# Patient Record
Sex: MALE | Race: WHITE | NOT HISPANIC OR LATINO | Employment: FULL TIME | URBAN - METROPOLITAN AREA
[De-identification: names, ages, dates, MRNs, and addresses within clinical notes are randomized per-mention and may not be internally consistent; named-entity substitution may affect disease eponyms.]

---

## 2017-10-13 ENCOUNTER — TELEPHONE (OUTPATIENT)
Dept: FAMILY MEDICINE | Facility: CLINIC | Age: 52
End: 2017-10-13

## 2017-10-13 NOTE — TELEPHONE ENCOUNTER
----- Message from Jaelyn Alcantar sent at 10/13/2017  1:32 PM CDT -----  Wife (Yessica Cuello)is returning nurse's (Daly) call/please call back at [phone] or [phone] (cellphone)

## 2017-10-13 NOTE — TELEPHONE ENCOUNTER
Pt has an appointment on 10/23 at 3:30 pm.  Per Dr. Lugo pt needs to come at 4:45 pm. Dr. Lugo will not be available 10/23/17 from 330-4:30 pm.     LMOR for pt to call clinic back.

## 2018-03-19 ENCOUNTER — DOCUMENTATION ONLY (OUTPATIENT)
Dept: FAMILY MEDICINE | Facility: CLINIC | Age: 53
End: 2018-03-19

## 2018-03-19 NOTE — PROGRESS NOTES
Pre-Visit Chart Review  For Appointment Scheduled on 3-20-18    Health Maintenance Due   Topic Date Due    Foot Exam  03/16/1973    Eye Exam  03/16/1973    TETANUS VACCINE  03/16/1981    Hemoglobin A1c  09/21/2009    Lipid Panel  03/21/2010    Colonoscopy  03/16/2013    Influenza Vaccine  08/01/2017

## 2018-03-20 ENCOUNTER — OFFICE VISIT (OUTPATIENT)
Dept: FAMILY MEDICINE | Facility: CLINIC | Age: 53
End: 2018-03-20
Payer: COMMERCIAL

## 2018-03-20 ENCOUNTER — LAB VISIT (OUTPATIENT)
Dept: LAB | Facility: HOSPITAL | Age: 53
End: 2018-03-20
Attending: FAMILY MEDICINE
Payer: COMMERCIAL

## 2018-03-20 VITALS
TEMPERATURE: 98 F | HEIGHT: 72 IN | BODY MASS INDEX: 38.22 KG/M2 | DIASTOLIC BLOOD PRESSURE: 78 MMHG | WEIGHT: 282.19 LBS | SYSTOLIC BLOOD PRESSURE: 136 MMHG | HEART RATE: 66 BPM

## 2018-03-20 DIAGNOSIS — E78.5 HYPERLIPIDEMIA ASSOCIATED WITH TYPE 2 DIABETES MELLITUS: ICD-10-CM

## 2018-03-20 DIAGNOSIS — E11.9 DIABETIC EYE EXAM: ICD-10-CM

## 2018-03-20 DIAGNOSIS — E11.40 TYPE 2 DIABETES MELLITUS WITH DIABETIC NEUROPATHY, WITHOUT LONG-TERM CURRENT USE OF INSULIN: ICD-10-CM

## 2018-03-20 DIAGNOSIS — D50.9 IRON DEFICIENCY ANEMIA, UNSPECIFIED IRON DEFICIENCY ANEMIA TYPE: ICD-10-CM

## 2018-03-20 DIAGNOSIS — K21.9 GASTROESOPHAGEAL REFLUX DISEASE, ESOPHAGITIS PRESENCE NOT SPECIFIED: ICD-10-CM

## 2018-03-20 DIAGNOSIS — Z01.00 DIABETIC EYE EXAM: ICD-10-CM

## 2018-03-20 DIAGNOSIS — E11.69 HYPERLIPIDEMIA ASSOCIATED WITH TYPE 2 DIABETES MELLITUS: ICD-10-CM

## 2018-03-20 DIAGNOSIS — Z87.19 H/O PYLORIC STENOSIS: ICD-10-CM

## 2018-03-20 DIAGNOSIS — R06.81 WITNESSED EPISODE OF APNEA: ICD-10-CM

## 2018-03-20 DIAGNOSIS — R06.83 SNORING: ICD-10-CM

## 2018-03-20 DIAGNOSIS — Z12.5 PROSTATE CANCER SCREENING: ICD-10-CM

## 2018-03-20 DIAGNOSIS — I10 ESSENTIAL HYPERTENSION: ICD-10-CM

## 2018-03-20 DIAGNOSIS — J30.1 CHRONIC SEASONAL ALLERGIC RHINITIS DUE TO POLLEN: ICD-10-CM

## 2018-03-20 DIAGNOSIS — H10.10 ALLERGIC CONJUNCTIVITIS, UNSPECIFIED LATERALITY: Primary | ICD-10-CM

## 2018-03-20 PROBLEM — J30.9 ALLERGIC RHINITIS: Status: ACTIVE | Noted: 2018-03-20

## 2018-03-20 LAB
ALBUMIN SERPL BCP-MCNC: 4 G/DL
ALP SERPL-CCNC: 73 U/L
ALT SERPL W/O P-5'-P-CCNC: 29 U/L
ANION GAP SERPL CALC-SCNC: 12 MMOL/L
AST SERPL-CCNC: 23 U/L
BASOPHILS # BLD AUTO: 0.07 K/UL
BASOPHILS NFR BLD: 0.9 %
BILIRUB SERPL-MCNC: 0.5 MG/DL
BUN SERPL-MCNC: 25 MG/DL
CALCIUM SERPL-MCNC: 9.7 MG/DL
CHLORIDE SERPL-SCNC: 94 MMOL/L
CHOLEST SERPL-MCNC: 266 MG/DL
CHOLEST/HDLC SERPL: 6.5 {RATIO}
CO2 SERPL-SCNC: 32 MMOL/L
COMPLEXED PSA SERPL-MCNC: 0.76 NG/ML
CREAT SERPL-MCNC: 1.4 MG/DL
DIFFERENTIAL METHOD: ABNORMAL
EOSINOPHIL # BLD AUTO: 0.4 K/UL
EOSINOPHIL NFR BLD: 4.6 %
ERYTHROCYTE [DISTWIDTH] IN BLOOD BY AUTOMATED COUNT: 14 %
EST. GFR  (AFRICAN AMERICAN): >60 ML/MIN/1.73 M^2
EST. GFR  (NON AFRICAN AMERICAN): 56.2 ML/MIN/1.73 M^2
ESTIMATED AVG GLUCOSE: 200 MG/DL
FERRITIN SERPL-MCNC: 49 NG/ML
GLUCOSE SERPL-MCNC: 249 MG/DL
HBA1C MFR BLD HPLC: 8.6 %
HCT VFR BLD AUTO: 39 %
HDLC SERPL-MCNC: 41 MG/DL
HDLC SERPL: 15.4 %
HGB BLD-MCNC: 13.1 G/DL
IMM GRANULOCYTES # BLD AUTO: 0.03 K/UL
IMM GRANULOCYTES NFR BLD AUTO: 0.4 %
IRON SERPL-MCNC: 74 UG/DL
LDLC SERPL CALC-MCNC: ABNORMAL MG/DL
LYMPHOCYTES # BLD AUTO: 2.2 K/UL
LYMPHOCYTES NFR BLD: 28 %
MCH RBC QN AUTO: 29.6 PG
MCHC RBC AUTO-ENTMCNC: 33.6 G/DL
MCV RBC AUTO: 88 FL
MONOCYTES # BLD AUTO: 0.5 K/UL
MONOCYTES NFR BLD: 6.5 %
NEUTROPHILS # BLD AUTO: 4.8 K/UL
NEUTROPHILS NFR BLD: 59.6 %
NONHDLC SERPL-MCNC: 225 MG/DL
NRBC BLD-RTO: 0 /100 WBC
PLATELET # BLD AUTO: 236 K/UL
PMV BLD AUTO: 11.4 FL
POTASSIUM SERPL-SCNC: 3.6 MMOL/L
PROT SERPL-MCNC: 7.6 G/DL
RBC # BLD AUTO: 4.43 M/UL
SATURATED IRON: 14 %
SODIUM SERPL-SCNC: 138 MMOL/L
TOTAL IRON BINDING CAPACITY: 522 UG/DL
TRANSFERRIN SERPL-MCNC: 353 MG/DL
TRIGL SERPL-MCNC: 487 MG/DL
WBC # BLD AUTO: 8.01 K/UL

## 2018-03-20 PROCEDURE — 36415 COLL VENOUS BLD VENIPUNCTURE: CPT | Mod: PO

## 2018-03-20 PROCEDURE — 82728 ASSAY OF FERRITIN: CPT

## 2018-03-20 PROCEDURE — 83036 HEMOGLOBIN GLYCOSYLATED A1C: CPT

## 2018-03-20 PROCEDURE — 83540 ASSAY OF IRON: CPT

## 2018-03-20 PROCEDURE — 3075F SYST BP GE 130 - 139MM HG: CPT | Mod: CPTII,S$GLB,, | Performed by: FAMILY MEDICINE

## 2018-03-20 PROCEDURE — 80053 COMPREHEN METABOLIC PANEL: CPT

## 2018-03-20 PROCEDURE — 85025 COMPLETE CBC W/AUTO DIFF WBC: CPT

## 2018-03-20 PROCEDURE — 3045F PR MOST RECENT HEMOGLOBIN A1C LEVEL 7.0-9.0%: CPT | Mod: CPTII,S$GLB,, | Performed by: FAMILY MEDICINE

## 2018-03-20 PROCEDURE — 84153 ASSAY OF PSA TOTAL: CPT

## 2018-03-20 PROCEDURE — 99999 PR PBB SHADOW E&M-EST. PATIENT-LVL III: CPT | Mod: PBBFAC,,, | Performed by: FAMILY MEDICINE

## 2018-03-20 PROCEDURE — 99214 OFFICE O/P EST MOD 30 MIN: CPT | Mod: S$GLB,,, | Performed by: FAMILY MEDICINE

## 2018-03-20 PROCEDURE — 3078F DIAST BP <80 MM HG: CPT | Mod: CPTII,S$GLB,, | Performed by: FAMILY MEDICINE

## 2018-03-20 PROCEDURE — 80061 LIPID PANEL: CPT

## 2018-03-20 RX ORDER — GLIMEPIRIDE 4 MG/1
TABLET ORAL
COMMUNITY
Start: 2018-02-16 | End: 2018-03-20

## 2018-03-20 RX ORDER — LISINOPRIL AND HYDROCHLOROTHIAZIDE 20; 25 MG/1; MG/1
1 TABLET ORAL DAILY
Qty: 90 TABLET | Refills: 3 | Status: SHIPPED | OUTPATIENT
Start: 2018-03-20 | End: 2018-12-18 | Stop reason: SDUPTHER

## 2018-03-20 RX ORDER — AZELASTINE HYDROCHLORIDE 0.5 MG/ML
1 SOLUTION/ DROPS OPHTHALMIC 2 TIMES DAILY
Qty: 4 ML | Refills: 11 | Status: SHIPPED | OUTPATIENT
Start: 2018-03-20 | End: 2018-10-08

## 2018-03-20 RX ORDER — METFORMIN HYDROCHLORIDE 1000 MG/1
1000 TABLET ORAL 2 TIMES DAILY WITH MEALS
COMMUNITY
End: 2018-03-20 | Stop reason: SDUPTHER

## 2018-03-20 RX ORDER — FERROUS SULFATE 325(65) MG
325 TABLET ORAL
COMMUNITY

## 2018-03-20 RX ORDER — LORATADINE 10 MG/1
10 TABLET ORAL DAILY
COMMUNITY

## 2018-03-20 RX ORDER — GABAPENTIN 300 MG/1
CAPSULE ORAL
Qty: 90 CAPSULE | Refills: 2 | Status: SHIPPED | OUTPATIENT
Start: 2018-03-20 | End: 2018-04-17

## 2018-03-20 RX ORDER — LANCETS
EACH MISCELLANEOUS
Qty: 100 EACH | Refills: 3 | Status: SHIPPED | OUTPATIENT
Start: 2018-03-20 | End: 2019-09-04

## 2018-03-20 RX ORDER — MULTIVITAMIN
1 TABLET ORAL DAILY
COMMUNITY

## 2018-03-20 RX ORDER — OMEPRAZOLE 20 MG/1
20 CAPSULE, DELAYED RELEASE ORAL DAILY
COMMUNITY
End: 2018-08-29 | Stop reason: SDUPTHER

## 2018-03-20 RX ORDER — IBUPROFEN 100 MG/5ML
1000 SUSPENSION, ORAL (FINAL DOSE FORM) ORAL DAILY
COMMUNITY
End: 2021-12-08

## 2018-03-20 RX ORDER — METFORMIN HYDROCHLORIDE 1000 MG/1
1000 TABLET ORAL 2 TIMES DAILY WITH MEALS
Qty: 180 TABLET | Refills: 3 | Status: SHIPPED | OUTPATIENT
Start: 2018-03-20 | End: 2018-09-05

## 2018-03-20 RX ORDER — ATENOLOL AND CHLORTHALIDONE TABLET 100; 25 MG/1; MG/1
1 TABLET ORAL DAILY
COMMUNITY
Start: 2018-02-16 | End: 2018-03-20

## 2018-03-20 RX ORDER — ASPIRIN 81 MG/1
81 TABLET ORAL DAILY
COMMUNITY
End: 2018-10-08

## 2018-03-20 RX ORDER — TRIAMCINOLONE ACETONIDE 55 UG/1
2 SPRAY, METERED NASAL DAILY
Qty: 16.5 G | Status: SHIPPED | OUTPATIENT
Start: 2018-03-20 | End: 2018-04-17 | Stop reason: ALTCHOICE

## 2018-03-20 RX ORDER — DEXTROSE 4 G
TABLET,CHEWABLE ORAL
Qty: 1 EACH | Refills: 0 | Status: SHIPPED | OUTPATIENT
Start: 2018-03-20 | End: 2018-09-05

## 2018-03-20 NOTE — PROGRESS NOTES
"Subjective:       Patient ID: Alfred Cuello is a 53 y.o. male.    Chief Complaint: Establish Care and Insomnia    Patient Active Problem List   Diagnosis    DM type 2 (diabetes mellitus, type 2)    Iron deficiency anemia    Essential hypertension    Allergic rhinitis    GERD (gastroesophageal reflux disease)    H/O pyloric stenosis    Hyperlipidemia associated with type 2 diabetes mellitus   Has not seen PCP in 2 years.  Has not been checking BS.  Has h/o neuropathy in feet with worsening sx of burning, crawling, pain crys after prolonged walking.  Tried Metanx but too expensive.      C/o chronic allergies with filmy eyes, congestion, stuffy nose, nose bleeds, PND. Not responding to claritin.  Tried flonase but had bad taste and made him feel "weird"    HPI  Review of Systems   Constitutional: Negative for fatigue and unexpected weight change.   Respiratory: Negative for chest tightness and shortness of breath.    Cardiovascular: Negative for chest pain, palpitations and leg swelling.   Gastrointestinal: Negative for abdominal pain, blood in stool, constipation and diarrhea.   Musculoskeletal: Negative for arthralgias.   Neurological: Negative for dizziness, syncope, light-headedness and headaches.       Objective:      Physical Exam   Constitutional: He is oriented to person, place, and time. He appears well-developed and well-nourished.   Cardiovascular: Normal rate, regular rhythm and normal heart sounds.    Pulmonary/Chest: Effort normal and breath sounds normal.   Musculoskeletal: He exhibits no edema.   Neurological: He is alert and oriented to person, place, and time.   Skin: Skin is warm and dry.   Psychiatric: He has a normal mood and affect.   Nursing note and vitals reviewed.      Assessment:       1. Allergic conjunctivitis, unspecified laterality    2. Type 2 diabetes mellitus with diabetic neuropathy, without long-term current use of insulin    3. Iron deficiency anemia, unspecified iron " deficiency anemia type    4. Essential hypertension    5. Chronic seasonal allergic rhinitis due to pollen    6. Gastroesophageal reflux disease, esophagitis presence not specified    7. H/O pyloric stenosis    8. Prostate cancer screening    9. Hyperlipidemia associated with type 2 diabetes mellitus    10. Diabetic eye exam    11. Witnessed episode of apnea    12. Snoring        Plan:       1. Type 2 diabetes mellitus with diabetic neuropathy, without long-term current use of insulin  Stable condition.  Continue current medications.  Will adjust based on lab findings or if condition changes.    - CBC auto differential; Future  - Comprehensive metabolic panel; Future  - Microalbumin/creatinine urine ratio; Future  - Hemoglobin A1c; Future  - metFORMIN (GLUCOPHAGE) 1000 MG tablet; Take 1 tablet (1,000 mg total) by mouth 2 (two) times daily with meals.  Dispense: 180 tablet; Refill: 3  - blood-glucose meter (CONTOUR METER) Misc; 1 meter  Dispense: 1 each; Refill: 0  - blood sugar diagnostic Strp; Take 1 x day testing  Dispense: 100 strip; Refill: 3  - lancets Misc; Take 1 x day  Dispense: 100 each; Refill: 3  Add  - gabapentin (NEURONTIN) 300 MG capsule; 1 po qhs x 1week, then 1 po bid x 1 week, then 1 po tid maintenance  Dispense: 90 capsule; Refill: 2    2. Iron deficiency anemia, unspecified iron deficiency anemia type  Screen and treat as indicated:    - Iron and TIBC; Future  - Ferritin; Future    3. Essential hypertension  Controlled on current medications.  Continue current medications.  D/c tenoretic for : (due to preferred AcEi)  - lisinopril-hydrochlorothiazide (PRINZIDE,ZESTORETIC) 20-25 mg Tab; Take 1 tablet by mouth once daily.  Dispense: 90 tablet; Refill: 3    4. Chronic seasonal allergic rhinitis due to pollen  Recommend otc non-sedating antihistamine such as Loratadine and/or steroid nasal spray such as Flonase as directed and as needed.  Please return to clinic if symptoms persist after these  interventions.    - triamcinolone (NASACORT) 55 mcg nasal inhaler; 2 sprays by Nasal route once daily.  Dispense: 16.5 g; Refill: prn    5. Gastroesophageal reflux disease, esophagitis presence not specified  Counseled patient on prevention of reflux with changes in diet and behavior.  I recommended avoidance of greasy and spicy foods, caffeine and eating within 3 hours of bedtime.  I counseled the patient to avoid eating large meals and instead eating more frequent small meals.  I also recommended weight loss and elevation of the head of the bed by 6 inches.  If symptoms persist after these changes medication may be needed to control GERD.        6. H/O pyloric stenosis  Noted    7. Allergic conjunctivitis, unspecified laterality  Add  - azelastine (OPTIVAR) 0.05 % ophthalmic solution; Place 1 drop into both eyes 2 (two) times daily.  Dispense: 4 mL; Refill: 11    8. Prostate cancer screening  Discussed benefits, risks and limitations of PSA testing and current USPSTF guidelines.  Patient expressed verbal understanding and wished to pursue screening.    - PSA, Screening; Future    9. Hyperlipidemia associated with type 2 diabetes mellitus  Stable condition.  Continue current medications.  Will adjust based on lab findings or if condition changes.    - Lipid panel; Future    10. Diabetic eye exam  Refer for eval and treat  - Ambulatory referral to Optometry    11. Witnessed episode of apnea  Refer for eval and treat  - Polysomnogram (CPAP will be added if patient meets diagnostic criteria.); Future    12. Snoring  Refer for eval and treat  - Polysomnogram (CPAP will be added if patient meets diagnostic criteria.); Future    Reeval 4 months with me and 4 weeks PA Carlos

## 2018-03-21 ENCOUNTER — TELEPHONE (OUTPATIENT)
Dept: SLEEP MEDICINE | Facility: HOSPITAL | Age: 53
End: 2018-03-21

## 2018-03-22 DIAGNOSIS — Z12.11 COLON CANCER SCREENING: ICD-10-CM

## 2018-03-23 DIAGNOSIS — E11.40 TYPE 2 DIABETES MELLITUS WITH DIABETIC NEUROPATHY, WITHOUT LONG-TERM CURRENT USE OF INSULIN: Primary | ICD-10-CM

## 2018-03-23 DIAGNOSIS — E11.69 HYPERLIPIDEMIA ASSOCIATED WITH TYPE 2 DIABETES MELLITUS: ICD-10-CM

## 2018-03-23 DIAGNOSIS — E78.5 HYPERLIPIDEMIA ASSOCIATED WITH TYPE 2 DIABETES MELLITUS: ICD-10-CM

## 2018-03-23 RX ORDER — GLIPIZIDE 5 MG/1
5 TABLET, FILM COATED, EXTENDED RELEASE ORAL
Qty: 90 TABLET | Refills: 3 | Status: SHIPPED | OUTPATIENT
Start: 2018-03-23 | End: 2018-04-17

## 2018-03-23 RX ORDER — ATORVASTATIN CALCIUM 40 MG/1
40 TABLET, FILM COATED ORAL DAILY
Qty: 90 TABLET | Refills: 3 | Status: SHIPPED | OUTPATIENT
Start: 2018-03-23 | End: 2019-03-23 | Stop reason: SDUPTHER

## 2018-04-10 ENCOUNTER — OFFICE VISIT (OUTPATIENT)
Dept: OPTOMETRY | Facility: CLINIC | Age: 53
End: 2018-04-10
Payer: COMMERCIAL

## 2018-04-10 DIAGNOSIS — H25.13 NUCLEAR SCLEROSIS, BILATERAL: ICD-10-CM

## 2018-04-10 DIAGNOSIS — E11.9 DIABETES MELLITUS WITHOUT OPHTHALMIC MANIFESTATIONS: Primary | ICD-10-CM

## 2018-04-10 DIAGNOSIS — H52.7 REFRACTIVE ERROR: ICD-10-CM

## 2018-04-10 DIAGNOSIS — H04.123 DRY EYE SYNDROME, BILATERAL: ICD-10-CM

## 2018-04-10 PROCEDURE — 99999 PR PBB SHADOW E&M-EST. PATIENT-LVL II: CPT | Mod: PBBFAC,,, | Performed by: OPTOMETRIST

## 2018-04-10 PROCEDURE — 92004 COMPRE OPH EXAM NEW PT 1/>: CPT | Mod: S$GLB,,, | Performed by: OPTOMETRIST

## 2018-04-10 RX ORDER — ZOSTER VACCINE RECOMBINANT, ADJUVANTED 50 MCG/0.5
KIT INTRAMUSCULAR
Refills: 0 | COMMUNITY
Start: 2018-03-20 | End: 2019-09-04

## 2018-04-10 RX ORDER — TETANUS TOXOID, REDUCED DIPHTHERIA TOXOID AND ACELLULAR PERTUSSIS VACCINE, ADSORBED 5; 2.5; 8; 8; 2.5 [IU]/.5ML; [IU]/.5ML; UG/.5ML; UG/.5ML; UG/.5ML
SUSPENSION INTRAMUSCULAR
Refills: 0 | COMMUNITY
Start: 2018-03-20 | End: 2019-09-04

## 2018-04-10 NOTE — LETTER
April 10, 2018      Nuria Man MD  [address]  [place] [address]           [facility] - Opt[facility]  [address] [place] [address] Drive [address]  [facility] [address]  Phone: [phone]          Patient: Alfred Cuello   MR Number: [id number]   YOB: 1965   Date of Visit: 4/10/2018       Dear Dr. Nuria Man:    Thank you for referring Alfred Cuello to me for evaluation. Attached you will find relevant portions of my assessment and plan of care.    If you have questions, please do not hesitate to call me. I look forward to following Alfred Cuello along with you.    Sincerely,    Carl Yost, OD    Enclosure  CC:  No Recipients    If you would like to receive this communication electronically, please contact [email or web address] or [phone] to request more information on [facility] access.    For providers and/or their staff who would like to refer a patient to [facility], please contact us through our one-stop-shop provider referral line, Tod Jacob, at [phone].    If you feel you have received this communication in error or would no longer like to receive these types of communications, please e-mail [email or web address]

## 2018-04-10 NOTE — PROGRESS NOTES
HPI     Presenting Complaint: Pt her today for yearly diabetic eye exam and   refraction.     Hemoglobin A1C       Date                     Value               Ref Range             Status                03/20/2018               8.6 (H)             4.0 - 5.6 %           Final                 03/21/2009               8.6 (H)             4.0 - 6.2 %           Final                 10/06/2008               12.5 (H)            4.0 - 6.2 %           Final            ----------     Pt states distance vision has been blurry, Uses OTC readers for small   print.     Ophthalmic medication / drops: Optivar 1 drop both eyes 2 x day    (-) headaches  (-) diplopia   (-) flashes / (-) floaters      Last edited by Carl Yost, OD on 4/10/2018  4:04 PM. (History)            Assessment /Plan     For exam results, see Encounter Report.    Diabetes mellitus without ophthalmic manifestations    Nuclear sclerosis, bilateral    Refractive error    Dry eye syndrome, bilateral      DM type 2 w/o ocular retinopathy OU. Discussed possible ocular affects of uncontrolled blood sugar with patient. Recommended continued strong blood sugar control and continued care with PCP. Monitor yearly.     Mild NS OU. Discussed possible ocular affects of cataracts. Acceptable BCVA OU. Discussed treatment options. Surgery not recommended at this time. Monitor yearly.     Discussed spec options, pt prefers to try OTC readers first, recommend OTC +1.75 for distance (20/25 OU) and OTC +4.00 for near. Return prn for spec Rx.    JOSE OU. D/c optivar and visine. Start OTC Systane Balance bid-qid OU. Discussed ocular affects of dry eyes. Discussed chronicity of JOSE. RTC if symptoms not alleviated by continued use of artificial tears.       RTC in 1 year for comprehensive eye exam, or sooner prn.

## 2018-04-17 ENCOUNTER — DOCUMENTATION ONLY (OUTPATIENT)
Dept: FAMILY MEDICINE | Facility: CLINIC | Age: 53
End: 2018-04-17

## 2018-04-17 ENCOUNTER — OFFICE VISIT (OUTPATIENT)
Dept: FAMILY MEDICINE | Facility: CLINIC | Age: 53
End: 2018-04-17
Payer: COMMERCIAL

## 2018-04-17 VITALS
HEART RATE: 88 BPM | HEIGHT: 72 IN | BODY MASS INDEX: 38.91 KG/M2 | TEMPERATURE: 99 F | SYSTOLIC BLOOD PRESSURE: 126 MMHG | WEIGHT: 287.25 LBS | DIASTOLIC BLOOD PRESSURE: 77 MMHG

## 2018-04-17 DIAGNOSIS — E11.42 DIABETIC POLYNEUROPATHY ASSOCIATED WITH TYPE 2 DIABETES MELLITUS: ICD-10-CM

## 2018-04-17 DIAGNOSIS — E11.69 HYPERLIPIDEMIA ASSOCIATED WITH TYPE 2 DIABETES MELLITUS: ICD-10-CM

## 2018-04-17 DIAGNOSIS — I15.2 HYPERTENSION ASSOCIATED WITH DIABETES: ICD-10-CM

## 2018-04-17 DIAGNOSIS — E78.5 HYPERLIPIDEMIA ASSOCIATED WITH TYPE 2 DIABETES MELLITUS: ICD-10-CM

## 2018-04-17 DIAGNOSIS — E11.59 HYPERTENSION ASSOCIATED WITH DIABETES: ICD-10-CM

## 2018-04-17 DIAGNOSIS — Z12.11 ENCOUNTER FOR SCREENING COLONOSCOPY: ICD-10-CM

## 2018-04-17 DIAGNOSIS — N18.30 CKD (CHRONIC KIDNEY DISEASE) STAGE 3, GFR 30-59 ML/MIN: ICD-10-CM

## 2018-04-17 DIAGNOSIS — G47.33 OSA (OBSTRUCTIVE SLEEP APNEA): Primary | ICD-10-CM

## 2018-04-17 DIAGNOSIS — E11.65 UNCONTROLLED TYPE 2 DIABETES MELLITUS WITH HYPERGLYCEMIA, WITHOUT LONG-TERM CURRENT USE OF INSULIN: Primary | ICD-10-CM

## 2018-04-17 PROCEDURE — 3045F PR MOST RECENT HEMOGLOBIN A1C LEVEL 7.0-9.0%: CPT | Mod: CPTII,S$GLB,, | Performed by: PHYSICIAN ASSISTANT

## 2018-04-17 PROCEDURE — 99999 PR PBB SHADOW E&M-EST. PATIENT-LVL V: CPT | Mod: PBBFAC,,, | Performed by: PHYSICIAN ASSISTANT

## 2018-04-17 PROCEDURE — 3074F SYST BP LT 130 MM HG: CPT | Mod: CPTII,S$GLB,, | Performed by: PHYSICIAN ASSISTANT

## 2018-04-17 PROCEDURE — 99214 OFFICE O/P EST MOD 30 MIN: CPT | Mod: S$GLB,,, | Performed by: PHYSICIAN ASSISTANT

## 2018-04-17 PROCEDURE — 3078F DIAST BP <80 MM HG: CPT | Mod: CPTII,S$GLB,, | Performed by: PHYSICIAN ASSISTANT

## 2018-04-17 RX ORDER — GABAPENTIN 300 MG/1
900 CAPSULE ORAL 3 TIMES DAILY
Qty: 270 CAPSULE | Refills: 2 | Status: SHIPPED | OUTPATIENT
Start: 2018-04-17 | End: 2018-07-18 | Stop reason: SDUPTHER

## 2018-04-17 RX ORDER — GLIPIZIDE 10 MG/1
10 TABLET, FILM COATED, EXTENDED RELEASE ORAL
Qty: 30 TABLET | Refills: 2 | Status: SHIPPED | OUTPATIENT
Start: 2018-04-17 | End: 2018-08-07 | Stop reason: SDUPTHER

## 2018-04-17 NOTE — PROGRESS NOTES
Pre-Visit Chart Review  For Appointment Scheduled on 4/17/2018    Health Maintenance Due   Topic Date Due    Foot Exam  03/16/1973    TETANUS VACCINE  03/16/1981    Pneumococcal PPSV23 (Medium Risk) (1) 03/16/1981    Colonoscopy  03/16/2013    Influenza Vaccine  08/01/2017

## 2018-04-17 NOTE — PROGRESS NOTES
Home sleep study did show significant RAFAEL and a trial of CPAP was recommended.  I have placed the order.  (please schedule for patient)

## 2018-04-17 NOTE — PROGRESS NOTES
Subjective:       Patient ID: Alfred Cuello is a 53 y.o. male.    Chief Complaint: Follow-up    Mr. Cuello is a 53 year old male who presents to clinic for follow up on hypertension and type 2 diabetes. Since his last visit he states gabapentin has helped only some with pins and needle sensation and burning pain in the legs. He has tolerated increasing dose to 900 mg BID and would like to increase to TID. Overall, he is tolerating the medication. He states morning fasting BG has remained elevated 180-220 despite addition of glipizide. He is open to seeing the diabetic educator. He reports occasional lower extremity edema. We reviewed his labs in detail including uncontrolled diabetes with HgbA1c 8 %, uncontrolled hyperlipidemia with trig >400, and mild renal insufficiency. He is due for foot exam today. He is due for colonoscopy.       Review of Systems   Constitutional: Negative for chills, fatigue and fever.   Eyes: Negative for visual disturbance.   Respiratory: Negative for cough, shortness of breath and wheezing.    Cardiovascular: Positive for leg swelling. Negative for chest pain and palpitations.   Gastrointestinal: Negative for abdominal pain, constipation, diarrhea, nausea and vomiting.   Musculoskeletal: Negative for arthralgias and myalgias.   Neurological: Positive for numbness. Negative for dizziness, syncope and headaches.        Burning sensation feet   Hematological: Negative for adenopathy.       Objective:      Vitals:    04/17/18 1706   BP: 126/77   Pulse: 88   Temp:      Physical Exam   Constitutional: He is oriented to person, place, and time. He appears well-developed.   Obese body habitus.   HENT:   Head: Normocephalic and atraumatic.   Eyes: Conjunctivae and EOM are normal. Pupils are equal, round, and reactive to light.   Cardiovascular: Normal rate, regular rhythm, normal heart sounds and intact distal pulses.    Pulses:       Dorsalis pedis pulses are 2+ on the right side, and 2+ on the  left side.        Posterior tibial pulses are 2+ on the right side, and 2+ on the left side.   Pulmonary/Chest: Effort normal and breath sounds normal.   Musculoskeletal:        Right foot: There is normal range of motion and no deformity.        Left foot: There is normal range of motion and no deformity.   Feet:   Right Foot:   Protective Sensation: 5 sites tested. 1 site sensed.  Skin Integrity: Negative for ulcer, blister or skin breakdown.   Left Foot:   Protective Sensation: 5 sites tested. 0 sites sensed.   Skin Integrity: Negative for ulcer, blister or skin breakdown.   Neurological: He is alert and oriented to person, place, and time.   Skin: Skin is warm and dry.   Psychiatric: He has a normal mood and affect.       Assessment:       1. Uncontrolled type 2 diabetes mellitus with hyperglycemia, without long-term current use of insulin    2. Diabetic polyneuropathy associated with type 2 diabetes mellitus    3. Hypertension associated with diabetes    4. Hyperlipidemia associated with type 2 diabetes mellitus    5. CKD (chronic kidney disease) stage 3, GFR 30-59 ml/min    6. Obesity, Class II, BMI 35.0-39.9, with comorbidity (see actual BMI)    7. Encounter for screening colonoscopy        Plan:       Uncontrolled type 2 diabetes mellitus with hyperglycemia, without long-term current use of insulin  -     glipiZIDE (GLUCOTROL) 10 MG TR24; Take 1 tablet (10 mg total) by mouth daily with breakfast.  Dispense: 30 tablet; Refill: 2  - Continue metformin as prescribed  -     Ambulatory Referral to Diabetes Education  - Discussed foot care today    Diabetic polyneuropathy associated with type 2 diabetes mellitus  -     Increase gabapentin (NEURONTIN) 300 MG capsule; Take 3 capsules (900 mg total) by mouth 3 (three) times daily.  Dispense: 270 capsule; Refill: 2. Monitor for side effects including dizziness/drowsiness/instability    Hypertension associated with diabetes        - Controlled, continue  lisinopril/hctz    Hyperlipidemia associated with type 2 diabetes mellitus        - Uncontrolled, continue lipitor 40 mg daily. Repeat lipids in 3 months and labs to be ordered at f/u visit.    CKD (chronic kidney disease) stage 3, GFR 30-59 ml/min        - Stable, avoid NSAIDs, continue to follow     Obesity, Class II, BMI 35.0-39.9, with comorbidity (see actual BMI)         - Encouraged patient to increase routine exercise     Encounter for screening colonoscopy  -     Ambulatory referral to Gastroenterology    Patient readiness: acceptance and barriers:none    During the course of the visit the patient was educated and counseled about the following:     Diabetes:  Discussed foot care.  Hypertension:   Medication: no change.  Obesity:   Regular aerobic exercise program discussed.    Goals: Diabetes: Maintain Hemoglobin A1C below 7, Hypertension: Reduce Blood Pressure and Obesity: Reduce calorie intake and BMI    Did patient meet goals/outcomes: No    The following self management tools provided: declined    Patient Instructions (the written plan) was given to the patient/family.     Time spent with patient: 30 minutes    Follow up in 4 weeks with me and 3 months with Dr. Man

## 2018-04-17 NOTE — Clinical Note
Please provide patient with information to schedule his colonoscopy via phone or [facility] message. Referral placed.

## 2018-04-20 PROBLEM — I12.9 HYPERTENSION ASSOCIATED WITH CHRONIC KIDNEY DISEASE DUE TO TYPE 2 DIABETES MELLITUS: Status: ACTIVE | Noted: 2018-03-20

## 2018-04-20 PROBLEM — I15.2 HYPERTENSION ASSOCIATED WITH DIABETES: Status: ACTIVE | Noted: 2018-03-20

## 2018-04-20 PROBLEM — E11.22 HYPERTENSION ASSOCIATED WITH CHRONIC KIDNEY DISEASE DUE TO TYPE 2 DIABETES MELLITUS: Status: ACTIVE | Noted: 2018-03-20

## 2018-04-20 PROBLEM — E11.59 HYPERTENSION ASSOCIATED WITH DIABETES: Status: ACTIVE | Noted: 2018-03-20

## 2018-05-01 ENCOUNTER — TELEPHONE (OUTPATIENT)
Dept: FAMILY MEDICINE | Facility: CLINIC | Age: 53
End: 2018-05-01

## 2018-05-01 ENCOUNTER — PATIENT MESSAGE (OUTPATIENT)
Dept: FAMILY MEDICINE | Facility: CLINIC | Age: 53
End: 2018-05-01

## 2018-05-01 NOTE — TELEPHONE ENCOUNTER
No he should not stop the baby aspirin.  His kidney impairment is not severe and this small dose of aspirin is not likely to significantly affect kidney function.  However aspirin is very effective at preventing heart attack and stroke

## 2018-05-01 NOTE — TELEPHONE ENCOUNTER
Spoke with patients wife. She knew her husbands test results and wanted to know if he should stop his baby asa, due to his impaired kidney function. I told her I would have to ask but I could not call her back, since there is not IOC on his chart. She understood and said she would get him to take care of that.

## 2018-05-01 NOTE — TELEPHONE ENCOUNTER
----- Message from Yadiel Amador sent at 5/1/2018  9:09 AM CDT -----  Contact: pt's wife Yessica Juan is calling to see if she can speak with a nurse about pt's medications   Call Back#[phone]  Thanks

## 2018-05-10 ENCOUNTER — PATIENT MESSAGE (OUTPATIENT)
Dept: FAMILY MEDICINE | Facility: CLINIC | Age: 53
End: 2018-05-10

## 2018-05-11 ENCOUNTER — TELEPHONE (OUTPATIENT)
Dept: FAMILY MEDICINE | Facility: CLINIC | Age: 53
End: 2018-05-11

## 2018-05-11 DIAGNOSIS — G47.33 OSA ON CPAP: Primary | ICD-10-CM

## 2018-05-11 NOTE — TELEPHONE ENCOUNTER
Notify patient:  Sleep study showed RAFAEL and needs CPAP. Order placed for home machine. Please process

## 2018-05-14 ENCOUNTER — DOCUMENTATION ONLY (OUTPATIENT)
Dept: FAMILY MEDICINE | Facility: CLINIC | Age: 53
End: 2018-05-14

## 2018-05-14 ENCOUNTER — OFFICE VISIT (OUTPATIENT)
Dept: FAMILY MEDICINE | Facility: CLINIC | Age: 53
End: 2018-05-14
Payer: COMMERCIAL

## 2018-05-14 VITALS
TEMPERATURE: 99 F | BODY MASS INDEX: 37.93 KG/M2 | SYSTOLIC BLOOD PRESSURE: 133 MMHG | HEART RATE: 100 BPM | DIASTOLIC BLOOD PRESSURE: 78 MMHG | HEIGHT: 72 IN | WEIGHT: 280 LBS

## 2018-05-14 DIAGNOSIS — I15.2 HYPERTENSION ASSOCIATED WITH DIABETES: ICD-10-CM

## 2018-05-14 DIAGNOSIS — E11.40 TYPE 2 DIABETES MELLITUS WITH DIABETIC NEUROPATHY, WITHOUT LONG-TERM CURRENT USE OF INSULIN: Primary | ICD-10-CM

## 2018-05-14 DIAGNOSIS — R21 RASH OF HANDS: ICD-10-CM

## 2018-05-14 DIAGNOSIS — E11.59 HYPERTENSION ASSOCIATED WITH DIABETES: ICD-10-CM

## 2018-05-14 PROCEDURE — 99214 OFFICE O/P EST MOD 30 MIN: CPT | Mod: S$GLB,,, | Performed by: PHYSICIAN ASSISTANT

## 2018-05-14 PROCEDURE — 3008F BODY MASS INDEX DOCD: CPT | Mod: CPTII,S$GLB,, | Performed by: PHYSICIAN ASSISTANT

## 2018-05-14 PROCEDURE — 99999 PR PBB SHADOW E&M-EST. PATIENT-LVL V: CPT | Mod: PBBFAC,,, | Performed by: PHYSICIAN ASSISTANT

## 2018-05-14 PROCEDURE — 3045F PR MOST RECENT HEMOGLOBIN A1C LEVEL 7.0-9.0%: CPT | Mod: CPTII,S$GLB,, | Performed by: PHYSICIAN ASSISTANT

## 2018-05-14 PROCEDURE — 3078F DIAST BP <80 MM HG: CPT | Mod: CPTII,S$GLB,, | Performed by: PHYSICIAN ASSISTANT

## 2018-05-14 PROCEDURE — 3075F SYST BP GE 130 - 139MM HG: CPT | Mod: CPTII,S$GLB,, | Performed by: PHYSICIAN ASSISTANT

## 2018-05-14 RX ORDER — AMITRIPTYLINE HYDROCHLORIDE 25 MG/1
25 TABLET, FILM COATED ORAL NIGHTLY
Qty: 30 TABLET | Refills: 1 | Status: SHIPPED | OUTPATIENT
Start: 2018-05-14 | End: 2018-09-05

## 2018-05-14 NOTE — PROGRESS NOTES
"Subjective:       Patient ID: Alfred Cuello is a 53 y.o. male.    Chief Complaint: Follow-up    Mr. Cuello is a 53 year old male who presents to clinic for follow up. Since his last visit he reports only minimal improvement with increasing gabapentin to 900 mg TID. He denies side effects, but states he has difficulty remembering to take the medication during the day. He wants neuropathic pain "to be completely gone." He states he has not been motivated to follow diabetic diet and his blood sugar has reflected that. He is open to seeing the diabetic educator. He is requesting referral to podiatry. He complains of rash on the palms of his hands, which he had several years ago that required multiple specialist evaluations and biopsies with no official diagnosis. He is quite distressed this rash is returning. He complains of poor mood given his health problems, but denies SI/HI. Sleep study confirmed sleep apnea.       Review of Systems   Constitutional: Positive for fatigue. Negative for chills and fever.   Eyes: Negative for visual disturbance.   Respiratory: Negative for cough, shortness of breath and wheezing.    Cardiovascular: Negative for chest pain, palpitations and leg swelling.   Gastrointestinal: Negative for abdominal pain, constipation, diarrhea, nausea and vomiting.   Endocrine: Positive for polydipsia and polyphagia. Negative for polyuria.   Musculoskeletal: Negative for arthralgias and myalgias.   Skin: Negative for pallor.   Neurological: Positive for dizziness. Negative for tremors, seizures, syncope, speech difficulty, weakness and headaches.        Burning pain in the feet   Hematological: Negative for adenopathy.   Psychiatric/Behavioral: Positive for dysphoric mood and sleep disturbance. Negative for confusion and suicidal ideas. The patient is nervous/anxious.        Objective:      Vitals:    05/14/18 1808   BP: 133/78   Pulse: 100   Temp: 98.9 °F (37.2 °C)     Physical Exam   Constitutional: He " is oriented to person, place, and time. He appears well-developed.   Obese body habitus.   HENT:   Head: Normocephalic and atraumatic.   Eyes: Conjunctivae and EOM are normal. Pupils are equal, round, and reactive to light.   Cardiovascular: Normal rate, regular rhythm, normal heart sounds and intact distal pulses.    Pulmonary/Chest: Effort normal and breath sounds normal.   Neurological: He is alert and oriented to person, place, and time.   Skin: Skin is warm and dry. Rash noted. Rash is papular (pinpoint skin colored lesions on the palms).   Psychiatric: He has a normal mood and affect.       Assessment:       1. Type 2 diabetes mellitus with diabetic neuropathy, without long-term current use of insulin    2. Hypertension associated with diabetes    3. Rash of hands    4. Obesity, Class II, BMI 35.0-39.9, with comorbidity (see actual BMI)        Plan:       Type 2 diabetes mellitus with diabetic neuropathy, without long-term current use of insulin         Uncontrolled, continue current medications for now (recently adjusted)  -     Add to gabapentin for treatment of neuropathic pain.  amitriptyline (ELAVIL) 25 MG tablet; Take 1 tablet (25 mg total) by mouth every evening.  Dispense: 30 tablet; Refill: 1         -     Ambulatory Referral to Diabetes Education ASAP        -     Ambulatory referral to Podiatry    Hypertension associated with diabetes         - Stable, continue current medications    Rash of hands  -     Ambulatory referral to Dermatology    Obesity, Class II, BMI 35.0-39.9, with comorbidity (see actual BMI)        - See below    Patient readiness: acceptance and barriers:none    During the course of the visit the patient was educated and counseled about the following:     Diabetes:  Discussed general issues about diabetes pathophysiology and management.  Hypertension:   Medication: no change.  Obesity:   Informal exercise measures discussed, e.g. taking stairs instead of elevator.    Goals:  Diabetes: Maintain Hemoglobin A1C below 7, Hypertension: Reduce Blood Pressure and Obesity: Reduce calorie intake and BMI    Did patient meet goals/outcomes: No    The following self management tools provided: declined    Patient Instructions (the written plan) was given to the patient/family.     Time spent with patient: 30 minutes     Follow up with Dr. Man in 8 weeks

## 2018-05-14 NOTE — PROGRESS NOTES
Pre-Visit Chart Review  For Appointment Scheduled on 5/14/2018    Health Maintenance Due   Topic Date Due    Pneumococcal PPSV23 (Medium Risk) (1) 03/16/1981    TETANUS VACCINE  04/03/2011    Colonoscopy  03/16/2013

## 2018-05-21 ENCOUNTER — TELEPHONE (OUTPATIENT)
Dept: FAMILY MEDICINE | Facility: CLINIC | Age: 53
End: 2018-05-21

## 2018-05-21 DIAGNOSIS — Z12.11 COLON CANCER SCREENING: Primary | ICD-10-CM

## 2018-05-21 NOTE — TELEPHONE ENCOUNTER
----- Message from Jacinto Gleason sent at 5/17/2018 11:19 AM CDT -----  Contact: pt wife (Fabby)            Name of Who is Calling: pt wife (Fabby)      What is the request in detail: is needing to schedule patient for the colonoscopy      Can the clinic reply by [facility]: no      What Number to [facility] Back [facility]: [phone]

## 2018-06-04 ENCOUNTER — TELEPHONE (OUTPATIENT)
Dept: FAMILY MEDICINE | Facility: CLINIC | Age: 53
End: 2018-06-04

## 2018-06-04 NOTE — TELEPHONE ENCOUNTER
----- Message from Phoebe Espinoza sent at 6/4/2018  2:46 PM CDT -----  Contact: Yessica Cuello (Spouse)  Yessica Cuello (Spouse) calling on the status of pt CPAP machine...[phone]

## 2018-06-04 NOTE — TELEPHONE ENCOUNTER
Spoke with [employer]. They are awaiting approval from insurance company and then they can proceed from that point. Gave this information to wife. Verbal understanding given.

## 2018-06-07 ENCOUNTER — TELEPHONE (OUTPATIENT)
Dept: GASTROENTEROLOGY | Facility: CLINIC | Age: 53
End: 2018-06-07

## 2018-06-07 ENCOUNTER — TELEPHONE (OUTPATIENT)
Dept: PODIATRY | Facility: CLINIC | Age: 53
End: 2018-06-07

## 2018-06-07 NOTE — TELEPHONE ENCOUNTER
----- Message from Kayleigh Brewster LPN sent at 6/7/2018  2:50 PM CDT -----      ----- Message -----  From: Jaelyn Alcantar  Sent: 6/7/2018   2:10 PM  To: [facility] Staff    Type: Needs Medical Advice    Who Called:  Wife (Yessica)  Best Call Back Number: [phone]  Additional Information: Wife (Yessica)requesting to speak with nurse concerning nurse visit appointment on June 11th/has question/please call back to advise.

## 2018-06-07 NOTE — TELEPHONE ENCOUNTER
Called pt to reschedule appt on 6/15/18 r/t change in clinic hours. No answer. Left message for pt to return call to office for proper scheduling.

## 2018-06-13 DIAGNOSIS — Z12.11 SCREENING FOR COLON CANCER: Primary | ICD-10-CM

## 2018-07-17 ENCOUNTER — DOCUMENTATION ONLY (OUTPATIENT)
Dept: FAMILY MEDICINE | Facility: CLINIC | Age: 53
End: 2018-07-17

## 2018-07-17 NOTE — PROGRESS NOTES
Pre-Visit Chart Review  For Appointment Scheduled on 7-18-18    Health Maintenance Due   Topic Date Due    Pneumococcal PPSV23 (Medium Risk) (1) 03/16/1981    TETANUS VACCINE  04/03/2011    Colonoscopy  03/16/2013    Hemoglobin A1c  06/20/2018

## 2018-07-18 ENCOUNTER — OFFICE VISIT (OUTPATIENT)
Dept: FAMILY MEDICINE | Facility: CLINIC | Age: 53
End: 2018-07-18
Payer: COMMERCIAL

## 2018-07-18 VITALS
WEIGHT: 285.5 LBS | TEMPERATURE: 99 F | SYSTOLIC BLOOD PRESSURE: 140 MMHG | HEIGHT: 72 IN | BODY MASS INDEX: 38.67 KG/M2 | HEART RATE: 107 BPM | DIASTOLIC BLOOD PRESSURE: 79 MMHG

## 2018-07-18 DIAGNOSIS — E11.42 DIABETIC POLYNEUROPATHY ASSOCIATED WITH TYPE 2 DIABETES MELLITUS: ICD-10-CM

## 2018-07-18 DIAGNOSIS — E11.69 HYPERLIPIDEMIA ASSOCIATED WITH TYPE 2 DIABETES MELLITUS: ICD-10-CM

## 2018-07-18 DIAGNOSIS — G47.33 OSA ON CPAP: ICD-10-CM

## 2018-07-18 DIAGNOSIS — D50.9 IRON DEFICIENCY ANEMIA, UNSPECIFIED IRON DEFICIENCY ANEMIA TYPE: ICD-10-CM

## 2018-07-18 DIAGNOSIS — K21.9 GASTROESOPHAGEAL REFLUX DISEASE, ESOPHAGITIS PRESENCE NOT SPECIFIED: ICD-10-CM

## 2018-07-18 DIAGNOSIS — I15.2 HYPERTENSION ASSOCIATED WITH DIABETES: Primary | ICD-10-CM

## 2018-07-18 DIAGNOSIS — E78.5 HYPERLIPIDEMIA ASSOCIATED WITH TYPE 2 DIABETES MELLITUS: ICD-10-CM

## 2018-07-18 DIAGNOSIS — N18.30 CKD (CHRONIC KIDNEY DISEASE) STAGE 3, GFR 30-59 ML/MIN: ICD-10-CM

## 2018-07-18 DIAGNOSIS — E11.59 HYPERTENSION ASSOCIATED WITH DIABETES: Primary | ICD-10-CM

## 2018-07-18 DIAGNOSIS — E11.40 TYPE 2 DIABETES MELLITUS WITH DIABETIC NEUROPATHY, WITHOUT LONG-TERM CURRENT USE OF INSULIN: ICD-10-CM

## 2018-07-18 PROCEDURE — 99999 PR PBB SHADOW E&M-EST. PATIENT-LVL III: CPT | Mod: PBBFAC,,, | Performed by: FAMILY MEDICINE

## 2018-07-18 PROCEDURE — 3077F SYST BP >= 140 MM HG: CPT | Mod: CPTII,S$GLB,, | Performed by: FAMILY MEDICINE

## 2018-07-18 PROCEDURE — 3008F BODY MASS INDEX DOCD: CPT | Mod: CPTII,S$GLB,, | Performed by: FAMILY MEDICINE

## 2018-07-18 PROCEDURE — 99214 OFFICE O/P EST MOD 30 MIN: CPT | Mod: S$GLB,,, | Performed by: FAMILY MEDICINE

## 2018-07-18 PROCEDURE — 3078F DIAST BP <80 MM HG: CPT | Mod: CPTII,S$GLB,, | Performed by: FAMILY MEDICINE

## 2018-07-18 PROCEDURE — 3045F PR MOST RECENT HEMOGLOBIN A1C LEVEL 7.0-9.0%: CPT | Mod: CPTII,S$GLB,, | Performed by: FAMILY MEDICINE

## 2018-07-18 RX ORDER — GABAPENTIN 300 MG/1
900 CAPSULE ORAL 4 TIMES DAILY
Qty: 1080 CAPSULE | Refills: 3 | Status: SHIPPED | OUTPATIENT
Start: 2018-07-18 | End: 2019-02-07 | Stop reason: SDUPTHER

## 2018-07-18 NOTE — PROGRESS NOTES
Subjective:       Patient ID: Alfred Cuello is a 53 y.o. male.    Chief Complaint: Follow-up    HPI  Review of Systems   Constitutional: Negative for fatigue and unexpected weight change.   Respiratory: Negative for chest tightness and shortness of breath.    Cardiovascular: Negative for chest pain, palpitations and leg swelling.   Gastrointestinal: Negative for abdominal pain.   Musculoskeletal: Negative for arthralgias.   Neurological: Negative for dizziness, syncope, light-headedness and headaches.       Patient Active Problem List   Diagnosis    DM type 2 (diabetes mellitus, type 2)    Iron deficiency anemia    Hypertension associated with diabetes    Allergic rhinitis    GERD (gastroesophageal reflux disease)    H/O pyloric stenosis    Hyperlipidemia associated with type 2 diabetes mellitus    CKD (chronic kidney disease) stage 3, GFR 30-59 ml/min    Obesity, Class II, BMI 35.0-39.9, with comorbidity (see actual BMI)    RAFAEL on CPAP    Screen for colon cancer     Patient is here for a chronic conditions follow up.    No visits with results within 1 Month(s) from this visit.   Latest known visit with results is:   Lab Visit on 03/20/2018   Component Date Value Ref Range Status    Microalbum.,U,Random 03/20/2018 101.0  ug/mL Final    Creatinine, Random Ur 03/20/2018 143.0  23.0 - 375.0 mg/dL Final    Microalb Creat Ratio 03/20/2018 70.6* 0.0 - 30.0 ug/mg Final     Starting using CPAP 2 days ago-still trying to get used to it  Objective:      Physical Exam   Constitutional: He is oriented to person, place, and time. He appears well-developed and well-nourished.   Cardiovascular: Normal rate, regular rhythm and normal heart sounds.    Pulmonary/Chest: Effort normal and breath sounds normal.   Musculoskeletal: He exhibits no edema.   Neurological: He is alert and oriented to person, place, and time.   Skin: Skin is warm and dry.   Psychiatric: He has a normal mood and affect.   Nursing note and  vitals reviewed.      Assessment:       1. Hypertension associated with diabetes    2. Hyperlipidemia associated with type 2 diabetes mellitus    3. CKD (chronic kidney disease) stage 3, GFR 30-59 ml/min    4. Iron deficiency anemia, unspecified iron deficiency anemia type    5. Type 2 diabetes mellitus with diabetic neuropathy, without long-term current use of insulin    6. Obesity, Class II, BMI 35.0-39.9, with comorbidity (see actual BMI)    7. RAFAEL on CPAP    8. Gastroesophageal reflux disease, esophagitis presence not specified    9. Diabetic polyneuropathy associated with type 2 diabetes mellitus        Plan:       1. Hypertension associated with diabetes  Controlled on current medications.  Continue current medications.    - Lipid panel; Future    2. Hyperlipidemia associated with type 2 diabetes mellitus  Stable condition.  Continue current medications.  Will adjust based on lab findings or if condition changes.      3. CKD (chronic kidney disease) stage 3, GFR 30-59 ml/min  Stable and chronic.  Will continue to monitor q3-6 months and control chronic conditions as optimally as possible to preserve function.      4. Iron deficiency anemia, unspecified iron deficiency anemia type  Stable condition.  Continue current medications.  Will adjust based on lab findings or if condition changes.    - CBC auto differential; Future  - Ferritin; Future  - Iron and TIBC; Future    5. Type 2 diabetes mellitus with diabetic neuropathy, without long-term current use of insulin  Stable condition.  Continue current medications.  Will adjust based on lab findings or if condition changes.    - Comprehensive metabolic panel; Future  - Hemoglobin A1c; Future  - Microalbumin/creatinine urine ratio; Future    6. Obesity, Class II, BMI 35.0-39.9, with comorbidity (see actual BMI)  Counseled patient on his ideal body weight, health consequences of being obese and current recommendations including weekly exercise and a heart healthy  diet.  Current BMI is:Estimated body mass index is 38.72 kg/m² as calculated from the following:    Height as of this encounter: 6' (1.829 m).    Weight as of this encounter: 129.5 kg (285 lb 7.9 oz)..  Patient is aware that ideal BMI < 25 or Weight in (lb) to have BMI = 25: 183.9.        7. RAFAEL on CPAP  Cont cpap    8. Gastroesophageal reflux disease, esophagitis presence not specified  Counseled patient on prevention of reflux with changes in diet and behavior.  I recommended avoidance of greasy and spicy foods, caffeine and eating within 3 hours of bedtime.  I counseled the patient to avoid eating large meals and instead eating more frequent small meals.  I also recommended weight loss and elevation of the head of the bed by 6 inches.  If symptoms persist after these changes medication may be needed to control GERD.        9. Diabetic polyneuropathy associated with type 2 diabetes mellitus  Continue  - gabapentin (NEURONTIN) 300 MG capsule; Take 3 capsules (900 mg total) by mouth 4 (four) times daily.  Dispense: 1080 capsule; Refill: 3      Reeval 3 months JEAN-PAUL Gonzalez and 6 months with me

## 2018-07-20 ENCOUNTER — HOSPITAL ENCOUNTER (OUTPATIENT)
Facility: HOSPITAL | Age: 53
Discharge: HOME | End: 2018-07-20
Attending: INTERNAL MEDICINE | Admitting: INTERNAL MEDICINE
Payer: COMMERCIAL

## 2018-07-20 ENCOUNTER — ANESTHESIA (OUTPATIENT)
Dept: ENDOSCOPY | Facility: HOSPITAL | Age: 53
End: 2018-07-20
Payer: COMMERCIAL

## 2018-07-20 ENCOUNTER — SURGERY (OUTPATIENT)
Age: 53
End: 2018-07-20

## 2018-07-20 ENCOUNTER — ANESTHESIA EVENT (OUTPATIENT)
Dept: ENDOSCOPY | Facility: HOSPITAL | Age: 53
End: 2018-07-20
Payer: COMMERCIAL

## 2018-07-20 VITALS
RESPIRATION RATE: 18 BRPM | HEART RATE: 76 BPM | DIASTOLIC BLOOD PRESSURE: 88 MMHG | TEMPERATURE: 98 F | BODY MASS INDEX: 35.94 KG/M2 | SYSTOLIC BLOOD PRESSURE: 137 MMHG | HEIGHT: 74 IN | WEIGHT: 280 LBS | OXYGEN SATURATION: 100 %

## 2018-07-20 DIAGNOSIS — K57.30 DIVERTICULOSIS OF LARGE INTESTINE WITHOUT HEMORRHAGE: ICD-10-CM

## 2018-07-20 DIAGNOSIS — K64.8 INTERNAL HEMORRHOIDS: ICD-10-CM

## 2018-07-20 DIAGNOSIS — Z12.11 SCREEN FOR COLON CANCER: ICD-10-CM

## 2018-07-20 DIAGNOSIS — K63.5 POLYP OF COLON, UNSPECIFIED PART OF COLON, UNSPECIFIED TYPE: Primary | ICD-10-CM

## 2018-07-20 PROCEDURE — 45380 COLONOSCOPY AND BIOPSY: CPT | Performed by: INTERNAL MEDICINE

## 2018-07-20 PROCEDURE — 45380 COLONOSCOPY AND BIOPSY: CPT | Mod: 22,59,, | Performed by: INTERNAL MEDICINE

## 2018-07-20 PROCEDURE — 27201089 HC SNARE, DISP (ANY): Performed by: INTERNAL MEDICINE

## 2018-07-20 PROCEDURE — 25000003 PHARM REV CODE 250: Performed by: INTERNAL MEDICINE

## 2018-07-20 PROCEDURE — D9220A PRA ANESTHESIA: Mod: 33,ANES,, | Performed by: ANESTHESIOLOGY

## 2018-07-20 PROCEDURE — 88305 TISSUE EXAM BY PATHOLOGIST: CPT | Performed by: PATHOLOGY

## 2018-07-20 PROCEDURE — 37000009 HC ANESTHESIA EA ADD 15 MINS: Performed by: INTERNAL MEDICINE

## 2018-07-20 PROCEDURE — 45385 COLONOSCOPY W/LESION REMOVAL: CPT | Performed by: INTERNAL MEDICINE

## 2018-07-20 PROCEDURE — 88305 TISSUE EXAM BY PATHOLOGIST: CPT | Mod: 26,,, | Performed by: PATHOLOGY

## 2018-07-20 PROCEDURE — 25000003 PHARM REV CODE 250: Performed by: NURSE ANESTHETIST, CERTIFIED REGISTERED

## 2018-07-20 PROCEDURE — 63600175 PHARM REV CODE 636 W HCPCS: Performed by: NURSE ANESTHETIST, CERTIFIED REGISTERED

## 2018-07-20 PROCEDURE — 37000008 HC ANESTHESIA 1ST 15 MINUTES: Performed by: INTERNAL MEDICINE

## 2018-07-20 PROCEDURE — 27201012 HC FORCEPS, HOT/COLD, DISP: Performed by: INTERNAL MEDICINE

## 2018-07-20 PROCEDURE — 45385 COLONOSCOPY W/LESION REMOVAL: CPT | Mod: 22,33,, | Performed by: INTERNAL MEDICINE

## 2018-07-20 PROCEDURE — D9220A PRA ANESTHESIA: Mod: 33,CRNA,, | Performed by: NURSE ANESTHETIST, CERTIFIED REGISTERED

## 2018-07-20 RX ORDER — GLYCOPYRROLATE 0.2 MG/ML
INJECTION INTRAMUSCULAR; INTRAVENOUS
Status: COMPLETED
Start: 2018-07-20 | End: 2018-07-20

## 2018-07-20 RX ORDER — PROPOFOL 10 MG/ML
INJECTION, EMULSION INTRAVENOUS
Status: COMPLETED
Start: 2018-07-20 | End: 2018-07-20

## 2018-07-20 RX ORDER — LIDOCAINE HYDROCHLORIDE 20 MG/ML
INJECTION, SOLUTION EPIDURAL; INFILTRATION; INTRACAUDAL; PERINEURAL
Status: DISCONTINUED
Start: 2018-07-20 | End: 2018-07-20 | Stop reason: HOSPADM

## 2018-07-20 RX ORDER — PROPOFOL 10 MG/ML
VIAL (ML) INTRAVENOUS
Status: DISCONTINUED | OUTPATIENT
Start: 2018-07-20 | End: 2018-07-20

## 2018-07-20 RX ORDER — SODIUM CHLORIDE 9 MG/ML
INJECTION, SOLUTION INTRAVENOUS CONTINUOUS
Status: DISCONTINUED | OUTPATIENT
Start: 2018-07-20 | End: 2018-07-20 | Stop reason: HOSPADM

## 2018-07-20 RX ORDER — DEXTROMETHORPHAN/PSEUDOEPHED 2.5-7.5/.8
DROPS ORAL
Status: COMPLETED | OUTPATIENT
Start: 2018-07-20 | End: 2018-07-20

## 2018-07-20 RX ORDER — GLYCOPYRROLATE 0.2 MG/ML
INJECTION INTRAMUSCULAR; INTRAVENOUS
Status: DISCONTINUED | OUTPATIENT
Start: 2018-07-20 | End: 2018-07-20

## 2018-07-20 RX ORDER — DEXTROMETHORPHAN/PSEUDOEPHED 2.5-7.5/.8
DROPS ORAL
Status: DISCONTINUED
Start: 2018-07-20 | End: 2018-07-20 | Stop reason: HOSPADM

## 2018-07-20 RX ORDER — LIDOCAINE HCL/PF 100 MG/5ML
SYRINGE (ML) INTRAVENOUS
Status: DISCONTINUED | OUTPATIENT
Start: 2018-07-20 | End: 2018-07-20

## 2018-07-20 RX ADMIN — PROPOFOL 50 MG: 10 INJECTION, EMULSION INTRAVENOUS at 01:07

## 2018-07-20 RX ADMIN — SIMETHICONE 40 MG: 20 SUSPENSION/ DROPS ORAL at 01:07

## 2018-07-20 RX ADMIN — PROPOFOL 100 MG: 10 INJECTION, EMULSION INTRAVENOUS at 01:07

## 2018-07-20 RX ADMIN — SODIUM CHLORIDE: 0.9 INJECTION, SOLUTION INTRAVENOUS at 12:07

## 2018-07-20 RX ADMIN — LIDOCAINE HYDROCHLORIDE 75 MG: 20 INJECTION, SOLUTION INTRAVENOUS at 01:07

## 2018-07-20 RX ADMIN — GLYCOPYRROLATE 0.2 MG: 0.2 INJECTION, SOLUTION INTRAMUSCULAR; INTRAVENOUS at 01:07

## 2018-07-20 NOTE — PLAN OF CARE
Patient awake, alert, and oriented.  No complaints of pain or discomfort at present time. Patient tolerated po fluids; Dr. Bob spoke with patient and spouse prior to discharge;    Abdomen soft and nontender;  Ambulates without difficulty;  All instructions given and reviewed with patient and family;  Stable for discharge to home accompanied by spouse.

## 2018-07-20 NOTE — ANESTHESIA POSTPROCEDURE EVALUATION
"Anesthesia Post Evaluation    Patient: Alfred Cuello    Procedure(s) Performed: Procedure(s) (LRB):  COLONOSCOPY (N/A)    Final Anesthesia Type: general  Patient location during evaluation: PACU  Patient participation: Yes- Able to Participate  Level of consciousness: awake and alert  Post-procedure vital signs: reviewed and stable  Pain management: adequate  Airway patency: patent  PONV status at discharge: No PONV  Anesthetic complications: no      Cardiovascular status: blood pressure returned to baseline  Respiratory status: unassisted  Hydration status: euvolemic  Follow-up not needed.        Visit Vitals  /88   Pulse 76   Temp 36.8 °C (98.3 °F) (Temporal)   Resp 18   Ht 6' 1.75" (1.873 m)   Wt 127 kg (280 lb)   SpO2 100%   BMI 36.19 kg/m²       Pain/Bean Score: Pain Assessment Performed: Yes (7/20/2018  1:56 PM)  Presence of Pain: denies (7/20/2018  2:20 PM)  Bean Score: 10 (7/20/2018  2:20 PM)      "

## 2018-07-20 NOTE — ANESTHESIA PREPROCEDURE EVALUATION
07/20/2018  Alfred Cuello is a 53 y.o., male.    Anesthesia Evaluation    I have reviewed the Patient Summary Reports.    I have reviewed the Nursing Notes.   I have reviewed the Medications.     Review of Systems  Anesthesia Hx:  Denies Family Hx of Anesthesia complications.   Denies Personal Hx of Anesthesia complications.   Cardiovascular:   Hypertension, poorly controlled    Pulmonary:   Sleep Apnea Just started CPAP, poor fit and tolerance   Renal/:   Chronic Renal Disease, CRI    Hepatic/GI:   Bowel Prep. GERD    Endocrine:   Diabetes, type 2        Physical Exam  General:  Obesity    Airway/Jaw/Neck:  Airway Findings: Mouth Opening: Normal Tongue: Normal  General Airway Assessment: Adult  Mallampati: III  TM Distance: Normal, at least 6 cm  Jaw/Neck Findings:  Neck ROM: Normal ROM  Neck Findings:  Girth Increased      Dental:  DENTAL FINDINGS: Normal   Chest/Lungs:  Chest/Lungs Clear    Heart/Vascular:  Heart Findings: Normal            Anesthesia Plan  Type of Anesthesia, risks & benefits discussed:  Anesthesia Type:  general  Patient's Preference:   Intra-op Monitoring Plan: standard ASA monitors  Intra-op Monitoring Plan Comments:   Post Op Pain Control Plan:   Post Op Pain Control Plan Comments:   Induction:   IV  Beta Blocker:  Patient is not currently on a Beta-Blocker (No further documentation required).       Informed Consent: Patient understands risks and agrees with Anesthesia plan.  Questions answered. Anesthesia consent signed with patient.  ASA Score: 3     Day of Surgery Review of History & Physical:    H&P update referred to the provider.         Ready For Surgery From Anesthesia Perspective.

## 2018-07-20 NOTE — [employer] PATIENT INSTRUCTIONS
Discharge Summary/Instructions after an Endoscopic Procedure  Patient Name: Alfred Cuello  Patient MRN: [id number]  Patient YOB: 1965     Friday, July 20, 2018  Marko Bob MD  RESTRICTIONS:  During your procedure today, you received medications for sedation.  These   medications may affect your judgment, balance and coordination.  Therefore,   for 24 hours, you have the following restrictions:   - DO NOT drive a car, operate machinery, make legal/financial decisions,   sign important papers or drink alcohol.    ACTIVITY:  Today: no heavy lifting, straining or running due to procedural   sedation/anesthesia.  The following day: return to full activity including work.  DIET:  Eat and drink normally unless instructed otherwise.     TREATMENT FOR COMMON SIDE EFFECTS:  - Mild abdominal pain, nausea, belching, bloating or excessive gas:  rest,   eat lightly and use a heating pad.  - Sore Throat: treat with throat lozenges and/or gargle with warm salt   water.  - Because air was used during the procedure, expelling large amounts of air   from your rectum or belching is normal.  - If a bowel prep was taken, you may not have a bowel movement for 1-3 days.    This is normal.  SYMPTOMS TO WATCH FOR AND REPORT TO YOUR PHYSICIAN:  1. Abdominal pain or bloating, other than gas cramps.  2. Chest pain.  3. Back pain.  4. Signs of infection such as: chills or fever occurring within 24 hours   after the procedure.  5. Rectal bleeding, which would show as bright red, maroon, or black stools.   (A tablespoon of blood from the rectum is not serious, especially if   hemorrhoids are present.)  6. Vomiting.  7. Weakness or dizziness.  GO DIRECTLY TO THE NEAREST EMERGENCY ROOM IF YOU HAVE ANY OF THE FOLLOWING:      Difficulty breathing              Chills and/or fever over 101 F   Persistent vomiting and/or vomiting blood   Severe abdominal pain   Severe chest pain   Black, tarry stools   Bleeding- more than one tablespoon   Any  other symptom or condition that you feel may need urgent attention  Your doctor recommends these additional instructions:  If any biopsies were taken, your doctors clinic will contact you in 1 to 2   weeks with any results.  - Patient has a contact number available for emergencies.  The signs and   symptoms of potential delayed complications were discussed with the   patient.  Return to normal activities tomorrow.  Written discharge   instructions were provided to the patient.   - High fiber diet.   - Continue present medications.   - Await pathology results.   - Repeat colonoscopy in 3 years for surveillance. PATIENT WILL NEED ONE HOUR   TIME SLOT FOR ALL FUTURE COLONOSCOPIES.  - Discharge patient to home (ambulatory).   - Return to my office PRN.  For questions, problems or results please call your physician - Marko Bob MD at Work:  [phone].  [facility], EMERGENCY ROOM PHONE NUMBER: [phone]  IF A COMPLICATION OR EMERGENCY SITUATION ARISES AND YOU ARE UNABLE TO REACH   YOUR PHYSICIAN - GO DIRECTLY TO THE EMERGENCY ROOM.  Marko Bob MD  7/20/2018 1:58:37 PM  This report has been verified and signed electronically.  [employer]

## 2018-07-20 NOTE — OR NURSING
Have you had a colonoscopy LESS THAN 3 years ago?   * If YES, answer these questions*:    NO    1. Did patient have a prior colonic polyp in a previous surveillance/diagnostic colonoscopy and is 18 years or older on date of encounter?    2. Documentation of < 3 year interval since the patients last colonoscopy due to medical reasons (eg., last colonoscopy incomplete, last colonoscopy had inadequate prep, piecemeal removal of adenomas, or last colonoscopy found > 10 adenomas) ?    1ST COLONOSCOPY

## 2018-07-20 NOTE — TRANSFER OF CARE
"Anesthesia Transfer of Care Note    Patient: Alfred Cuello    Procedure(s) Performed: Procedure(s) (LRB):  COLONOSCOPY (N/A)    Patient location: PACU    Anesthesia Type: general    Transport from OR: Transported from OR on room air with adequate spontaneous ventilation    Post pain: adequate analgesia    Post assessment: no apparent anesthetic complications and tolerated procedure well    Post vital signs: stable    Level of consciousness: awake, alert and oriented    Nausea/Vomiting: no nausea/vomiting    Complications: none    Transfer of care protocol was followed      Last vitals:   Visit Vitals  BP (!) 130/93 (BP Location: Left arm, Patient Position: Lying)   Pulse 88   Temp 37 °C (98.6 °F) (Temporal)   Resp 13   Ht 6' 1.75" (1.873 m)   Wt 127 kg (280 lb)   SpO2 96%   BMI 36.19 kg/m²     "

## 2018-07-20 NOTE — H&P
CC: Screening for colorectal cancer - first occurrence    53 year old male with above. States that symptoms are absent, no alleviating/exacerbating factors. No family history of CA. No personal history of polyps. No bleeding or weight loss.     ROS:  No headache, no fever/chills, no chest pain/SOB, no nausea/vomiting/diarrhea/constipation/GI bleeding/abdominal pain, no dysuria/hematuria.    VSSAF   Exam:   Alert and oriented x 3; no apparent distress   PERRLA, sclera anicteric  CV: Regular rate/rhythm, normal PMI   Lungs: Clear bilaterally with no wheeze/rales   Abdomen: Soft, NT/ND, normal bowel sounds   Ext: No cyanosis, clubbing     Impression:   As above    Plan:   Proceed with endoscopy. Further recs to follow.

## 2018-07-20 NOTE — DISCHARGE INSTRUCTIONS
Diverticulosis    Diverticulosis means that small pouches have formed in the wall of your large intestine (colon). Most often, this problem causes no symptoms and is common as people age. But the pouches in the colon are at risk of becoming infected. When this happens, the condition is called diverticulitis. Although most people with diverticulosis never develop diverticulitis, it is still not uncommon. Rectal bleeding can also occur and in less common situations, a type of colon inflammation called colitis.  While most people do not have symptoms, some people with diverticulosis may have:  · Abdominal cramps and pain  · Bloating  · Constipation  · Change in bowel habits  Causes  The exact cause of diverticulosis (and diverticulitis) has not been proved, but a few things are associated with the condition:  · Low-fiber diet  · Constipation  · Lack of exercise  Your healthcare provider will talk with you about how to manage your condition. Diet changes may be all that are needed to help control diverticulosis and prevent progression to diverticulitis. If you develop diverticulitis, you will likely need other treatments.  Home care  You may be told to take fiber supplements daily. Fiber adds bulk to the stool so that it passes through the colon more easily. Stool softeners may be recommended. You may also be given medications for pain relief. Be sure to take all medications as directed.  In the past, people were told to avoid corn, nuts, and seeds. This is no longer necessary.  Follow these guidelines when caring for yourself at home:  · Eat unprocessed foods that are high in fiber. Whole grains, fruits, and vegetables are good choices.  · Drink 6 to 8 glasses of water every day unless your healthcare provider has you limit how much fluid you should have.  · Watch for changes in your bowel movements. Tell your provider if you notice any changes.  · Begin an exercise program. Ask your provider how to get started.  Generally, walking is the best.  · Get plenty of rest and sleep.  Follow-up care  Follow up with your healthcare provider, or as advised. Regular visits may be needed to check on your health. Sometimes special procedures such as colonoscopy, are needed after an episode of diverticulitis or blooding. Be sure to keep all your appointments.  If a stool sample was taken, or cultures were done, you should be told if they are positive, or if your treatment needs to be changed. You can call as directed for the results.  If X-rays were done, a radiologist will look at them. You will be told if there is a change in your treatment.  If antibiotics were prescribed, be sure to finish them all.  When to seek medical advice  Call your healthcare provider right away if any of these occur:  · Fever of 100.4°F (38°C) or higher, or as directed by your healthcare provider  · Severe cramps in the lower left side of the abdomen or pain that is getting worse  · Tenderness in the lower left side of the abdomen or worsening pain throughout the abdomen  · Diarrhea or constipation that doesn't get better within 24 hours  · Nausea and vomiting  · Bleeding from the rectum  Call 911  Call emergency services if any of the following occur:  · Trouble breathing  · Confusion  · Very drowsy or trouble awakening  · Fainting or loss of consciousness  · Rapid heart rate  · Chest pain  Date Last Reviewed: 12/30/2015 © 2000-2017 [employer]. [address] [place] [address]. All rights reserved. This information is not intended as a substitute for professional medical care. Always follow your healthcare professional's instructions.        Hemorrhoids    Hemorrhoids are swollen and inflamed veins inside the rectum and near the anus. The rectum is the last several inches of the colon. The anus is the passage between the rectum and the outside of the body.  Causes  The veins can become swollen due to increased pressure in them. This  is most often caused by:  · Chronic constipation or diarrhea  · Straining when having a bowel movement  · Sitting too long on the toilet  · A low-fiber diet  · Pregnancy  Symptoms  · Bleeding from the rectum (this may be noticeable after bowel movements)  · Lump near the anus  · Itching around the anus  · Pain around the anus  There are different types of hemorrhoids. Depending on the type you have and the severity, you may be able to treat yourself at home. In some cases, a procedure may be the best treatment option. Your healthcare provider can tell you more about this, if needed.  Home care  General care  · To get relief from pain or itching, try:  ¨ Topical products. Your healthcare provider may prescribe or recommend creams, ointments, or pads that can be applied to the hemorrhoid. Use these exactly as directed.  ¨ Medicines. Your healthcare provider may recommend stool softeners, suppositories, or laxatives to help manage constipation. Use these exactly as directed.  ¨ Sitz baths. A sitz bath involves sitting in a few inches of warm bath water. Be careful not to make the water so hot that you burn yourself--test it before sitting in it. Soak for about 10 to 15 minutes a few times a day. This may help relieve pain.  Tips to help prevent hemorrhoids  · Eat more fiber. Fiber adds bulk to stool and absorbs water as it moves through your colon. This makes stool softer and easier to pass.  ¨ Increase the fiber in your diet with more fiber-rich foods. These include fresh fruit, vegetables, and whole grains.  ¨ Take a fiber supplement or bulking agent, if advised to by your provider. These include products such as psyllium or methylcellulose.  · Drink plenty of water, if directed to by your provider. This can help keep stool soft.  · Be more active. Frequent exercise aids digestion and helps prevent constipation. It may also help make bowel movements more regular.  · Dont strain during bowel movements. This can make  hemorrhoids more likely. Also, dont sit on the toilet for long periods of time.  Follow-up care  Follow up with your healthcare provider, or as advised. If a culture or imaging tests were done, you will be notified of the results when they are ready. This may take a few days or longer.  When to seek medical advice  Call your healthcare provider right away if any of these occur:  · Increased bleeding from the rectum  · Increased pain around the rectum or anus  · Weakness or dizziness  Call 911  Call 911 or return to the emergency department right away if any of these occur:  · Trouble breathing or swallowing  · Fainting or loss of consciousness  · Unusually fast heart rate  · Vomiting blood  · Large amounts of blood in stool  Date Last Reviewed: 6/22/2015 © 2000-2017 [employer]. [address] [place] [address], [place] [address]. All rights reserved. This information is not intended as a substitute for professional medical care. Always follow your healthcare professional's instructions.        Understanding Colon and Rectal Polyps    The colon (also called the large intestine) is a muscular tube that forms the last part of the digestive tract. It absorbs water and stores food waste. The colon is about 4 to 6 feet long. The rectum is the last 6 inches of the colon. The colon and rectum have a smooth lining composed of millions of cells. Changes in these cells can lead to growths in the colon that can become cancerous and should be removed. Multiple tests are available to screen for colon cancer, but the colonoscopy is the most recommended test. During colonoscopy, these polyps can be removed. How often you need this test depends on many things including your condition, your family history, symptoms, and what the findings were at the previous colonoscopy.   When the colon lining changes  Changes that happen in the cells that line the colon or rectum can lead to growths called polyps. Over a period of years,  polyps can turn cancerous. Removing polyps early may prevent cancer from ever forming.  Polyps  Polyps are fleshy clumps of tissue that form on the lining of the colon or rectum. Small polyps are usually benign (not cancerous). However, over time, cells in a polyp can change and become cancerous. Certain types of polyps known as adenomatous polyps are premalignant. The risk for invasive cancer increases with the size of the polyp and certain cell and gene features. This means that they can become cancerous if they're not removed. Hyperplastic polyps are benign. They can grow quite large and not turn cancerous.   Cancer  Almost all colorectal cancers start when polyp cells begin growing abnormally. As a cancerous tumor grows, it may involve more and more of the colon or rectum. In time, cancer can also grow beyond the colon or rectum and spread to nearby organs or to glands called lymph nodes. The cells can also travel to other parts of the body. This is known as metastasis. The earlier a cancerous tumor is removed, the better the chance of preventing its spread.    Date Last Reviewed: 8/1/2016  © 2987-7090 [employer]. [address] [place] [address]. All rights reserved. This information is not intended as a substitute for professional medical care. Always follow your healthcare professional's instructions.      Discharge Instructions: After Your Surgery/Procedure  Youve just had surgery. During surgery you were given medicine called anesthesia to keep you relaxed and free of pain. After surgery you may have some pain or nausea. This is common. Here are some tips for feeling better and getting well after surgery.     Stay on schedule with your medication.   Going home  Your doctor or nurse will show you how to take care of yourself when you go home. He or she will also answer your questions. Have an adult family member or friend drive you home.      For your safety we recommend these  "precaution for the first 24 hours after your procedure:  · Do not drive or use heavy equipment.  · Do not make important decisions or sign legal papers.  · Do not drink alcohol.  · Have someone stay with you, if needed. He or she can watch for problems and help keep you safe.  · Your concentration, balance, coordination, and judgement may be impaired for many hours after anesthesia.  Use caution when ambulating or standing up.     · You may feel weak and "washed out" after anesthesia and surgery.      Subtle residual effects of general anesthesia or sedation with regional / local anesthesia can last more than 24 hours.  Rest for the remainder of the day or longer if your Doctor/Surgeon has advised you to do so.  Although you may feel normal within the first 24 hours, your reflexes and mental ability may be impaired without you realizing it.  You may feel dizzy, lightheaded or sleepy for 24 hours or longer.      Be sure to go to all follow-up visits with your doctor. And rest after your surgery for as long as your doctor tells you to.  Coping with pain  If you have pain after surgery, pain medicine will help you feel better. Take it as told, before pain becomes severe. Also, ask your doctor or pharmacist about other ways to control pain. This might be with heat, ice, or relaxation. And follow any other instructions your surgeon or nurse gives you.  Tips for taking pain medicine  To get the best relief possible, remember these points:  · Pain medicines can upset your stomach. Taking them with a little food may help.  · Most pain relievers taken by mouth need at least 20 to 30 minutes to start to work.  · Taking medicine on a schedule can help you remember to take it. Try to time your medicine so that you can take it before starting an activity. This might be before you get dressed, go for a walk, or sit down for dinner.  · Constipation is a common side effect of pain medicines. Call your doctor before taking any " medicines such as laxatives or stool softeners to help ease constipation. Also ask if you should skip any foods. Drinking lots of fluids and eating foods such as fruits and vegetables that are high in fiber can also help. Remember, do not take laxatives unless your surgeon has prescribed them.  · Drinking alcohol and taking pain medicine can cause dizziness and slow your breathing. It can even be deadly. Do not drink alcohol while taking pain medicine.  · Pain medicine can make you react more slowly to things. Do not drive or run machinery while taking pain medicine.  Your health care provider may tell you to take acetaminophen to help ease your pain. Ask him or her how much you are supposed to take each day. Acetaminophen or other pain relievers may interact with your prescription medicines or other over-the-counter (OTC) drugs. Some prescription medicines have acetaminophen and other ingredients. Using both prescription and OTC acetaminophen for pain can cause you to overdose. Read the labels on your OTC medicines with care. This will help you to clearly know the list of ingredients, how much to take, and any warnings. It may also help you not take too much acetaminophen. If you have questions or do not understand the information, ask your pharmacist or health care provider to explain it to you before you take the OTC medicine.  Managing nausea  Some people have an upset stomach after surgery. This is often because of anesthesia, pain, or pain medicine, or the stress of surgery. These tips will help you handle nausea and eat healthy foods as you get better. If you were on a special food plan before surgery, ask your doctor if you should follow it while you get better. These tips may help:  · Do not push yourself to eat. Your body will tell you when to eat and how much.  · Start off with clear liquids and soup. They are easier to digest.  · Next try semi-solid foods, such as mashed potatoes, applesauce, and  gelatin, as you feel ready.  · Slowly move to solid foods. Dont eat fatty, rich, or spicy foods at first.  · Do not force yourself to have 3 large meals a day. Instead eat smaller amounts more often.  · Take pain medicines with a small amount of solid food, such as crackers or toast, to avoid nausea.     Call your surgeon if  · You still have pain an hour after taking medicine. The medicine may not be strong enough.  · You feel too sleepy, dizzy, or groggy. The medicine may be too strong.  · You have side effects like nausea, vomiting, or skin changes, such as rash, itching, or hives.       If you have obstructive sleep apnea  You were given anesthesia medicine during surgery to keep you comfortable and free of pain. After surgery, you may have more apnea spells because of this medicine and other medicines you were given. The spells may last longer than usual.   At home:  · Keep using the continuous positive airway pressure (CPAP) device when you sleep. Unless your health care provider tells you not to, use it when you sleep, day or night. CPAP is a common device used to treat obstructive sleep apnea.  · Talk with your provider before taking any pain medicine, muscle relaxants, or sedatives. Your provider will tell you about the possible dangers of taking these medicines.  © 5300-2659 [employer]. [address] [place] [address], [place] [address]. All rights reserved. This information is not intended as a substitute for professional medical care. Always follow your healthcare professional's instructions.  Discharge Instructions for Bronchoscopy    Chest X-ray completed and MD notified.    ACTIVITY LEVEL:  If you received sedation or an anesthetic, you may feel sleepy for several hours. Do not drive, operate machinery, make critical decisions, or perform activities that require coordination or balance until tomorrow morning. Please have a responsible person stay with you for at least two (2) hours after you  leave the hospital.     DIET:  Do not eat or drink anything until ***. Once you can drink clear liquids without coughing, you can resume your regular diet.     WHAT you may expect over the next 24 hours:  · You may experience a low grade fever.  · You may cough up streaks of blood.  · Take Tylenol as directed for comfort/fever.    Additional Instructions:  · Do not take Aspirin, ibuprofen, naproxen, or any medications containing these items for *** days after the bronchoscopy.  · If you normally take Coumadin or aspirin, you may restart on ***.     COME TO THE EMERGENCY DEPARTMENT IF:  · You cough up more than one (1) tablespoon of blood.  · You have fever over 101F (38.4C) for more than one evening.  · You experience shortness of breath that is of new onset, or that is increased from your usual baseline.  · You experience chest pain.  · You have chills.    RETURN APPOINTMENT:  Follow up as directed.   Comments: ***.    FOR EMERGENCIES:    If any unusual problems or difficulties occur, contact ***  or the resident at *** or at the Clinic office, ***.    Colonoscopy     A camera attached to a flexible tube with a viewing lens is used to take video pictures.     Colonoscopy is a test to view the inside of your lower digestive tract (colon and rectum). Sometimes it can show the last part of the small intestine (ileum). During the test, small pieces of tissue may be removed for testing. This is called a biopsy. Small growths, such as polyps, may also be removed.   Why is colonoscopy done?  The test is done to help look for colon cancer. And it can help find the source of abdominal pain, bleeding, and changes in bowel habits. It may be needed once a year, depending on factors such as your:  · Age  · Health history  · Family health history  · Symptoms  · Results from any prior colonoscopy  Risks and possible complications  These include:  · Bleeding               · A puncture or tear in the colon   · Risks of  anesthesia  · A cancer lesion not being seen  Getting ready   To prepare for the test:  · Talk with your healthcare provider about the risks of the test (see below). Also ask your healthcare provider about alternatives to the test.  · Tell your healthcare provider about any medicines you take. Also tell him or her about any health conditions you may have.  · Make sure your rectum and colon are empty for the test. Follow the diet and bowel prep instructions exactly. If you dont, the test may need to be rescheduled.  · Plan for a friend or family member to drive you home after the test.     Colonoscopy provides an inside view of the entire colon.     You may discuss the results with your doctor right away or at a future visit.  During the test   The test is usually done in the hospital on an outpatient basis. This means you go home the same day. The procedure takes about 30 minutes. During that time:  · You are given relaxing (sedating) medicine through an IV line. You may be drowsy, or fully asleep.  · The healthcare provider will first give you a physical exam to check for anal and rectal problems.  · Then the anus is lubricated and the scope inserted.  · If you are awake, you may have a feeling similar to needing to have a bowel movement. You may also feel pressure as air is pumped into the colon. Its OK to pass gas during the procedure.  · Biopsy, polyp removal, or other treatments may be done during the test.  After the test   You may have gas right after the test. It can help to try to pass it to help prevent later bloating. Your healthcare provider may discuss the results with you right away. Or you may need to schedule a follow-up visit to talk about the results. After the test, you can go back to your normal eating and other activities. You may be tired from the sedation and need to rest for a few hours.  Date Last Reviewed: 11/1/2016  © 6265-2714 [employer]. [address] [place] [address], [facility]  [place] [address]. All rights reserved. This information is not intended as a substitute for professional medical care. Always follow your healthcare professional's instructions.

## 2018-07-26 ENCOUNTER — TELEPHONE (OUTPATIENT)
Dept: FAMILY MEDICINE | Facility: CLINIC | Age: 53
End: 2018-07-26

## 2018-07-26 NOTE — TELEPHONE ENCOUNTER
----- Message from Rin Harris sent at 7/26/2018  2:54 PM CDT -----  Contact: Wife Fabby  Patient went to the company that provided his C [employer] machine and they would not adjust it without orders from his doctor.  Patient is having troubles exhaling so needs it to be adjusted.   Call back at [phone].  Thank you!

## 2018-07-27 NOTE — TELEPHONE ENCOUNTER
Spoke with patients wife to explain the CPAP process. She said he is having trouble exhaling with the mask on. I did advise that this was a normal thing. That he would have to exhale against the pressure in the machine set to keep his airway open. Kind of like breathing with your head out of a car window. I educated her that it is a process he may only be able to wear his mask for a few hours initially but each couple of nights he should be able to increase the amount of time its on. The longer he can keep it on the better he will feel the next day. She said ok I'll let him know. Advised to call back if problem persists past a few weeks. She verbalized understanding.

## 2018-08-07 DIAGNOSIS — E11.65 UNCONTROLLED TYPE 2 DIABETES MELLITUS WITH HYPERGLYCEMIA, WITHOUT LONG-TERM CURRENT USE OF INSULIN: ICD-10-CM

## 2018-08-07 RX ORDER — GLIPIZIDE 10 MG/1
10 TABLET, FILM COATED, EXTENDED RELEASE ORAL
Qty: 30 TABLET | Refills: 2 | Status: SHIPPED | OUTPATIENT
Start: 2018-08-07 | End: 2018-10-31

## 2018-08-28 ENCOUNTER — PATIENT MESSAGE (OUTPATIENT)
Dept: FAMILY MEDICINE | Facility: CLINIC | Age: 53
End: 2018-08-28

## 2018-08-28 ENCOUNTER — PATIENT MESSAGE (OUTPATIENT)
Dept: GASTROENTEROLOGY | Facility: CLINIC | Age: 53
End: 2018-08-28

## 2018-08-28 RX ORDER — OMEPRAZOLE 20 MG/1
20 CAPSULE, DELAYED RELEASE ORAL DAILY
Qty: 90 CAPSULE | Status: CANCELLED | OUTPATIENT
Start: 2018-08-28

## 2018-08-31 RX ORDER — OMEPRAZOLE 20 MG/1
20 CAPSULE, DELAYED RELEASE ORAL DAILY
Qty: 90 CAPSULE | Refills: 3 | Status: SHIPPED | OUTPATIENT
Start: 2018-08-31 | End: 2019-06-13 | Stop reason: SDUPTHER

## 2018-09-05 ENCOUNTER — OFFICE VISIT (OUTPATIENT)
Dept: ENDOCRINOLOGY | Facility: CLINIC | Age: 53
End: 2018-09-05
Payer: COMMERCIAL

## 2018-09-05 VITALS
DIASTOLIC BLOOD PRESSURE: 70 MMHG | SYSTOLIC BLOOD PRESSURE: 113 MMHG | WEIGHT: 284.38 LBS | BODY MASS INDEX: 36.5 KG/M2 | RESPIRATION RATE: 18 BRPM | HEIGHT: 74 IN | TEMPERATURE: 99 F | HEART RATE: 81 BPM

## 2018-09-05 DIAGNOSIS — I15.2 HYPERTENSION ASSOCIATED WITH DIABETES: ICD-10-CM

## 2018-09-05 DIAGNOSIS — E11.40 TYPE 2 DIABETES MELLITUS WITH DIABETIC NEUROPATHY, WITHOUT LONG-TERM CURRENT USE OF INSULIN: Primary | ICD-10-CM

## 2018-09-05 DIAGNOSIS — E78.5 HYPERLIPIDEMIA ASSOCIATED WITH TYPE 2 DIABETES MELLITUS: ICD-10-CM

## 2018-09-05 DIAGNOSIS — E11.59 HYPERTENSION ASSOCIATED WITH DIABETES: ICD-10-CM

## 2018-09-05 DIAGNOSIS — G47.33 OSA ON CPAP: ICD-10-CM

## 2018-09-05 DIAGNOSIS — G62.9 NEUROPATHY: ICD-10-CM

## 2018-09-05 DIAGNOSIS — E11.69 HYPERLIPIDEMIA ASSOCIATED WITH TYPE 2 DIABETES MELLITUS: ICD-10-CM

## 2018-09-05 DIAGNOSIS — D50.9 IRON DEFICIENCY ANEMIA, UNSPECIFIED IRON DEFICIENCY ANEMIA TYPE: ICD-10-CM

## 2018-09-05 PROCEDURE — 99999 PR PBB SHADOW E&M-EST. PATIENT-LVL IV: CPT | Mod: PBBFAC,,, | Performed by: PHYSICIAN ASSISTANT

## 2018-09-05 PROCEDURE — 99204 OFFICE O/P NEW MOD 45 MIN: CPT | Mod: S$GLB,,, | Performed by: PHYSICIAN ASSISTANT

## 2018-09-05 PROCEDURE — 3074F SYST BP LT 130 MM HG: CPT | Mod: CPTII,S$GLB,, | Performed by: PHYSICIAN ASSISTANT

## 2018-09-05 PROCEDURE — 3046F HEMOGLOBIN A1C LEVEL >9.0%: CPT | Mod: CPTII,S$GLB,, | Performed by: PHYSICIAN ASSISTANT

## 2018-09-05 PROCEDURE — 3078F DIAST BP <80 MM HG: CPT | Mod: CPTII,S$GLB,, | Performed by: PHYSICIAN ASSISTANT

## 2018-09-05 PROCEDURE — 3008F BODY MASS INDEX DOCD: CPT | Mod: CPTII,S$GLB,, | Performed by: PHYSICIAN ASSISTANT

## 2018-09-05 RX ORDER — DEXTROSE 4 G
1 TABLET,CHEWABLE ORAL DAILY PRN
Qty: 1 EACH | Refills: 0 | Status: SHIPPED | OUTPATIENT
Start: 2018-09-05 | End: 2019-09-04

## 2018-09-05 RX ORDER — DULOXETIN HYDROCHLORIDE 60 MG/1
60 CAPSULE, DELAYED RELEASE ORAL DAILY
Qty: 30 CAPSULE | Refills: 11 | Status: SHIPPED | OUTPATIENT
Start: 2018-09-05 | End: 2018-09-20

## 2018-09-05 RX ORDER — METFORMIN HYDROCHLORIDE 850 MG/1
TABLET ORAL
Qty: 270 TABLET | Refills: 3 | Status: SHIPPED | OUTPATIENT
Start: 2018-09-05 | End: 2019-11-18 | Stop reason: SDUPTHER

## 2018-09-05 NOTE — PROGRESS NOTES
"CC: DM    HPI: Alfred Cuello was diagnosed with Type 2 DM ~ 6 years ago after having polyuria, polydipsia and polyphagia. No hospitalizations for DM. His mom has Type 2 diabetes and is on a pump.     New to me and endocrine today.    CURRENT DIABETIC MEDS: metformin 2 g daily, glipizide xr 10 mg daily  Uses Vials or Pens: n/a  Skipped/missed glycemic medications:no    Past medications:  Glimepiride    BG readings are checked 1x every few days.  Fastin-200    Hypoglycemia: No  Type of Glucose Meter:  Orgoo contour    Physical Activity: He does 10,000+ steps at work every day.    Dietary Habits:   BF-banana with breakfast carnation  LH-sandwich, carrots, applesauce, hot dog, egg salad  DN-salad, wife cooks, grilled chicken, pork chops, rice or potatoes  Snacks on carrots and cheese. Drinks water.     Last Eye Exam: --cataracts  Last Podiatry Exam:     Last DM Education Attended:  in [facility] Hx: Drinks ETOH (2-3 cocktails) on holidays. He smoked 0.5 ppd for 33 years and quit in .     REVIEW OF SYSTEMS  General: no fatigue, wt stable.   Eyes: decreased vision while reading no blurry vision.   Cardiac: no chest pain or palpitations.   Respiratory: no cough or dyspnea.   GI: no abdominal pain or nausea.   Skin: no rashes or itching.   Neuro: + numbness or tingling in feet.   Endocrine: no polyuria, polydipsia, polyphagia.   Remainder ROS negative    Vital Signs  /70 (BP Location: Right arm, Patient Position: Sitting, BP Method: Large (Automatic))   Pulse 81   Temp 98.8 °F (37.1 °C) (Oral)   Resp 18   Ht 6' 1.75" (1.873 m)   Wt 129 kg (284 lb 6.3 oz)   BMI 36.76 kg/m²     Personally reviewed labs below:   Hemoglobin A1C   Date Value Ref Range Status   2018 9.7 (H) 4.0 - 5.6 % Final     Comment:     ADA Screening Guidelines:  5.7-6.4%  Consistent with prediabetes  >or=6.5%  Consistent with diabetes  High levels of fetal hemoglobin interfere with the HbA1C  assay. " Heterozygous hemoglobin variants (HbS, HgC, etc)do  not significantly interfere with this assay.   However, presence of multiple variants may affect accuracy.     03/20/2018 8.6 (H) 4.0 - 5.6 % Final     Comment:     According to ADA guidelines, hemoglobin A1c <7.0% represents  optimal control in non-pregnant diabetic patients. Different  metrics may apply to specific patient populations.   Standards of Medical Care in Diabetes-2016.  For the purpose of screening for the presence of diabetes:  <5.7%     Consistent with the absence of diabetes  5.7-6.4%  Consistent with increasing risk for diabetes   (prediabetes)  >or=6.5%  Consistent with diabetes  Currently, no consensus exists for use of hemoglobin A1c  for diagnosis of diabetes for children.  This Hemoglobin A1c assay has significant interference with fetal   hemoglobin   (HbF). The results are invalid for patients with abnormal amounts of   HbF,   including those with known Hereditary Persistence   of Fetal Hemoglobin. Heterozygous hemoglobin variants (HbAS, HbAC,   HbAD, HbAE, HbA2) do not significantly interfere with this assay;   however, presence of multiple variants in a sample may impact the %   interference.     03/21/2009 8.6 (H) 4.0 - 6.2 % Final       Chemistry        Component Value Date/Time     07/20/2018 1045    K 3.7 07/20/2018 1045    CL 97 07/20/2018 1045    CO2 31 (H) 07/20/2018 1045    BUN 14 07/20/2018 1045    CREATININE 1.3 07/20/2018 1045     (H) 07/20/2018 1045        Component Value Date/Time    CALCIUM 9.5 07/20/2018 1045    ALKPHOS 95 07/20/2018 1045    AST 31 07/20/2018 1045    ALT 35 07/20/2018 1045    BILITOT 0.7 07/20/2018 1045    ESTGFRAFRICA >60 07/20/2018 1045    EGFRNONAA >60 07/20/2018 1045        Lab Results   Component Value Date    CHOL 199 07/20/2018    CHOL 266 (H) 03/20/2018    CHOL 273 (H) 03/21/2009     Lab Results   Component Value Date    HDL 34 (L) 07/20/2018    HDL 41 03/20/2018    HDL 43 03/21/2009      Lab Results   Component Value Date    LDLCALC Invalid, Trig>400.0 07/20/2018    LDLCALC Invalid, Trig>400.0 03/20/2018    LDLCALC 174.6 (H) 03/21/2009     Lab Results   Component Value Date    TRIG 440 (H) 07/20/2018    TRIG 487 (H) 03/20/2018    TRIG 277 (H) 03/21/2009     Lab Results   Component Value Date    CHOLHDL 17.1 (L) 07/20/2018    CHOLHDL 15.4 (L) 03/20/2018    CHOLHDL 15.8 (L) 03/21/2009     Lab Results   Component Value Date    TSH 1.22 10/06/2008     Lab Results   Component Value Date    MICALBCREAT 23.7 07/20/2018     No results found for: [id number]    PHYSICAL EXAMINATION  Constitutional: elderly male, appears well, no distress  Neck: Supple, trachea midline.   Respiratory: even and unlabored, CTA without wheezes.  Cardiovascular: RRR; no carotid bruits or murmurs.   Lymph: DP pulses  2+ bilaterally; no edema.   Skin: warm and dry; no injection site reactions, no acanthosis nigracans observed.  Neuro: patient alert and cooperative; CN 2-12 grossly intact  Foot Exam: no sores or macerations noted. Healing Abrasion on dorsal side of mid left foot. Small pinpoint hole on 5th digit of right foot,no exudate or signs of infection.    Protective Sensation (w/ 10 gram monofilament):  Right: Intact  Left: Intact    Visual Inspection:  Normal -  Bilateral, Nails Intact - without Evidence of Foot Deformity- Bilateral, Callus -  Bilateral and Dry Skin -  Bilateral    Pedal Pulses:   Right: Present  Left: Present    Posterior tibialis:   Right:Present  Left: Present     Vibratory Sensation  Right:Absent  Left:Absent     Assessment/Plan    1. Type 2 diabetes mellitus with diabetic neuropathy, without long-term current use of insulin  Fructosamine    Hemoglobin A1c    GlycoMark (TM)    T3    T4, free    TSH    Ambulatory consult to Diabetic Education    Ambulatory Referral to Podiatry    DULoxetine (CYMBALTA) 60 MG capsule    semaglutide (OZEMPIC) 0.25 mg or 0.5 mg(2 mg/1.5 mL) PnIj    metFORMIN (GLUCOPHAGE)  850 MG tablet    blood-glucose meter Misc    blood sugar diagnostic Strp    Amylase    Lipase   2. Neuropathy     3. Hypertension associated with diabetes     4. Hyperlipidemia associated with type 2 diabetes mellitus     5. Iron deficiency anemia, unspecified iron deficiency anemia type     6. RAFAEL on CPAP       Type 2 DM-A1c is above goal. Start ozempic 0.25 mg weekly for one month. Increase dose to 0.5 mg after one month. Discussed side effects and MOA. BG checks 2x daily. Logs to all visits. Referral to DE and podiatry.   Neuropathy-start Cymbalta 60 mg daily. Continue gabapentin.  ILW-crcyuq-wjglwpxy ACEi  HLD-elevated-continue statin and ASA. Optimize BG control.  Anemia-may falsely lower a1c. Continue Iron supplements.  RAFAEL-continue CPAP    FOLLOW UP  6 weeks

## 2018-09-13 ENCOUNTER — TELEPHONE (OUTPATIENT)
Dept: FAMILY MEDICINE | Facility: CLINIC | Age: 53
End: 2018-09-13

## 2018-09-13 DIAGNOSIS — R09.89 CHOKING EPISODE OCCURRING AT NIGHT: Primary | ICD-10-CM

## 2018-09-13 NOTE — TELEPHONE ENCOUNTER
----- Message from Paras Lawson sent at 9/13/2018  9:51 AM CDT -----  Type: Needs Medical Advice    Who Called:  Wife- Yessica Cuello  Symptoms (please be specific):  NA  How long has patient had these symptoms:  HUSSEIN  Pharmacy name and phone #:  HUSSEIN  Best Call Back Number: [phone]  Additional Information: Patient need a rx to lower C-pap pressure with [facility] respiratory- [phone]. The wife asking for the order to be faxed today, patient want to have test done tomorrow.

## 2018-09-14 NOTE — TELEPHONE ENCOUNTER
"Spoke to patient's wife via involvement of care states patient can not tolerate the cpap states patient wakes up out of his sleep describes as "choking patient" or "takes his breath away" states patient has had a bipap in the past and has done fine with it.    Spoke to [facility] respiratory staff patient will need a new order for bipap and will need a repeat sleep titration only  " Problem: Patient Care Overview (Adult)  Goal: Plan of Care Review  Outcome: Ongoing (interventions implemented as appropriate)    04/07/17 1550   Outcome Evaluation   Outcome Summary/Follow up Plan Pt w/ decreased LOC this pm still. Slightly more alert and was able to squeeze hand when asked, but not appopriate for OOB activity. Tolerated ROM well.    Coping/Psychosocial Response Interventions   Plan Of Care Reviewed With patient

## 2018-09-18 NOTE — TELEPHONE ENCOUNTER
Spoke to patient's wife notified patient will need sleep titration. Provided patient wife with phone number to schedule

## 2018-09-19 ENCOUNTER — PATIENT MESSAGE (OUTPATIENT)
Dept: ENDOCRINOLOGY | Facility: CLINIC | Age: 53
End: 2018-09-19

## 2018-09-19 ENCOUNTER — PATIENT MESSAGE (OUTPATIENT)
Dept: FAMILY MEDICINE | Facility: CLINIC | Age: 53
End: 2018-09-19

## 2018-09-20 DIAGNOSIS — E11.40 TYPE 2 DIABETES MELLITUS WITH DIABETIC NEUROPATHY, WITHOUT LONG-TERM CURRENT USE OF INSULIN: Primary | ICD-10-CM

## 2018-09-20 DIAGNOSIS — G62.9 NEUROPATHY: ICD-10-CM

## 2018-09-20 RX ORDER — DULOXETIN HYDROCHLORIDE 30 MG/1
30 CAPSULE, DELAYED RELEASE ORAL DAILY
Qty: 30 CAPSULE | Refills: 11 | Status: SHIPPED | OUTPATIENT
Start: 2018-09-20 | End: 2018-10-08

## 2018-10-01 ENCOUNTER — LAB VISIT (OUTPATIENT)
Dept: LAB | Facility: HOSPITAL | Age: 53
End: 2018-10-01
Attending: PHYSICIAN ASSISTANT
Payer: COMMERCIAL

## 2018-10-01 DIAGNOSIS — E11.40 TYPE 2 DIABETES MELLITUS WITH DIABETIC NEUROPATHY, WITHOUT LONG-TERM CURRENT USE OF INSULIN: ICD-10-CM

## 2018-10-01 LAB
ESTIMATED AVG GLUCOSE: 206 MG/DL
HBA1C MFR BLD HPLC: 8.8 %

## 2018-10-01 PROCEDURE — 84443 ASSAY THYROID STIM HORMONE: CPT

## 2018-10-01 PROCEDURE — 83690 ASSAY OF LIPASE: CPT

## 2018-10-01 PROCEDURE — 82150 ASSAY OF AMYLASE: CPT

## 2018-10-01 PROCEDURE — 83036 HEMOGLOBIN GLYCOSYLATED A1C: CPT

## 2018-10-01 PROCEDURE — 82985 ASSAY OF GLYCATED PROTEIN: CPT

## 2018-10-01 PROCEDURE — 36415 COLL VENOUS BLD VENIPUNCTURE: CPT | Mod: PO

## 2018-10-01 PROCEDURE — 84439 ASSAY OF FREE THYROXINE: CPT

## 2018-10-01 PROCEDURE — 84480 ASSAY TRIIODOTHYRONINE (T3): CPT

## 2018-10-01 PROCEDURE — 84378 SUGARS SINGLE QUANT: CPT

## 2018-10-02 LAB
AMYLASE SERPL-CCNC: 54 U/L
LIPASE SERPL-CCNC: 53 U/L
T3 SERPL-MCNC: 105 NG/DL
T4 FREE SERPL-MCNC: 0.99 NG/DL
TSH SERPL DL<=0.005 MIU/L-ACNC: 0.82 UIU/ML

## 2018-10-03 LAB — FRUCTOSAMINE SERPL-SCNC: 383 UMOL /L (ref 151–300)

## 2018-10-05 LAB — GLYCOMARK (TM): 6.9 UG/ML

## 2018-10-08 ENCOUNTER — LAB VISIT (OUTPATIENT)
Dept: LAB | Facility: HOSPITAL | Age: 53
End: 2018-10-08
Attending: INTERNAL MEDICINE
Payer: COMMERCIAL

## 2018-10-08 ENCOUNTER — OFFICE VISIT (OUTPATIENT)
Dept: ENDOCRINOLOGY | Facility: CLINIC | Age: 53
End: 2018-10-08
Payer: COMMERCIAL

## 2018-10-08 VITALS
RESPIRATION RATE: 18 BRPM | HEART RATE: 78 BPM | SYSTOLIC BLOOD PRESSURE: 132 MMHG | HEIGHT: 74 IN | BODY MASS INDEX: 36.27 KG/M2 | DIASTOLIC BLOOD PRESSURE: 70 MMHG | WEIGHT: 282.63 LBS

## 2018-10-08 DIAGNOSIS — I15.2 HYPERTENSION ASSOCIATED WITH DIABETES: ICD-10-CM

## 2018-10-08 DIAGNOSIS — E78.5 HYPERLIPIDEMIA ASSOCIATED WITH TYPE 2 DIABETES MELLITUS: ICD-10-CM

## 2018-10-08 DIAGNOSIS — K21.9 GASTROESOPHAGEAL REFLUX DISEASE, ESOPHAGITIS PRESENCE NOT SPECIFIED: ICD-10-CM

## 2018-10-08 DIAGNOSIS — E11.40 TYPE 2 DIABETES MELLITUS WITH DIABETIC NEUROPATHY, WITHOUT LONG-TERM CURRENT USE OF INSULIN: ICD-10-CM

## 2018-10-08 DIAGNOSIS — E11.69 HYPERLIPIDEMIA ASSOCIATED WITH TYPE 2 DIABETES MELLITUS: ICD-10-CM

## 2018-10-08 DIAGNOSIS — E11.65 TYPE 2 DIABETES MELLITUS WITH HYPERGLYCEMIA, WITHOUT LONG-TERM CURRENT USE OF INSULIN: ICD-10-CM

## 2018-10-08 DIAGNOSIS — G47.33 OSA ON CPAP: ICD-10-CM

## 2018-10-08 DIAGNOSIS — E11.59 HYPERTENSION ASSOCIATED WITH DIABETES: ICD-10-CM

## 2018-10-08 DIAGNOSIS — G62.9 NEUROPATHY: ICD-10-CM

## 2018-10-08 DIAGNOSIS — E78.00 HYPERCHOLESTEROLEMIA: ICD-10-CM

## 2018-10-08 DIAGNOSIS — E11.65 TYPE 2 DIABETES MELLITUS WITH HYPERGLYCEMIA, WITHOUT LONG-TERM CURRENT USE OF INSULIN: Primary | ICD-10-CM

## 2018-10-08 DIAGNOSIS — N18.30 CKD (CHRONIC KIDNEY DISEASE) STAGE 3, GFR 30-59 ML/MIN: ICD-10-CM

## 2018-10-08 DIAGNOSIS — E66.9 OBESITY (BMI 30-39.9): ICD-10-CM

## 2018-10-08 LAB
CHOLEST SERPL-MCNC: 171 MG/DL
CHOLEST/HDLC SERPL: 4.3 {RATIO}
HDLC SERPL-MCNC: 40 MG/DL
HDLC SERPL: 23.4 %
LDLC SERPL CALC-MCNC: 80 MG/DL
NONHDLC SERPL-MCNC: 131 MG/DL
TRIGL SERPL-MCNC: 255 MG/DL

## 2018-10-08 PROCEDURE — 36415 COLL VENOUS BLD VENIPUNCTURE: CPT | Mod: PO

## 2018-10-08 PROCEDURE — 3045F PR MOST RECENT HEMOGLOBIN A1C LEVEL 7.0-9.0%: CPT | Mod: CPTII,S$GLB,, | Performed by: INTERNAL MEDICINE

## 2018-10-08 PROCEDURE — 3078F DIAST BP <80 MM HG: CPT | Mod: CPTII,S$GLB,, | Performed by: INTERNAL MEDICINE

## 2018-10-08 PROCEDURE — 3008F BODY MASS INDEX DOCD: CPT | Mod: CPTII,S$GLB,, | Performed by: INTERNAL MEDICINE

## 2018-10-08 PROCEDURE — 99214 OFFICE O/P EST MOD 30 MIN: CPT | Mod: S$GLB,,, | Performed by: INTERNAL MEDICINE

## 2018-10-08 PROCEDURE — 3075F SYST BP GE 130 - 139MM HG: CPT | Mod: CPTII,S$GLB,, | Performed by: INTERNAL MEDICINE

## 2018-10-08 PROCEDURE — 83701 LIPOPROTEIN BLD HR FRACTION: CPT

## 2018-10-08 PROCEDURE — 99999 PR PBB SHADOW E&M-EST. PATIENT-LVL IV: CPT | Mod: PBBFAC,,, | Performed by: INTERNAL MEDICINE

## 2018-10-08 PROCEDURE — 80061 LIPID PANEL: CPT

## 2018-10-08 RX ORDER — SEMAGLUTIDE 1.34 MG/ML
0.5 INJECTION, SOLUTION SUBCUTANEOUS
Qty: 3 SYRINGE | Refills: 3 | Status: SHIPPED | OUTPATIENT
Start: 2018-10-08 | End: 2019-01-06

## 2018-10-08 RX ORDER — DULOXETIN HYDROCHLORIDE 60 MG/1
60 CAPSULE, DELAYED RELEASE ORAL DAILY
Qty: 90 CAPSULE | Refills: 3 | Status: SHIPPED | OUTPATIENT
Start: 2018-10-08 | End: 2019-02-07 | Stop reason: SDUPTHER

## 2018-10-08 NOTE — PROGRESS NOTES
Subjective:      Patient ID: Alfred Cuello is a 53 y.o. male.    Chief Complaint:      53 yr old gentleman with type 2 diabetes seen for [facility] visit today.      History of Present Illness    Patient is a 53 yr old gentleman seen in [facility] today. He had previously been seen for initial care visit by Eulalia Sheridan PA-C.  Patient was first diagnosed with diabetes ~ 6 yrs ago.  This was found on routine screening labs.  Patients mother has diabetes as well.   Patients most recent HBA1c from 10/18 was 8.8 reflecting an improvement from 9.7 from 07/18.  His current treatment regimen consists of metformin 850mg TID, glipizide 10mg Qd ( has been on this since diagnosis) and ozempic 0.25mg weekly.  Hi associated comorbidities are detailed below;  Patient Active Problem List   Diagnosis    DM type 2 (diabetes mellitus, type 2)    Iron deficiency anemia    Hypertension associated with diabetes    Allergic rhinitis    GERD (gastroesophageal reflux disease)    H/O pyloric stenosis    Hyperlipidemia associated with type 2 diabetes mellitus    CKD (chronic kidney disease) stage 3, GFR 30-59 ml/min    Obesity (BMI 30-39.9)    RAFAEL on CPAP    Screen for colon cancer    Neuropathy    Type 2 diabetes mellitus with hyperglycemia    Hypercholesterolemia     His most recent ophthalmic screening was from 04/18.   ?? Flu vaccination; does not take this by choice  ?/ Pneumovax.; does not take this by choice.  Patients [place] scores is 18.  Patient stopped smoking when he was diagnosed with diabetes and also stopped drinking ETOH at the time.          Review of Systems   Constitutional: Negative for fatigue and fever.   HENT: Negative for facial swelling, hearing loss, trouble swallowing and voice change.    Respiratory: Negative for cough and shortness of breath.    Cardiovascular: Negative for chest pain, palpitations and leg swelling.   Gastrointestinal: Negative for abdominal pain, nausea and vomiting.   Endocrine: Negative  "for polyuria.   Genitourinary: Negative for dysuria.   Musculoskeletal: Negative for gait problem.   Skin: Positive for rash (intermittent rashes which become more apparent with higher ambient blood glucose levels). Negative for color change and pallor.   Neurological: Positive for numbness (painful lancinating pains in both feet; chronic) and headaches (intermittent).   Hematological: Does not bruise/bleed easily.   Psychiatric/Behavioral: The patient is not nervous/anxious.        Objective: Resp 18   Ht 6' 1.75" (1.873 m)   Wt 128.2 kg (282 lb 10.1 oz)   BMI 36.53 kg/m²    /70 (BP Location: Left arm, Patient Position: Sitting, BP Method: X-Large (Automatic))   Pulse 78   Resp 18   Ht 6' 1.75" (1.873 m)   Wt 128.2 kg (282 lb 10.1 oz)   BMI 36.53 kg/m²  Body surface area is 2.58 meters squared.           Physical Exam   Constitutional: He is oriented to person, place, and time. He appears well-developed and well-nourished. No distress.   Pleasant gentleman. Not pale, anicteric and afebrile. Well hydrated. Not in any acute distress.   HENT:   Head: Normocephalic and atraumatic.   Eyes: Conjunctivae and EOM are normal. Pupils are equal, round, and reactive to light. No scleral icterus.   Neck: Normal range of motion. Neck supple. No thyromegaly present.   Cardiovascular: Normal rate, regular rhythm and normal heart sounds.   No murmur heard.  Pulmonary/Chest: Effort normal and breath sounds normal. No stridor. No respiratory distress. He has no wheezes.   Abdominal: He exhibits distension (chronic).   Obese anterior abdominal wall.   Musculoskeletal: Normal range of motion. He exhibits no edema.   Neurological: He is alert and oriented to person, place, and time. No cranial nerve deficit.   Skin: Skin is warm and dry. No rash noted. He is not diaphoretic. No erythema. No pallor.   Psychiatric: He has a normal mood and affect. His behavior is normal. Judgment and thought content normal.   Vitals " reviewed.      Lab Review:     Results for KATHIE PICKERING (MRN [id number]) as of 10/8/2018 13:17   Ref. Range 7/20/2018 10:34 7/20/2018 10:45 7/20/2018 14:01 10/1/2018 16:00   WBC Latest Ref Range: 3.90 - 12.70 K/uL  7.20     RBC Latest Ref Range: 4.60 - 6.20 M/uL  4.51 (L)     Hemoglobin Latest Ref Range: 14.0 - 18.0 g/dL  12.7 (L)     Hematocrit Latest Ref Range: 40.0 - 54.0 %  38.3 (L)     MCV Latest Ref Range: 82 - 98 fL  85     MCH Latest Ref Range: 27.0 - 31.0 pg  28.2     MCHC Latest Ref Range: 32.0 - 36.0 g/dL  33.2     RDW Latest Ref Range: 11.5 - 14.5 %  14.8 (H)     Platelets Latest Ref Range: 150 - 350 K/uL  237     MPV Latest Ref Range: 9.2 - 12.9 fL  9.0 (L)     Gran% Latest Ref Range: 38.0 - 73.0 %  65.4     Gran # (ANC) Latest Ref Range: 1.8 - 7.7 K/uL  4.7     Lymph% Latest Ref Range: 18.0 - 48.0 %  25.5     Lymph # Latest Ref Range: 1.0 - 4.8 K/uL  1.8     Mono% Latest Ref Range: 4.0 - 15.0 %  4.3     Mono # Latest Ref Range: 0.3 - 1.0 K/uL  0.3     Eosinophil% Latest Ref Range: 0.0 - 8.0 %  4.5     Eos # Latest Ref Range: 0.0 - 0.5 K/uL  0.3     Basophil% Latest Ref Range: 0.0 - 1.9 %  0.3     Baso # Latest Ref Range: 0.00 - 0.20 K/uL  0.00     Iron Latest Ref Range: 45 - 160 ug/dL  76     TIBC Latest Ref Range: 250 - 450 ug/dL  451 (H)     Saturated Iron Latest Ref Range: 20 - 50 %  17 (L)     Transferrin Latest Ref Range: 200 - 375 mg/dL  305     Ferritin Latest Ref Range: 20.0 - 300.0 ng/mL  74     Sodium Latest Ref Range: 136 - 145 mmol/L  139     Potassium Latest Ref Range: 3.5 - 5.1 mmol/L  3.7     Chloride Latest Ref Range: 95 - 110 mmol/L  97     CO2 Latest Ref Range: 23 - 29 mmol/L  31 (H)     Anion Gap Latest Ref Range: 8 - 16 mmol/L  11     BUN, Bld Latest Ref Range: 6 - 20 mg/dL  14     Creatinine Latest Ref Range: 0.5 - 1.4 mg/dL  1.3     eGFR if non African American Latest Ref Range: >60 mL/min/1.73 m^2  >60     eGFR if African American Latest Ref Range: >60 mL/min/1.73 m^2  >60      Glucose Latest Ref Range: 70 - 110 mg/dL  222 (H)     Calcium Latest Ref Range: 8.7 - 10.5 mg/dL  9.5     Alkaline Phosphatase Latest Ref Range: 55 - 135 U/L  95     Total Protein Latest Ref Range: 6.0 - 8.4 g/dL  7.1     Albumin Latest Ref Range: 3.5 - 5.2 g/dL  3.8     Total Bilirubin Latest Ref Range: 0.1 - 1.0 mg/dL  0.7     AST Latest Ref Range: 10 - 40 U/L  31     ALT Latest Ref Range: 10 - 44 U/L  35     Amylase Latest Ref Range: 20 - 110 U/L    54   Lipase Latest Ref Range: 4 - 60 U/L    53   Triglycerides Latest Ref Range: 30 - 150 mg/dL  440 (H)     Cholesterol Latest Ref Range: 120 - 199 mg/dL  199     HDL Latest Ref Range: 40 - 75 mg/dL  34 (L)     LDL Cholesterol Latest Ref Range: 63.0 - 159.0 mg/dL  Invalid, Trig>400.0     Total Cholesterol/HDL Ratio Latest Ref Range: 2.0 - 5.0   5.9 (H)     Hemoglobin A1C Latest Ref Range: 4.0 - 5.6 %  9.7 (H)  8.8 (H)   Estimated Avg Glucose Latest Ref Range: 68 - 131 mg/dL  232 (H)  206 (H)   GlycoMark (TM) Latest Units: ug/mL    6.9 (L)   TSH Latest Ref Range: 0.400 - 4.000 uIU/mL    0.821   T3, Total Latest Ref Range: 60 - 180 ng/dL    105   Free T4 Latest Ref Range: 0.71 - 1.51 ng/dL    0.99   Microalbum.,U,Random Latest Units: ug/mL 41.0      Microalb Creat Ratio Latest Ref Range: 0.0 - 30.0 ug/mg 23.7      Fructosamine Latest Ref Range: 151 - 300 umol /L    383 (H)   TISSUE SPECIMEN TO PATHOLOGY - SURGERY Unknown   Rpt    Creatinine, Random Ur Latest Ref Range: 23.0 - 375.0 mg/dL 173.0      Differential Method Unknown  Automated     HDL/Chol Ratio Latest Ref Range: 20.0 - 50.0 %  17.1 (L)     Non-HDL Cholesterol Latest Units: mg/dL  165         Assessment:     1. Type 2 diabetes mellitus with hyperglycemia, without long-term current use of insulin  Lipid panel    LDL Cholesterol, Direct Measurement    Ambulatory consult to Podiatry   2. Hyperlipidemia associated with type 2 diabetes mellitus  Lipid panel    LDL Cholesterol, Direct Measurement   3.  Hypertension associated with diabetes     4. Hypercholesterolemia     5. CKD (chronic kidney disease) stage 3, GFR 30-59 ml/min     6. Neuropathy  DULoxetine (CYMBALTA) 60 MG capsule   7. Obesity (BMI 30-39.9)     8. Gastroesophageal reflux disease, esophagitis presence not specified     9. RAFAEL on CPAP        Type 2 [id number] . Increase  ozempic to 0.5mg weekly  To stop glipizide and instead commence jardiance 10mg QD.   Regarding painful diabetic Neuropathy-increase Cymbalta to  60 mg daily and Continue gabapentin but see if he can reduce dose based on that.  Also refer for Nevro study @ main [facility].  Regarding PDM-[id number]-kpiznfwf ACEi  HLD-elevated-continue statin and ASA. Optimize BG control.  Anemia-may falsely lower a1c. Continue Iron supplements.  RAFAEL-continue CPAP; refer to ffup with the sleep medicine group for adjustment of CPAP settings and possible nasal septum correction.            Plan:     FFup in ~ 3mths

## 2018-10-09 ENCOUNTER — OFFICE VISIT (OUTPATIENT)
Dept: PODIATRY | Facility: CLINIC | Age: 53
End: 2018-10-09
Payer: COMMERCIAL

## 2018-10-09 VITALS
WEIGHT: 284.81 LBS | BODY MASS INDEX: 37.75 KG/M2 | HEART RATE: 104 BPM | DIASTOLIC BLOOD PRESSURE: 72 MMHG | SYSTOLIC BLOOD PRESSURE: 142 MMHG | HEIGHT: 73 IN

## 2018-10-09 DIAGNOSIS — B35.1 ONYCHOMYCOSIS DUE TO DERMATOPHYTE: Primary | ICD-10-CM

## 2018-10-09 DIAGNOSIS — E11.49 TYPE II DIABETES MELLITUS WITH NEUROLOGICAL MANIFESTATIONS: ICD-10-CM

## 2018-10-09 PROCEDURE — 99203 OFFICE O/P NEW LOW 30 MIN: CPT | Mod: S$GLB,,, | Performed by: PODIATRIST

## 2018-10-09 PROCEDURE — 3045F PR MOST RECENT HEMOGLOBIN A1C LEVEL 7.0-9.0%: CPT | Mod: CPTII,S$GLB,, | Performed by: PODIATRIST

## 2018-10-09 PROCEDURE — 99999 PR PBB SHADOW E&M-EST. PATIENT-LVL III: CPT | Mod: PBBFAC,,, | Performed by: PODIATRIST

## 2018-10-09 PROCEDURE — 3008F BODY MASS INDEX DOCD: CPT | Mod: CPTII,S$GLB,, | Performed by: PODIATRIST

## 2018-10-09 PROCEDURE — 3077F SYST BP >= 140 MM HG: CPT | Mod: CPTII,S$GLB,, | Performed by: PODIATRIST

## 2018-10-09 PROCEDURE — 3078F DIAST BP <80 MM HG: CPT | Mod: CPTII,S$GLB,, | Performed by: PODIATRIST

## 2018-10-09 RX ORDER — CICLOPIROX 80 MG/ML
SOLUTION TOPICAL NIGHTLY
Qty: 6.6 ML | Refills: 11 | Status: SHIPPED | OUTPATIENT
Start: 2018-10-09 | End: 2019-09-04

## 2018-10-09 NOTE — PROGRESS NOTES
Subjective:      Patient ID: Alfred Cuello is a 53 y.o. male.    Chief Complaint: Diabetic Foot Exam (Pain 3rd toe right foot just stated today )    Alfred is a 53 y.o. male who presents to the clinic upon referral from Dr. Beach  for evaluation and treatment of diabetic feet. Alfred has a past medical history of Diabetes mellitus, type 2, GERD (gastroesophageal reflux disease), Hyperlipidemia, Hypertension, and Neuropathy. Patient relates no major problem with feet. Only complaints today consist of Diabetes, increased risk amputation needing evaluation/management/optomization of foot care.  .    PCP: Nuria Man MD    Date Last Seen by PCP:   Chief Complaint   Patient presents with    Diabetic Foot Exam     Pain 3rd toe right foot just stated today          Current shoe gear: Casual shoes    Hemoglobin A1C   Date Value Ref Range Status   10/01/2018 8.8 (H) 4.0 - 5.6 % Final     Comment:     ADA Screening Guidelines:  5.7-6.4%  Consistent with prediabetes  >or=6.5%  Consistent with diabetes  High levels of fetal hemoglobin interfere with the HbA1C  assay. Heterozygous hemoglobin variants (HbS, HgC, etc)do  not significantly interfere with this assay.   However, presence of multiple variants may affect accuracy.     07/20/2018 9.7 (H) 4.0 - 5.6 % Final     Comment:     ADA Screening Guidelines:  5.7-6.4%  Consistent with prediabetes  >or=6.5%  Consistent with diabetes  High levels of fetal hemoglobin interfere with the HbA1C  assay. Heterozygous hemoglobin variants (HbS, HgC, etc)do  not significantly interfere with this assay.   However, presence of multiple variants may affect accuracy.     03/20/2018 8.6 (H) 4.0 - 5.6 % Final     Comment:     According to ADA guidelines, hemoglobin A1c <7.0% represents  optimal control in non-pregnant diabetic patients. Different  metrics may apply to specific patient populations.   Standards of Medical Care in Diabetes-2016.  For the purpose of screening for the presence of  diabetes:  <5.7%     Consistent with the absence of diabetes  5.7-6.4%  Consistent with increasing risk for diabetes   (prediabetes)  >or=6.5%  Consistent with diabetes  Currently, no consensus exists for use of hemoglobin A1c  for diagnosis of diabetes for children.  This Hemoglobin A1c assay has significant interference with fetal   hemoglobin   (HbF). The results are invalid for patients with abnormal amounts of   HbF,   including those with known Hereditary Persistence   of Fetal Hemoglobin. Heterozygous hemoglobin variants (HbAS, HbAC,   HbAD, HbAE, HbA2) do not significantly interfere with this assay;   however, presence of multiple variants in a sample may impact the %   interference.             Review of Systems   Constitution: Negative for chills, diaphoresis, fever, malaise/fatigue and night sweats.   Cardiovascular: Negative for claudication, cyanosis, leg swelling and syncope.   Skin: Positive for nail changes. Negative for color change, dry skin, rash, suspicious lesions and unusual hair distribution.   Musculoskeletal: Negative for falls, joint pain, joint swelling, muscle cramps, muscle weakness and stiffness.   Gastrointestinal: Negative for constipation, diarrhea, nausea and vomiting.   Neurological: Negative for brief paralysis, disturbances in coordination, focal weakness, numbness, paresthesias, sensory change and tremors.           Objective:      Physical Exam   Constitutional: He is oriented to person, place, and time. He appears well-developed and well-nourished. He is cooperative. No distress.   Cardiovascular:   Pulses:       Popliteal pulses are 2+ on the right side, and 2+ on the left side.        Dorsalis pedis pulses are 2+ on the right side, and 2+ on the left side.        Posterior tibial pulses are 2+ on the right side, and 2+ on the left side.   Capillary refill 3 seconds all toes/distal feet, all toes/both feet warm to touch.      Negative lymphadenopathy bilateral popliteal fossa  and tarsal tunnel.      Negavie lower extremity edema bilateral.     Musculoskeletal:        Right ankle: He exhibits normal range of motion, no swelling, no ecchymosis, no deformity, no laceration and normal pulse. Achilles tendon normal. Achilles tendon exhibits no pain, no defect and normal Templeton's test results.   Patient has hammertoes of digits   2-5 bilateral                partially reducible without symptom today.    Otherwise, Normal angle, base, station of gait. All ten toes without clubbing, cyanosis, or signs of ischemia.  No pain to palpation bilateral lower extremities.  Range of motion, stability, muscle strength, and muscle tone normal bilateral feet and legs.     Lymphadenopathy: No inguinal adenopathy noted on the right or left side.   Negative lymphadenopathy bilateral popliteal fossa and tarsal tunnel.    Negative lymphangitic streaking bilateral feet/ankles/legs.   Neurological: He is alert and oriented to person, place, and time. He has normal strength. He displays no atrophy and no tremor. A sensory deficit is present. He exhibits normal muscle tone. Gait normal.   Reflex Scores:       Patellar reflexes are 2+ on the right side and 2+ on the left side.       Achilles reflexes are 2+ on the right side and 2+ on the left side.  Diminished/loss of protective sensation all toes bilateral to 10 gram monofilament.    Decreased/absent vibratory sensation bilateral feet to 128Hz tuning fork.    Burning tingling paresthesias both feet minimal with medication.   Skin: Skin is warm, dry and intact. Capillary refill takes 2 to 3 seconds. No abrasion, no bruising, no burn, no ecchymosis, no laceration, no lesion and no rash noted. He is not diaphoretic. No cyanosis or erythema. No pallor. Nails show no clubbing.     Skin is normal age and health appropriate color, turgor, texture, and temperature bilateral lower extremities without ulceration, hyperpigmentation, discoloration, masses nodules or cords  palpated.  No ecchymosis, erythema, edema, or cardinal signs of infection bilateral lower extremities.    Toenails 1st, 2nd, 3rd, 4th, 5th  bilateral are hypertrophic thickened 2-3 mm, dystrophic, discolored tanish brown with tan, gray crumbly subungual debris.  Neatly trimmed and not tender to distal nail plate pressure, without periungual skin abnormality of each.     Psychiatric: He has a normal mood and affect.             Assessment:       Encounter Diagnoses   Name Primary?    Onychomycosis due to dermatophyte Yes    Type II diabetes mellitus with neurological manifestations          Plan:       Alfred was seen today for diabetic foot exam.    Diagnoses and all orders for this visit:    Onychomycosis due to dermatophyte    Type II diabetes mellitus with neurological manifestations    Other orders  -     ciclopirox (PENLAC) 8 % Soln; Apply topically nightly.      I counseled the patient on his conditions, their implications and medical management.        - Shoe inspection. Diabetic Foot Education. Patient reminded of the importance of good nutrition and blood sugar control to help prevent podiatric complications of diabetes. Patient instructed on proper foot hygeine. We discussed wearing proper shoe gear, daily foot inspections, never walking without protective shoe gear, never putting sharp instruments to feet, routine podiatric visits at least annually.      Rx penlac.    Discussed conservative treatment with shoes of adequate dimensions, material, and style to alleviate symptoms and delay or prevent surgical intervention.         Follow-up in about 1 year (around 10/9/2019).

## 2018-10-09 NOTE — LETTER
October 9, 2018      Trace Beach MD  [address] [place] [address]           [facility]  [address] [place] [address]  Phone: [phone]          Patient: Alfred Cuello   MR Number: [id number]   YOB: 1965   Date of Visit: 10/9/2018       Dear Dr. Trace Beach:    Thank you for referring Alfred Cuello to me for evaluation. Attached you will find relevant portions of my assessment and plan of care.    If you have questions, please do not hesitate to call me. I look forward to following Alfred Cuello along with you.    Sincerely,    Leonid Dueñas, DPHERMILA    Enclosure  CC:  No Recipients    If you would like to receive this communication electronically, please contact [email or web address] or [phone] to request more information on [email or web address] Link access.    For providers and/or their staff who would like to refer a patient to [facility], please contact us through our one-stop-shop provider referral line, [facility] , at [phone].    If you feel you have received this communication in error or would no longer like to receive these types of communications, please e-mail [email or web address]

## 2018-10-11 ENCOUNTER — PATIENT MESSAGE (OUTPATIENT)
Dept: ENDOCRINOLOGY | Facility: CLINIC | Age: 53
End: 2018-10-11

## 2018-10-11 LAB — LDLC SERPL-MCNC: 101 MG/DL

## 2018-10-17 ENCOUNTER — TELEPHONE (OUTPATIENT)
Dept: RESEARCH | Facility: OTHER | Age: 53
End: 2018-10-17

## 2018-10-17 ENCOUNTER — RESEARCH ENCOUNTER (OUTPATIENT)
Dept: RESEARCH | Facility: OTHER | Age: 53
End: 2018-10-17

## 2018-10-17 NOTE — TELEPHONE ENCOUNTER
"Left message with patient's wife to return call regarding PDN study at [phone] or [phone] ([place]). Wife expresses that she will try to convince patient to call back due to "He really dismissed the study by the time he got to the car."  "

## 2018-10-18 NOTE — ADDENDUM NOTE
Addended by: ADILSON BAINS on: 10/18/2018 02:44 PM     Modules accepted: Level of Service, SmartSet

## 2018-10-19 ENCOUNTER — RESEARCH ENCOUNTER (OUTPATIENT)
Dept: RESEARCH | Facility: OTHER | Age: 53
End: 2018-10-19
Payer: COMMERCIAL

## 2018-10-19 DIAGNOSIS — E11.40 PAINFUL DIABETIC NEUROPATHY: Primary | ICD-10-CM

## 2018-10-19 DIAGNOSIS — G89.4 CHRONIC PAIN SYNDROME: ICD-10-CM

## 2018-10-22 PROBLEM — G89.4 CHRONIC PAIN SYNDROME: Status: ACTIVE | Noted: 2018-10-22

## 2018-10-22 PROBLEM — E11.40 PAINFUL DIABETIC NEUROPATHY: Status: ACTIVE | Noted: 2018-10-22

## 2018-10-26 ENCOUNTER — PATIENT MESSAGE (OUTPATIENT)
Dept: ENDOCRINOLOGY | Facility: CLINIC | Age: 53
End: 2018-10-26

## 2018-10-30 ENCOUNTER — TELEPHONE (OUTPATIENT)
Dept: ENDOCRINOLOGY | Facility: CLINIC | Age: 53
End: 2018-10-30

## 2018-10-30 ENCOUNTER — HOSPITAL ENCOUNTER (OUTPATIENT)
Dept: RADIOLOGY | Facility: HOSPITAL | Age: 53
Discharge: HOME | End: 2018-10-30
Attending: ANESTHESIOLOGY

## 2018-10-30 ENCOUNTER — PATIENT MESSAGE (OUTPATIENT)
Dept: FAMILY MEDICINE | Facility: CLINIC | Age: 53
End: 2018-10-30

## 2018-10-30 ENCOUNTER — TELEPHONE (OUTPATIENT)
Dept: FAMILY MEDICINE | Facility: CLINIC | Age: 53
End: 2018-10-30

## 2018-10-30 DIAGNOSIS — E11.40 PAINFUL DIABETIC NEUROPATHY: ICD-10-CM

## 2018-10-30 DIAGNOSIS — G89.4 CHRONIC PAIN SYNDROME: ICD-10-CM

## 2018-10-30 PROCEDURE — 72148 MRI LUMBAR SPINE W/O DYE: CPT | Mod: TC

## 2018-10-30 PROCEDURE — 72148 MRI LUMBAR SPINE W/O DYE: CPT | Mod: 26,,, | Performed by: RADIOLOGY

## 2018-10-30 NOTE — TELEPHONE ENCOUNTER
Called pt regarding below message. Recording on phone states the number I dialed will not accept your call and goes to a busy signal

## 2018-10-30 NOTE — TELEPHONE ENCOUNTER
----- Message from Anna Bourne sent at 10/30/2018  3:25 PM CDT -----  Contact: Self/ [phone]  Pt calling in regards to appt tomorrow. Wants to know if he was supposed to have blood work done first. Thank you.

## 2018-10-30 NOTE — TELEPHONE ENCOUNTER
----- Message from Abby Keys sent at 10/30/2018 12:44 PM CDT -----  Contact: Patient's wife, Yessica  Patient's wife is calling to speak with someone regarding patient's MRI that is scheduled for 4:30 today.  She stated that they want to charge him $266 for this and it is supposed to be done as a study.  Please call to discuss, because he is not supposed to be charged for this.  Call Back#[phone]  Thanks

## 2018-10-31 ENCOUNTER — OFFICE VISIT (OUTPATIENT)
Dept: FAMILY MEDICINE | Facility: CLINIC | Age: 53
End: 2018-10-31
Payer: COMMERCIAL

## 2018-10-31 VITALS
SYSTOLIC BLOOD PRESSURE: 120 MMHG | BODY MASS INDEX: 35.97 KG/M2 | HEIGHT: 73 IN | TEMPERATURE: 99 F | WEIGHT: 271.38 LBS | OXYGEN SATURATION: 96 % | HEART RATE: 97 BPM | RESPIRATION RATE: 14 BRPM | DIASTOLIC BLOOD PRESSURE: 72 MMHG

## 2018-10-31 DIAGNOSIS — Z23 NEED FOR 23-POLYVALENT PNEUMOCOCCAL POLYSACCHARIDE VACCINE: ICD-10-CM

## 2018-10-31 DIAGNOSIS — E78.5 HYPERLIPIDEMIA ASSOCIATED WITH TYPE 2 DIABETES MELLITUS: ICD-10-CM

## 2018-10-31 DIAGNOSIS — E11.59 HYPERTENSION ASSOCIATED WITH DIABETES: ICD-10-CM

## 2018-10-31 DIAGNOSIS — N18.30 CKD (CHRONIC KIDNEY DISEASE) STAGE 3, GFR 30-59 ML/MIN: ICD-10-CM

## 2018-10-31 DIAGNOSIS — E11.69 HYPERLIPIDEMIA ASSOCIATED WITH TYPE 2 DIABETES MELLITUS: ICD-10-CM

## 2018-10-31 DIAGNOSIS — E11.40 PAINFUL DIABETIC NEUROPATHY: ICD-10-CM

## 2018-10-31 DIAGNOSIS — I15.2 HYPERTENSION ASSOCIATED WITH DIABETES: ICD-10-CM

## 2018-10-31 DIAGNOSIS — E11.40 TYPE 2 DIABETES MELLITUS WITH DIABETIC NEUROPATHY, WITHOUT LONG-TERM CURRENT USE OF INSULIN: ICD-10-CM

## 2018-10-31 DIAGNOSIS — L08.9 TOE INFECTION: Primary | ICD-10-CM

## 2018-10-31 DIAGNOSIS — E66.9 OBESITY (BMI 30-39.9): ICD-10-CM

## 2018-10-31 PROCEDURE — 3078F DIAST BP <80 MM HG: CPT | Mod: CPTII,S$GLB,, | Performed by: FAMILY MEDICINE

## 2018-10-31 PROCEDURE — 90471 IMMUNIZATION ADMIN: CPT | Mod: S$GLB,,, | Performed by: FAMILY MEDICINE

## 2018-10-31 PROCEDURE — 3045F PR MOST RECENT HEMOGLOBIN A1C LEVEL 7.0-9.0%: CPT | Mod: CPTII,S$GLB,, | Performed by: FAMILY MEDICINE

## 2018-10-31 PROCEDURE — 99999 PR PBB SHADOW E&M-EST. PATIENT-LVL IV: CPT | Mod: PBBFAC,,, | Performed by: FAMILY MEDICINE

## 2018-10-31 PROCEDURE — 3008F BODY MASS INDEX DOCD: CPT | Mod: CPTII,S$GLB,, | Performed by: FAMILY MEDICINE

## 2018-10-31 PROCEDURE — 3074F SYST BP LT 130 MM HG: CPT | Mod: CPTII,S$GLB,, | Performed by: FAMILY MEDICINE

## 2018-10-31 PROCEDURE — 90732 PPSV23 VACC 2 YRS+ SUBQ/IM: CPT | Mod: S$GLB,,, | Performed by: FAMILY MEDICINE

## 2018-10-31 PROCEDURE — 99214 OFFICE O/P EST MOD 30 MIN: CPT | Mod: 25,S$GLB,, | Performed by: FAMILY MEDICINE

## 2018-10-31 RX ORDER — SULFAMETHOXAZOLE AND TRIMETHOPRIM 800; 160 MG/1; MG/1
1 TABLET ORAL 2 TIMES DAILY
Qty: 20 TABLET | Refills: 0 | Status: SHIPPED | OUTPATIENT
Start: 2018-10-31 | End: 2018-11-13

## 2018-10-31 NOTE — PATIENT INSTRUCTIONS
Established High Blood Pressure    High blood pressure (hypertension) is a chronic disease. Often, healthcare providers dont know what causes it. But it can be caused by certain health conditions and medicines.  If you have high blood pressure, you may not have any symptoms. If you do have symptoms, they may include headache, dizziness, changes in your vision, chest pain, and shortness of breath. But even without symptoms, high blood pressure thats not treated raises your risk for heart attack and stroke. High blood pressure is a serious health risk and shouldnt be ignored.  A blood pressure reading is made up of two numbers: a higher number over a lower number. The top number is the systolic pressure. The bottom number is the diastolic pressure. A normal blood pressure is a systolic pressure of  less than 120 over a diastolic pressure of less than 80. You will see your blood pressure readings written together. For example, a person with a systolic pressure of 188 and a diastolic pressure of 78 will have 118/78 written in the medical record.  High blood pressure is when either the top number is 140 or higher, or the bottom number is 90 or higher. This must be the result when taking your blood pressure a number of times. The blood pressures between normal and high are called prehypertension.  Home care  If you have high blood pressure, you should do what is listed below to lower your blood pressure. If you are taking medicines for high blood pressure, these methods may reduce or end your need for medicines in the future.  · Begin a weight-loss program if you are overweight.  · Cut back on how much salt you get in your diet. Heres how to do this:  ¨ Dont eat foods that have a lot of salt. These include olives, pickles, smoked meats, and salted potato chips.  ¨ Dont add salt to your food at the table.  ¨ Use only small amounts of salt when cooking.  · Start an exercise program. Talk with your healthcare  provider about the type of exercise program that would be best for you. It doesn't have to be hard. Even brisk walking for 20 minutes 3 times a week is a good form of exercise.  · Dont take medicines that stimulate the heart. This includes many over-the-counter cold and sinus decongestant pills and sprays, as well as diet pills. Check the warnings about hypertension on the label. Before buying any over-the-counter medicines or supplements, always ask the pharmacist about the product's potential interaction with your high blood pressure and your high blood pressure medicines.  · Stimulants such as amphetamine or cocaine could be deadly for someone with high blood pressure. Never take these.  · Limit how much caffeine you get in your diet. Switch to caffeine-free products.  · Stop smoking. If you are a long-time smoker, this can be hard. Talk to your healthcare provider about medicines and nicotine replacement options to help you. Also, enroll in a stop-smoking program to make it more likely that you will quit for good.  · Learn how to handle stress. This is an important part of any program to lower blood pressure. Learn about relaxation methods like meditation, yoga, or biofeedback.  · If your provider prescribed medicines, take them exactly as directed. Missing doses may cause your blood pressure get out of control.  · If you miss a dose or doses, check with your healthcare provider or pharmacist about what to do.  · Consider buying an automatic blood pressure machine. Ask your provider for a recommendation. You can get one of these at most pharmacies.     The American Heart Association recommends the following guidelines for home blood pressure monitoring:  · Don't smoke or drink coffee for 30 minutes before taking your blood pressure.  · Go to the bathroom before the test.  · Relax for 5 minutes before taking the measurement.  · Sit with your back supported (don't sit on a couch or soft chair); keep your feet on  the floor uncrossed. Place your arm on a solid flat surface (like a table) with the upper part of the arm at heart level. Place the middle of the cuff directly above the eye of the elbow. Check the monitor's instruction manual for an illustration.  · Take multiple readings. When you measure, take 2 to 3 readings one minute apart and record all of the results.  · Take your blood pressure at the same time every day, or as your healthcare provider recommends.  · Record the date, time, and blood pressure reading.  · Take the record with you to your next medical appointment. If your blood pressure monitor has a built-in memory, simply take the monitor with you to your next appointment.  · Call your provider if you have several high readings. Don't be frightened by a single high blood pressure reading, but if you get several high readings, check in with your healthcare provider.  · Note: When blood pressure reaches a systolic (top number) of 180 or higher OR diastolic (bottom number) of 110 or higher, seek emergency medical treatment.  Follow-up care  You will need to see your healthcare provider regularly. This is to check your blood pressure and to make changes to your medicines. Make a follow-up appointment as directed. Bring the record of your home blood pressure readings to the appointment.  When to seek medical advice  Call your healthcare provider right away if any of these occur:  · Blood pressure reaches a systolic (upper number) of 180 or higher OR a diastolic (bottom number) of 110 or higher  · Chest pain or shortness of breath  · Severe headache  · Throbbing or rushing sound in the ears  · Nosebleed  · Sudden severe pain in your belly (abdomen)  · Extreme drowsiness, confusion, or fainting  · Dizziness or spinning sensation (vertigo)  · Weakness of an arm or leg or one side of the face  · You have problems speaking or seeing   Date Last Reviewed: 12/1/2016  © 4353-6485 [employer]. [address] [place] [address]  [place] [address]. All rights reserved. This information is not intended as a substitute for professional medical care. Always follow your healthcare professional's instructions.            Walking for Fitness  Fitness walking has something for everyone, even people who are already fit. Walking is one of the safest ways to condition your body aerobically. It can boost energy, help you lose weight, and reduce stress.    Physical benefits  · Walking strengthens your heart and lungs, and tones your muscles.  · When walking, your feet land with less impact than in other sports. This reduces chances of muscle, bone, and joint injury.  · Regular walking improves your cholesterol levels and lowers your risk of heart disease. And it helps you control your blood sugar if you have diabetes.  · Walking is a weight-bearing activity, which helps maintain bone density. This can help prevent osteoporosis.  Personal rewards  · Taking walks can help you relax and manage stress. And fitness walking may make you feel better about yourself.  · Walking can help you sleep better at night and make you less likely to be depressed.  · Regular walking may help maintain your memory as you get older.  · Walking is a great way to spend extra time with friends and family members. Be sure to invite your dog along!  Q&A about fitness walking  Q: Will walking keep me fit?  A: Yes. Regular walking at the right pace gives you all the benefits of other aerobic activities, such as jogging and swimming.  Q: Will walking help me lose weight and keep it off?  A: Yes. Per mile, walking can burn as many calories as jogging. Your health care provider can help work walking into your weight-loss plan.  Q: Is walking safe for my health?  A: Yes. Walking is safe if you have high blood pressure, diabetes, heart disease, or other conditions. Talk to your healthcare provider before you start.  Date Last Reviewed: 4/1/2017  © 9275-6105 [employer]. [address] [place] [address], [place] [address]. All rights reserved. This information is not intended as a substitute for professional medical care. Always follow your healthcare professional's instructions.            Diabetes (General Information)  Diabetes is a long-term health problem. It means your body does not make enough insulin. Or it may mean that your body cannot use the insulin it makes. Insulin is a hormone in your body. It lets blood sugar (glucose) reach the cells in your body. All of your cells need glucose for fuel.  When you have diabetes, the glucose in your blood builds up because it cannot get into the cells. This buildup is called high blood sugar (hyperglycemia).  Your blood sugar level depends on several things. It depends on what kind of food you eat and how much of it you eat. It also depends on how much exercise you get, and how much insulin you have in your body. Eating too much of the wrong kinds of food or not taking diabetes medicine on time can cause high blood sugar. Infections can cause high blood sugar even if you are taking medicines correctly.  These things can also cause low blood sugar:  · Missing meals  · Not eating enough food  · Taking too much diabetes medicine  Diabetes can cause serious problems over time if you do not get treated. These problems include heart disease, stroke, kidney failure, and blindness. They also include nerve pain or loss of feeling in your legs and feet, and gangrene of the feet. By keeping your blood sugar under control you can prevent or delay these problems.  Normal blood sugar levels are 80 to 100 before a meal and less than 180 in the 1 to 2 hours after a meal.  Home care  Follow these guidelines when caring for yourself at home:  · Follow the diet your healthcare provider gives you. Take insulin or other diabetes medicine exactly as told to.  · Watch your blood sugar as you are told to. Keep a log of your results. This will help your  provider change your medicines to keep your blood sugar under control.  · Try to reach your ideal weight. You may be able to cut back on or not have to take diabetes medicine if you eat the right foods and get exercise.  · Do not smoke. Smoking worsens the effects of diabetes on your circulation. You are much more likely to have a heart attack if you have diabetes and you smoke.  · Take good care of your feet. If you have lost feeling in your feet, you may not see an injury or infection. Check your feet and between your toes at least once a week.  · Wear a medical alert bracelet or necklace, or carry a card in your wallet that says you have diabetes. This will help healthcare providers give you the right care if you get very ill and cannot tell them that you have diabetes.  Sick day plan  If you get a cold, the flu, or a bacterial or viral infection, take these steps:  · Look at your diabetes sick plan and call your healthcare provider as you were told to. You may need to call your provider right away if:  ¨ Your blood sugar is above 240 while taking your diabetes medicine  ¨ Your urine ketone levels are above normal or high  ¨ You have been vomiting more than 6 hours  ¨ You have trouble breathing or your breath ha s a fruity smell  ¨ You have a high fever  ¨ You have a fever for several days and you are not getting better  ¨ You get light-headed and are sleepier than usual  · Keep taking your diabetes pills (oral medicine) even if you have been vomiting and are feeling sick. Call your provider right away because you may need insulin to lower your blood sugar until you recover from your illness.  · Keep taking your insulin even if you have been vomiting and are feeling sick. Call your provider right away to ask if you need to change your insulin dose. This will depend on your blood sugar results.  · Check your blood sugar every 2 to 4 hours, or at least 4 times a day.  · Check your ketones often. If you are vomiting  and having diarrhea, watch them more often.  · Do not skip meals. Try to eat small meals on a regular schedule. Do this even if you do not feel like eating.  · Drink water or other liquids that do not have caffeine or calories. This will keep you from getting dehydrated. If you are nauseated or vomiting, takes small sips every 5 minutes. To prevent dehydration try to drink a cup (8 ounces) of fluids every hour while you are awake.  General care  Always bring a source of fast-acting sugar with you in case you have symptoms of low blood sugar (below 70). At the first sign of low blood sugar, eat or drink 15 to 20 grams of fast-acting sugar to raise your blood sugar. Examples are:  · 3 to 4 glucose tablets. You can buy these at most drugstores.  · 4 ounces (1/2 cup) of regular (not diet) soft drinks  · 4 ounces (1/2 cup) of any fruit juice  · 8 ounces (1 cup) of milk  · 5 to 6 pieces of hard candy  · 1 tablespoon of honey  Check your blood sugar 15 minutes after treating yourself. If it is still below 70, take 15 to 20 more grams of fast-acting sugar. Test again in 15 minutes. If it returns to normal (70 or above), eat a snack or meal to keep your blood sugar in a safe range. If it stays low, call your doctor or go to an emergency room.  Follow-up care  Follow-up with your healthcare provider, or as advised. For more information about diabetes, visit the American Diabetes Association website at www.diabetes.org or call 800-861-4602.  When to seek medical advice  Call your healthcare provider right away if you have any of these symptoms of high blood sugar:  · Frequent urination  · Dizziness  · Drowsiness  · Thirst  · Headache  · Nausea or vomiting  · Abdominal pain  · Eyesight changes  · Fast breathing  · Confusion or loss of consciousness  Also call your provider right away if you have any of these signs of low blood sugar:  · Fatigue  · Headache  · Shakes  · Excess sweating  · Hunger  · Feeling anxious or  restless  · Eyesight changes  · Drowsiness  · Weakness  · Confusion or loss of consciousness  Call 911  Call for emergency help right away if any of these occur:  · Chest pain or shortness of breath  · Dizziness or fainting  · Weakness of an arm or leg or one side of the face  · Trouble speaking or seeing   Date Last Reviewed: 6/1/2016 © 2000-2017 [employer]. [address] [place] [address], [place] [address]. All rights reserved. This information is not intended as a substitute for professional medical care. Always follow your healthcare professional's instructions.            Weight Management: Getting Started  Healthy bodies come in all shapes and sizes. Not all bodies are made to be thin. For some people, a healthy weight is higher than the average weight listed on weight charts. Your healthcare provider can help you decide on a healthy weight for you.    Reasons to lose weight  Losing weight can help with some health problems, such as high blood pressure, heart disease, diabetes, sleep apnea, and arthritis. You may also feel more energy.  Set your long-term goal  Your goal doesn't even have to be a specific weight. You may decide on a fitness goal (such as being able to walk 10 miles a week), or a health goal (such as lowering your blood pressure). Choose a goal that is measurable and reasonable, so you know when you've reached it. A goal of reaching a BMI of less than 25 is not always reasonable (or possible).   Make an action plan  Habits dont change overnight. Setting your goals too high can leave you feeling discouraged if you cant reach them. Be realistic. Choose one or two small changes you can make now. Set an action plan for how you are going to make these changes. When you can stick to this plan, keep making a few more small changes. Taking small steps will help you stay on the path to success.  Track your progress  Write down your goals. Then, keep a daily record of your progress. Write down  what you eat and how active you are. This record lets you look back on how much youve done. It may also help when youre feeling frustrated. Reward yourself for success. Even if you dont reach every goal, give yourself credit for what you do get done.  Get support  Encouragement from others can help make losing weight easier. Ask your family members and friends for support. They may even want to join you. Also look to your healthcare provider, registered dietitian, and  for help. Your local hospital can give you more information about nutrition, exercise, and weight loss.  Date Last Reviewed: 1/31/2016  © 4168-6397 [employer]. [address] [place] [address], [place] [address]. All rights reserved. This information is not intended as a substitute for professional medical care. Always follow your healthcare professional's instructions.

## 2018-10-31 NOTE — PROGRESS NOTES
Subjective:       Patient ID: Alfred Cuello is a 53 y.o. male.    Chief Complaint: Follow-up (3mth f/u)    HPI  Review of Systems   Constitutional: Negative for fatigue and unexpected weight change.   Respiratory: Negative for chest tightness and shortness of breath.    Cardiovascular: Negative for chest pain, palpitations and leg swelling.   Gastrointestinal: Negative for abdominal pain.   Musculoskeletal: Negative for arthralgias.   Neurological: Negative for dizziness, syncope, light-headedness and headaches.       Patient Active Problem List   Diagnosis    DM type 2 (diabetes mellitus, type 2)    Iron deficiency anemia    Hypertension associated with diabetes    Allergic rhinitis    GERD (gastroesophageal reflux disease)    H/O pyloric stenosis    Hyperlipidemia associated with type 2 diabetes mellitus    CKD (chronic kidney disease) stage 3, GFR 30-59 ml/min    Obesity (BMI 30-39.9)    RAFAEL on CPAP    Neuropathy    Type 2 diabetes mellitus with hyperglycemia    Hypercholesterolemia    Painful diabetic neuropathy    Chronic pain syndrome     Patient is here for a chronic conditions follow up.    Lab Visit on 10/08/2018   Component Date Value Ref Range Status    Cholesterol 10/08/2018 171  120 - 199 mg/dL Final    Triglycerides 10/08/2018 255* 30 - 150 mg/dL Final    HDL 10/08/2018 40  40 - 75 mg/dL Final    LDL Cholesterol 10/08/2018 80.0  63.0 - 159.0 mg/dL Final    HDL/Chol Ratio 10/08/2018 23.4  20.0 - 50.0 % Final    Total Cholesterol/HDL Ratio 10/08/2018 4.3  2.0 - 5.0 Final    Non-HDL Cholesterol 10/08/2018 131  mg/dL Final    LDL Cholesterol (Beta Quantificati* 10/08/2018 101  mg/dL Final   Lab Visit on 10/01/2018   Component Date Value Ref Range Status    Fructosamine 10/01/2018 383* 151 - 300 umol /L Final    Hemoglobin A1C 10/01/2018 8.8* 4.0 - 5.6 % Final    Estimated Avg Glucose 10/01/2018 206* 68 - 131 mg/dL Final    GlycoMark (TM) 10/01/2018 6.9* ug/mL Final    T3,  Total 10/01/2018 105  60 - 180 ng/dL Final    Free T4 10/01/2018 0.99  0.71 - 1.51 ng/dL Final    TSH 10/01/2018 0.821  0.400 - 4.000 uIU/mL Final    Amylase 10/01/2018 54  20 - 110 U/L Final    Lipase 10/01/2018 53  4 - 60 U/L Final     Endocrine Dr. Beach- a1c down to 8.8. Losing weight since change from glipizide to jardiance and startying ozempic.  Down from 287 to 271 lbs    Has noticed right 3rd toe is swollen red a few days.  No skin breakdown except around distal toenails (brittle due to fungus).  NT but has little feeling.  No maceration or fissuring between webs.  Objective:      Physical Exam   Constitutional: He is oriented to person, place, and time. He appears well-developed and well-nourished.   Cardiovascular: Normal rate, regular rhythm and normal heart sounds.   Pulmonary/Chest: Effort normal and breath sounds normal.   Musculoskeletal: He exhibits no edema.        Feet:    Neurological: He is alert and oriented to person, place, and time.   Skin: Skin is warm and dry.   Psychiatric: He has a normal mood and affect.   Nursing note and vitals reviewed.      Assessment:       1. Toe infection    2. Need for 23-polyvalent pneumococcal polysaccharide vaccine    3. Hypertension associated with diabetes    4. CKD (chronic kidney disease) stage 3, GFR 30-59 ml/min    5. Type 2 diabetes mellitus with diabetic neuropathy, without long-term current use of insulin    6. Obesity (BMI 30-39.9)    7. Hyperlipidemia associated with type 2 diabetes mellitus    8. Painful diabetic neuropathy        Plan:         1. Toe infection  Cleanse wound twice daily.  Apply antibiotic ointment and sterile bandage twice daily and as needed.  Monitor for increased pain, pus, redness and swelling.  If occurs then return to clinic or go to the emergency room if clinic is closed for a recheck.      - sulfamethoxazole-trimethoprim 800-160mg (BACTRIM DS) 800-160 mg Tab; Take 1 tablet by mouth 2 (two) times daily.  Dispense: 20  tablet; Refill: 0    2. Need for 23-polyvalent pneumococcal polysaccharide vaccine  Immunize today.  Counseled patient on risks, benefits and side effects.  Patient elected to proceed with vaccination.    - (In Office Administered) Pneumococcal Polysaccharide Vaccine (23 Valent) (SQ/IM)    3. Hypertension associated with diabetes  Controlled on current medications.  Continue current medications.      4. CKD (chronic kidney disease) stage 3, GFR 30-59 ml/min  Stable and chronic.  Will continue to monitor q3-6 months and control chronic conditions as optimally as possible to preserve function.      5. Type 2 diabetes mellitus with diabetic neuropathy, without long-term current use of insulin  Cont endocrine mgmt    6. Obesity (BMI 30-39.9)  Is achieivng Weight loss    7. Hyperlipidemia associated with type 2 diabetes mellitus  Stable condition.  Continue current medications.  Will adjust based on lab findings or if condition changes.      8. Painful diabetic neuropathy  Cont current mgmt and foot care        Time spent with patient: 20 minutes    Patient with be reevaluated in 1 week ANA to recheck toe infection for resolution and  6 months or sooner prn    Greater than 50% of this visit was spent counseling as described in above documentation:Yes

## 2018-11-02 DIAGNOSIS — L08.9 TOE INFECTION: ICD-10-CM

## 2018-11-04 PROBLEM — Z12.11 SCREEN FOR COLON CANCER: Status: RESOLVED | Noted: 2018-07-20 | Resolved: 2018-11-04

## 2018-11-05 ENCOUNTER — PATIENT MESSAGE (OUTPATIENT)
Dept: FAMILY MEDICINE | Facility: CLINIC | Age: 53
End: 2018-11-05

## 2018-11-05 RX ORDER — SULFAMETHOXAZOLE AND TRIMETHOPRIM 800; 160 MG/1; MG/1
TABLET ORAL
Qty: 20 TABLET | Refills: 0 | OUTPATIENT
Start: 2018-11-05

## 2018-11-08 ENCOUNTER — OFFICE VISIT (OUTPATIENT)
Dept: FAMILY MEDICINE | Facility: CLINIC | Age: 53
End: 2018-11-08
Payer: COMMERCIAL

## 2018-11-08 VITALS
OXYGEN SATURATION: 97 % | BODY MASS INDEX: 35.79 KG/M2 | HEART RATE: 89 BPM | HEIGHT: 73 IN | DIASTOLIC BLOOD PRESSURE: 62 MMHG | WEIGHT: 270.06 LBS | TEMPERATURE: 99 F | SYSTOLIC BLOOD PRESSURE: 124 MMHG

## 2018-11-08 DIAGNOSIS — E11.00 TYPE 2 DIABETES MELLITUS WITH HYPEROSMOLARITY WITHOUT COMA, WITHOUT LONG-TERM CURRENT USE OF INSULIN: ICD-10-CM

## 2018-11-08 DIAGNOSIS — G62.9 NEUROPATHY: ICD-10-CM

## 2018-11-08 DIAGNOSIS — I99.8 VASCULAR INSUFFICIENCY: Primary | ICD-10-CM

## 2018-11-08 PROCEDURE — 3078F DIAST BP <80 MM HG: CPT | Mod: CPTII,S$GLB,, | Performed by: PHYSICIAN ASSISTANT

## 2018-11-08 PROCEDURE — 3008F BODY MASS INDEX DOCD: CPT | Mod: CPTII,S$GLB,, | Performed by: PHYSICIAN ASSISTANT

## 2018-11-08 PROCEDURE — 3074F SYST BP LT 130 MM HG: CPT | Mod: CPTII,S$GLB,, | Performed by: PHYSICIAN ASSISTANT

## 2018-11-08 PROCEDURE — 99999 PR PBB SHADOW E&M-EST. PATIENT-LVL V: CPT | Mod: PBBFAC,,, | Performed by: PHYSICIAN ASSISTANT

## 2018-11-08 PROCEDURE — 99213 OFFICE O/P EST LOW 20 MIN: CPT | Mod: S$GLB,,, | Performed by: PHYSICIAN ASSISTANT

## 2018-11-08 PROCEDURE — 3045F PR MOST RECENT HEMOGLOBIN A1C LEVEL 7.0-9.0%: CPT | Mod: CPTII,S$GLB,, | Performed by: PHYSICIAN ASSISTANT

## 2018-11-08 NOTE — PROGRESS NOTES
Subjective:       Patient ID: Alfred Cuello is a 53 y.o. male.    Chief Complaint: Foot Pain    HPI   Pt still has foot / toe discomfort R foot 3rd digit x 1 mos  Completed course of bactrim with no change  Pt diabetic in poor control  Pt has diabetic neuropathy  Review of Systems   Constitutional: Negative.  Negative for activity change, appetite change, chills, diaphoresis, fatigue, fever and unexpected weight change.   HENT: Negative.    Eyes: Negative.    Respiratory: Negative.    Cardiovascular: Negative.  Negative for chest pain and leg swelling.   Gastrointestinal: Negative.    Endocrine: Negative.    Genitourinary: Negative.    Musculoskeletal: Negative.    Skin: Positive for color change.       Objective:      Physical Exam   Constitutional: He appears well-developed and well-nourished. No distress.   HENT:   Head: Normocephalic and atraumatic.   Musculoskeletal: He exhibits edema. He exhibits no tenderness.   Mild swelling 3rd digit R foot  Mild erythema  Non tender  No increased warmth to touch    Skin: Skin is warm and dry. No rash noted. There is erythema.   Vitals reviewed.      Assessment:       1. Vascular insufficiency    2. Type 2 diabetes mellitus with hyperosmolarity without coma, without long-term current use of insulin    3. Neuropathy        Plan:       Vascular insufficiency  -     Ambulatory referral to Podiatry    Type 2 diabetes mellitus with hyperosmolarity without coma, without long-term current use of insulin  -     Ambulatory referral to Podiatry    Neuropathy  -     Ambulatory referral to Podiatry    discussed diet and need for better diabetic control  Elevation  Support hose

## 2018-11-13 ENCOUNTER — HOSPITAL ENCOUNTER (OUTPATIENT)
Dept: RADIOLOGY | Facility: HOSPITAL | Age: 53
Discharge: HOME | End: 2018-11-13
Attending: PODIATRIST
Payer: COMMERCIAL

## 2018-11-13 ENCOUNTER — OFFICE VISIT (OUTPATIENT)
Dept: PODIATRY | Facility: CLINIC | Age: 53
End: 2018-11-13
Payer: COMMERCIAL

## 2018-11-13 VITALS
HEIGHT: 73 IN | DIASTOLIC BLOOD PRESSURE: 73 MMHG | BODY MASS INDEX: 35.79 KG/M2 | SYSTOLIC BLOOD PRESSURE: 115 MMHG | WEIGHT: 270.06 LBS | HEART RATE: 99 BPM

## 2018-11-13 DIAGNOSIS — M10.071 ACUTE IDIOPATHIC GOUT INVOLVING TOE OF RIGHT FOOT: Primary | ICD-10-CM

## 2018-11-13 DIAGNOSIS — E11.42 DIABETIC POLYNEUROPATHY ASSOCIATED WITH TYPE 2 DIABETES MELLITUS: ICD-10-CM

## 2018-11-13 DIAGNOSIS — M79.89 SWELLING OF TOE OF RIGHT FOOT: ICD-10-CM

## 2018-11-13 DIAGNOSIS — E11.621 DIABETIC ULCER OF TOE OF LEFT FOOT ASSOCIATED WITH TYPE 2 DIABETES MELLITUS, WITH FAT LAYER EXPOSED: ICD-10-CM

## 2018-11-13 DIAGNOSIS — L97.522 DIABETIC ULCER OF TOE OF LEFT FOOT ASSOCIATED WITH TYPE 2 DIABETES MELLITUS, WITH FAT LAYER EXPOSED: ICD-10-CM

## 2018-11-13 PROCEDURE — 73630 X-RAY EXAM OF FOOT: CPT | Mod: 26,RT,, | Performed by: RADIOLOGY

## 2018-11-13 PROCEDURE — 3008F BODY MASS INDEX DOCD: CPT | Mod: CPTII,S$GLB,, | Performed by: PODIATRIST

## 2018-11-13 PROCEDURE — 3045F PR MOST RECENT HEMOGLOBIN A1C LEVEL 7.0-9.0%: CPT | Mod: CPTII,S$GLB,, | Performed by: PODIATRIST

## 2018-11-13 PROCEDURE — 3074F SYST BP LT 130 MM HG: CPT | Mod: CPTII,S$GLB,, | Performed by: PODIATRIST

## 2018-11-13 PROCEDURE — 87075 CULTR BACTERIA EXCEPT BLOOD: CPT

## 2018-11-13 PROCEDURE — 99213 OFFICE O/P EST LOW 20 MIN: CPT | Mod: S$GLB,,, | Performed by: PODIATRIST

## 2018-11-13 PROCEDURE — 99999 PR PBB SHADOW E&M-EST. PATIENT-LVL III: CPT | Mod: PBBFAC,,, | Performed by: PODIATRIST

## 2018-11-13 PROCEDURE — 73630 X-RAY EXAM OF FOOT: CPT | Mod: TC,FY,PO,RT

## 2018-11-13 PROCEDURE — 3078F DIAST BP <80 MM HG: CPT | Mod: CPTII,S$GLB,, | Performed by: PODIATRIST

## 2018-11-13 PROCEDURE — 87070 CULTURE OTHR SPECIMN AEROBIC: CPT

## 2018-11-13 NOTE — LETTER
November 14, 2018      Rod Vázquez PA-C  [address]  [place] [address]           [facility]  [address] [facility] [address] [place] [address]  Phone: [phone]          Patient: Alfred Cuello   MR Number: [id number]   YOB: 1965   Date of Visit: 11/13/2018       Dear Rod Vázquez:    Thank you for referring Alfred Cuello to me for evaluation. Attached you will find relevant portions of my assessment and plan of care.    If you have questions, please do not hesitate to call me. I look forward to following Alfred Cuello along with you.    Sincerely,    Selvin Shea, CATRACHITA    Enclosure  CC:  No Recipients    If you would like to receive this communication electronically, please contact [email or web address] or [phone] to request more information on [employer] access.    For providers and/or their staff who would like to refer a patient to [facility], please contact us through our one-stop-shop provider referral line, Tod Jacob, at [phone].    If you feel you have received this communication in error or would no longer like to receive these types of communications, please e-mail [email or web address]

## 2018-11-14 NOTE — PROGRESS NOTES
Subjective:      Patient ID: Alfred Cuello is a 53 y.o. male.    Chief Complaint: Diabetes Mellitus (10/31/18 Magda 10/1/18 8.8) and Foot Pain (3rd toe on right foot swollen, blister on 2nd left toe)  Patient presents to clinic with the chief complaint of redness and swelling of the Rt. 3rd toe that has been present for over 1 month.   Denies experiencing pain from the digit, however, is concerned by continued symptoms of swelling.  It was initially thought by his PCP to be infection, however, relates taking a course of oral antibiotics with no improvement in the appearance of the digit.  He has even attempted to elevate the limb with the same results.  Denies recent trauma to the digit.  Also, relates having sustained several blisters to the Lt. Foot with a remaining wound overlying the Lt. 2nd toe.  He has been treating this daily with applying an antibiotic ointment and covering with a bandage.  States the other sites have healed, and the final wound appears to be healing quite well.  Denies noticing signs of infection to the digit. Denies any additional pedal complaints.      Past Medical History:   Diagnosis Date    Diabetes mellitus, type 2     GERD (gastroesophageal reflux disease)     Hyperlipidemia     Hypertension     Neuropathy        Past Surgical History:   Procedure Laterality Date    ABDOMINAL SURGERY      as a child    COLONOSCOPY N/A 7/20/2018    Procedure: COLONOSCOPY;  Surgeon: Marko Koo MD;  Location: [facility];  Service: Endoscopy;  Laterality: N/A;    COLONOSCOPY N/A 7/20/2018    Performed by Marko Koo MD at [facility]       Family History   Problem Relation Age of Onset    Glaucoma Neg Hx        Social History     Socioeconomic History    Marital status:      Spouse name: None    Number of children: None    Years of education: None    Highest education level: None   Social Needs    Financial resource strain: None    Food insecurity - worry: None     Food insecurity - inability: None    Transportation needs - medical: None    Transportation needs - non-medical: None   Occupational History    None   Tobacco Use    Smoking status: Former Smoker     Last attempt to quit: 3/20/2011     Years since quittin.6    Smokeless tobacco: Never Used   Substance and Sexual Activity    Alcohol use: Yes     Comment: occ    Drug use: No    Sexual activity: None   Other Topics Concern    None   Social History Narrative    None       Current Outpatient Medications   Medication Sig Dispense Refill    ascorbic acid, vitamin C, (VITAMIN C) 1000 MG tablet Take 1,000 mg by mouth once daily.      atorvastatin (LIPITOR) 40 MG tablet Take 1 tablet (40 mg total) by mouth once daily. 90 tablet 3    blood sugar diagnostic Strp 2 strips by Misc.(Non-Drug; Combo Route) route once daily. One touch verio 200 strip 3    blood-glucose meter Misc 1 Device by Misc.(Non-Drug; Combo Route) route daily as needed. One touch verio 1 each 0    BOOSTRIX TDAP 2.5-8-5 Lf-mcg-Lf/0.5mL Syrg injection ADM 0.5ML IM UTD  0    ciclopirox (PENLAC) 8 % Soln Apply topically nightly. 6.6 mL 11    DULoxetine (CYMBALTA) 60 MG capsule Take 1 capsule (60 mg total) by mouth once daily. 90 capsule 3    empagliflozin (JARDIANCE) 10 mg Tab Take 10 mg by mouth once daily. 90 tablet 3    ferrous sulfate 325 mg (65 mg iron) Tab tablet Take 325 mg by mouth daily with breakfast.      gabapentin (NEURONTIN) 300 MG capsule Take 3 capsules (900 mg total) by mouth 4 (four) times daily. 1080 capsule 3    lancets Misc Take 1 x day 100 each 3    lisinopril-hydrochlorothiazide (PRINZIDE,ZESTORETIC) 20-25 mg Tab Take 1 tablet by mouth once daily. 90 tablet 3    loratadine (CLARITIN) 10 mg tablet Take 10 mg by mouth once daily.      metFORMIN (GLUCOPHAGE) 850 MG tablet Take 1 tablet three times daily with meals. 270 tablet 3    multivitamin (ONE DAILY MULTIVITAMIN) per tablet Take 1 tablet by mouth once daily.       omeprazole (PRILOSEC) 20 MG capsule Take 1 capsule (20 mg total) by mouth once daily. 90 capsule 3    OZEMPIC 0.25 mg or 0.5 mg(2 mg/1.5 mL) PnIj Inject 0.5 mg into the skin every 7 days. 3 Syringe 3    SHINGRIX, PF, 50 mcg/0.5 mL injection INJECT UTD  0     No current facility-administered medications for this visit.        Review of patient's allergies indicates:   Allergen Reactions    Percodan [oxycodone-aspirin]          Review of Systems   Constitution: Negative for chills and fever.   Cardiovascular: Negative for claudication and leg swelling.   Skin: Positive for poor wound healing and suspicious lesions. Negative for color change and nail changes.   Musculoskeletal: Positive for joint swelling. Negative for muscle cramps and muscle weakness.   Neurological: Positive for numbness and paresthesias.   Psychiatric/Behavioral: Negative for altered mental status.           Objective:      Physical Exam   Constitutional: He is oriented to person, place, and time. He appears well-developed and well-nourished. No distress.   Cardiovascular:   Pulses:       Dorsalis pedis pulses are 2+ on the right side, and 2+ on the left side.        Posterior tibial pulses are 2+ on the right side, and 2+ on the left side.   CFT is 3 seconds bilateral.  Pedal hair growth is present bilateral. Varicosities noted bilateral.  Moderate nonpitting edema noted to the Rt. 3rd toe.  Toes are cool to touch with increasing warmness bilateral.   Musculoskeletal: He exhibits edema. He exhibits no tenderness.   Muscle strength 5/5 in all muscle groups bilateral.  No tenderness nor crepitation with ROM of foot/ankle joints bilateral.  No tenderness with palpation of bilateral foot and ankle.     Neurological: He is alert and oriented to person, place, and time. He has normal strength. A sensory deficit is present.   Protective sensation per Stites-Elina monofilament is decreased bilateral.  Light touch is absent bilateral.   Skin:  Skin is warm, dry and intact. Capillary refill takes less than 2 seconds. Lesion noted. No abrasion, no bruising, no burn, no ecchymosis, no laceration, no petechiae and no rash noted. He is not diaphoretic. There is erythema. No pallor.   Location: Open wound noted to the dorsal Lt. 2nd toe  Base: Down to dermis and comprised of fibrin.    Drainage: None  Meliza wound: Devoid of erythema, edema, fluctuance, purulence, and malodor.   Measurement: 0.4 x 0.4 x 0.1cm    Mild erythema noted the length of the Rt. 3rd toe.             Assessment:       Encounter Diagnoses   Name Primary?    Swelling of toe of right foot     Diabetic ulcer of toe of left foot associated with type 2 diabetes mellitus, with fat layer exposed     Acute idiopathic gout involving toe of right foot Yes    Diabetic polyneuropathy associated with type 2 diabetes mellitus          Plan:       Alfred was seen today for diabetes mellitus and foot pain.    Diagnoses and all orders for this visit:    Acute idiopathic gout involving toe of right foot    Swelling of toe of right foot  -     X-Ray Foot Complete Right; Future  -     Uric acid; Future  -     C-reactive protein; Future  -     Sedimentation rate; Future  -     CBC auto differential; Future    Diabetic ulcer of toe of left foot associated with type 2 diabetes mellitus, with fat layer exposed  -     Aerobic culture  -     Culture, Anaerobic    Diabetic polyneuropathy associated with type 2 diabetes mellitus      I counseled the patient on his conditions, their implications and medical management.    Orders written for CBC, ESR, CRP, and Uric acid.  Positive for gout.    Will call the patient with instructions regarding daily application of warm compresses, gout diet, and will recommend taking a daily tart cherry supplement to lower uric acid.    The wound overlying the Lt. 2nd toe was cleansed with saline soaked gauze and cultures were obtained.    The wound base was covered with ariela and a  mepilex border.  Given residual ariela and advised to change the dressing every 3 days.    Instructed to call if the site begins to appear infected or fails to heal in two weeks.    RTC prn.    Selvin Shea DPM

## 2018-11-16 LAB — BACTERIA SPEC AEROBE CULT: NORMAL

## 2018-11-19 LAB — BACTERIA SPEC ANAEROBE CULT: NORMAL

## 2018-12-11 ENCOUNTER — OFFICE VISIT (OUTPATIENT)
Dept: PSYCHIATRY | Facility: CLINIC | Age: 53
End: 2018-12-11
Payer: COMMERCIAL

## 2018-12-11 DIAGNOSIS — G62.9 NEUROPATHY: ICD-10-CM

## 2018-12-11 DIAGNOSIS — E11.69 HYPERLIPIDEMIA ASSOCIATED WITH TYPE 2 DIABETES MELLITUS: ICD-10-CM

## 2018-12-11 DIAGNOSIS — G89.4 CHRONIC PAIN SYNDROME: ICD-10-CM

## 2018-12-11 DIAGNOSIS — E11.65 TYPE 2 DIABETES MELLITUS WITH HYPERGLYCEMIA, WITHOUT LONG-TERM CURRENT USE OF INSULIN: ICD-10-CM

## 2018-12-11 DIAGNOSIS — G47.33 OSA ON CPAP: ICD-10-CM

## 2018-12-11 DIAGNOSIS — N18.30 CKD (CHRONIC KIDNEY DISEASE) STAGE 3, GFR 30-59 ML/MIN: ICD-10-CM

## 2018-12-11 DIAGNOSIS — I15.2 HYPERTENSION ASSOCIATED WITH DIABETES: ICD-10-CM

## 2018-12-11 DIAGNOSIS — E78.5 HYPERLIPIDEMIA ASSOCIATED WITH TYPE 2 DIABETES MELLITUS: ICD-10-CM

## 2018-12-11 DIAGNOSIS — K21.9 GASTROESOPHAGEAL REFLUX DISEASE, ESOPHAGITIS PRESENCE NOT SPECIFIED: ICD-10-CM

## 2018-12-11 DIAGNOSIS — Z01.818 PREOP EXAMINATION: Primary | ICD-10-CM

## 2018-12-11 DIAGNOSIS — E11.59 HYPERTENSION ASSOCIATED WITH DIABETES: ICD-10-CM

## 2018-12-11 PROCEDURE — 99999 PR PBB SHADOW E&M-EST. PATIENT-LVL I: CPT | Mod: PBBFAC,,, | Performed by: PSYCHOLOGIST

## 2018-12-11 PROCEDURE — 96102 PR PSYCHOLOGIC TESTING BY TECHNICIAN: CPT | Mod: 59,S$GLB,, | Performed by: PSYCHOLOGIST

## 2018-12-11 PROCEDURE — 96101 PR PSYCHOLOGIC TESTING BY PSYCH/PHYS: CPT | Mod: S$GLB,,, | Performed by: PSYCHOLOGIST

## 2018-12-11 PROCEDURE — 90791 PSYCH DIAGNOSTIC EVALUATION: CPT | Mod: S$GLB,,, | Performed by: PSYCHOLOGIST

## 2018-12-11 NOTE — PSYCH TESTING
[facility]  [address] [place]  [address]  [phone]    REPORT OF PSYCHOLOGICAL TESTING    NAME: Alfred Cuello  OC #: [id number]  : 1965    REFERRED BY:  Rahat Orantes M.D.    EVALUATED BY:  Ana Munoz, Ph.D., Clinical Psychologist  SETH Alonso, Psychometrician    DATES OF EVALUATION: 2018, 2018    EVALUATION PROCEDURES AND TIMES:  Conducted by Psychologist:  Interpretation and report of test data  Integration of information from diagnostic interview, medical record, and testing data  Conducted by Technician:  [place] Multiphasic Personality Ddaxnxzvs-1-Paxtnxcxxdxb Form (MMPI-2-RF)  [facility] Behavioral Medicine Diagnostic (MBMD)  [place] Depression Inventory - II (BDI-II)  CPT Codes and Time:  47543 - 2 hours; 62355 - 2 hours    EVALUATION FINDINGS:  The diagnostic interview revealed that Mr. Alfred Cuello is a 53-year-old White  male referred for Psychological Evaluation prior to surgical implantation of a spinal cord stimulator (SCS) to address chronic pain.  He started experiencing neuropathy three years ago and it started slow.  He notes his pain is currently constant, and mostly worsens when he does not take his medication or during the end of the day.  He reported he has chronic pain in his feet and hands (occasionally but I can still use them).  He has tried using an inversion table and medications without receiving sufficient relief.  His activities are greatly limited.  He has difficulty with sleep due to pain.  He continues to work, walk stairs, be on his feet, and engage in self-care.  Mr. Cuello was able to walk to his appointment today without assistance.    Mr. Cuello is now interested in a trial of the SCS.  He has reviewed the educational materials and is familiar with the procedures involved.  When asked about potential concerns regarding SCS, he indicated concern about damaging the battery.  His expectations regarding SCS  include hoping Im one of those successful candidates and I have significant reduction in neuropathy and I can maintain it.  His wife, mother, brother, and father will be available to help him during recovery after surgery.    Mr. Cuello acknowledged sleep problems that are well-managed with Cymbalta.  He does not report current psychiatric problems or adjustment issues.  He was pleasant and cooperative in interview and appeared to be in good spirits.     The medical record also revealed the following diagnoses:  Neuropathy; Chronic pain syndrome; Hypertension associated with type 2 diabetes mellitus; Hypercholesterolemia; CKD; Type 2 diabetes mellitus; GERD; RAFAEL on CPAP.    TEST DATA:  Psychological Testing data revealed that he was fully cooperative and engaged in the assessment process.  Effort on all tests was satisfactory to produce valid results.    Mr. Cuello produced a valid and interpretable MMPI-2-RF profile, answering items relevantly based on content. Therefore, his responses should be considered a relatively accurate reflection of his current psychological functioning.   Cognitive/Somatic.  Mr. Simon reports somatic complaints including preoccupation with health, weakness or fatigue, head pain, and neurological symptoms.  He also reports cognitive problems such as memory or concentration difficulties.  Emotional.  Mr. Connelly responses indicate emotional distress such as depression, lack of positive emotional experiences, anhedonia, and self-doubt.  He may also be stress-reactive and worry-prone.  Thought.  Mr. Simon reports beliefs that others seek to harm him, which can lead to suspiciousness and alienation from others.  Behavioral.  Mr. Simon acknowledges a history of acting-out behavior that may include poor impulse control, juvenile conduct problems, or difficulties with those in authority.     The MBMD indicated that Mr. Cuello may have reported more emotional symptoms than objectively exist; therefore,  scoring adjustments were made to correct for these tendencies.  This profile should be interpreted with some caution due to these issues.  Mr. Cuello is not reporting any significant distress at this time.  He may think poorly of his abilities and have low self-esteem, which can lead to withdrawn behavior or feelings of worthlessness.  He may have difficulty following prescribed medication regimens, and acknowledged taking others medications or increasing dosages for relief.  Still, testing suggests that he is quite capable of handling the psychological discomfort of surgery. According to test data, psychological factors are unlikely to contribute to excessive medical complications for Mr. Cuello.     The BDI-II revealed the presence of mild depression with a total score of 15 and higher scores in Past Failures and Loss of Interest in Sex.    DIAGNOSTIC IMPRESSIONS:    ICD-10-CM ICD-9-CM   1. Preop examination Z01.818 V72.84   2. Neuropathy G62.9 355.9   3. Chronic pain syndrome G89.4 338.4   4. Type 2 diabetes mellitus with hyperglycemia, without long-term current use of insulin E11.65 250.00     790.29   5. Gastroesophageal reflux disease, esophagitis presence not specified K21.9 530.81   6. Hypertension associated with diabetes E11.59 250.80    I10 401.9   7. Hyperlipidemia associated with type 2 diabetes mellitus E11.69 250.80    E78.5 272.4   8. CKD (chronic kidney disease) stage 3, GFR 30-59 ml/min N18.3 585.3   9. RAFAEL on CPAP G47.33 327.23    Z99.89 V46.8       SUMMARY AND RECOMMENDATIONS:  Mr. Cuello has a long history of severe pain and is pursuing the spinal cord stimulator to improve pain and quality of life.  Test results should be considered valid, and indicate that he reports mild depressive symptoms and physical health concerns.  Based on research and recommendations by Armani et al. (2017) and Armani and Beatris (2013) regarding presurgical psychological screening for pain control procedures, his test  results and reports are within the expected range to predict adequate outcomes and patient satisfaction.  In the clinical interview, Mr. Cuello denied current psychiatric problems or adjustment issues.  There are no recommendations for psychological treatment at this time, and he is aware of resources available should his needs change in the future.  This evaluation revealed NO contraindications to the spinal cord stimulator from a psychological perspective.        Interpretation and report and coding were completed on 12/11/2018.

## 2018-12-11 NOTE — Clinical Note
There are no overt psychological contraindications for the spinal cord stimulator procedure. Please do not hesitate to contact me with any questions or concerns. JR Richy
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
## 2018-12-18 ENCOUNTER — TELEPHONE (OUTPATIENT)
Dept: PAIN MEDICINE | Facility: CLINIC | Age: 53
End: 2018-12-18

## 2018-12-18 DIAGNOSIS — I10 ESSENTIAL HYPERTENSION: ICD-10-CM

## 2018-12-18 RX ORDER — LISINOPRIL AND HYDROCHLOROTHIAZIDE 20; 25 MG/1; MG/1
1 TABLET ORAL DAILY
Qty: 90 TABLET | Refills: 3 | Status: SHIPPED | OUTPATIENT
Start: 2018-12-18 | End: 2020-01-05

## 2018-12-18 NOTE — TELEPHONE ENCOUNTER
Attempted to contact patient regarding scheduling consult with Dr. Orantes for the SCS trial.     Spoke to patient's wife, Mrs. Cuello, she stated he was at work and she will relay message to him. Staff provided office contact number [phone] to schedule consult.     Mrs. Cuello verbalized understanding.

## 2018-12-20 ENCOUNTER — TELEPHONE (OUTPATIENT)
Dept: PAIN MEDICINE | Facility: CLINIC | Age: 53
End: 2018-12-20

## 2018-12-20 NOTE — TELEPHONE ENCOUNTER
Contacted and spoke to patient's wife regarding scheduling consult for SCS Trial with Dr. Orantes, wife stated patient was at work and she had given him the message but will do so again today.

## 2018-12-21 ENCOUNTER — TELEPHONE (OUTPATIENT)
Dept: ENDOCRINOLOGY | Facility: CLINIC | Age: 53
End: 2018-12-21

## 2018-12-21 ENCOUNTER — TELEPHONE (OUTPATIENT)
Dept: PAIN MEDICINE | Facility: CLINIC | Age: 53
End: 2018-12-21

## 2018-12-21 NOTE — TELEPHONE ENCOUNTER
Called and spoke to Leslie with [facility] and she says that they were supposed to receive a referral to see the patient but have not received one yet. It should be faxed to [phone]. Did not see a referral in the system, does the patient needs this?

## 2018-12-21 NOTE — TELEPHONE ENCOUNTER
Mrs. Cuello returned call and stated it should all be billed through the research program, staff was attempting to schedule consult, phone call got disconnected.    Staff attempted to contact patient's wife, no answer, left detailed voice message and requested a return call.

## 2018-12-21 NOTE — TELEPHONE ENCOUNTER
----- Message from Jocelynn Candelario sent at 12/21/2018 10:16 AM CST -----  Contact: Leslie / [facility]  Type: Needs Medical Advice    Who Called:  [facility]  Best Call Back Number: Leslie at   Additional Information: Calling to speak with the Nurse about the referral to see Dr Cody Gordon. Please advise.

## 2018-12-24 NOTE — TELEPHONE ENCOUNTER
Called patient to find out more information on if the patient was requesting a referral with Dr. Gordon. Received message that the number dialed will not accept the call.

## 2019-01-02 ENCOUNTER — PATIENT OUTREACH (OUTPATIENT)
Dept: ADMINISTRATIVE | Facility: HOSPITAL | Age: 54
End: 2019-01-02

## 2019-01-02 DIAGNOSIS — E11.8 TYPE 2 DIABETES MELLITUS WITH COMPLICATION, WITHOUT LONG-TERM CURRENT USE OF INSULIN: Primary | ICD-10-CM

## 2019-01-02 NOTE — LETTER
January 8, 2019    Alfred Robledo Cuate  [address] [facility] [address]             [facility]  [address]  [facility] [address]  Phone: [phone] Dear [facility] is committed to your overall health and would like to ensure that you are up to date on your recommended test and/or procedures.   Nuria Man MD  has found that your chart shows you may be due for the following:     HEMOGLOBIN  A1C       If you have had any of the above done at another facility, please let us know so that we may obtain copies from that facility.  If you have a copy of these records, please provide a copy for us to scan into your chart.  You are welcome to request that the report be faxed to us at  ([phone]).     Otherwise, please schedule these appointments at your earliest convenience by calling [phone] or going to [email or web address].     If you have an upcoming scheduled appointment, please disregard this letter.     Sincerely,   Your [facility]   MD Conchis Bowser LPN Clinical Care Coordinator   [facility]   [address] [facility] [address]   Phone [phone]   Fax [phone]

## 2019-01-07 ENCOUNTER — TELEPHONE (OUTPATIENT)
Dept: ENDOCRINOLOGY | Facility: CLINIC | Age: 54
End: 2019-01-07

## 2019-01-08 ENCOUNTER — OFFICE VISIT (OUTPATIENT)
Dept: PAIN MEDICINE | Facility: CLINIC | Age: 54
End: 2019-01-08
Payer: COMMERCIAL

## 2019-01-08 ENCOUNTER — RESEARCH ENCOUNTER (OUTPATIENT)
Dept: RESEARCH | Facility: OTHER | Age: 54
End: 2019-01-08

## 2019-01-08 VITALS
HEART RATE: 111 BPM | TEMPERATURE: 99 F | WEIGHT: 268.5 LBS | HEIGHT: 73 IN | DIASTOLIC BLOOD PRESSURE: 54 MMHG | SYSTOLIC BLOOD PRESSURE: 89 MMHG | BODY MASS INDEX: 35.59 KG/M2

## 2019-01-08 DIAGNOSIS — E11.40 PAINFUL DIABETIC NEUROPATHY: Primary | ICD-10-CM

## 2019-01-08 DIAGNOSIS — G89.4 CHRONIC PAIN DISORDER: ICD-10-CM

## 2019-01-08 DIAGNOSIS — E11.00 TYPE 2 DIABETES MELLITUS WITH HYPEROSMOLARITY WITHOUT COMA, WITHOUT LONG-TERM CURRENT USE OF INSULIN: ICD-10-CM

## 2019-01-08 PROCEDURE — 3078F DIAST BP <80 MM HG: CPT | Mod: CPTII,S$GLB,, | Performed by: NURSE PRACTITIONER

## 2019-01-08 PROCEDURE — 3045F PR MOST RECENT HEMOGLOBIN A1C LEVEL 7.0-9.0%: CPT | Mod: CPTII,S$GLB,, | Performed by: NURSE PRACTITIONER

## 2019-01-08 PROCEDURE — 3008F PR BODY MASS INDEX (BMI) DOCUMENTED: ICD-10-PCS | Mod: CPTII,S$GLB,, | Performed by: NURSE PRACTITIONER

## 2019-01-08 PROCEDURE — 3074F PR MOST RECENT SYSTOLIC BLOOD PRESSURE < 130 MM HG: ICD-10-PCS | Mod: CPTII,S$GLB,, | Performed by: NURSE PRACTITIONER

## 2019-01-08 PROCEDURE — 99203 OFFICE O/P NEW LOW 30 MIN: CPT | Mod: S$GLB,,, | Performed by: NURSE PRACTITIONER

## 2019-01-08 PROCEDURE — 99203 PR OFFICE/OUTPT VISIT, NEW, LEVL III, 30-44 MIN: ICD-10-PCS | Mod: S$GLB,,, | Performed by: NURSE PRACTITIONER

## 2019-01-08 PROCEDURE — 3074F SYST BP LT 130 MM HG: CPT | Mod: CPTII,S$GLB,, | Performed by: NURSE PRACTITIONER

## 2019-01-08 PROCEDURE — 99999 PR PBB SHADOW E&M-EST. PATIENT-LVL III: CPT | Mod: PBBFAC,,, | Performed by: NURSE PRACTITIONER

## 2019-01-08 PROCEDURE — 99999 PR PBB SHADOW E&M-EST. PATIENT-LVL III: ICD-10-PCS | Mod: PBBFAC,,, | Performed by: NURSE PRACTITIONER

## 2019-01-08 PROCEDURE — 3045F PR MOST RECENT HEMOGLOBIN A1C LEVEL 7.0-9.0%: ICD-10-PCS | Mod: CPTII,S$GLB,, | Performed by: NURSE PRACTITIONER

## 2019-01-08 PROCEDURE — 3008F BODY MASS INDEX DOCD: CPT | Mod: CPTII,S$GLB,, | Performed by: NURSE PRACTITIONER

## 2019-01-08 PROCEDURE — 3078F PR MOST RECENT DIASTOLIC BLOOD PRESSURE < 80 MM HG: ICD-10-PCS | Mod: CPTII,S$GLB,, | Performed by: NURSE PRACTITIONER

## 2019-01-08 NOTE — LETTER
January 8, 2019      Rahat Orantes MD  [address]  [facility] [address]           [facility]  [address]  [facility] [address]  Phone: [phone]  Fax: [phone]          Patient: Alfred Cuello   MR Number: [id number]   YOB: 1965   Date of Visit: 1/8/2019       Dear Dr. Rahat Orantes:    Thank you for referring Alfred Cuello to me for evaluation. Attached you will find relevant portions of my assessment and plan of care.    If you have questions, please do not hesitate to call me. I look forward to following Alfred Cuello along with you.    Sincerely,    Sanjuana Sharp, [facility]    Enclosure  CC:  No Recipients    If you would like to receive this communication electronically, please contact [email or web address] or [phone] to request more information on [employer] Link access.    For providers and/or their staff who would like to refer a patient to [facility], please contact us through our one-stop-shop provider referral line, [facility], at [phone].    If you feel you have received this communication in error or would no longer like to receive these types of communications, please e-mail [email or web address]

## 2019-01-08 NOTE — PROGRESS NOTES
Chronic Pain - Consult    Referring Physician: Rahat Orantes MD    Chief Complaint: No chief complaint on file.       SUBJECTIVE: Disclaimer: This note has been generated using voice-recognition software. There may be typographical errors that have been missed during proof-reading    Initial encounter:    Alfred Cuello presents to the clinic for the evaluation of BLE painful diabetic neuropathy pain.  He is here for PDN assessment for research study.  The pain started about 3 years ago following no major injury and symptoms have been worsening.  He has a long standing history of diabetes with abnormal A1C.  His last A1C was 8.8.  His pain is primarily in his feet and toes.  He does also have pain in his calves.  He describes his pain as burning and electric.  He reports associated numbness and pins and needles sensation.  He denies any symptoms above the knees.  He had a psych consult on 12/11/18 and was approved for spinal cord stimulation.    Brief history:    Pain Description:    Exacerbating factors: Walking and Night Time.      Mitigating factors heat.     Patient denies urinary incontinence, bowel incontinence, significant weight loss and significant motor weakness.  Patient denies any suicidal or homicidal ideations    Pain Procedures: None    Spinal decompression -never  Joint replacement -never    Imaging:     Narrative     EXAMINATION:  MRI LUMBAR SPINE WITHOUT CONTRAST    CLINICAL HISTORY:  research; Type 2 diabetes mellitus with diabetic neuropathy, unspecified    TECHNIQUE:  Multiplanar, multisequence MR images were acquired from the thoracolumbar junction to the sacrum without the administration of contrast.    COMPARISON:  None.    FINDINGS:  Vertebral body heights are within normal limits.  Marrow signal is appropriate.  There is a small hemangioma of the L2 vertebral body.  There are bilateral L5 pars interarticularis defects accompanied by grade 1 anterolisthesis of L5 on S1.  Disc  desiccation is present throughout the lumbar spine.  There is moderate loss of disc height at L5-S1, with mild loss of disc height present from T11 through L2.  There is small Schmorl's node formation of the inferior endplates of T11 and T12.  The conus terminates at T12.    T11-12: There is a small central disc extrusion without significant spinal canal or neuroforaminal narrowing.    T12-L1: There is mild broad-based posterior disc bulging without significant spinal canal or neuroforaminal narrowing.    L1-2: Mild broad-based posterior disc bulging without significant spinal canal or neuroforaminal narrowing.    L2-3: There is a small right paracentral disc protrusion without significant spinal canal or neuroforaminal narrowing.    L3-4: Small central disc protrusion without significant spinal canal or neuroforaminal narrowing.    L4-5: There is a small right foraminal disc extrusion with accompanying annular tear of the disc.  Moderate bilateral facet arthropathy is also present in the findings contribute to mild bilateral neuroforaminal narrowing.    L5-S1: Grade 1 spondylolisthesis with uncovering of the posterior disc is present.  There is moderate bilateral neuroforaminal narrowing without significant spinal canal narrowing.      Impression       Grade 1 L5-S1 spondylolisthesis resulting in moderate bilateral neuroforaminal narrowing.    Multilevel degenerative disc changes with level by level comments above.         Past Medical History:   Diagnosis Date    Diabetes mellitus, type 2     GERD (gastroesophageal reflux disease)     Hyperlipidemia     Hypertension     Neuropathy      Past Surgical History:   Procedure Laterality Date    ABDOMINAL SURGERY      as a child    COLONOSCOPY N/A 7/20/2018    Performed by Marko Koo MD at [facility] END[facility]     Social History     Socioeconomic History    Marital status:      Spouse name: Not on file    Number of children: Not on file    Years of  education: Not on file    Highest education level: Not on file   Social Needs    Financial resource strain: Not on file    Food insecurity - worry: Not on file    Food insecurity - inability: Not on file    Transportation needs - medical: Not on file    Transportation needs - non-medical: Not on file   Occupational History    Not on file   Tobacco Use    Smoking status: Former Smoker     Last attempt to quit: 3/20/2011     Years since quittin.8    Smokeless tobacco: Never Used   Substance and Sexual Activity    Alcohol use: Yes     Comment: occ    Drug use: No    Sexual activity: Not on file   Other Topics Concern    Not on file   Social History Narrative    Not on file     Family History   Problem Relation Age of Onset    Glaucoma Neg Hx        Review of patient's allergies indicates:   Allergen Reactions    Percodan [oxycodone-aspirin]        Current Outpatient Medications   Medication Sig    ascorbic acid, vitamin C, (VITAMIN C) 1000 MG tablet Take 1,000 mg by mouth once daily.    atorvastatin (LIPITOR) 40 MG tablet Take 1 tablet (40 mg total) by mouth once daily.    blood sugar diagnostic Strp 2 strips by Misc.(Non-Drug; Combo Route) route once daily. One touch verio    blood-glucose meter Misc 1 Device by Misc.(Non-Drug; Combo Route) route daily as needed. One touch verio    BOOSTRIX TDAP 2.5-8-5 Lf-mcg-Lf/0.5mL Syrg injection ADM 0.5ML IM UTD    ciclopirox (PENLAC) 8 % Soln Apply topically nightly.    DULoxetine (CYMBALTA) 60 MG capsule Take 1 capsule (60 mg total) by mouth once daily.    ferrous sulfate 325 mg (65 mg iron) Tab tablet Take 325 mg by mouth daily with breakfast.    gabapentin (NEURONTIN) 300 MG capsule Take 3 capsules (900 mg total) by mouth 4 (four) times daily.    lancets Misc Take 1 x day    lisinopril-hydrochlorothiazide (PRINZIDE,ZESTORETIC) 20-25 mg Tab TAKE 1 TABLET BY MOUTH ONCE DAILY    loratadine (CLARITIN) 10 mg tablet Take 10 mg by mouth once daily.  "   metFORMIN (GLUCOPHAGE) 850 MG tablet Take 1 tablet three times daily with meals.    multivitamin (ONE DAILY MULTIVITAMIN) per tablet Take 1 tablet by mouth once daily.    omeprazole (PRILOSEC) 20 MG capsule Take 1 capsule (20 mg total) by mouth once daily.    SHINGRIX, PF, 50 mcg/0.5 mL injection INJECT UTD     No current facility-administered medications for this visit.        REVIEW OF SYSTEMS:    GENERAL:  No weight loss, malaise or fevers.  HEENT:   No recent changes in vision or hearing  NECK:  Negative for lumps, no difficulty with swallowing.  RESPIRATORY:  Negative for cough, wheezing or shortness of breath, patient denies any recent URI.  CARDIOVASCULAR:  Negative for chest pain, leg swelling or palpitations.  Hypertension.  GI:  Negative for abdominal discomfort, blood in stools or black stools or change in bowel habits.  MUSCULOSKELETAL:  See HPI.  SKIN:  Negative for lesions, rash, and itching.  PSYCH:  No mood disorder or recent psychosocial stressors.  Patients sleep is not disturbed secondary to pain.  HEMATOLOGY/LYMPHOLOGY:  Negative for prolonged bleeding, bruising easily or swollen nodes.  Patient is not currently taking any anti-coagulants  ENDO: Diabetes.  NEURO:   No history of headaches, syncope, paralysis, seizures or tremors.  All other reviewed and negative other than HPI.    OBJECTIVE:    BP (!) 89/54   Pulse (!) 111   Temp 99.1 °F (37.3 °C)   Ht 6' 1" (1.854 m)   Wt 121.8 kg (268 lb 8.3 oz)   BMI 35.43 kg/m²     PHYSICAL EXAMINATION:    GENERAL: Well appearing, in no acute distress, alert and oriented x3.  PSYCH:  Mood and affect appropriate.  SKIN: Skin color, texture, turgor normal, no rashes or lesions.  HEAD/FACE:  Normocephalic, atraumatic. Cranial nerves grossly intact.  CV: RRR with palpation of the radial artery.  PULM: No evidence of respiratory difficulty, symmetric chest rise.   GI:  Soft and non-tender.  BACK: Straight leg raising in the supine position is negative " to radicular pain. No pain to palpation over the facet joints of the lumbar spine or spinous processes. Normal range of motion without pain reproduction.  EXTREMITIES: Peripheral joint ROM is full and pain free without obvious instability or laxity in all four extremities. No deformities, edema, or skin discoloration. Good capillary refill.  MUSCULOSKELETAL: Shoulder, hip, and knee provocative maneuvers are negative.  No atrophy or tone abnormalities are noted.  NEURO: See detailed research note.  GAIT: Normal.    ASSESSMENT: 53 y.o. year old male with chronic LE pain, consistent with     Encounter Diagnoses   Name Primary?    Painful diabetic neuropathy Yes    Type 2 diabetes mellitus with hyperosmolarity without coma, without long-term current use of insulin     Chronic pain disorder        PLAN:     - Neurological assessment completed with research team.    - F/U with research department.        Sanjuana Sharp  01/08/2019

## 2019-01-09 NOTE — PROGRESS NOTES
"Date: 08 January 2019  Study: A Post-Market, Multicenter, Prospective, Randomized, Crossover Clinical Trial Comparing 10 kHz Spinal Cord Stimulation (HF10 Therapy) Combined with Conventional Medical Management to Conventional Medical Management Alone in the Treatment of Chronic, Intractable, Neuropathic Limb Pain              IRB/Protocol #: 2017.196.B/QU4190-2   : Rahat Orantes MD  Site Number: 26          Subject Number: [id number]    Baseline:  Subject completed all self-reported questionnaires assessing pain severity and stability, disability, functioning, healthcare utilization, health-related quality of life, and sleep. He describes his condition as bilateral shooting pains with intermittent "pins and needles" on the bottoms of his feet (worse on the right), that often awaken him at night. Subject documents an average pain intensity of 7.65/10 cm on the VAS for his lower extremities. The lower limb assessment was completed with no wounds or ulcers observed. Medical and surgical history were reviewed with the patient. Subject was evaluated for adverse events since his initial visit per study protocol. He states he was diagnosed with acute gout of his right foot on 11/13/2018 following slightly elevated uric acid lab values. Subject estimates the onsent of swelling in his right foot 3rd digit to have begun on 10/31/2018 and to have been resolved by the end of November. He denies any persisting pain in this digit today, only numbness. A 6-Minute Walk Test was conducted and the subjects shortness of breath and level of fatigue were recorded. Sanjuana Sharp NP, completed the neurological assessment while Cruz Birmingham MD, observed. Subject was determined to be eligible for randomization.     Randomization:  Upon  input into the electronic database from the baseline visit, subject was randomized to conventional medication management on 01/09/2019.  "

## 2019-01-27 ENCOUNTER — PATIENT MESSAGE (OUTPATIENT)
Dept: ENDOCRINOLOGY | Facility: CLINIC | Age: 54
End: 2019-01-27

## 2019-01-28 NOTE — TELEPHONE ENCOUNTER
Per notes 10/8/18:  Type 2 [id number] . Increase  ozempic to 0.5mg weekly  To stop glipizide and instead commence jardiance 10mg QD. ( is this correct)

## 2019-02-07 ENCOUNTER — RESEARCH ENCOUNTER (OUTPATIENT)
Dept: RESEARCH | Facility: OTHER | Age: 54
End: 2019-02-07

## 2019-02-07 DIAGNOSIS — E11.69 HYPERLIPIDEMIA ASSOCIATED WITH TYPE 2 DIABETES MELLITUS: ICD-10-CM

## 2019-02-07 DIAGNOSIS — G89.4 CHRONIC PAIN SYNDROME: ICD-10-CM

## 2019-02-07 DIAGNOSIS — E11.59 HYPERTENSION ASSOCIATED WITH DIABETES: ICD-10-CM

## 2019-02-07 DIAGNOSIS — E11.42 DIABETIC POLYNEUROPATHY ASSOCIATED WITH TYPE 2 DIABETES MELLITUS: ICD-10-CM

## 2019-02-07 DIAGNOSIS — E66.9 OBESITY (BMI 30-39.9): ICD-10-CM

## 2019-02-07 DIAGNOSIS — E78.00 HYPERCHOLESTEROLEMIA: ICD-10-CM

## 2019-02-07 DIAGNOSIS — G62.9 NEUROPATHY: ICD-10-CM

## 2019-02-07 DIAGNOSIS — E11.65 TYPE 2 DIABETES MELLITUS WITH HYPERGLYCEMIA, WITHOUT LONG-TERM CURRENT USE OF INSULIN: Primary | ICD-10-CM

## 2019-02-07 DIAGNOSIS — N18.30 CKD (CHRONIC KIDNEY DISEASE) STAGE 3, GFR 30-59 ML/MIN: ICD-10-CM

## 2019-02-07 DIAGNOSIS — G47.33 OSA ON CPAP: ICD-10-CM

## 2019-02-07 DIAGNOSIS — Z87.19 H/O PYLORIC STENOSIS: ICD-10-CM

## 2019-02-07 DIAGNOSIS — K21.9 GASTROESOPHAGEAL REFLUX DISEASE, ESOPHAGITIS PRESENCE NOT SPECIFIED: ICD-10-CM

## 2019-02-07 DIAGNOSIS — E78.5 HYPERLIPIDEMIA ASSOCIATED WITH TYPE 2 DIABETES MELLITUS: ICD-10-CM

## 2019-02-07 DIAGNOSIS — I15.2 HYPERTENSION ASSOCIATED WITH DIABETES: ICD-10-CM

## 2019-02-07 DIAGNOSIS — E11.40 PAINFUL DIABETIC NEUROPATHY: ICD-10-CM

## 2019-02-07 RX ORDER — DULOXETIN HYDROCHLORIDE 60 MG/1
60 CAPSULE, DELAYED RELEASE ORAL DAILY
Qty: 90 CAPSULE | Refills: 3 | Status: SHIPPED | OUTPATIENT
Start: 2019-02-07 | End: 2019-10-30

## 2019-02-07 RX ORDER — PREGABALIN 50 MG/1
50 CAPSULE ORAL 3 TIMES DAILY
Qty: 90 CAPSULE | Refills: 3 | Status: SHIPPED | OUTPATIENT
Start: 2019-02-07 | End: 2019-02-22 | Stop reason: SDUPTHER

## 2019-02-07 RX ORDER — GABAPENTIN 300 MG/1
900 CAPSULE ORAL 4 TIMES DAILY
Qty: 1080 CAPSULE | Refills: 3 | Status: SHIPPED | OUTPATIENT
Start: 2019-02-07 | End: 2019-08-12 | Stop reason: SDUPTHER

## 2019-02-07 NOTE — PROGRESS NOTES
Subjective:      Patient ID: Alfred Cuello is a 53 y.o. male.    Chief Complaint:      Patient is a 53 yr old gentleman with painful diabetic polyneuropathy seen in [facility] today for per protocol study visit for the Nevro study.    History of Present Illness  Patient is a 53 yr old gentleman seen in [facility] today. He had previously been seen for initial clinical care visit by Eulalia Sheridan PA-C.  Patient is being seen today in [facility] as part of the Nevro study for which he has been recently randomized to the conventional pharmaceutical intervention arm of the study.    Patient was first diagnosed with diabetes ~ 6 yrs ago.  This was found on routine screening labs.  Patients mother has diabetes as well.   Patients most recent HBA1c from 10/18 was 8.8 reflecting an improvement from 9.7 from 07/18.  His current treatment regimen consists of metformin 850mg TID, glipizide 10mg Qd ( has been on this since diagnosis) and ozempic 0.25mg weekly.  Hi associated comorbidities are detailed below;      Patient Active Problem List   Diagnosis    DM type 2 (diabetes mellitus, type 2)    Iron deficiency anemia    Hypertension associated with diabetes    Allergic rhinitis    GERD (gastroesophageal reflux disease)    H/O pyloric stenosis    Hyperlipidemia associated with type 2 diabetes mellitus    CKD (chronic kidney disease) stage 3, GFR 30-59 ml/min    Obesity (BMI 30-39.9)    RAFAEL on CPAP    Screen for colon cancer    Neuropathy    Type 2 diabetes mellitus with hyperglycemia    Hypercholesterolemia      His most recent ophthalmic screening was from 04/18.   ?? Flu vaccination; does not take this by choice  ?/ Pneumovax.; does not take this by choice.  Patients Dysart scores is 18.  Patient stopped smoking when he was diagnosed with diabetes and also stopped drinking ETOH at the time.  Regarding the PDN he is presently on max dose neurontin (3600mg TDD) and Cymbalta 60mg QD.  Patient continues to have nocturnal pain  mainly in his feet which often wakes him 2-4 x a night. Overall however his symptoms are much improved since he started participation in the study.  Patient has lost ~ 14lbs thus far since his last clinic visit.          Review of Systems   Constitutional: Negative for diaphoresis, fatigue and unexpected weight change.   HENT: Negative for facial swelling and trouble swallowing.    Eyes: Negative for visual disturbance.   Respiratory: Negative for cough and shortness of breath.    Cardiovascular: Negative for chest pain, palpitations and leg swelling.   Gastrointestinal: Negative for abdominal pain, nausea and vomiting.   Genitourinary: Negative for dysuria.   Musculoskeletal: Negative for arthralgias, gait problem and myalgias.   Skin: Negative for color change, pallor and rash.   Neurological: Positive for numbness (associated with pain in both feet as detailed above).   Hematological: Does not bruise/bleed easily.       Objective:   There were no vitals taken for this visit.     Physical Exam    Lab Review:     Results for KATHIE PICKERING (MRN [id number]) as of 2/7/2019 12:27   Ref. Range 11/13/2018 16:32 11/13/2018 16:47 11/13/2018 17:01   WBC Latest Ref Range: 3.90 - 12.70 K/uL  8.40    RBC Latest Ref Range: 4.60 - 6.20 M/uL  4.42 (L)    Hemoglobin Latest Ref Range: 14.0 - 18.0 g/dL  13.0 (L)    Hematocrit Latest Ref Range: 40.0 - 54.0 %  38.7 (L)    MCV Latest Ref Range: 82 - 98 fL  88    MCH Latest Ref Range: 27.0 - 31.0 pg  29.4    MCHC Latest Ref Range: 32.0 - 36.0 g/dL  33.6    RDW Latest Ref Range: 11.5 - 14.5 %  13.7    Platelets Latest Ref Range: 150 - 350 K/uL  258    MPV Latest Ref Range: 9.2 - 12.9 fL  11.0    Gran% Latest Ref Range: 38.0 - 73.0 %  54.4    Gran # (ANC) Latest Ref Range: 1.8 - 7.7 K/uL  4.6    Immature Granulocytes Latest Ref Range: 0.0 - 0.5 %  0.2    Immature Grans (Abs) Latest Ref Range: 0.00 - 0.04 K/uL  0.02    Lymph% Latest Ref Range: 18.0 - 48.0 %  33.7    Lymph # Latest Ref  Range: 1.0 - 4.8 K/uL  2.8    Mono% Latest Ref Range: 4.0 - 15.0 %  6.2    Mono # Latest Ref Range: 0.3 - 1.0 K/uL  0.5    Eosinophil% Latest Ref Range: 0.0 - 8.0 %  4.3    Eos # Latest Ref Range: 0.0 - 0.5 K/uL  0.4    Basophil% Latest Ref Range: 0.0 - 1.9 %  1.2    Baso # Latest Ref Range: 0.00 - 0.20 K/uL  0.10    nRBC Latest Ref Range: 0 /100 WBC  0    Differential Method Unknown  Automated    Sed Rate Latest Ref Range: 0 - 10 mm/Hr  7    Uric Acid Latest Ref Range: 3.4 - 7.0 mg/dL  8.5 (H)    CRP Latest Ref Range: 0.0 - 8.2 mg/L  3.6        Assessment:     1. Type 2 diabetes mellitus with hyperglycemia, without long-term current use of insulin     2. Gastroesophageal reflux disease, esophagitis presence not specified     3. H/O pyloric stenosis     4. Obesity (BMI 30-39.9)     5. Painful diabetic neuropathy     6. Chronic pain syndrome     7. Hypertension associated with diabetes     8. Hyperlipidemia associated with type 2 diabetes mellitus     9. Hypercholesterolemia     10. CKD (chronic kidney disease) stage 3, GFR 30-59 ml/min     11. RAFAEL on CPAP     12. Diabetic polyneuropathy associated with type 2 diabetes mellitus  gabapentin (NEURONTIN) 300 MG capsule   13. Neuropathy  DULoxetine (CYMBALTA) 60 MG capsule        Type 2 [id number] . To  continue  ozempic to 0.5mg weekly,  jardiance 10mg QD and metformin 850mg TID as before.  Will obtain HBA1c at upcoming clinical visit. To ensure SMBG testing at least 2 x daily with record keeping and discussed with patient strategies to reduce risk for groin and genital infections including Amy's gangrene which he brought up because he had seen information online regarding it.  Regarding painful diabetic Neuropathy-to continue Cymbalta to  60 mg daily and Continue gabapentin as before. To add on low dose lyrica 50mg TID. May titrate dose based on clinical response.  Also refer for Nevro study @ main [facility].  Regarding NFB-jytibi-pyxttlnn  ACEi  HLD-elevated-continue statin and ASA. Optimize BG control.  RAFAEL-continue CPAP; refer to ffup with the sleep medicine group for adjustment of CPAP settings and possible nasal septum correction.        Plan:     FFup in ~ 4-6 wks as per study protocol.

## 2019-02-09 ENCOUNTER — LAB VISIT (OUTPATIENT)
Dept: LAB | Facility: HOSPITAL | Age: 54
End: 2019-02-09
Attending: FAMILY MEDICINE
Payer: COMMERCIAL

## 2019-02-09 DIAGNOSIS — E11.8 TYPE 2 DIABETES MELLITUS WITH COMPLICATION, WITHOUT LONG-TERM CURRENT USE OF INSULIN: ICD-10-CM

## 2019-02-09 LAB
ESTIMATED AVG GLUCOSE: 171 MG/DL
HBA1C MFR BLD HPLC: 7.6 %

## 2019-02-09 PROCEDURE — 36415 COLL VENOUS BLD VENIPUNCTURE: CPT | Mod: PO

## 2019-02-09 PROCEDURE — 83036 HEMOGLOBIN GLYCOSYLATED A1C: CPT

## 2019-02-22 DIAGNOSIS — G62.9 NEUROPATHY: ICD-10-CM

## 2019-02-22 DIAGNOSIS — E11.42 DIABETIC POLYNEUROPATHY ASSOCIATED WITH TYPE 2 DIABETES MELLITUS: ICD-10-CM

## 2019-02-22 NOTE — TELEPHONE ENCOUNTER
----- Message from Elly Maharaj sent at 2/22/2019  1:15 PM CST -----  Type:  RX Refill Request    Who Called:  Wife-Yessica Cuello   Refill or New Rx:  New Rx  RX Name and Strength:  pregabalin (LYRICA) 50 MG capsule  How is the patient currently taking it? (ex. 1XDay):  Na  Is this a 30 day or 90 day RX:  30  Preferred Pharmacy with phone number:    [facility], [place] - [address] [place] [address]  [place] [address]  Phone: [phone] Fax: [phone]  Local or Mail Order:  local  Ordering Provider:  Baylee  Best Call Back Number:  [phone]  Additional Information:  Stating a new prescription has not yet been sent to the pharmacy, please advise

## 2019-02-24 RX ORDER — PREGABALIN 50 MG/1
50 CAPSULE ORAL 3 TIMES DAILY
Qty: 90 CAPSULE | Refills: 3 | Status: SHIPPED | OUTPATIENT
Start: 2019-02-24 | End: 2019-02-25 | Stop reason: SDUPTHER

## 2019-02-25 ENCOUNTER — TELEPHONE (OUTPATIENT)
Dept: ENDOCRINOLOGY | Facility: CLINIC | Age: 54
End: 2019-02-25

## 2019-02-25 ENCOUNTER — PATIENT MESSAGE (OUTPATIENT)
Dept: ENDOCRINOLOGY | Facility: CLINIC | Age: 54
End: 2019-02-25

## 2019-02-25 DIAGNOSIS — G62.9 NEUROPATHY: ICD-10-CM

## 2019-02-25 DIAGNOSIS — E11.42 DIABETIC POLYNEUROPATHY ASSOCIATED WITH TYPE 2 DIABETES MELLITUS: ICD-10-CM

## 2019-02-25 RX ORDER — PREGABALIN 50 MG/1
50 CAPSULE ORAL 3 TIMES DAILY
Qty: 90 CAPSULE | Refills: 3 | Status: SHIPPED | OUTPATIENT
Start: 2019-02-25 | End: 2019-07-11 | Stop reason: SDUPTHER

## 2019-03-11 ENCOUNTER — PATIENT MESSAGE (OUTPATIENT)
Dept: ENDOCRINOLOGY | Facility: CLINIC | Age: 54
End: 2019-03-11

## 2019-03-14 ENCOUNTER — TELEPHONE (OUTPATIENT)
Dept: ENDOCRINOLOGY | Facility: CLINIC | Age: 54
End: 2019-03-14

## 2019-03-14 NOTE — TELEPHONE ENCOUNTER
Tried to call patient several times in reference to rescheduling his appointment on 3-25. Always got the same message your number can no longer be reached as dialed. Left a my [facility] message that was not returned.

## 2019-03-21 ENCOUNTER — TELEPHONE (OUTPATIENT)
Dept: RESEARCH | Facility: OTHER | Age: 54
End: 2019-03-21

## 2019-03-21 NOTE — TELEPHONE ENCOUNTER
Contacted subject to remind him of his upcoming appointment scheduled for the PDN (IRB# [id number]) study. Reminded him to maintain stable pain medications and normal activity levels up until that visit. Subject reports his pain levels have remained the same despite the addition of Lyrica at his last appointment.

## 2019-03-23 DIAGNOSIS — E11.69 HYPERLIPIDEMIA ASSOCIATED WITH TYPE 2 DIABETES MELLITUS: ICD-10-CM

## 2019-03-23 DIAGNOSIS — E78.5 HYPERLIPIDEMIA ASSOCIATED WITH TYPE 2 DIABETES MELLITUS: ICD-10-CM

## 2019-03-25 RX ORDER — ATORVASTATIN CALCIUM 40 MG/1
TABLET, FILM COATED ORAL
Qty: 90 TABLET | Refills: 1 | Status: SHIPPED | OUTPATIENT
Start: 2019-03-25 | End: 2019-07-24 | Stop reason: SDUPTHER

## 2019-03-25 NOTE — PROGRESS NOTES
Refill Authorization Note     is requesting a refill authorization.    Brief assessment and rationale for refill: APPROVE: prr  Name and strength of medication: atorvastatin (LIPITOR) 40 MG tablet       Medication Therapy Plan: HLD- lco(lov); Approve 6 more months     Medication reconciliation completed: No              How patient will take medication: t1t po qd          Comments:   LAST LIPIDS (12 months)  Lab Results   Component Value Date    CHOL 171 10/08/2018    CHOL 199 07/20/2018    CHOL 266 (H) 03/20/2018    Lab Results   Component Value Date    HDL 40 10/08/2018    HDL 34 (L) 07/20/2018    HDL 41 03/20/2018      Lab Results   Component Value Date    TRIG 255 (H) 10/08/2018    TRIG 440 (H) 07/20/2018    TRIG 487 (H) 03/20/2018    Lab Results   Component Value Date    CHOLHDL 23.4 10/08/2018    CHOLHDL 17.1 (L) 07/20/2018    CHOLHDL 15.4 (L) 03/20/2018       Lab Results   Component Value Date    LDLCALC 80.0 10/08/2018    LDLCALC Invalid, Trig>400.0 07/20/2018    LDLCALC Invalid, Trig>400.0 03/20/2018        LAST LFTs (12 months)   Lab Results   Component Value Date    ALT 35 07/20/2018    ALT 29 03/20/2018    ALT 35 03/21/2009    Lab Results   Component Value Date    AST 31 07/20/2018    AST 23 03/20/2018    AST 20 03/21/2009      Lab Results   Component Value Date    BILITOT 0.7 07/20/2018    BILITOT 0.5 03/20/2018    BILITOT 0.4 03/21/2009    LFT/TOTBILI-wnl, or within 3x the UNL     APPOINTMENTS (past 12 m or future 3m authorizing provider)  LAST VISIT DATE  Nuria Man MD 10/31/2018         NEXT VISIT DATE  Nuria Man MD 5/13/2019

## 2019-04-04 ENCOUNTER — RESEARCH ENCOUNTER (OUTPATIENT)
Dept: RESEARCH | Facility: OTHER | Age: 54
End: 2019-04-04

## 2019-04-04 VITALS — BODY MASS INDEX: 34.96 KG/M2 | WEIGHT: 265 LBS

## 2019-04-04 PROBLEM — Z00.6 RESEARCH STUDY PATIENT: Status: ACTIVE | Noted: 2019-04-04

## 2019-04-05 NOTE — PROGRESS NOTES
"Date: 5 April 2019  Study: A Post-Market, Multicenter, Prospective, Randomized, Crossover Clinical Trial Comparing 10 kHz Spinal Cord Stimulation (HF10 Therapy) Combined with Conventional Medical Management to Conventional Medical Management Alone in the Treatment of Chronic, Intractable, Neuropathic Limb Pain              IRB/Protocol #: 2017.196.B/ZC6365-0   : Rahat Orantes MD  Site Number: 26          Subject Number: [id number]    3 Month Follow-Up:    Subject returned to the Clinical Trials Unit for a three-month follow-up post-medication management of his diabetic neuropathy. He was evaluated for pain, adverse events, disability, functioning, health care utilization, weight, and sleep. No adverse events were reported since his last visit. He states "some improvement, but not where I would like it to be" with the addition of Lyrica 150 mg daily. He reports he still awakens some nights due to the pain in his feet. Over the past seven days the subject documents an average pain intensity of 4.6/10 cm on the VAS for his lower extremities. A neurological assessment was completed by Trace Beach MD, and no lower extremity wounds were observed upon lower limb wound assessment. A blood draw was collected during the visit for an updated hemoglobin A1c measurement. The 6-Minute Walk Test was conducted with the subjects pre- and post-walk shortness of breath and level of fatigue recorded. All self-reported questionnaires were completed by the subject. A follow-up 6-month appointment was scheduled for 5/30/2019. Subject was reminded to maintain stable dosing of all analgesic medications for two weeks prior to next visit.  "

## 2019-04-11 ENCOUNTER — TELEPHONE (OUTPATIENT)
Dept: FAMILY MEDICINE | Facility: CLINIC | Age: 54
End: 2019-04-11

## 2019-04-11 NOTE — TELEPHONE ENCOUNTER
Notify patient: he should not be on both gabapentin and Lyrica-one or the other.  Please calrify which he is taking and update meds list

## 2019-04-12 ENCOUNTER — TELEPHONE (OUTPATIENT)
Dept: FAMILY MEDICINE | Facility: CLINIC | Age: 54
End: 2019-04-12

## 2019-04-12 NOTE — TELEPHONE ENCOUNTER
Sent patient letter in mail to contact me at his earliest convenience.Dr. Man states he should not be on both gabapentin and Lyrica-one or the other. Needs nurse or MA to please clarify which he is taking and update meds list

## 2019-04-18 DIAGNOSIS — E11.9 TYPE 2 DIABETES MELLITUS WITHOUT COMPLICATION, UNSPECIFIED WHETHER LONG TERM INSULIN USE: ICD-10-CM

## 2019-04-22 ENCOUNTER — PATIENT MESSAGE (OUTPATIENT)
Dept: ADMINISTRATIVE | Facility: HOSPITAL | Age: 54
End: 2019-04-22

## 2019-04-29 ENCOUNTER — PATIENT OUTREACH (OUTPATIENT)
Dept: ADMINISTRATIVE | Facility: HOSPITAL | Age: 54
End: 2019-04-29

## 2019-04-29 NOTE — LETTER
May 7, 2019    Alfred Cuello  [address] [facility] [address]             [facility]  [address]  [facility] [address]  Phone: [phone] [facility] is committed to your overall health.  To help you get the most out of each of your visits, we will review your information to make sure you are up to date on all of your recommended tests and/or procedures.       Dr. Nuria Man MD has found that your chart shows you may be due for a:     DIABETIC EYE EXAM     If you have had any of the above done at another facility, please bring the records or information with you so that your record at [facility] will be complete.  If you would like to schedule any of these, please contact the clinic at [phone].     If you are currently taking medication, please bring it with you to your appointment for review.     Also, if you have any type of Advanced Directives, please bring them with you to your office visit so we may scan them into your chart.     Thank You,     Your [facility] Team,   MD Vivienne Kingsley LPN Clinical Care Coordinator   [facility]   [address] [facility] [address]   Phone [phone]   Fax [phone]

## 2019-04-29 NOTE — PROGRESS NOTES
Health Maintenance Due   Topic Date Due    Influenza Vaccine  08/01/2018    Eye Exam  04/10/2019

## 2019-05-06 ENCOUNTER — PATIENT MESSAGE (OUTPATIENT)
Dept: ADMINISTRATIVE | Facility: HOSPITAL | Age: 54
End: 2019-05-06

## 2019-05-23 DIAGNOSIS — E11.9 TYPE 2 DIABETES MELLITUS WITHOUT COMPLICATION: ICD-10-CM

## 2019-05-30 ENCOUNTER — RESEARCH ENCOUNTER (OUTPATIENT)
Dept: RESEARCH | Facility: OTHER | Age: 54
End: 2019-05-30

## 2019-05-31 VITALS — WEIGHT: 270.75 LBS | BODY MASS INDEX: 35.72 KG/M2

## 2019-05-31 NOTE — PROGRESS NOTES
Date: 30 May 2019  Study: A Post-Market, Multicenter, Prospective, Randomized, Crossover Clinical Trial Comparing 10 kHz Spinal Cord Stimulation (HF10 Therapy) Combined with Conventional Medical Management to Conventional Medical Management Alone in the Treatment of Chronic, Intractable, Neuropathic Limb Pain              IRB/Protocol #: 2017.196.B/EV6401-6   : Rahat Orantes MD  Site Number: 26          Subject Number: [id number]    6 Month Follow-Up/Crossover Eligibility:  Subject returned to the Clinical Trials Unit for a crossover eligibility visit after 6 months of conventional medication management with endocrinologist, Trace Beach MD. He was evaluated for pain, adverse events, disability, functioning, health care utilization, weight, and sleep. Medications were reviewed and subject denies any adverse events since his last visit. He reports over the past seven days an average pain intensity of 6.80/10 cm on the VAS for his lower extremities due to diabetic neuropathy. The neurological assessment was completed by Trace Beach MD, and no lower extremity wounds were documented upon observation. Blood was drawn during the visit to obtain an updated HbA1c value. All self-reported questionnaires were completed by the subject. Cruz Birmingham MD, was present to determine subject's eligibility for crossover to HF10 therapy. Cruz Birmingham MD, and the subject agreed pain relief was not sufficient with conventional medication management and warranted crossover to the alternate treatment arm. Subject reports he is interested in pursuing HF10 therapy as he has not experienced greater than 50% lower limb pain relief on the current treatment arm. Despite having tried Lyrica, gabapentin, and Cymbalta, subject states he is still awakening to stabbing, sharp pains during the night. Subject meets criteria for study crossover and his information was submitted for spinal cord stimulator trial  procedure scheduling.

## 2019-06-03 ENCOUNTER — TELEPHONE (OUTPATIENT)
Dept: PAIN MEDICINE | Facility: CLINIC | Age: 54
End: 2019-06-03

## 2019-06-03 NOTE — TELEPHONE ENCOUNTER
Attempted to contact patient to schedule SCS Trial, no answer, left voice message and requested a return call advising patient to ask to be transferred to [facility].

## 2019-06-04 ENCOUNTER — TELEPHONE (OUTPATIENT)
Dept: PAIN MEDICINE | Facility: CLINIC | Age: 54
End: 2019-06-04

## 2019-06-04 DIAGNOSIS — G89.4 CHRONIC PAIN SYNDROME: Primary | ICD-10-CM

## 2019-06-04 DIAGNOSIS — E11.40 PAINFUL DIABETIC NEUROPATHY: ICD-10-CM

## 2019-06-04 NOTE — TELEPHONE ENCOUNTER
Spoke to patient and his wife regarding scheduling SCS trial.    SCS trial, x-rays and post-op scheduled tentatively, pt stated he will call back to confirm because he will check with his boss first.     All dates/times and pre-procedure instructions given, all questions answered. Appointment reminder letter and pre-procedure instructions will be mailed.    Patient and his wife acknowledged information given and expressed understanding.

## 2019-06-04 NOTE — TELEPHONE ENCOUNTER
----- Message from Key Shelley sent at 5/31/2019  9:33 AM CDT -----  Regarding: PDN study trial SCS procedure scheduling  Good morning Adriana,    I have received approval for this patient to receive a SCS. He has already completed his psych evaluation and has met with Dr. Orantes. Please schedule this patient for a Nevro, lumbar SCS trial with Dr. Orantes and a post-op with Sanjuana Sharp when you get a chance. Thank you and let me know if you have any questions!    Best,     Key

## 2019-06-04 NOTE — TELEPHONE ENCOUNTER
Contacted and spoke to patient's wife Fabby regarding scheduling SCS Trial, staff provided tentative dates and times for trial and post-op appointment.    Fabby stated Mr. Cuello was at work, she will relay the message and have him call back.

## 2019-06-11 ENCOUNTER — TELEPHONE (OUTPATIENT)
Dept: ENDOCRINOLOGY | Facility: CLINIC | Age: 54
End: 2019-06-11

## 2019-06-11 ENCOUNTER — PATIENT MESSAGE (OUTPATIENT)
Dept: ENDOCRINOLOGY | Facility: CLINIC | Age: 54
End: 2019-06-11

## 2019-06-11 NOTE — TELEPHONE ENCOUNTER
----- Message from Jaelyn Mckeon sent at 6/11/2019  1:43 PM CDT -----  Type: Needs Medical Advice    Who Called:  Patient   Symptoms (please be specific):  lesion on foot about the size of a quarter  How long has patient had these symptoms:  2 days  Best Call Back Number: contact patient or spouse thur [facility], she has blocked [facility] number off of her phone acccidently  Additional Information: patient walked in to clinic today and was told by Dr. garcia to see his PA, the first available is 06/21/19. Please contact to advise.     Thank you

## 2019-06-16 RX ORDER — OMEPRAZOLE 20 MG/1
CAPSULE, DELAYED RELEASE ORAL
Qty: 90 CAPSULE | Refills: 3 | Status: SHIPPED | OUTPATIENT
Start: 2019-06-16 | End: 2020-06-30

## 2019-06-17 ENCOUNTER — HOSPITAL ENCOUNTER (OUTPATIENT)
Facility: OTHER | Age: 54
Discharge: HOME | End: 2019-06-17
Attending: ANESTHESIOLOGY | Admitting: ANESTHESIOLOGY

## 2019-06-17 ENCOUNTER — RESEARCH ENCOUNTER (OUTPATIENT)
Dept: RESEARCH | Facility: OTHER | Age: 54
End: 2019-06-17

## 2019-06-17 VITALS
TEMPERATURE: 99 F | DIASTOLIC BLOOD PRESSURE: 69 MMHG | SYSTOLIC BLOOD PRESSURE: 117 MMHG | HEIGHT: 73 IN | HEART RATE: 84 BPM | WEIGHT: 267 LBS | OXYGEN SATURATION: 96 % | RESPIRATION RATE: 18 BRPM | BODY MASS INDEX: 35.39 KG/M2

## 2019-06-17 DIAGNOSIS — G89.4 CHRONIC PAIN SYNDROME: ICD-10-CM

## 2019-06-17 DIAGNOSIS — E11.40 PAINFUL DIABETIC NEUROPATHY: Primary | ICD-10-CM

## 2019-06-17 DIAGNOSIS — G89.29 CHRONIC PAIN: ICD-10-CM

## 2019-06-17 LAB — POCT GLUCOSE: 182 MG/DL (ref 70–110)

## 2019-06-17 PROCEDURE — 25000003 PHARM REV CODE 250: Performed by: STUDENT IN AN ORGANIZED HEALTH CARE EDUCATION/TRAINING PROGRAM

## 2019-06-17 PROCEDURE — 63650 IMPLANT NEUROELECTRODES: CPT | Mod: 51,,, | Performed by: ANESTHESIOLOGY

## 2019-06-17 PROCEDURE — 25000003 PHARM REV CODE 250: Performed by: ANESTHESIOLOGY

## 2019-06-17 PROCEDURE — 63600175 PHARM REV CODE 636 W HCPCS: Performed by: STUDENT IN AN ORGANIZED HEALTH CARE EDUCATION/TRAINING PROGRAM

## 2019-06-17 PROCEDURE — 63600175 PHARM REV CODE 636 W HCPCS: Performed by: ANESTHESIOLOGY

## 2019-06-17 PROCEDURE — 99152 PR MOD CONSCIOUS SEDATION, SAME PHYS, 5+ YRS, FIRST 15 MIN: ICD-10-PCS | Mod: ,,, | Performed by: ANESTHESIOLOGY

## 2019-06-17 PROCEDURE — 99152 MOD SED SAME PHYS/QHP 5/>YRS: CPT | Mod: ,,, | Performed by: ANESTHESIOLOGY

## 2019-06-17 PROCEDURE — 63650 PR PERCUT IMPLNT NEUROELECT,EPIDURAL: ICD-10-PCS | Mod: ,,, | Performed by: ANESTHESIOLOGY

## 2019-06-17 DEVICE — IMPLANTABLE DEVICE: Type: IMPLANTABLE DEVICE | Site: BACK | Status: FUNCTIONAL

## 2019-06-17 RX ORDER — FENTANYL CITRATE 50 UG/ML
INJECTION, SOLUTION INTRAMUSCULAR; INTRAVENOUS
Status: DISCONTINUED | OUTPATIENT
Start: 2019-06-17 | End: 2019-06-17 | Stop reason: HOSPADM

## 2019-06-17 RX ORDER — MIDAZOLAM HYDROCHLORIDE 1 MG/ML
INJECTION INTRAMUSCULAR; INTRAVENOUS
Status: DISCONTINUED | OUTPATIENT
Start: 2019-06-17 | End: 2019-06-17 | Stop reason: HOSPADM

## 2019-06-17 RX ORDER — BUPIVACAINE HYDROCHLORIDE 2.5 MG/ML
INJECTION, SOLUTION EPIDURAL; INFILTRATION; INTRACAUDAL
Status: DISCONTINUED | OUTPATIENT
Start: 2019-06-17 | End: 2019-06-17 | Stop reason: HOSPADM

## 2019-06-17 RX ORDER — LIDOCAINE HYDROCHLORIDE 10 MG/ML
INJECTION INFILTRATION; PERINEURAL
Status: DISCONTINUED | OUTPATIENT
Start: 2019-06-17 | End: 2019-06-17 | Stop reason: HOSPADM

## 2019-06-17 RX ORDER — SODIUM CHLORIDE 9 MG/ML
500 INJECTION, SOLUTION INTRAVENOUS CONTINUOUS
Status: DISCONTINUED | OUTPATIENT
Start: 2019-06-17 | End: 2019-06-17 | Stop reason: HOSPADM

## 2019-06-17 RX ORDER — CEFAZOLIN SODIUM 1 G/3ML
1 INJECTION, POWDER, FOR SOLUTION INTRAMUSCULAR; INTRAVENOUS ONCE
Status: COMPLETED | OUTPATIENT
Start: 2019-06-17 | End: 2019-06-17

## 2019-06-17 RX ADMIN — SODIUM CHLORIDE 500 ML: 0.9 INJECTION, SOLUTION INTRAVENOUS at 10:06

## 2019-06-17 RX ADMIN — CEFAZOLIN SODIUM 1 G: 1 POWDER, FOR SOLUTION INTRAMUSCULAR; INTRAVENOUS at 10:06

## 2019-06-17 NOTE — DISCHARGE INSTRUCTIONS
Thank you for allowing us to care for you today. You may receive a survey about the care we provided. Your feedback is valuable and helps us provide excellent care throughout the community.     Home Care Instructions Pain Management:    1. DIET:   You may resume your normal diet today.   2. BATHING:   Sponge baths ONLY. No showers or tub baths with Spinal Cord Stimulator (SCS) in.   3. DRESSING:   Do not remove bandage. Doctor will remove at follow up visit. You can reinforce edges with band aids or skin tape if they start to come up.  4. ACTIVITY LEVEL:   Nothing strenuous with SCS in place. No bending of the back, no lifting anything heavier than a gallon of milk.    5. MEDICATIONS:   You may resume your normal medications today. DO NOT take any blood thinners with SCS in place.    6. SPECIAL INSTRUCTIONS:   No heat or ice  to the injection site while SCS is in place     If you have received any sedation through your IV, you may not drive for 24 hrs.     PLEASE CALL YOUR DOCTOR IF:  1. Redness or swelling around the injection site.  2. Fever of 101 degrees or more  3. Drainage (pus) from the injection site.  4. For any continuous bleeding (some dried blood over the incision is normal.)    FOR EMERGENCIES:   If any unusual problems or difficulties occur during clinic hours, call [phone] or [phone].   Adult Procedural Sedation Instructions    Recovery After Procedural Sedation (Adult)  You have been given medicine by vein to make you sleep during your surgery. This may have included both a pain medicine and sleeping medicine. Most of the effects have worn off. But you may still have some drowsiness for the next 6 to 8 hours.  Home care  Follow these guidelines when you get home:  · For the next 8 hours, you should be watched by a responsible adult. This person should make sure your condition is not getting worse.  · Don't drink any alcohol for the next 24 hours.  · Don't drive, operate dangerous machinery, or  make important business or personal decisions during the next 24 hours.  Note: Your healthcare provider may tell you not to take any medicine by mouth for pain or sleep in the next 4 hours. These medicines may react with the medicines you were given in the hospital. This could cause a much stronger response than usual.  Follow-up care  Follow up with your healthcare provider if you are not alert and back to your usual level of activity within 12 hours.  When to seek medical advice  Call your healthcare provider right away if any of these occur:  · Drowsiness gets worse  · Weakness or dizziness gets worse  · Repeated vomiting  · You can't be awakened   Date Last Reviewed: 10/18/2016  © 7491-5246 [employer]. [address] [place] [address], [place] [address]. All rights reserved. This information is not intended as a substitute for professional medical care. Always follow your healthcare professional's instructions.

## 2019-06-17 NOTE — DISCHARGE SUMMARY
Discharge Note  Short Stay      SUMMARY     Admit Date: 6/17/2019    Attending Physician: Isadora Palma      Discharge Physician: Isadora Palma      Discharge Date: 6/17/2019 11:47 AM    Procedure(s) (LRB):  Trial, Neurostimulator, LUMBAR SPINAL CORD STIMULATOR TRIAL (N/A)    Final Diagnosis: Chronic pain syndrome [G89.4]  Painful diabetic neuropathy [E11.40]    Disposition: Home or self care    Patient Instructions:   Current Discharge Medication List      CONTINUE these medications which have NOT CHANGED    Details   ascorbic acid, vitamin C, (VITAMIN C) 1000 MG tablet Take 1,000 mg by mouth once daily.      atorvastatin (LIPITOR) 40 MG tablet TAKE 1 TABLET BY MOUTH ONCE DAILY  Qty: 90 tablet, Refills: 1    Associated Diagnoses: Hyperlipidemia associated with type 2 diabetes mellitus      blood sugar diagnostic Strp 2 strips by Misc.(Non-Drug; Combo Route) route once daily. One touch verio  Qty: 200 strip, Refills: 3    Associated Diagnoses: Type 2 diabetes mellitus with diabetic neuropathy, without long-term current use of insulin      blood-glucose meter Misc 1 Device by Misc.(Non-Drug; Combo Route) route daily as needed. One touch verio  Qty: 1 each, Refills: 0    Associated Diagnoses: Type 2 diabetes mellitus with diabetic neuropathy, without long-term current use of insulin      BOOSTRIX TDAP 2.5-8-5 Lf-mcg-Lf/0.5mL Syrg injection ADM 0.5ML IM UTD  Refills: 0      ciclopirox (PENLAC) 8 % Soln Apply topically nightly.  Qty: 6.6 mL, Refills: 11      DULoxetine (CYMBALTA) 60 MG capsule Take 1 capsule (60 mg total) by mouth once daily.  Qty: 90 capsule, Refills: 3    Associated Diagnoses: Neuropathy      empagliflozin (JARDIANCE) 10 mg Tab Take 10 mg by mouth once daily.  Qty: 90 tablet, Refills: 3    Associated Diagnoses: Type 2 diabetes mellitus with hyperglycemia, without long-term current use of insulin      ferrous sulfate 325 mg (65 mg iron) Tab tablet Take 325 mg by mouth daily with breakfast.       gabapentin (NEURONTIN) 300 MG capsule Take 3 capsules (900 mg total) by mouth 4 (four) times daily.  Qty: 1080 capsule, Refills: 3    Associated Diagnoses: Diabetic polyneuropathy associated with type 2 diabetes mellitus      lancets Misc Take 1 x day  Qty: 100 each, Refills: 3    Associated Diagnoses: Type 2 diabetes mellitus with diabetic neuropathy, without long-term current use of insulin      lisinopril-hydrochlorothiazide (PRINZIDE,ZESTORETIC) 20-25 mg Tab TAKE 1 TABLET BY MOUTH ONCE DAILY  Qty: 90 tablet, Refills: 3    Associated Diagnoses: Essential hypertension      loratadine (CLARITIN) 10 mg tablet Take 10 mg by mouth once daily.      metFORMIN (GLUCOPHAGE) 850 MG tablet Take 1 tablet three times daily with meals.  Qty: 270 tablet, Refills: 3    Associated Diagnoses: Type 2 diabetes mellitus with diabetic neuropathy, without long-term current use of insulin      multivitamin (ONE DAILY MULTIVITAMIN) per tablet Take 1 tablet by mouth once daily.      omeprazole (PRILOSEC) 20 MG capsule TAKE 1 CAPSULE BY MOUTH ONCE DAILY  Qty: 90 capsule, Refills: 3      pregabalin (LYRICA) 50 MG capsule Take 1 capsule (50 mg total) by mouth 3 (three) times daily.  Qty: 90 capsule, Refills: 3    Associated Diagnoses: Diabetic polyneuropathy associated with type 2 diabetes mellitus; Neuropathy      semaglutide (OZEMPIC) 0.25 mg or 0.5 mg(2 mg/1.5 mL) PnIj Inject 0.5 mg into the skin every 7 days.  Qty: 3 Syringe, Refills: 3    Associated Diagnoses: Type 2 diabetes mellitus with hyperglycemia, without long-term current use of insulin      SHINGRIX, PF, 50 mcg/0.5 mL injection INJECT UTD  Refills: 0                 Discharge Diagnosis: Chronic pain syndrome [G89.4]  Painful diabetic neuropathy [E11.40]  Condition on Discharge: Stable with no complications to procedure   Diet on Discharge: Same as before.  Activity: as per instruction sheet.  Discharge to: Home with a responsible adult.  Follow up: 2-4 weeks

## 2019-06-17 NOTE — OP NOTE
Patient Name: Alfred Cuello  MRN: [id number]    INFORMED CONSENT: The procedure, risks, benefits and options were discussed with patient. There are no contraindications to the procedure. The patient expressed understanding and agreed to proceed. The personnel performing the procedure was discussed. I verify that I personally obtained Alfred's consent prior to the start of the procedure and the signed consent can be found on the patient's chart.        Procedure Date: 6/17/2019  Surgeon(s) and Role:     * Rahat Orantes MD - Primary      Pre Procedure diagnosis: Chronic pain syndrome [G89.4]  Painful diabetic neuropathy [E11.40]  1. Painful diabetic neuropathy    2. Chronic pain syndrome    3. Chronic pain      Post-Procedure diagnosis: SAME    Sedation: Yes - Fentanyl 100 mcg and Midazolam 2 mg    Assistants:Isadora Palma MD PGY-5  [facility]     PROCEDURE:NEVRO Thoracolumbar SPINAL CORD STIMULATOR TRIAL (Nevro)    PREOPERATIVE ANTIBIOTICS: Ancef 1 g IV given 30 minutes prior to procedure start, see anesthesia record for time.  DESCRIPTION OF PROCEDURE:  The patient was brought to the fluoroscopy suite. IV access was obtained prior to the procedure. The patient was positioned prone on the fluoroscopy table. Continuous hemodynamic monitoring was initiated including blood pressure and pulse oximetry. IV sedation was administered incrementally to allow the patient to remain comfortable and conversant throughout the procedure. The lumbar spine area was prepped with chlorhexidine  three times and draped in a sterile fashion. Skin anesthesia was achieved using 5 cc of lidocaine 1%. A 14 3 1/2 inch Tuohy needle was inserted at the T12/L1 interspace using a paramedian approach. The needle was slowly advanced in a 45-degree angle down towards the ligamentum flavum.  Once the ligamentum flavum was reached, a loss of resistance syringe was attached.  The needle was slowly advanced into the dorsal epidural space.   After loss of resistance was noted, negative aspiration for blood or CSF was confirmed . A Nevro 8 contact percutaneous lead was inserted through the needle and slowly advanced in the epidural space under fluoroscopy. The lead was eventually placed at top of T9. A second lead was then placed in a similar fashion and placed immediately adjacent to the first lead at the bottom of T9. Both leads were then connected to allow for test of impedence. The lead was sutured in place with a 2-0 silk suture. A sterile dressing was applied. The patient was transferred to the postoperative area in stable condition.  The patient's postoperative neurological examination showed no evidence of lower extremity weakness with normal bowel and bladder function. no specimens collected. PATIENT was taken to the Post-block Recovery Area for further observation.     Dr. Orantes was present during the critical and key portions of the procedure.  Treatment plan was discussed with the patient. Post procedure instructions were reviewed and the patient voiced understanding.    Blood Loss: Nill  Specimen: None    Dr. Rahat Orantes MD was present during the entire procedure.

## 2019-06-17 NOTE — PROGRESS NOTES
Date: 17 June 2019  Study: A Post-Market, Multicenter, Prospective, Randomized, Crossover Clinical Trial Comparing 10 kHz Spinal Cord Stimulation (HF10 Therapy) Combined with Conventional Medical Management to Conventional Medical Management Alone in the Treatment of Chronic, Intractable, Neuropathic Limb Pain              IRB/Protocol #: 2017.196.B/[id number]   : Rahat Orantes MD  Site Number: 26          Subject Number: [id number]    Trial Implant:  Subject determined eligible for crossover to the spinal cord stimulator treatment arm presented to [facility] for his trial procedure. Medications were reviewed with the subject and [facility] , Sim Car, administered the pain distribution map for subject completion prior to the procedure start time. Subject successfully underwent trial system lead implantation with Rahat Orantes MD, and device programming was initiated by the  post-procedure. Radiographic images of the leads were captured for documentation of placement. Subject was evaluated for any adverse events and none were reported. Follow-up appointment for post-op lead removal is scheduled for 6/24/2019.

## 2019-06-17 NOTE — H&P
"HPI  Patient presenting for  Procedure(s) (LRB):  Nevro Trial, Neurostimulator, LUMBAR SPINAL CORD STIMULATOR TRIAL (N/A)    No health changes since previous encounter    PMHx, PSHx, Allergies, Medications reviewed in epic    ROS negative except pain complaints in HPI    OBJECTIVE:    /81   Pulse 81   Temp 98.6 °F (37 °C) (Oral)   Resp 16   Ht 6' 1" (1.854 m)   Wt 121.1 kg (267 lb)   SpO2 97%   BMI 35.23 kg/m²     PHYSICAL EXAMINATION:    GENERAL: Well appearing, in no acute distress, alert and oriented x3.  PSYCH:  Mood and affect appropriate.  SKIN: Skin color, texture, turgor normal, no rashes or lesions.  CV: RRR with palpation of the radial artery.  PULM: No evidence of respiratory difficulty, symmetric chest rise. Clear to auscultation.  NEURO: Cranial nerves grossly intact.    Plan:    Proceed with procedure as planned    Isadora Palma  06/17/2019  "

## 2019-06-24 ENCOUNTER — HOSPITAL ENCOUNTER (OUTPATIENT)
Dept: RADIOLOGY | Facility: OTHER | Age: 54
Discharge: HOME | End: 2019-06-24
Attending: NURSE PRACTITIONER
Payer: COMMERCIAL

## 2019-06-24 ENCOUNTER — RESEARCH ENCOUNTER (OUTPATIENT)
Dept: RESEARCH | Facility: OTHER | Age: 54
End: 2019-06-24

## 2019-06-24 ENCOUNTER — OFFICE VISIT (OUTPATIENT)
Dept: PAIN MEDICINE | Facility: CLINIC | Age: 54
End: 2019-06-24
Payer: COMMERCIAL

## 2019-06-24 VITALS
TEMPERATURE: 99 F | HEART RATE: 74 BPM | BODY MASS INDEX: 35.94 KG/M2 | RESPIRATION RATE: 18 BRPM | DIASTOLIC BLOOD PRESSURE: 79 MMHG | WEIGHT: 271.19 LBS | SYSTOLIC BLOOD PRESSURE: 133 MMHG | HEIGHT: 73 IN

## 2019-06-24 DIAGNOSIS — G89.4 CHRONIC PAIN DISORDER: Primary | ICD-10-CM

## 2019-06-24 DIAGNOSIS — E11.40 PAINFUL DIABETIC NEUROPATHY: ICD-10-CM

## 2019-06-24 DIAGNOSIS — G89.4 CHRONIC PAIN SYNDROME: ICD-10-CM

## 2019-06-24 PROCEDURE — 72070 X-RAY EXAM THORAC SPINE 2VWS: CPT | Mod: TC,FY

## 2019-06-24 PROCEDURE — 72100 XR LUMBAR SPINE AP AND LATERAL: ICD-10-PCS | Mod: 26,,, | Performed by: RADIOLOGY

## 2019-06-24 PROCEDURE — 72100 X-RAY EXAM L-S SPINE 2/3 VWS: CPT | Mod: 26,,, | Performed by: RADIOLOGY

## 2019-06-24 PROCEDURE — 72070 XR THORACIC SPINE AP LATERAL: ICD-10-PCS | Mod: 26,,, | Performed by: RADIOLOGY

## 2019-06-24 PROCEDURE — 99024 PR POST-OP FOLLOW-UP VISIT: ICD-10-PCS | Mod: S$GLB,,, | Performed by: NURSE PRACTITIONER

## 2019-06-24 PROCEDURE — 72100 X-RAY EXAM L-S SPINE 2/3 VWS: CPT | Mod: TC,FY

## 2019-06-24 PROCEDURE — 99999 PR PBB SHADOW E&M-EST. PATIENT-LVL III: ICD-10-PCS | Mod: PBBFAC,,, | Performed by: NURSE PRACTITIONER

## 2019-06-24 PROCEDURE — 99999 PR PBB SHADOW E&M-EST. PATIENT-LVL III: CPT | Mod: PBBFAC,,, | Performed by: NURSE PRACTITIONER

## 2019-06-24 PROCEDURE — 72070 X-RAY EXAM THORAC SPINE 2VWS: CPT | Mod: 26,,, | Performed by: RADIOLOGY

## 2019-06-24 PROCEDURE — 99024 POSTOP FOLLOW-UP VISIT: CPT | Mod: S$GLB,,, | Performed by: NURSE PRACTITIONER

## 2019-06-24 NOTE — RESEARCH
Date: 24 June 2019  Study: A Post-Market, Multicenter, Prospective, Randomized, Crossover Clinical Trial Comparing 10 kHz Spinal Cord Stimulation (HF10 Therapy) Combined with Conventional Medical Management to Conventional Medical Management Alone in the Treatment of Chronic, Intractable, Neuropathic Limb Pain              IRB/Protocol #: 2017.196.B/[id number]   : Rahat Orantes MD  Site Number: 26          Subject Number: [id number]     Unscheduled Visit (Mid-Trial)  Subject presents to the [facility] for a post-op visit following his trial procedure on 6/17/2019. Radiographic images of the leads were collected for documentation of placement. Minimal lead migration was observed. Subject denies any adverse events since his last visit and states no changes to his medications. Sim Car, the [facility] , was available to adjust programming parameters and re-evaluate for optimal pain relief over the next few days. Sanjuana Sharp NP, assessed and redressed the procedure site with no drainage or redness observed. Subject is scheduled for his lead removal and an end-of-trial evaluation on 6/27/2019.

## 2019-06-24 NOTE — PROGRESS NOTES
Chronic Pain - Follow Up    Referring Physician: No ref. provider found    Chief Complaint: No chief complaint on file.       SUBJECTIVE: Disclaimer: This note has been generated using voice-recognition software. There may be typographical errors that have been missed during proof-reading    Interval History 6/24/2019:  The patient is here for follow up and lead pull.  He is s/p Nevro SCS trial on 6/17/19.  He is part of the research PDN study.  He is reporting about 30% relief so far.  He had XRAYs today.  Sim is here to meet with him.  His pain today is 3/10.      Initial encounter:    Alfred Cuello presents to the clinic for the evaluation of BLE painful diabetic neuropathy pain.  He is here for PDN assessment for research study.  The pain started about 3 years ago following no major injury and symptoms have been worsening.  He has a long standing history of diabetes with abnormal A1C.  His last A1C was 8.8.  His pain is primarily in his feet and toes.  He does also have pain in his calves.  He describes his pain as burning and electric.  He reports associated numbness and pins and needles sensation.  He denies any symptoms above the knees.  He had a psych consult on 12/11/18 and was approved for spinal cord stimulation.    Brief history:    Pain Description:    Exacerbating factors: Walking and Night Time.      Mitigating factors heat.     Patient denies urinary incontinence, bowel incontinence, significant weight loss and significant motor weakness.  Patient denies any suicidal or homicidal ideations    Pain Procedures:   6/17/19 Nevro SCS trial    Spinal decompression -never  Joint replacement -never    Imaging:     Narrative     EXAMINATION:  MRI LUMBAR SPINE WITHOUT CONTRAST    CLINICAL HISTORY:  research; Type 2 diabetes mellitus with diabetic neuropathy, unspecified    TECHNIQUE:  Multiplanar, multisequence MR images were acquired from the thoracolumbar junction to the sacrum without the  administration of contrast.    COMPARISON:  None.    FINDINGS:  Vertebral body heights are within normal limits.  Marrow signal is appropriate.  There is a small hemangioma of the L2 vertebral body.  There are bilateral L5 pars interarticularis defects accompanied by grade 1 anterolisthesis of L5 on S1.  Disc desiccation is present throughout the lumbar spine.  There is moderate loss of disc height at L5-S1, with mild loss of disc height present from T11 through L2.  There is small Schmorl's node formation of the inferior endplates of T11 and T12.  The conus terminates at T12.    T11-12: There is a small central disc extrusion without significant spinal canal or neuroforaminal narrowing.    T12-L1: There is mild broad-based posterior disc bulging without significant spinal canal or neuroforaminal narrowing.    L1-2: Mild broad-based posterior disc bulging without significant spinal canal or neuroforaminal narrowing.    L2-3: There is a small right paracentral disc protrusion without significant spinal canal or neuroforaminal narrowing.    L3-4: Small central disc protrusion without significant spinal canal or neuroforaminal narrowing.    L4-5: There is a small right foraminal disc extrusion with accompanying annular tear of the disc.  Moderate bilateral facet arthropathy is also present in the findings contribute to mild bilateral neuroforaminal narrowing.    L5-S1: Grade 1 spondylolisthesis with uncovering of the posterior disc is present.  There is moderate bilateral neuroforaminal narrowing without significant spinal canal narrowing.      Impression       Grade 1 L5-S1 spondylolisthesis resulting in moderate bilateral neuroforaminal narrowing.    Multilevel degenerative disc changes with level by level comments above.         Past Medical History:   Diagnosis Date    Diabetes mellitus, type 2     GERD (gastroesophageal reflux disease)     Hyperlipidemia     Hypertension     Neuropathy      Past Surgical  History:   Procedure Laterality Date    ABDOMINAL SURGERY      as a child    COLONOSCOPY N/A 2018    Performed by [facility]oo [facility] at [facility]    Trial, Neurostimulator, LUMBAR SPINAL CORD STIMULATOR TRIAL N/A 2019    Performed by Rahat Orantes MD at [facility]     Social History     Socioeconomic History    Marital status:      Spouse name: Not on file    Number of children: Not on file    Years of education: Not on file    Highest education level: Not on file   Occupational History    Not on file   Social Needs    Financial resource strain: Not on file    Food insecurity:     Worry: Not on file     Inability: Not on file    Transportation needs:     Medical: Not on file     Non-medical: Not on file   Tobacco Use    Smoking status: Former Smoker     Last attempt to quit: 3/20/2011     Years since quittin.2    Smokeless tobacco: Never Used   Substance and Sexual Activity    Alcohol use: Yes     Comment: occ    Drug use: No    Sexual activity: Not on file   Lifestyle    Physical activity:     Days per week: Not on file     Minutes per session: Not on file    Stress: Not on file   Relationships    Social connections:     Talks on phone: Not on file     Gets together: Not on file     Attends Jain service: Not on file     Active member of club or organization: Not on file     Attends meetings of clubs or organizations: Not on file     Relationship status: Not on file   Other Topics Concern    Not on file   Social History Narrative    Not on file     Family History   Problem Relation Age of Onset    Glaucoma Neg Hx        Review of patient's allergies indicates:   Allergen Reactions    Percodan [oxycodone-aspirin]        Current Outpatient Medications   Medication Sig    ascorbic acid, vitamin C, (VITAMIN C) 1000 MG tablet Take 1,000 mg by mouth once daily.    atorvastatin (LIPITOR) 40 MG tablet TAKE 1 TABLET BY MOUTH ONCE DAILY    blood sugar diagnostic  Strp 2 strips by Misc.(Non-Drug; Combo Route) route once daily. One touch verio    blood-glucose meter Misc 1 Device by Misc.(Non-Drug; Combo Route) route daily as needed. One touch verio    BOOSTRIX TDAP 2.5-8-5 Lf-mcg-Lf/0.5mL Syrg injection ADM 0.5ML IM UTD    ciclopirox (PENLAC) 8 % Soln Apply topically nightly.    DULoxetine (CYMBALTA) 60 MG capsule Take 1 capsule (60 mg total) by mouth once daily.    empagliflozin (JARDIANCE) 10 mg Tab Take 10 mg by mouth once daily.    ferrous sulfate 325 mg (65 mg iron) Tab tablet Take 325 mg by mouth daily with breakfast.    gabapentin (NEURONTIN) 300 MG capsule Take 3 capsules (900 mg total) by mouth 4 (four) times daily.    lancets Misc Take 1 x day    lisinopril-hydrochlorothiazide (PRINZIDE,ZESTORETIC) 20-25 mg Tab TAKE 1 TABLET BY MOUTH ONCE DAILY    loratadine (CLARITIN) 10 mg tablet Take 10 mg by mouth once daily.    metFORMIN (GLUCOPHAGE) 850 MG tablet Take 1 tablet three times daily with meals.    multivitamin (ONE DAILY MULTIVITAMIN) per tablet Take 1 tablet by mouth once daily.    omeprazole (PRILOSEC) 20 MG capsule TAKE 1 CAPSULE BY MOUTH ONCE DAILY    pregabalin (LYRICA) 50 MG capsule Take 1 capsule (50 mg total) by mouth 3 (three) times daily.    semaglutide (OZEMPIC) 0.25 mg or 0.5 mg(2 mg/1.5 mL) PnIj Inject 0.5 mg into the skin every 7 days.    SHINGRIX, PF, 50 mcg/0.5 mL injection INJECT UTD     No current facility-administered medications for this visit.        REVIEW OF SYSTEMS:    GENERAL:  No weight loss, malaise or fevers.  HEENT:   No recent changes in vision or hearing  NECK:  Negative for lumps, no difficulty with swallowing.  RESPIRATORY:  Negative for cough, wheezing or shortness of breath, patient denies any recent URI.  CARDIOVASCULAR:  Negative for chest pain, leg swelling or palpitations.  Hypertension.  GI:  Negative for abdominal discomfort, blood in stools or black stools or change in bowel habits.  MUSCULOSKELETAL:  See  "HPI.  SKIN:  Negative for lesions, rash, and itching.  PSYCH:  No mood disorder or recent psychosocial stressors.  Patients sleep is not disturbed secondary to pain.  HEMATOLOGY/LYMPHOLOGY:  Negative for prolonged bleeding, bruising easily or swollen nodes.  Patient is not currently taking any anti-coagulants  ENDO: Diabetes.  NEURO:   No history of headaches, syncope, paralysis, seizures or tremors.  All other reviewed and negative other than HPI.    OBJECTIVE:    /79   Pulse 74   Temp 98.8 °F (37.1 °C) (Oral)   Resp 18   Ht 6' 1" (1.854 m)   Wt 123 kg (271 lb 3.2 oz)   BMI 35.78 kg/m²     PHYSICAL EXAMINATION:    GENERAL: Well appearing, in no acute distress, alert and oriented x3.  PSYCH:  Mood and affect appropriate.  SKIN: Skin color, texture, turgor normal, no rashes or lesions. Tape changed to SCS site.  No drainage or redness noted.  HEAD/FACE:  Normocephalic, atraumatic. Cranial nerves grossly intact.  CV: RRR with palpation of the radial artery.  GAIT: Normal.    ASSESSMENT: 54 y.o. year old male with chronic LE pain, consistent with     Encounter Diagnoses   Name Primary?    Chronic pain disorder Yes    Painful diabetic neuropathy        PLAN:     - The patient is s/p Nevro SCS trial for PDN research study.  The decision was made by the research team to extend his trial for 3 additional days.    - I redressed the area because he was having discomfort from the tape.    - F/U in 3 days.      The above plan and management options were discussed at length with patient. Patient is in agreement with the above and verbalized understanding.     Sanjuana Sharp  06/24/2019  "

## 2019-06-27 ENCOUNTER — RESEARCH ENCOUNTER (OUTPATIENT)
Dept: RESEARCH | Facility: OTHER | Age: 54
End: 2019-06-27

## 2019-06-27 ENCOUNTER — OFFICE VISIT (OUTPATIENT)
Dept: SPINE | Facility: CLINIC | Age: 54
End: 2019-06-27
Payer: COMMERCIAL

## 2019-06-27 VITALS
HEIGHT: 73 IN | WEIGHT: 279.75 LBS | BODY MASS INDEX: 37.08 KG/M2 | TEMPERATURE: 99 F | SYSTOLIC BLOOD PRESSURE: 131 MMHG | HEART RATE: 94 BPM | DIASTOLIC BLOOD PRESSURE: 74 MMHG

## 2019-06-27 DIAGNOSIS — E11.40 PAINFUL DIABETIC NEUROPATHY: Primary | ICD-10-CM

## 2019-06-27 DIAGNOSIS — G89.4 CHRONIC PAIN SYNDROME: ICD-10-CM

## 2019-06-27 PROCEDURE — 99999 PR PBB SHADOW E&M-EST. PATIENT-LVL IV: CPT | Mod: PBBFAC,,, | Performed by: NURSE PRACTITIONER

## 2019-06-27 PROCEDURE — 99999 PR PBB SHADOW E&M-EST. PATIENT-LVL IV: ICD-10-PCS | Mod: PBBFAC,,, | Performed by: NURSE PRACTITIONER

## 2019-06-27 PROCEDURE — 99024 PR POST-OP FOLLOW-UP VISIT: ICD-10-PCS | Mod: S$GLB,,, | Performed by: NURSE PRACTITIONER

## 2019-06-27 PROCEDURE — 99024 POSTOP FOLLOW-UP VISIT: CPT | Mod: S$GLB,,, | Performed by: NURSE PRACTITIONER

## 2019-06-27 NOTE — PROGRESS NOTES
Chronic Pain - Follow Up    Referring Physician: No ref. provider found    Chief Complaint: No chief complaint on file.       SUBJECTIVE: Disclaimer: This note has been generated using voice-recognition software. There may be typographical errors that have been missed during proof-reading    Interval History 6/27/2019:  The patient returns for follow up of BLE pain and lead pull.  He is here today for lead removal and PDN research follow up. His pain today is 2/10.    Interval History 6/24/2019:  The patient is here for follow up and lead pull.  He is s/p Nevro SCS trial on 6/17/19.  He is part of the research PDN study.  He is reporting about 30% relief so far.  He had XRAYs today.  Sim is here to meet with him.  His pain today is 3/10.      Initial encounter:    Alfred Cuello presents to the clinic for the evaluation of BLE painful diabetic neuropathy pain.  He is here for PDN assessment for research study.  The pain started about 3 years ago following no major injury and symptoms have been worsening.  He has a long standing history of diabetes with abnormal A1C.  His last A1C was 8.8.  His pain is primarily in his feet and toes.  He does also have pain in his calves.  He describes his pain as burning and electric.  He reports associated numbness and pins and needles sensation.  He denies any symptoms above the knees.  He had a psych consult on 12/11/18 and was approved for spinal cord stimulation.    Brief history:    Pain Description:    Exacerbating factors: Walking and Night Time.      Mitigating factors heat.     Patient denies urinary incontinence, bowel incontinence, significant weight loss and significant motor weakness.  Patient denies any suicidal or homicidal ideations    Pain Procedures:   6/17/19 Nevro SCS trial    Spinal decompression -never  Joint replacement -never    Imaging:     Narrative     EXAMINATION:  MRI LUMBAR SPINE WITHOUT CONTRAST    CLINICAL HISTORY:  research; Type 2 diabetes  mellitus with diabetic neuropathy, unspecified    TECHNIQUE:  Multiplanar, multisequence MR images were acquired from the thoracolumbar junction to the sacrum without the administration of contrast.    COMPARISON:  None.    FINDINGS:  Vertebral body heights are within normal limits.  Marrow signal is appropriate.  There is a small hemangioma of the L2 vertebral body.  There are bilateral L5 pars interarticularis defects accompanied by grade 1 anterolisthesis of L5 on S1.  Disc desiccation is present throughout the lumbar spine.  There is moderate loss of disc height at L5-S1, with mild loss of disc height present from T11 through L2.  There is small Schmorl's node formation of the inferior endplates of T11 and T12.  The conus terminates at T12.    T11-12: There is a small central disc extrusion without significant spinal canal or neuroforaminal narrowing.    T12-L1: There is mild broad-based posterior disc bulging without significant spinal canal or neuroforaminal narrowing.    L1-2: Mild broad-based posterior disc bulging without significant spinal canal or neuroforaminal narrowing.    L2-3: There is a small right paracentral disc protrusion without significant spinal canal or neuroforaminal narrowing.    L3-4: Small central disc protrusion without significant spinal canal or neuroforaminal narrowing.    L4-5: There is a small right foraminal disc extrusion with accompanying annular tear of the disc.  Moderate bilateral facet arthropathy is also present in the findings contribute to mild bilateral neuroforaminal narrowing.    L5-S1: Grade 1 spondylolisthesis with uncovering of the posterior disc is present.  There is moderate bilateral neuroforaminal narrowing without significant spinal canal narrowing.      Impression       Grade 1 L5-S1 spondylolisthesis resulting in moderate bilateral neuroforaminal narrowing.    Multilevel degenerative disc changes with level by level comments above.         Past Medical  History:   Diagnosis Date    Diabetes mellitus, type 2     GERD (gastroesophageal reflux disease)     Hyperlipidemia     Hypertension     Neuropathy      Past Surgical History:   Procedure Laterality Date    ABDOMINAL SURGERY      as a child    COLONOSCOPY N/A 2018    Performed by [facility]o [facility]oo [facility] at [facility]    Trial, Neurostimulator, LUMBAR SPINAL CORD STIMULATOR TRIAL N/A 2019    Performed by [facility]hat [facility]antes [facility] at [facility]     Social History     Socioeconomic History    Marital status:      Spouse name: Not on file    Number of children: Not on file    Years of education: Not on file    Highest education level: Not on file   Occupational History    Not on file   Social Needs    Financial resource strain: Not on file    Food insecurity:     Worry: Not on file     Inability: Not on file    Transportation needs:     Medical: Not on file     Non-medical: Not on file   Tobacco Use    Smoking status: Former Smoker     Last attempt to quit: 3/20/2011     Years since quittin.2    Smokeless tobacco: Never Used   Substance and Sexual Activity    Alcohol use: Yes     Comment: occ    Drug use: No    Sexual activity: Not on file   Lifestyle    Physical activity:     Days per week: Not on file     Minutes per session: Not on file    Stress: Not on file   Relationships    Social connections:     Talks on phone: Not on file     Gets together: Not on file     Attends Christianity service: Not on file     Active member of club or organization: Not on file     Attends meetings of clubs or organizations: Not on file     Relationship status: Not on file   Other Topics Concern    Not on file   Social History Narrative    Not on file     Family History   Problem Relation Age of Onset    Glaucoma Neg Hx        Review of patient's allergies indicates:   Allergen Reactions    Percodan [oxycodone-aspirin]        Current Outpatient Medications   Medication Sig    ascorbic acid,  vitamin C, (VITAMIN C) 1000 MG tablet Take 1,000 mg by mouth once daily.    atorvastatin (LIPITOR) 40 MG tablet TAKE 1 TABLET BY MOUTH ONCE DAILY    blood sugar diagnostic Strp 2 strips by Misc.(Non-Drug; Combo Route) route once daily. One touch verio    blood-glucose meter Misc 1 Device by Misc.(Non-Drug; Combo Route) route daily as needed. One touch verio    BOOSTRIX TDAP 2.5-8-5 Lf-mcg-Lf/0.5mL Syrg injection ADM 0.5ML IM UTD    ciclopirox (PENLAC) 8 % Soln Apply topically nightly.    ferrous sulfate 325 mg (65 mg iron) Tab tablet Take 325 mg by mouth daily with breakfast.    lancets Misc Take 1 x day    lisinopril-hydrochlorothiazide (PRINZIDE,ZESTORETIC) 20-25 mg Tab TAKE 1 TABLET BY MOUTH ONCE DAILY    loratadine (CLARITIN) 10 mg tablet Take 10 mg by mouth once daily.    metFORMIN (GLUCOPHAGE) 850 MG tablet Take 1 tablet three times daily with meals.    multivitamin (ONE DAILY MULTIVITAMIN) per tablet Take 1 tablet by mouth once daily.    omeprazole (PRILOSEC) 20 MG capsule TAKE 1 CAPSULE BY MOUTH ONCE DAILY    SHINGRIX, PF, 50 mcg/0.5 mL injection INJECT UTD    DULoxetine (CYMBALTA) 60 MG capsule Take 1 capsule (60 mg total) by mouth once daily.    empagliflozin (JARDIANCE) 10 mg Tab Take 10 mg by mouth once daily.    gabapentin (NEURONTIN) 300 MG capsule Take 3 capsules (900 mg total) by mouth 4 (four) times daily.    pregabalin (LYRICA) 50 MG capsule Take 1 capsule (50 mg total) by mouth 3 (three) times daily.    semaglutide (OZEMPIC) 0.25 mg or 0.5 mg(2 mg/1.5 mL) PnIj Inject 0.5 mg into the skin every 7 days.     No current facility-administered medications for this visit.        REVIEW OF SYSTEMS:    GENERAL:  No weight loss, malaise or fevers.  HEENT:   No recent changes in vision or hearing  NECK:  Negative for lumps, no difficulty with swallowing.  RESPIRATORY:  Negative for cough, wheezing or shortness of breath, patient denies any recent URI.  CARDIOVASCULAR:  Negative for chest  "pain, leg swelling or palpitations.  Hypertension.  GI:  Negative for abdominal discomfort, blood in stools or black stools or change in bowel habits.  MUSCULOSKELETAL:  See HPI.  SKIN:  Negative for lesions, rash, and itching.  PSYCH:  No mood disorder or recent psychosocial stressors.  Patients sleep is not disturbed secondary to pain.  HEMATOLOGY/LYMPHOLOGY:  Negative for prolonged bleeding, bruising easily or swollen nodes.  Patient is not currently taking any anti-coagulants  ENDO: Diabetes.  NEURO:   No history of headaches, syncope, paralysis, seizures or tremors.  All other reviewed and negative other than HPI.    OBJECTIVE:    /74   Pulse 94   Temp 99.1 °F (37.3 °C)   Ht 6' 1" (1.854 m)   Wt 126.9 kg (279 lb 12.2 oz)   BMI 36.91 kg/m²     PHYSICAL EXAMINATION:    GENERAL: Well appearing, in no acute distress, alert and oriented x3.  PSYCH:  Mood and affect appropriate.  SKIN: Skin color, texture, turgor normal, no rashes or lesions. SCS site without drainage or signs of infection.  Leads removed without difficulty with tips intact.  Area cleaned with alcohol and Bacitracin applied.  HEAD/FACE:  Normocephalic, atraumatic. Cranial nerves grossly intact.  CV: RRR with palpation of the radial artery.  GAIT: Normal.    ASSESSMENT: 54 y.o. year old male with chronic LE pain, consistent with     Encounter Diagnoses   Name Primary?    Painful diabetic neuropathy Yes    Chronic pain syndrome        PLAN:     - The patient is s/p Nevro SCS trial for PDN research study.  Leads removed without difficulty.    - Complete neurological assessment completed with research team.    - F/U PRN.      The above plan and management options were discussed at length with patient. Patient is in agreement with the above and verbalized understanding.     Sanjuana Sharp  06/27/2019  "

## 2019-06-27 NOTE — PROGRESS NOTES
Date: 27 June 2019  Study: A Post-Market, Multicenter, Prospective, Randomized, Crossover Clinical Trial Comparing 10 kHz Spinal Cord Stimulation (HF10 Therapy) Combined with Conventional Medical Management to Conventional Medical Management Alone in the Treatment of Chronic, Intractable, Neuropathic Limb Pain              IRB/Protocol #: 2017.196.B/HO7845-7   : Rahat Orantes MD  Site Number: 26          Subject Number: [id number]     End of Trial:     [facility] p[facility] evaluation 10 days post-trial system implantation. He documents an average pain intensity of 2.9/10 cm on the VAS for his lower extremities on his best program, which demonstrates a 62% improvement in pain relief compared to baseline. Despite a greater than 50% reduction in his average pain intensity, the subject expresses disappointment in his expectations for pain relief during the trial period, particularly at night. He reports additional difficulty sleeping due to the size and shape of the external battery around his waist. Subject states he received the greatest pain relief during the trial extension reprogramming with Nevro , Sim Car. However, he did not believe the improvements were sufficient enough to decrease his dose of pain medications in the future, which he reports was his ultimate goal. Prior to successful removal of the leads, neurological symptoms and neurological functions were evaluated by Sanjuana Sharp NP. Subject is eligible for permanent implantation per study protocol criteria, although the subject is not currently interested in proceeding.

## 2019-07-11 DIAGNOSIS — G62.9 NEUROPATHY: ICD-10-CM

## 2019-07-11 DIAGNOSIS — E11.42 DIABETIC POLYNEUROPATHY ASSOCIATED WITH TYPE 2 DIABETES MELLITUS: ICD-10-CM

## 2019-07-12 DIAGNOSIS — G62.9 NEUROPATHY: ICD-10-CM

## 2019-07-12 DIAGNOSIS — E11.42 DIABETIC POLYNEUROPATHY ASSOCIATED WITH TYPE 2 DIABETES MELLITUS: ICD-10-CM

## 2019-07-12 RX ORDER — PREGABALIN 50 MG/1
CAPSULE ORAL
Qty: 90 CAPSULE | Refills: 3 | Status: SHIPPED | OUTPATIENT
Start: 2019-07-12 | End: 2019-07-12 | Stop reason: SDUPTHER

## 2019-07-12 RX ORDER — PREGABALIN 50 MG/1
CAPSULE ORAL
Qty: 90 CAPSULE | Refills: 3 | Status: SHIPPED | OUTPATIENT
Start: 2019-07-12 | End: 2019-07-15 | Stop reason: SDUPTHER

## 2019-07-15 DIAGNOSIS — E11.42 DIABETIC POLYNEUROPATHY ASSOCIATED WITH TYPE 2 DIABETES MELLITUS: ICD-10-CM

## 2019-07-15 DIAGNOSIS — G62.9 NEUROPATHY: ICD-10-CM

## 2019-07-15 RX ORDER — PREGABALIN 50 MG/1
CAPSULE ORAL
Qty: 90 CAPSULE | Refills: 3 | Status: SHIPPED | OUTPATIENT
Start: 2019-07-15 | End: 2019-07-17 | Stop reason: SDUPTHER

## 2019-07-17 DIAGNOSIS — E11.42 DIABETIC POLYNEUROPATHY ASSOCIATED WITH TYPE 2 DIABETES MELLITUS: ICD-10-CM

## 2019-07-17 DIAGNOSIS — G62.9 NEUROPATHY: ICD-10-CM

## 2019-07-18 RX ORDER — PREGABALIN 50 MG/1
50 CAPSULE ORAL 3 TIMES DAILY
Qty: 90 CAPSULE | Refills: 3 | Status: SHIPPED | OUTPATIENT
Start: 2019-07-18 | End: 2019-12-16 | Stop reason: SDUPTHER

## 2019-07-24 ENCOUNTER — OFFICE VISIT (OUTPATIENT)
Dept: ENDOCRINOLOGY | Facility: CLINIC | Age: 54
End: 2019-07-24
Payer: COMMERCIAL

## 2019-07-24 VITALS
HEIGHT: 73 IN | SYSTOLIC BLOOD PRESSURE: 126 MMHG | BODY MASS INDEX: 36.21 KG/M2 | DIASTOLIC BLOOD PRESSURE: 84 MMHG | HEART RATE: 80 BPM | TEMPERATURE: 98 F | WEIGHT: 273.25 LBS

## 2019-07-24 DIAGNOSIS — E11.40 TYPE 2 DIABETES MELLITUS WITH DIABETIC NEUROPATHY, WITHOUT LONG-TERM CURRENT USE OF INSULIN: Primary | ICD-10-CM

## 2019-07-24 DIAGNOSIS — E11.69 HYPERLIPIDEMIA ASSOCIATED WITH TYPE 2 DIABETES MELLITUS: ICD-10-CM

## 2019-07-24 DIAGNOSIS — E11.59 HYPERTENSION ASSOCIATED WITH DIABETES: ICD-10-CM

## 2019-07-24 DIAGNOSIS — E55.9 HYPOVITAMINOSIS D: ICD-10-CM

## 2019-07-24 DIAGNOSIS — D50.9 IRON DEFICIENCY ANEMIA, UNSPECIFIED IRON DEFICIENCY ANEMIA TYPE: ICD-10-CM

## 2019-07-24 DIAGNOSIS — E78.5 HYPERLIPIDEMIA ASSOCIATED WITH TYPE 2 DIABETES MELLITUS: ICD-10-CM

## 2019-07-24 DIAGNOSIS — R35.89 POLYURIA: ICD-10-CM

## 2019-07-24 DIAGNOSIS — G47.33 OSA ON CPAP: ICD-10-CM

## 2019-07-24 DIAGNOSIS — I15.2 HYPERTENSION ASSOCIATED WITH DIABETES: ICD-10-CM

## 2019-07-24 PROCEDURE — 3045F PR MOST RECENT HEMOGLOBIN A1C LEVEL 7.0-9.0%: ICD-10-PCS | Mod: CPTII,S$GLB,, | Performed by: PHYSICIAN ASSISTANT

## 2019-07-24 PROCEDURE — 99999 PR PBB SHADOW E&M-EST. PATIENT-LVL III: CPT | Mod: PBBFAC,,, | Performed by: PHYSICIAN ASSISTANT

## 2019-07-24 PROCEDURE — 99214 PR OFFICE/OUTPT VISIT, EST, LEVL IV, 30-39 MIN: ICD-10-PCS | Mod: S$GLB,,, | Performed by: PHYSICIAN ASSISTANT

## 2019-07-24 PROCEDURE — 3045F PR MOST RECENT HEMOGLOBIN A1C LEVEL 7.0-9.0%: CPT | Mod: CPTII,S$GLB,, | Performed by: PHYSICIAN ASSISTANT

## 2019-07-24 PROCEDURE — 3008F BODY MASS INDEX DOCD: CPT | Mod: CPTII,S$GLB,, | Performed by: PHYSICIAN ASSISTANT

## 2019-07-24 PROCEDURE — 3079F DIAST BP 80-89 MM HG: CPT | Mod: CPTII,S$GLB,, | Performed by: PHYSICIAN ASSISTANT

## 2019-07-24 PROCEDURE — 99999 PR PBB SHADOW E&M-EST. PATIENT-LVL III: ICD-10-PCS | Mod: PBBFAC,,, | Performed by: PHYSICIAN ASSISTANT

## 2019-07-24 PROCEDURE — 3074F PR MOST RECENT SYSTOLIC BLOOD PRESSURE < 130 MM HG: ICD-10-PCS | Mod: CPTII,S$GLB,, | Performed by: PHYSICIAN ASSISTANT

## 2019-07-24 PROCEDURE — 99214 OFFICE O/P EST MOD 30 MIN: CPT | Mod: S$GLB,,, | Performed by: PHYSICIAN ASSISTANT

## 2019-07-24 PROCEDURE — 3079F PR MOST RECENT DIASTOLIC BLOOD PRESSURE 80-89 MM HG: ICD-10-PCS | Mod: CPTII,S$GLB,, | Performed by: PHYSICIAN ASSISTANT

## 2019-07-24 PROCEDURE — 3074F SYST BP LT 130 MM HG: CPT | Mod: CPTII,S$GLB,, | Performed by: PHYSICIAN ASSISTANT

## 2019-07-24 PROCEDURE — 3008F PR BODY MASS INDEX (BMI) DOCUMENTED: ICD-10-PCS | Mod: CPTII,S$GLB,, | Performed by: PHYSICIAN ASSISTANT

## 2019-07-24 RX ORDER — ATORVASTATIN CALCIUM 40 MG/1
40 TABLET, FILM COATED ORAL DAILY
Qty: 90 TABLET | Refills: 3 | Status: SHIPPED | OUTPATIENT
Start: 2019-07-24 | End: 2020-05-14 | Stop reason: SDUPTHER

## 2019-07-24 NOTE — PROGRESS NOTES
"CC: DM    HPI: Alfred Cuello was diagnosed with Type 2 DM ~ 6 years ago after having polyuria, polydipsia and polyphagia. No hospitalizations for DM. His mother has Type 2 diabetes and uses a pump.     CURRENT DIABETIC MEDS: metformin 850 mg TID, Ozempic 0.5 mg weekly, Jardiance 10 mg daily  Uses Vials or Pens: n/a  Skipped/missed glycemic medications:no    Past medications:  Glimepiride    BG readings are checked 1x every few days.  Fastin-160  LH: 200  DN: 220    Hypoglycemia: No  Type of Glucose Meter:  Hasmukh contour    Physical Activity: none    Dietary Habits:   BF-instant  breakfast carnation  LH-tuna sandwich, carrots, applesauce, hot dog, egg salad  DN-salad, wife cooks, grilled chicken, pork chops, steamed vegetables, rice or potatoes  Snacks on carrots, crackers, apples and cheese. Drinks water.     Last Eye Exam: --cataracts  Last Podiatry Exam:     Last DM Education Attended:  in [facility] Hx: Drinks ETOH (2-3 cocktails) on holidays. He smoked 0.5 ppd for 33 years and quit in .     REVIEW OF SYSTEMS  General: wt loss, no fatigue, wt stable.   Eyes: decreased vision while reading no blurry vision.   Cardiac: no chest pain or palpitations.   Respiratory: no cough or dyspnea.   GI: no abdominal pain or nausea.   Skin: no rashes or itching.   Neuro: + numbness or tingling in feet.   Endocrine: + polyuria, no polydipsia, polyphagia.   Remainder ROS negative    Vital Signs  /84 (BP Location: Left arm, Patient Position: Sitting, BP Method: Medium (Manual))   Pulse 80   Temp 98.1 °F (36.7 °C) (Oral)   Ht 6' 1" (1.854 m)   Wt 123.9 kg (273 lb 4.2 oz)   BMI 36.05 kg/m²     Personally reviewed labs below:   Hemoglobin A1C   Date Value Ref Range Status   2019 7.6 (H) 4.0 - 5.6 % Final     Comment:     ADA Screening Guidelines:  5.7-6.4%  Consistent with prediabetes  >or=6.5%  Consistent with diabetes  High levels of fetal hemoglobin interfere with the HbA1C  assay. " Heterozygous hemoglobin variants (HbS, HgC, etc)do  not significantly interfere with this assay.   However, presence of multiple variants may affect accuracy.     10/01/2018 8.8 (H) 4.0 - 5.6 % Final     Comment:     ADA Screening Guidelines:  5.7-6.4%  Consistent with prediabetes  >or=6.5%  Consistent with diabetes  High levels of fetal hemoglobin interfere with the HbA1C  assay. Heterozygous hemoglobin variants (HbS, HgC, etc)do  not significantly interfere with this assay.   However, presence of multiple variants may affect accuracy.     07/20/2018 9.7 (H) 4.0 - 5.6 % Final     Comment:     ADA Screening Guidelines:  5.7-6.4%  Consistent with prediabetes  >or=6.5%  Consistent with diabetes  High levels of fetal hemoglobin interfere with the HbA1C  assay. Heterozygous hemoglobin variants (HbS, HgC, etc)do  not significantly interfere with this assay.   However, presence of multiple variants may affect accuracy.         Chemistry        Component Value Date/Time     07/20/2018 1045    K 3.7 07/20/2018 1045    CL 97 07/20/2018 1045    CO2 31 (H) 07/20/2018 1045    BUN 14 07/20/2018 1045    CREATININE 1.3 07/20/2018 1045     (H) 07/20/2018 1045        Component Value Date/Time    CALCIUM 9.5 07/20/2018 1045    ALKPHOS 95 07/20/2018 1045    AST 31 07/20/2018 1045    ALT 35 07/20/2018 1045    BILITOT 0.7 07/20/2018 1045    ESTGFRAFRICA >60 07/20/2018 1045    EGFRNONAA >60 07/20/2018 1045        Lab Results   Component Value Date    CHOL 171 10/08/2018    CHOL 199 07/20/2018    CHOL 266 (H) 03/20/2018     Lab Results   Component Value Date    HDL 40 10/08/2018    HDL 34 (L) 07/20/2018    HDL 41 03/20/2018     Lab Results   Component Value Date    LDLCALC 80.0 10/08/2018    LDLCALC Invalid, Trig>400.0 07/20/2018    LDLCALC Invalid, Trig>400.0 03/20/2018     Lab Results   Component Value Date    TRIG 255 (H) 10/08/2018    TRIG 440 (H) 07/20/2018    TRIG 487 (H) 03/20/2018     Lab Results   Component Value  Date    CHOLHDL 23.4 10/08/2018    CHOLHDL 17.1 (L) 07/20/2018    CHOLHDL 15.4 (L) 03/20/2018     Lab Results   Component Value Date    TSH 0.821 10/01/2018     Lab Results   Component Value Date    MICALBCREAT 23.7 07/20/2018     No results found for: [id number]    PHYSICAL EXAMINATION  Constitutional: elderly male, appears well, no distress  Neck: Supple, trachea midline.   Respiratory: even and unlabored, CTA without wheezes.  Cardiovascular: RRR; no carotid bruits or murmurs.   Lymph: DP pulses  2+ bilaterally; no edema.   Skin: warm and dry; no injection site reactions, no acanthosis nigracans observed.  Neuro: patient alert and cooperative; CN 2-12 grossly intact    Previous exam 10/18  Foot Exam: no sores or macerations noted. Healing Abrasion on dorsal side of mid left foot. Small pinpoint hole on 5th digit of right foot,no exudate or signs of infection.    Protective Sensation (w/ 10 gram monofilament):  Right: Intact  Left: Intact    Visual Inspection:  Normal -  Bilateral, Nails Intact - without Evidence of Foot Deformity- Bilateral, Callus -  Bilateral and Dry Skin -  Bilateral    Pedal Pulses:   Right: Present  Left: Present    Posterior tibialis:   Right:Present  Left: Present     Vibratory Sensation  Right:Absent  Left:Absent     Assessment/Plan    1. Type 2 diabetes mellitus with diabetic neuropathy, without long-term current use of insulin  Microalbumin/creatinine urine ratio    Hemoglobin A1c    GlycoMark (TM)    Fructosamine    Comprehensive metabolic panel    CBC auto differential    T3    T4, free    TSH    Ambulatory referral to Ophthalmology    semaglutide (OZEMPIC) 1 mg/dose (2 mg/1.5 mL) PnIj   2. Hyperlipidemia associated with type 2 diabetes mellitus  atorvastatin (LIPITOR) 40 MG tablet   3. Hypertension associated with diabetes     4. Iron deficiency anemia, unspecified iron deficiency anemia type  Iron and TIBC   5. RAFAEL on CPAP     6. Hypovitaminosis D  Vitamin D   7. Polyuria   Urinalysis Microscopic     Type 2 DM-A1c today. Increase Ozempic to 1 mg weekly. Increase Jardiance to 25 mg daily. Continue metformin. . BG checks 2x daily. Logs to all visits.  Neuropathy-continue Cymbalta and gabapentin.  UJI-wijaqb-tebnyzzt ACEi  HLD-elevated-continue statin and ASA. Optimize BG control.  Anemia-may falsely lower a1c. Continue Iron supplements.  RAFAEL-continue CPAP  Polyuria-check urine    FOLLOW UP  3 mths

## 2019-07-28 PROBLEM — R35.89 POLYURIA: Status: ACTIVE | Noted: 2019-07-28

## 2019-07-28 PROBLEM — E55.9 HYPOVITAMINOSIS D: Status: ACTIVE | Noted: 2019-07-28

## 2019-07-29 ENCOUNTER — LAB VISIT (OUTPATIENT)
Dept: LAB | Facility: HOSPITAL | Age: 54
End: 2019-07-29
Attending: PHYSICIAN ASSISTANT
Payer: COMMERCIAL

## 2019-07-29 DIAGNOSIS — R35.89 POLYURIA: ICD-10-CM

## 2019-07-29 DIAGNOSIS — E11.40 TYPE 2 DIABETES MELLITUS WITH DIABETIC NEUROPATHY, WITHOUT LONG-TERM CURRENT USE OF INSULIN: ICD-10-CM

## 2019-07-29 PROCEDURE — 82043 UR ALBUMIN QUANTITATIVE: CPT

## 2019-07-29 PROCEDURE — 81001 URINALYSIS AUTO W/SCOPE: CPT

## 2019-07-30 LAB
ALBUMIN/CREAT UR: 6 UG/MG (ref 0–30)
CREAT UR-MCNC: 150 MG/DL (ref 23–375)
MICROALBUMIN UR DL<=1MG/L-MCNC: 9 UG/ML
MICROSCOPIC COMMENT: NORMAL
WBC #/AREA URNS AUTO: 0 /HPF (ref 0–5)

## 2019-07-31 ENCOUNTER — PATIENT MESSAGE (OUTPATIENT)
Dept: ENDOCRINOLOGY | Facility: CLINIC | Age: 54
End: 2019-07-31

## 2019-08-05 DIAGNOSIS — N18.9 CHRONIC KIDNEY DISEASE, UNSPECIFIED CKD STAGE: ICD-10-CM

## 2019-08-11 ENCOUNTER — PATIENT MESSAGE (OUTPATIENT)
Dept: ENDOCRINOLOGY | Facility: CLINIC | Age: 54
End: 2019-08-11

## 2019-08-12 DIAGNOSIS — E11.42 DIABETIC POLYNEUROPATHY ASSOCIATED WITH TYPE 2 DIABETES MELLITUS: ICD-10-CM

## 2019-08-12 RX ORDER — GABAPENTIN 300 MG/1
900 CAPSULE ORAL 4 TIMES DAILY
Qty: 1080 CAPSULE | Refills: 3 | Status: SHIPPED | OUTPATIENT
Start: 2019-08-12 | End: 2020-02-29

## 2019-09-02 ENCOUNTER — PATIENT MESSAGE (OUTPATIENT)
Dept: ENDOCRINOLOGY | Facility: CLINIC | Age: 54
End: 2019-09-02

## 2019-09-04 ENCOUNTER — OFFICE VISIT (OUTPATIENT)
Dept: OPTOMETRY | Facility: CLINIC | Age: 54
End: 2019-09-04
Payer: COMMERCIAL

## 2019-09-04 ENCOUNTER — PATIENT MESSAGE (OUTPATIENT)
Dept: FAMILY MEDICINE | Facility: CLINIC | Age: 54
End: 2019-09-04

## 2019-09-04 DIAGNOSIS — H52.7 REFRACTIVE ERROR: ICD-10-CM

## 2019-09-04 DIAGNOSIS — H25.13 NUCLEAR SCLEROSIS, BILATERAL: ICD-10-CM

## 2019-09-04 DIAGNOSIS — E11.9 DIABETES MELLITUS WITHOUT OPHTHALMIC MANIFESTATIONS: Primary | ICD-10-CM

## 2019-09-04 PROCEDURE — 99999 PR PBB SHADOW E&M-EST. PATIENT-LVL II: ICD-10-PCS | Mod: PBBFAC,,, | Performed by: OPTOMETRIST

## 2019-09-04 PROCEDURE — 92014 PR EYE EXAM, EST PATIENT,COMPREHESV: ICD-10-PCS | Mod: S$GLB,,, | Performed by: OPTOMETRIST

## 2019-09-04 PROCEDURE — 99999 PR PBB SHADOW E&M-EST. PATIENT-LVL II: CPT | Mod: PBBFAC,,, | Performed by: OPTOMETRIST

## 2019-09-04 PROCEDURE — 92014 COMPRE OPH EXAM EST PT 1/>: CPT | Mod: S$GLB,,, | Performed by: OPTOMETRIST

## 2019-09-04 RX ORDER — PRAVASTATIN SODIUM 40 MG/1
40 TABLET ORAL
COMMUNITY
End: 2019-10-30

## 2019-09-04 RX ORDER — GLIMEPIRIDE 4 MG/1
4 TABLET ORAL
COMMUNITY
End: 2020-10-15 | Stop reason: SDUPTHER

## 2019-09-04 RX ORDER — ATENOLOL AND CHLORTHALIDONE TABLET 100; 25 MG/1; MG/1
1 TABLET ORAL
COMMUNITY
End: 2020-01-14

## 2019-09-04 RX ORDER — NAPROXEN SODIUM 220 MG/1
81 TABLET, FILM COATED ORAL DAILY
COMMUNITY
End: 2021-12-08

## 2019-09-04 RX ORDER — ENALAPRIL MALEATE 5 MG/1
5 TABLET ORAL
COMMUNITY
End: 2020-01-14

## 2019-09-04 NOTE — LETTER
September 4, 2019      SKYLER Sheridan PA-C  [address] [facility] [address]           [facility] - Optometry  [address] [place] [address] [address]  [facility] [address]  Phone: [phone]          Patient: Alfred Cuello   MR Number: [id number]   YOB: 1965   Date of Visit: 9/4/2019       Dear SKYLER Sheridan:    Thank you for referring Alfred Cuello to me for evaluation. Attached you will find relevant portions of my assessment and plan of care.    If you have questions, please do not hesitate to call me. I look forward to following Alfred Cuello along with you.    Sincerely,    Carl Yost, OD    Enclosure  CC:  No Recipients    If you would like to receive this communication electronically, please contact [email or web address] or [phone] to request more information on [employer] access.    For providers and/or their staff who would like to refer a patient to [facility], please contact us through our one-stop-shop provider referral line, [facility], at [phone].    If you feel you have received this communication in error or would no longer like to receive these types of communications, please e-mail [email or web address]

## 2019-09-04 NOTE — PROGRESS NOTES
HPI     55 YO male presents today for an annual eye exam. He states he is doing   well, notes that he is having difficulty focusing more and gets dizzy at   times but is thinks this is due to the different diabetic/neuropathy   medications they have been putting him on.     Lab Results       Component                Value               Date                       HGBA1C                   8.5 (H)             07/29/2019                 HGBA1C                   7.6 (H)             02/09/2019                 HGBA1C                   8.8 (H)             10/01/2018            No results found for: LABA1C    Last edited by Jamila Rodrigez, PCT on 9/4/2019  3:42 PM. (History)            Assessment /Plan     For exam results, see Encounter Report.    Diabetes mellitus without ophthalmic manifestations    Nuclear sclerosis, bilateral    Refractive error      DM type 2 w/o ocular retinopathy OU. Discussed possible ocular affects of uncontrolled blood sugar with patient. Recommended continued strong blood sugar control and continued care with PCP. Monitor yearly.     Mild NS OU. Not yet significant. Discussed possible ocular affects of cataracts. Acceptable BCVA OU. Discussed treatment options. Surgery not recommended at this time. Monitor yearly.     Discussed spec options, pt happy with otc readers. Return prn for spec Rx.      RTC in 1 year for comprehensive eye exam, or sooner prn.

## 2019-09-16 ENCOUNTER — OFFICE VISIT (OUTPATIENT)
Dept: FAMILY MEDICINE | Facility: CLINIC | Age: 54
End: 2019-09-16
Payer: COMMERCIAL

## 2019-09-16 VITALS
HEART RATE: 94 BPM | WEIGHT: 277.56 LBS | OXYGEN SATURATION: 96 % | DIASTOLIC BLOOD PRESSURE: 60 MMHG | SYSTOLIC BLOOD PRESSURE: 100 MMHG | HEIGHT: 73 IN | RESPIRATION RATE: 16 BRPM | BODY MASS INDEX: 36.79 KG/M2 | TEMPERATURE: 98 F

## 2019-09-16 DIAGNOSIS — E55.9 HYPOVITAMINOSIS D: ICD-10-CM

## 2019-09-16 DIAGNOSIS — N18.30 CKD (CHRONIC KIDNEY DISEASE) STAGE 3, GFR 30-59 ML/MIN: ICD-10-CM

## 2019-09-16 DIAGNOSIS — E11.00 TYPE 2 DIABETES MELLITUS WITH HYPEROSMOLARITY WITHOUT COMA, WITHOUT LONG-TERM CURRENT USE OF INSULIN: ICD-10-CM

## 2019-09-16 DIAGNOSIS — E11.42 DIABETIC POLYNEUROPATHY ASSOCIATED WITH TYPE 2 DIABETES MELLITUS: Primary | ICD-10-CM

## 2019-09-16 DIAGNOSIS — E11.65 TYPE 2 DIABETES MELLITUS WITH HYPERGLYCEMIA, WITHOUT LONG-TERM CURRENT USE OF INSULIN: ICD-10-CM

## 2019-09-16 DIAGNOSIS — E78.5 HYPERLIPIDEMIA ASSOCIATED WITH TYPE 2 DIABETES MELLITUS: ICD-10-CM

## 2019-09-16 DIAGNOSIS — E66.9 OBESITY (BMI 30-39.9): ICD-10-CM

## 2019-09-16 DIAGNOSIS — I15.2 HYPERTENSION ASSOCIATED WITH DIABETES: ICD-10-CM

## 2019-09-16 DIAGNOSIS — E11.59 HYPERTENSION ASSOCIATED WITH DIABETES: ICD-10-CM

## 2019-09-16 DIAGNOSIS — E11.69 HYPERLIPIDEMIA ASSOCIATED WITH TYPE 2 DIABETES MELLITUS: ICD-10-CM

## 2019-09-16 PROCEDURE — 3074F SYST BP LT 130 MM HG: CPT | Mod: CPTII,S$GLB,, | Performed by: FAMILY MEDICINE

## 2019-09-16 PROCEDURE — 99999 PR PBB SHADOW E&M-EST. PATIENT-LVL IV: CPT | Mod: PBBFAC,,, | Performed by: FAMILY MEDICINE

## 2019-09-16 PROCEDURE — 3078F PR MOST RECENT DIASTOLIC BLOOD PRESSURE < 80 MM HG: ICD-10-PCS | Mod: CPTII,S$GLB,, | Performed by: FAMILY MEDICINE

## 2019-09-16 PROCEDURE — 3008F PR BODY MASS INDEX (BMI) DOCUMENTED: ICD-10-PCS | Mod: CPTII,S$GLB,, | Performed by: FAMILY MEDICINE

## 2019-09-16 PROCEDURE — 99214 OFFICE O/P EST MOD 30 MIN: CPT | Mod: S$GLB,,, | Performed by: FAMILY MEDICINE

## 2019-09-16 PROCEDURE — 3045F PR MOST RECENT HEMOGLOBIN A1C LEVEL 7.0-9.0%: ICD-10-PCS | Mod: CPTII,S$GLB,, | Performed by: FAMILY MEDICINE

## 2019-09-16 PROCEDURE — 3008F BODY MASS INDEX DOCD: CPT | Mod: CPTII,S$GLB,, | Performed by: FAMILY MEDICINE

## 2019-09-16 PROCEDURE — 99999 PR PBB SHADOW E&M-EST. PATIENT-LVL IV: ICD-10-PCS | Mod: PBBFAC,,, | Performed by: FAMILY MEDICINE

## 2019-09-16 PROCEDURE — 3045F PR MOST RECENT HEMOGLOBIN A1C LEVEL 7.0-9.0%: CPT | Mod: CPTII,S$GLB,, | Performed by: FAMILY MEDICINE

## 2019-09-16 PROCEDURE — 3074F PR MOST RECENT SYSTOLIC BLOOD PRESSURE < 130 MM HG: ICD-10-PCS | Mod: CPTII,S$GLB,, | Performed by: FAMILY MEDICINE

## 2019-09-16 PROCEDURE — 3078F DIAST BP <80 MM HG: CPT | Mod: CPTII,S$GLB,, | Performed by: FAMILY MEDICINE

## 2019-09-16 PROCEDURE — 99214 PR OFFICE/OUTPT VISIT, EST, LEVL IV, 30-39 MIN: ICD-10-PCS | Mod: S$GLB,,, | Performed by: FAMILY MEDICINE

## 2019-09-16 RX ORDER — ALCOHOL 2.38 KG/3.79L
1 GEL TOPICAL DAILY
Qty: 90 CAPSULE | Refills: 3 | Status: SHIPPED | OUTPATIENT
Start: 2019-09-16 | End: 2020-10-15

## 2019-09-16 NOTE — PROGRESS NOTES
Subjective:       Patient ID: Alfred Cuello is a 54 y.o. male.    Chief Complaint: Follow-up (6mth f/u hypertension / diabetes)    HPI  Review of Systems   Constitutional: Negative for fatigue and unexpected weight change.   Respiratory: Negative for chest tightness and shortness of breath.    Cardiovascular: Negative for chest pain, palpitations and leg swelling.   Gastrointestinal: Negative for abdominal pain.   Musculoskeletal: Negative for arthralgias.   Neurological: Positive for numbness. Negative for dizziness, syncope, weakness, light-headedness and headaches.       Patient Active Problem List   Diagnosis    Iron deficiency anemia    Hypertension associated with diabetes    Allergic rhinitis    GERD (gastroesophageal reflux disease)    H/O pyloric stenosis    Hyperlipidemia associated with type 2 diabetes mellitus    CKD (chronic kidney disease) stage 3, GFR 30-59 ml/min    Obesity (BMI 30-39.9)    RAFAEL on CPAP    Neuropathy    Type 2 diabetes mellitus with hyperglycemia    Painful diabetic neuropathy    Chronic pain syndrome    Diabetic polyneuropathy associated with type 2 diabetes mellitus    Research study patient    Chronic pain    Hypovitaminosis D    Polyuria     Patient is here for a chronic conditions follow up.    Endocrine Dr. Beach- has advancing neuropathy pain uncontrolled with both gabapentin 300mg 3 po qid and lyrica 50mg tid.  Did well with metanx but too expensive  Objective:      Physical Exam   Constitutional: He is oriented to person, place, and time. He appears well-developed and well-nourished.   Cardiovascular: Normal rate, regular rhythm and normal heart sounds.   Pulmonary/Chest: Effort normal and breath sounds normal.   Musculoskeletal: He exhibits no edema.   Neurological: He is alert and oriented to person, place, and time.   Skin: Skin is warm and dry.   Psychiatric: He has a normal mood and affect.   Nursing note and vitals reviewed.      Assessment:        1. Diabetic polyneuropathy associated with type 2 diabetes mellitus    2. Type 2 diabetes mellitus with hyperosmolarity without coma, without long-term current use of insulin    3. Hypertension associated with diabetes    4. Hyperlipidemia associated with type 2 diabetes mellitus    5. CKD (chronic kidney disease) stage 3, GFR 30-59 ml/min    6. Obesity (BMI 30-39.9)    7. Type 2 diabetes mellitus with hyperglycemia, without long-term current use of insulin    8. Hypovitaminosis D        Plan:         1. Diabetic polyneuropathy associated with type 2 diabetes mellitus  Cont current meds and restart  - [id number]-algal oil (METANX/FOLTANX RF) 3 mg-35 mg-2 mg -90.314 mg Cap; Take 1 capsule by mouth once daily.  Dispense: 90 capsule; Refill: 3    2. Type 2 diabetes mellitus with hyperosmolarity without coma, without long-term current use of insulin  Refer for eval and treat  - Ambulatory referral/consult to Endocrinology; Future    3. Hypertension associated with diabetes  Controlled on current medications.  Continue current medications.      4. Hyperlipidemia associated with type 2 diabetes mellitus  Chol at goal. Your triglycerides are borderline high. I recommend a heart healthy diet rich in fiber, fresh vegetables and fruit and low in saturated fats (fried foods, red meat, etc.).  I also recommend regular exercise including a minimum of 150 minutes of cardio exercise per week and 2-30 minute workouts of strength training like light weights, yoga, pilates, etc.  You should work toward a body mass index of < 25.        5. CKD (chronic kidney disease) stage 3, GFR 30-59 ml/min  Stable and chronic.  Will continue to monitor q3-6 months and control chronic conditions as optimally as possible to preserve function.      6. Obesity (BMI 30-39.9)  Counseled patient on his ideal body weight, health consequences of being obese and current recommendations including weekly exercise and a heart healthy diet.  Current  "BMI is:Estimated body mass index is 36.62 kg/m² as calculated from the following:    Height as of this encounter: 6' 1" (1.854 m).    Weight as of this encounter: 125.9 kg (277 lb 9 oz)..  Patient is aware that ideal BMI < 25 or Weight in (lb) to have BMI = 25: 189.1.        7. Type 2 diabetes mellitus with hyperglycemia, without long-term current use of insulin  Improving control. F/u endocrine and cont working on better control    8. Hypovitaminosis D  Screen and treat as indicated:          Time spent with patient: 20 minutes    Patient with be reevaluated in 6 months or sooner prn    Greater than 50% of this visit was spent counseling as described in above documentation:Yes  "

## 2019-09-16 NOTE — PATIENT INSTRUCTIONS
Diabetes (General Information)  Diabetes is a long-term health problem. It means your body does not make enough insulin. Or it may mean that your body cannot use the insulin it makes. Insulin is a hormone in your body. It lets blood sugar (glucose) reach the cells in your body. All of your cells need glucose for fuel.  When you have diabetes, the glucose in your blood builds up because it cannot get into the cells. This buildup is called high blood sugar (hyperglycemia).  Your blood sugar level depends on several things. It depends on what kind of food you eat and how much of it you eat. It also depends on how much exercise you get, and how much insulin you have in your body. Eating too much of the wrong kinds of food or not taking diabetes medicine on time can cause high blood sugar. Infections can cause high blood sugar even if you are taking medicines correctly.  These things can also cause low blood sugar:  · Missing meals  · Not eating enough food  · Taking too much diabetes medicine  Diabetes can cause serious problems over time if you do not get treated. These problems include heart disease, stroke, kidney failure, and blindness. They also include nerve pain or loss of feeling in your legs and feet, and gangrene of the feet. By keeping your blood sugar under control you can prevent or delay these problems.  Normal blood sugar levels are 80 to 100 before a meal and less than 180 in the 1 to 2 hours after a meal.  Home care  Follow these guidelines when caring for yourself at home:  · Follow the diet your healthcare provider gives you. Take insulin or other diabetes medicine exactly as told to.  · Watch your blood sugar as you are told to. Keep a log of your results. This will help your provider change your medicines to keep your blood sugar under control.  · Try to reach your ideal weight. You may be able to cut back on or not have to take diabetes medicine if you eat the right foods and get exercise.  · Do  not smoke. Smoking worsens the effects of diabetes on your circulation. You are much more likely to have a heart attack if you have diabetes and you smoke.  · Take good care of your feet. If you have lost feeling in your feet, you may not see an injury or infection. Check your feet and between your toes at least once a week.  · Wear a medical alert bracelet or necklace, or carry a card in your wallet that says you have diabetes. This will help healthcare providers give you the right care if you get very ill and cannot tell them that you have diabetes.  Sick day plan  If you get a cold, the flu, or a bacterial or viral infection, take these steps:  · Look at your diabetes sick plan and call your healthcare provider as you were told to. You may need to call your provider right away if:  ¨ Your blood sugar is above 240 while taking your diabetes medicine  ¨ Your urine ketone levels are above normal or high  ¨ You have been vomiting more than 6 hours  ¨ You have trouble breathing or your breath ha s a fruity smell  ¨ You have a high fever  ¨ You have a fever for several days and you are not getting better  ¨ You get light-headed and are sleepier than usual  · Keep taking your diabetes pills (oral medicine) even if you have been vomiting and are feeling sick. Call your provider right away because you may need insulin to lower your blood sugar until you recover from your illness.  · Keep taking your insulin even if you have been vomiting and are feeling sick. Call your provider right away to ask if you need to change your insulin dose. This will depend on your blood sugar results.  · Check your blood sugar every 2 to 4 hours, or at least 4 times a day.  · Check your ketones often. If you are vomiting and having diarrhea, watch them more often.  · Do not skip meals. Try to eat small meals on a regular schedule. Do this even if you do not feel like eating.  · Drink water or other liquids that do not have caffeine or  calories. This will keep you from getting dehydrated. If you are nauseated or vomiting, takes small sips every 5 minutes. To prevent dehydration try to drink a cup (8 ounces) of fluids every hour while you are awake.  General care  Always bring a source of fast-acting sugar with you in case you have symptoms of low blood sugar (below 70). At the first sign of low blood sugar, eat or drink 15 to 20 grams of fast-acting sugar to raise your blood sugar. Examples are:  · 3 to 4 glucose tablets. You can buy these at most [employer].  · 4 ounces (1/2 cup) of regular (not diet) soft drinks  · 4 ounces (1/2 cup) of any fruit juice  · 8 ounces (1 cup) of milk  · 5 to 6 pieces of hard candy  · 1 tablespoon of honey  Check your blood sugar 15 minutes after treating yourself. If it is still below 70, take 15 to 20 more grams of fast-acting sugar. Test again in 15 minutes. If it returns to normal (70 or above), eat a snack or meal to keep your blood sugar in a safe range. If it stays low, call your doctor or go to an emergency room.  Follow-up care  Follow-up with your healthcare provider, or as advised. For more information about diabetes, visit the American Diabetes Association website at www.diabetes.org or call 680-382-2026.  When to seek medical advice  Call your healthcare provider right away if you have any of these symptoms of high blood sugar:  · Frequent urination  · Dizziness  · Drowsiness  · Thirst  · Headache  · Nausea or vomiting  · Abdominal pain  · Eyesight changes  · Fast breathing  · Confusion or loss of consciousness  Also call your provider right away if you have any of these signs of low blood sugar:  · Fatigue  · Headache  · Shakes  · Excess sweating  · Hunger  · Feeling anxious or restless  · Eyesight changes  · Drowsiness  · Weakness  · Confusion or loss of consciousness  Call 911  Call for emergency help right away if any of these occur:  · Chest pain or shortness of breath  · Dizziness or  fainting  · Weakness of an arm or leg or one side of the face  · Trouble speaking or seeing   Date Last Reviewed: 6/1/2016  © 1440-7887 [employer]. [address] [place] [address], [place] [address]. All rights reserved. This information is not intended as a substitute for professional medical care. Always follow your healthcare professional's instructions.            Walking for Fitness  Fitness walking has something for everyone, even people who are already fit. Walking is one of the safest ways to condition your body aerobically. It can boost energy, help you lose weight, and reduce stress.    Physical benefits  · Walking strengthens your heart and lungs, and tones your muscles.  · When walking, your feet land with less impact than in other sports. This reduces chances of muscle, bone, and joint injury.  · Regular walking improves your cholesterol levels and lowers your risk of heart disease. And it helps you control your blood sugar if you have diabetes.  · Walking is a weight-bearing activity, which helps maintain bone density. This can help prevent osteoporosis.  Personal rewards  · Taking walks can help you relax and manage stress. And fitness walking may make you feel better about yourself.  · Walking can help you sleep better at night and make you less likely to be depressed.  · Regular walking may help maintain your memory as you get older.  · Walking is a great way to spend extra time with friends and family members. Be sure to invite your dog along!  Q&A about fitness walking  Q: Will walking keep me fit?  A: Yes. Regular walking at the right pace gives you all the benefits of other aerobic activities, such as jogging and swimming.  Q: Will walking help me lose weight and keep it off?  A: Yes. Per mile, walking can burn as many calories as jogging. Your health care provider can help work walking into your weight-loss plan.  Q: Is walking safe for my health?  A: Yes. Walking is safe if you have high  blood pressure, diabetes, heart disease, or other conditions. Talk to your healthcare provider before you start.  Date Last Reviewed: 4/1/2017 © 2000-2017 [employer]. [address] [place] [address], [place] [address]. All rights reserved. This information is not intended as a substitute for professional medical care. Always follow your healthcare professional's instructions.            Established High Blood Pressure    High blood pressure (hypertension) is a chronic disease. Often, healthcare providers dont know what causes it. But it can be caused by certain health conditions and medicines.  If you have high blood pressure, you may not have any symptoms. If you do have symptoms, they may include headache, dizziness, changes in your vision, chest pain, and shortness of breath. But even without symptoms, high blood pressure thats not treated raises your risk for heart attack and stroke. High blood pressure is a serious health risk and shouldnt be ignored.  A blood pressure reading is made up of two numbers: a higher number over a lower number. The top number is the systolic pressure. The bottom number is the diastolic pressure. A normal blood pressure is a systolic pressure of  less than 120 over a diastolic pressure of less than 80. You will see your blood pressure readings written together. For example, a person with a systolic pressure of 188 and a diastolic pressure of 78 will have 118/78 written in the medical record.  High blood pressure is when either the top number is 140 or higher, or the bottom number is 90 or higher. This must be the result when taking your blood pressure a number of times. The blood pressures between normal and high are called prehypertension.  Home care  If you have high blood pressure, you should do what is listed below to lower your blood pressure. If you are taking medicines for high blood pressure, these methods may reduce or end your need for medicines in the  future.  · Begin a weight-loss program if you are overweight.  · Cut back on how much salt you get in your diet. Heres how to do this:  ¨ Dont eat foods that have a lot of salt. These include olives, pickles, smoked meats, and salted potato chips.  ¨ Dont add salt to your food at the table.  ¨ Use only small amounts of salt when cooking.  · Start an exercise program. Talk with your healthcare provider about the type of exercise program that would be best for you. It doesn't have to be hard. Even brisk walking for 20 minutes 3 times a week is a good form of exercise.  · Dont take medicines that stimulate the heart. This includes many over-the-counter cold and sinus decongestant pills and sprays, as well as diet pills. Check the warnings about hypertension on the label. Before buying any over-the-counter medicines or supplements, always ask the pharmacist about the product's potential interaction with your high blood pressure and your high blood pressure medicines.  · Stimulants such as amphetamine or cocaine could be deadly for someone with high blood pressure. Never take these.  · Limit how much caffeine you get in your diet. Switch to caffeine-free products.  · Stop smoking. If you are a long-time smoker, this can be hard. Talk to your healthcare provider about medicines and nicotine replacement options to help you. Also, enroll in a stop-smoking program to make it more likely that you will quit for good.  · Learn how to handle stress. This is an important part of any program to lower blood pressure. Learn about relaxation methods like meditation, yoga, or biofeedback.  · If your provider prescribed medicines, take them exactly as directed. Missing doses may cause your blood pressure get out of control.  · If you miss a dose or doses, check with your healthcare provider or pharmacist about what to do.  · Consider buying an automatic blood pressure machine. Ask your provider for a recommendation. You can get one  of these at most pharmacies.     The American Heart Association recommends the following guidelines for home blood pressure monitoring:  · Don't smoke or drink coffee for 30 minutes before taking your blood pressure.  · Go to the bathroom before the test.  · Relax for 5 minutes before taking the measurement.  · Sit with your back supported (don't sit on a couch or soft chair); keep your feet on the floor uncrossed. Place your arm on a solid flat surface (like a table) with the upper part of the arm at heart level. Place the middle of the cuff directly above the eye of the elbow. Check the monitor's instruction manual for an illustration.  · Take multiple readings. When you measure, take 2 to 3 readings one minute apart and record all of the results.  · Take your blood pressure at the same time every day, or as your healthcare provider recommends.  · Record the date, time, and blood pressure reading.  · Take the record with you to your next medical appointment. If your blood pressure monitor has a built-in memory, simply take the monitor with you to your next appointment.  · Call your provider if you have several high readings. Don't be frightened by a single high blood pressure reading, but if you get several high readings, check in with your healthcare provider.  · Note: When blood pressure reaches a systolic (top number) of 180 or higher OR diastolic (bottom number) of 110 or higher, seek emergency medical treatment.  Follow-up care  You will need to see your healthcare provider regularly. This is to check your blood pressure and to make changes to your medicines. Make a follow-up appointment as directed. Bring the record of your home blood pressure readings to the appointment.  When to seek medical advice  Call your healthcare provider right away if any of these occur:  · Blood pressure reaches a systolic (upper number) of 180 or higher OR a diastolic (bottom number) of 110 or higher  · Chest pain or shortness of  breath  · Severe headache  · Throbbing or rushing sound in the ears  · Nosebleed  · Sudden severe pain in your belly (abdomen)  · Extreme drowsiness, confusion, or fainting  · Dizziness or spinning sensation (vertigo)  · Weakness of an arm or leg or one side of the face  · You have problems speaking or seeing   Date Last Reviewed: 12/1/2016 © 2000-2017 [employer]. [address] [place] [address], [place] [address]. All rights reserved. This information is not intended as a substitute for professional medical care. Always follow your healthcare professional's instructions.            Weight Management: Getting Started  Healthy bodies come in all shapes and sizes. Not all bodies are made to be thin. For some people, a healthy weight is higher than the average weight listed on weight charts. Your healthcare provider can help you decide on a healthy weight for you.    Reasons to lose weight  Losing weight can help with some health problems, such as high blood pressure, heart disease, diabetes, sleep apnea, and arthritis. You may also feel more energy.  Set your long-term goal  Your goal doesn't even have to be a specific weight. You may decide on a fitness goal (such as being able to walk 10 miles a week), or a health goal (such as lowering your blood pressure). Choose a goal that is measurable and reasonable, so you know when you've reached it. A goal of reaching a BMI of less than 25 is not always reasonable (or possible).   Make an action plan  Habits dont change overnight. Setting your goals too high can leave you feeling discouraged if you cant reach them. Be realistic. Choose one or two small changes you can make now. Set an action plan for how you are going to make these changes. When you can stick to this plan, keep making a few more small changes. Taking small steps will help you stay on the path to success.  Track your progress  Write down your goals. Then, keep a daily record of your progress. Write  down what you eat and how active you are. This record lets you look back on how much youve done. It may also help when youre feeling frustrated. Reward yourself for success. Even if you dont reach every goal, give yourself credit for what you do get done.  Get support  Encouragement from others can help make losing weight easier. Ask your family members and friends for support. They may even want to join you. Also look to your healthcare provider, registered dietitian, and  for help. Your local hospital can give you more information about nutrition, exercise, and weight loss.  Date Last Reviewed: 1/31/2016  © 6150-8763 [employer], [employer]. [address] [place] [address], [place] [address]. All rights reserved. This information is not intended as a substitute for professional medical care. Always follow your healthcare professional's instructions.          Metanx  -methylfolate and methyl B12

## 2019-09-17 PROBLEM — E78.00 HYPERCHOLESTEROLEMIA: Status: RESOLVED | Noted: 2018-10-08 | Resolved: 2019-09-17

## 2019-09-17 PROBLEM — E11.9 DM TYPE 2 (DIABETES MELLITUS, TYPE 2): Status: RESOLVED | Noted: 2018-03-20 | Resolved: 2019-09-17

## 2019-10-18 DIAGNOSIS — E11.9 TYPE 2 DIABETES MELLITUS WITHOUT COMPLICATION: ICD-10-CM

## 2019-10-19 ENCOUNTER — PATIENT MESSAGE (OUTPATIENT)
Dept: ENDOCRINOLOGY | Facility: CLINIC | Age: 54
End: 2019-10-19

## 2019-10-21 DIAGNOSIS — E11.40 TYPE 2 DIABETES MELLITUS WITH DIABETIC NEUROPATHY, WITHOUT LONG-TERM CURRENT USE OF INSULIN: ICD-10-CM

## 2019-10-30 ENCOUNTER — LAB VISIT (OUTPATIENT)
Dept: LAB | Facility: HOSPITAL | Age: 54
End: 2019-10-30
Attending: NURSE PRACTITIONER
Payer: COMMERCIAL

## 2019-10-30 ENCOUNTER — OFFICE VISIT (OUTPATIENT)
Dept: ENDOCRINOLOGY | Facility: CLINIC | Age: 54
End: 2019-10-30
Payer: COMMERCIAL

## 2019-10-30 VITALS
WEIGHT: 277 LBS | DIASTOLIC BLOOD PRESSURE: 82 MMHG | HEART RATE: 97 BPM | TEMPERATURE: 98 F | HEIGHT: 73 IN | BODY MASS INDEX: 36.71 KG/M2 | SYSTOLIC BLOOD PRESSURE: 140 MMHG

## 2019-10-30 DIAGNOSIS — I15.2 HYPERTENSION ASSOCIATED WITH DIABETES: ICD-10-CM

## 2019-10-30 DIAGNOSIS — E11.00 TYPE 2 DIABETES MELLITUS WITH HYPEROSMOLARITY WITHOUT COMA, WITHOUT LONG-TERM CURRENT USE OF INSULIN: ICD-10-CM

## 2019-10-30 DIAGNOSIS — E11.69 HYPERLIPIDEMIA ASSOCIATED WITH TYPE 2 DIABETES MELLITUS: ICD-10-CM

## 2019-10-30 DIAGNOSIS — E55.9 VITAMIN D DEFICIENCY: ICD-10-CM

## 2019-10-30 DIAGNOSIS — E11.42 DIABETIC POLYNEUROPATHY ASSOCIATED WITH TYPE 2 DIABETES MELLITUS: ICD-10-CM

## 2019-10-30 DIAGNOSIS — E66.9 OBESITY (BMI 30-39.9): ICD-10-CM

## 2019-10-30 DIAGNOSIS — E11.00 TYPE 2 DIABETES MELLITUS WITH HYPEROSMOLARITY WITHOUT COMA, WITHOUT LONG-TERM CURRENT USE OF INSULIN: Primary | ICD-10-CM

## 2019-10-30 DIAGNOSIS — E78.5 HYPERLIPIDEMIA ASSOCIATED WITH TYPE 2 DIABETES MELLITUS: ICD-10-CM

## 2019-10-30 DIAGNOSIS — E11.59 HYPERTENSION ASSOCIATED WITH DIABETES: ICD-10-CM

## 2019-10-30 DIAGNOSIS — E11.22 TYPE 2 DIABETES MELLITUS WITH STAGE 3 CHRONIC KIDNEY DISEASE, WITHOUT LONG-TERM CURRENT USE OF INSULIN: ICD-10-CM

## 2019-10-30 DIAGNOSIS — G47.33 OSA ON CPAP: ICD-10-CM

## 2019-10-30 DIAGNOSIS — N18.30 TYPE 2 DIABETES MELLITUS WITH STAGE 3 CHRONIC KIDNEY DISEASE, WITHOUT LONG-TERM CURRENT USE OF INSULIN: ICD-10-CM

## 2019-10-30 PROBLEM — R35.89 POLYURIA: Status: RESOLVED | Noted: 2019-07-28 | Resolved: 2019-10-30

## 2019-10-30 PROBLEM — J30.9 ALLERGIC RHINITIS: Status: RESOLVED | Noted: 2018-03-20 | Resolved: 2019-10-30

## 2019-10-30 PROBLEM — G89.29 CHRONIC PAIN: Status: RESOLVED | Noted: 2019-06-17 | Resolved: 2019-10-30

## 2019-10-30 PROBLEM — G62.9 NEUROPATHY: Status: RESOLVED | Noted: 2018-09-05 | Resolved: 2019-10-30

## 2019-10-30 LAB
ALBUMIN SERPL BCP-MCNC: 4.2 G/DL (ref 3.5–5.2)
ALP SERPL-CCNC: 75 U/L (ref 55–135)
ALT SERPL W/O P-5'-P-CCNC: 24 U/L (ref 10–44)
ANION GAP SERPL CALC-SCNC: 9 MMOL/L (ref 8–16)
AST SERPL-CCNC: 18 U/L (ref 10–40)
BILIRUB SERPL-MCNC: 0.3 MG/DL (ref 0.1–1)
BUN SERPL-MCNC: 23 MG/DL (ref 6–20)
CALCIUM SERPL-MCNC: 9.5 MG/DL (ref 8.7–10.5)
CHLORIDE SERPL-SCNC: 105 MMOL/L (ref 95–110)
CO2 SERPL-SCNC: 27 MMOL/L (ref 23–29)
CREAT SERPL-MCNC: 1.5 MG/DL (ref 0.5–1.4)
EST. GFR  (AFRICAN AMERICAN): 59.3 ML/MIN/1.73 M^2
EST. GFR  (NON AFRICAN AMERICAN): 51.3 ML/MIN/1.73 M^2
ESTIMATED AVG GLUCOSE: 174 MG/DL (ref 68–131)
GLUCOSE SERPL-MCNC: 158 MG/DL (ref 70–110)
HBA1C MFR BLD HPLC: 7.7 % (ref 4–5.6)
POTASSIUM SERPL-SCNC: 4.3 MMOL/L (ref 3.5–5.1)
PROT SERPL-MCNC: 7.1 G/DL (ref 6–8.4)
SODIUM SERPL-SCNC: 141 MMOL/L (ref 136–145)

## 2019-10-30 PROCEDURE — 99214 OFFICE O/P EST MOD 30 MIN: CPT | Mod: S$GLB,,, | Performed by: NURSE PRACTITIONER

## 2019-10-30 PROCEDURE — 99214 PR OFFICE/OUTPT VISIT, EST, LEVL IV, 30-39 MIN: ICD-10-PCS | Mod: S$GLB,,, | Performed by: NURSE PRACTITIONER

## 2019-10-30 PROCEDURE — 99999 PR PBB SHADOW E&M-EST. PATIENT-LVL IV: CPT | Mod: PBBFAC,,, | Performed by: NURSE PRACTITIONER

## 2019-10-30 PROCEDURE — 80053 COMPREHEN METABOLIC PANEL: CPT

## 2019-10-30 PROCEDURE — 36415 COLL VENOUS BLD VENIPUNCTURE: CPT | Mod: PO

## 2019-10-30 PROCEDURE — 99999 PR PBB SHADOW E&M-EST. PATIENT-LVL IV: ICD-10-PCS | Mod: PBBFAC,,, | Performed by: NURSE PRACTITIONER

## 2019-10-30 PROCEDURE — 83036 HEMOGLOBIN GLYCOSYLATED A1C: CPT

## 2019-10-30 NOTE — LETTER
November 4, 2019      Nuria Man MD  [address]  [place] [address]           [facility]  [address]  [place] [address]  Phone: [phone]          Patient: Alfred Cuello   MR Number: [id number]   YOB: 1965   Date of Visit: 10/30/2019       Dear Dr. Nuria Man:    Thank you for referring Alfred Cuello to me for evaluation. Attached you will find relevant portions of my assessment and plan of care.    If you have questions, please do not hesitate to call me. I look forward to following Alfred Cuello along with you.    Sincerely,    Ariadna Light, KEELEY, NP    Enclosure  CC:  No Recipients    If you would like to receive this communication electronically, please contact [email or web address] or [phone] to request more information on [employer] Link access.    For providers and/or their staff who would like to refer a patient to [facility], please contact us through our one-stop-shop provider referral line, [facility], at [phone].    If you feel you have received this communication in error or would no longer like to receive these types of communications, please e-mail [email or web address]

## 2019-10-30 NOTE — PROGRESS NOTES
CC: This 54 y.o. male presents for management of Diabetes    and chronic conditions pending review including HTN, HLP, RAFAEL, vitamin d deficiency, obesity    HPI: He was diagnosed with T2DM in 2013. Has never been hospitalized r/t DM.  Family hx of DM: mother     monitoring BG at home:checks a few times a month  Fastin-200  hypoglycemia at home- none    Diet: Eats 3 Meals a day, snacks- carrots, crackers, apples and cheese.  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  Skin: Skin is intact and heals well, no rashes, no hair changes  Eyes: Denies visual disturbances  Resp: no SOB or BYRNE, no cough  Cardiac: No palpitations, chest pain, no edema   GI: No nausea or vomiting, diarrhea, constipation, or abdominal pain.  /GYN: No nocturia, burning or pain.   MS/Neuro: + numbness/ tingling in BLE; Gait steady, speech clear  Psych: Denies drug/ETOH abuse, no hx of depression.  Other systems: negative.    PE:  GENERAL: Well developed, well nourished.  PSYCH: AAOx3, appropriate mood and affect, pleasant expression, conversant, appears relaxed, well groomed.   EYES: Conjunctiva, corneas clear  NECK: Supple, trachea midline  CHEST: Resp even and unlabored, CTA bilateral.  CARDIAC: RRR, S1, S2 heard, no murmurs  ABDOMEN: Soft, non-tender, non-distended   VASCULAR: DP pulses +2/4 bilaterally, no edema.  NEURO: Gait steady  SKIN: Skin warm  and dry no acanthosis nigracans.  FOOT EXAMINATION: 10/30/19  No foot deformity, corns or callus formation, Onychomycosis, nails in good condition and well trimmed, no interspace maceration or ulceration noted.  Decreased hair growth present over toes/feet. Protective sensation decreased/ absent with 10 gram monofilament.  +2 dorsalis pedis and posterior pulses noted.      Lab Results   Component Value Date    MICALBCREAT 6.0 07/29/2019       Hemoglobin A1C   Date Value Ref Range Status   07/29/2019 8.5 (H) 4.0 - 5.6 % Final     Comment:     ADA Screening Guidelines:  5.7-6.4%  Consistent with prediabetes  >or=6.5%  Consistent with diabetes  High levels of fetal hemoglobin interfere with the HbA1C  assay. Heterozygous hemoglobin variants (HbS, HgC, etc)do  not significantly interfere with this assay.   However, presence of multiple variants may affect accuracy.     02/09/2019 7.6 (H) 4.0 - 5.6 % Final     Comment:     ADA Screening Guidelines:  5.7-6.4%  Consistent with prediabetes  >or=6.5%  Consistent with diabetes  High levels of fetal hemoglobin interfere with the HbA1C  assay. Heterozygous hemoglobin variants (HbS, HgC, etc)do  not significantly interfere with this assay.   However, presence of multiple variants may affect accuracy.     10/01/2018 8.8 (H) 4.0 - 5.6 % Final     Comment:     ADA Screening Guidelines:  5.7-6.4%  Consistent with prediabetes  >or=6.5%  Consistent with diabetes  High levels of fetal hemoglobin interfere with the HbA1C  assay. Heterozygous hemoglobin variants (HbS, HgC, etc)do  not significantly interfere with this assay.   However, presence of multiple variants may affect accuracy.          ASSESSMENT and PLAN:      1. T2DM with hyperglycemia, CKD , DM PN- labs today  No med changes today    discussed DM, progression of disease, long term complications, tx options.   Discussed A1c and BG goals.   Reviewed  hypoglycemia, s/s and appropriate tx.   Instructed to monitor BG and bring  meter/ log to every clinic visit.   - takes ASA, ACEi, statin    2. HTN - controlled, continue meds as previously prescribed and monitor.     3. HLP -  on statin therapy, LFTs WNL, check labs     4.  RAFAEL- wears cpap    5. Vitamin d deficiency - takes MVI    6. Obesity - Body mass index is 36.55 kg/m².       Follow-up: in 3 months with lab prior

## 2019-11-18 DIAGNOSIS — E11.40 TYPE 2 DIABETES MELLITUS WITH DIABETIC NEUROPATHY, WITHOUT LONG-TERM CURRENT USE OF INSULIN: ICD-10-CM

## 2019-11-18 RX ORDER — METFORMIN HYDROCHLORIDE 850 MG/1
TABLET ORAL
Qty: 270 TABLET | Refills: 3 | Status: SHIPPED | OUTPATIENT
Start: 2019-11-18 | End: 2020-10-15 | Stop reason: SDUPTHER

## 2019-12-26 ENCOUNTER — PATIENT MESSAGE (OUTPATIENT)
Dept: ENDOCRINOLOGY | Facility: CLINIC | Age: 54
End: 2019-12-26

## 2019-12-26 DIAGNOSIS — E11.42 DIABETIC POLYNEUROPATHY ASSOCIATED WITH TYPE 2 DIABETES MELLITUS: ICD-10-CM

## 2019-12-26 DIAGNOSIS — G62.9 NEUROPATHY: ICD-10-CM

## 2019-12-27 RX ORDER — PREGABALIN 50 MG/1
50 CAPSULE ORAL 3 TIMES DAILY
Qty: 270 CAPSULE | Refills: 3 | Status: SHIPPED | OUTPATIENT
Start: 2019-12-27 | End: 2020-01-13 | Stop reason: SDUPTHER

## 2020-01-04 DIAGNOSIS — I10 ESSENTIAL HYPERTENSION: ICD-10-CM

## 2020-01-06 NOTE — TELEPHONE ENCOUNTER
Please clarify: enalapril, tenoretic and lisinoprol HCt are all on meds list. Should not be taking all as they are duplicated.  Which ones is he taking?

## 2020-01-13 ENCOUNTER — PATIENT MESSAGE (OUTPATIENT)
Dept: FAMILY MEDICINE | Facility: CLINIC | Age: 55
End: 2020-01-13

## 2020-01-13 ENCOUNTER — PATIENT MESSAGE (OUTPATIENT)
Dept: ENDOCRINOLOGY | Facility: CLINIC | Age: 55
End: 2020-01-13

## 2020-01-13 DIAGNOSIS — E11.42 DIABETIC POLYNEUROPATHY ASSOCIATED WITH TYPE 2 DIABETES MELLITUS: ICD-10-CM

## 2020-01-13 DIAGNOSIS — I10 ESSENTIAL HYPERTENSION: ICD-10-CM

## 2020-01-13 DIAGNOSIS — G62.9 NEUROPATHY: ICD-10-CM

## 2020-01-13 RX ORDER — PREGABALIN 50 MG/1
CAPSULE ORAL
Qty: 270 CAPSULE | Refills: 3 | Status: SHIPPED | OUTPATIENT
Start: 2020-01-13 | End: 2020-07-13

## 2020-01-13 RX ORDER — PREGABALIN 50 MG/1
50 CAPSULE ORAL 3 TIMES DAILY
Qty: 270 CAPSULE | Refills: 3 | Status: SHIPPED | OUTPATIENT
Start: 2020-01-13 | End: 2020-04-12

## 2020-01-13 NOTE — TELEPHONE ENCOUNTER
----- Message from Paras Lawson sent at 1/13/2020  2:40 PM CST -----  Type:  RX Refill Request    Who Called: shelby Refill or New Rx:  Refill   RX Name and Strength: lisinopril hctz   How is the patient currently taking it? (ex. 1XDay):    Is this a 30 day or 90 day RX: 90 day supply  Preferred Pharmacy with phone number: [email or web address]  [phone] Local or Mail Order:  local  Ordering Provider:  Dr Man  [facility] Call Back Number:  [phone]  Additional Information:

## 2020-01-13 NOTE — TELEPHONE ENCOUNTER
Patient requesting a refill on lisinopril-HCTZ medication. Last refill 12-18-18. Last OV 9-16-19.

## 2020-01-14 RX ORDER — LISINOPRIL AND HYDROCHLOROTHIAZIDE 20; 25 MG/1; MG/1
1 TABLET ORAL DAILY
Qty: 90 TABLET | Refills: 3 | Status: SHIPPED | OUTPATIENT
Start: 2020-01-14 | End: 2020-10-15 | Stop reason: SDUPTHER

## 2020-01-14 RX ORDER — LISINOPRIL AND HYDROCHLOROTHIAZIDE 20; 25 MG/1; MG/1
1 TABLET ORAL DAILY
Qty: 90 TABLET | Refills: 3 | Status: CANCELLED | OUTPATIENT
Start: 2020-01-14 | End: 2021-01-13

## 2020-01-14 NOTE — TELEPHONE ENCOUNTER
Patient responded to a portal e-mail. He is no longer taking Enalapril or tenoretic.  Med list updated.     Lisinopril hct refill was approved in different encounter.

## 2020-01-14 NOTE — TELEPHONE ENCOUNTER
Please clarify-on enalipril, lisinopril hct and tenoretic on meds list. HCTZ and pril drugs are duplicated and should not be on both.

## 2020-02-03 ENCOUNTER — LAB VISIT (OUTPATIENT)
Dept: LAB | Facility: HOSPITAL | Age: 55
End: 2020-02-03
Attending: NURSE PRACTITIONER
Payer: COMMERCIAL

## 2020-02-03 ENCOUNTER — PATIENT OUTREACH (OUTPATIENT)
Dept: ADMINISTRATIVE | Facility: OTHER | Age: 55
End: 2020-02-03

## 2020-02-03 DIAGNOSIS — E11.00 TYPE 2 DIABETES MELLITUS WITH HYPEROSMOLARITY WITHOUT COMA, WITHOUT LONG-TERM CURRENT USE OF INSULIN: ICD-10-CM

## 2020-02-03 DIAGNOSIS — E55.9 VITAMIN D DEFICIENCY: ICD-10-CM

## 2020-02-03 LAB
25(OH)D3+25(OH)D2 SERPL-MCNC: 33 NG/ML (ref 30–96)
ALBUMIN SERPL BCP-MCNC: 4.3 G/DL (ref 3.5–5.2)
ALP SERPL-CCNC: 90 U/L (ref 55–135)
ALT SERPL W/O P-5'-P-CCNC: 30 U/L (ref 10–44)
ANION GAP SERPL CALC-SCNC: 15 MMOL/L (ref 8–16)
AST SERPL-CCNC: 23 U/L (ref 10–40)
BILIRUB SERPL-MCNC: 0.6 MG/DL (ref 0.1–1)
BUN SERPL-MCNC: 23 MG/DL (ref 6–20)
CALCIUM SERPL-MCNC: 10 MG/DL (ref 8.7–10.5)
CHLORIDE SERPL-SCNC: 97 MMOL/L (ref 95–110)
CHOLEST SERPL-MCNC: 200 MG/DL (ref 120–199)
CHOLEST/HDLC SERPL: 5.4 {RATIO} (ref 2–5)
CO2 SERPL-SCNC: 28 MMOL/L (ref 23–29)
CREAT SERPL-MCNC: 1.4 MG/DL (ref 0.5–1.4)
EST. GFR  (AFRICAN AMERICAN): >60 ML/MIN/1.73 M^2
EST. GFR  (NON AFRICAN AMERICAN): 55.8 ML/MIN/1.73 M^2
ESTIMATED AVG GLUCOSE: 186 MG/DL (ref 68–131)
GLUCOSE SERPL-MCNC: 162 MG/DL (ref 70–110)
HBA1C MFR BLD HPLC: 8.1 % (ref 4–5.6)
HDLC SERPL-MCNC: 37 MG/DL (ref 40–75)
HDLC SERPL: 18.5 % (ref 20–50)
LDLC SERPL CALC-MCNC: 102 MG/DL (ref 63–159)
NONHDLC SERPL-MCNC: 163 MG/DL
POTASSIUM SERPL-SCNC: 4.3 MMOL/L (ref 3.5–5.1)
PROT SERPL-MCNC: 7.8 G/DL (ref 6–8.4)
SODIUM SERPL-SCNC: 140 MMOL/L (ref 136–145)
TRIGL SERPL-MCNC: 305 MG/DL (ref 30–150)
TSH SERPL DL<=0.005 MIU/L-ACNC: 0.76 UIU/ML (ref 0.4–4)

## 2020-02-03 PROCEDURE — 36415 COLL VENOUS BLD VENIPUNCTURE: CPT | Mod: PO

## 2020-02-03 PROCEDURE — 84443 ASSAY THYROID STIM HORMONE: CPT

## 2020-02-03 PROCEDURE — 80053 COMPREHEN METABOLIC PANEL: CPT

## 2020-02-03 PROCEDURE — 83036 HEMOGLOBIN GLYCOSYLATED A1C: CPT

## 2020-02-03 PROCEDURE — 80061 LIPID PANEL: CPT

## 2020-02-03 PROCEDURE — 82306 VITAMIN D 25 HYDROXY: CPT

## 2020-02-06 ENCOUNTER — OFFICE VISIT (OUTPATIENT)
Dept: ENDOCRINOLOGY | Facility: CLINIC | Age: 55
End: 2020-02-06
Payer: COMMERCIAL

## 2020-02-06 VITALS
HEART RATE: 103 BPM | TEMPERATURE: 98 F | SYSTOLIC BLOOD PRESSURE: 130 MMHG | HEIGHT: 73 IN | BODY MASS INDEX: 36.32 KG/M2 | DIASTOLIC BLOOD PRESSURE: 70 MMHG | WEIGHT: 274.06 LBS

## 2020-02-06 DIAGNOSIS — E11.42 DIABETIC POLYNEUROPATHY ASSOCIATED WITH TYPE 2 DIABETES MELLITUS: ICD-10-CM

## 2020-02-06 DIAGNOSIS — G47.33 OSA ON CPAP: ICD-10-CM

## 2020-02-06 DIAGNOSIS — E66.9 OBESITY (BMI 30-39.9): ICD-10-CM

## 2020-02-06 DIAGNOSIS — E78.5 HYPERLIPIDEMIA ASSOCIATED WITH TYPE 2 DIABETES MELLITUS: ICD-10-CM

## 2020-02-06 DIAGNOSIS — E11.69 HYPERLIPIDEMIA ASSOCIATED WITH TYPE 2 DIABETES MELLITUS: ICD-10-CM

## 2020-02-06 DIAGNOSIS — E11.59 HYPERTENSION ASSOCIATED WITH DIABETES: ICD-10-CM

## 2020-02-06 DIAGNOSIS — E11.65 TYPE 2 DIABETES MELLITUS WITH HYPERGLYCEMIA, WITHOUT LONG-TERM CURRENT USE OF INSULIN: ICD-10-CM

## 2020-02-06 DIAGNOSIS — N18.30 TYPE 2 DIABETES MELLITUS WITH STAGE 3 CHRONIC KIDNEY DISEASE, WITHOUT LONG-TERM CURRENT USE OF INSULIN: Primary | ICD-10-CM

## 2020-02-06 DIAGNOSIS — I15.2 HYPERTENSION ASSOCIATED WITH DIABETES: ICD-10-CM

## 2020-02-06 DIAGNOSIS — E11.22 TYPE 2 DIABETES MELLITUS WITH STAGE 3 CHRONIC KIDNEY DISEASE, WITHOUT LONG-TERM CURRENT USE OF INSULIN: Primary | ICD-10-CM

## 2020-02-06 PROCEDURE — 3052F HG A1C>EQUAL 8.0%<EQUAL 9.0%: CPT | Mod: CPTII,S$GLB,, | Performed by: NURSE PRACTITIONER

## 2020-02-06 PROCEDURE — 99999 PR PBB SHADOW E&M-EST. PATIENT-LVL IV: CPT | Mod: PBBFAC,,, | Performed by: NURSE PRACTITIONER

## 2020-02-06 PROCEDURE — 3052F PR MOST RECENT HEMOGLOBIN A1C LEVEL 8.0 - < 9.0%: ICD-10-PCS | Mod: CPTII,S$GLB,, | Performed by: NURSE PRACTITIONER

## 2020-02-06 PROCEDURE — 99214 OFFICE O/P EST MOD 30 MIN: CPT | Mod: S$GLB,,, | Performed by: NURSE PRACTITIONER

## 2020-02-06 PROCEDURE — 99999 PR PBB SHADOW E&M-EST. PATIENT-LVL IV: ICD-10-PCS | Mod: PBBFAC,,, | Performed by: NURSE PRACTITIONER

## 2020-02-06 PROCEDURE — 99214 PR OFFICE/OUTPT VISIT, EST, LEVL IV, 30-39 MIN: ICD-10-PCS | Mod: S$GLB,,, | Performed by: NURSE PRACTITIONER

## 2020-02-06 RX ORDER — ICOSAPENT ETHYL 1000 MG/1
2 CAPSULE ORAL 2 TIMES DAILY
Qty: 180 CAPSULE | Refills: 3 | Status: SHIPPED | OUTPATIENT
Start: 2020-02-06 | End: 2020-05-14 | Stop reason: SDUPTHER

## 2020-02-06 RX ORDER — PIOGLITAZONEHYDROCHLORIDE 15 MG/1
15 TABLET ORAL DAILY
Qty: 90 TABLET | Refills: 3 | Status: SHIPPED | OUTPATIENT
Start: 2020-02-06 | End: 2020-10-15

## 2020-02-06 NOTE — PROGRESS NOTES
CC: This 54 y.o. male presents for management of diabetes and chronic conditions pending review including HTN, HLP, RAFAEL, vitamin d deficiency, obesity    HPI: He was diagnosed with T2DM in 2013. Has never been hospitalized r/t DM.  Family hx of DM: mother     A1c increased since last viist   monitoring BG at home:  Fasting 120-150, 2 hr 150-170    hypoglycemia at home- none    Diet: Eats 3 Meals a day, snacks- carrots, crackers, apples and cheese.  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  Skin: Skin is intact and heals well, no rashes, no hair changes  Eyes: Denies visual disturbances  Resp: no SOB or BYRNE, no cough  Cardiac: No palpitations, chest pain, no edema   GI: No nausea or vomiting, diarrhea, constipation, or abdominal pain.  /GYN: No nocturia, burning or pain.   MS/Neuro: + numbness/ tingling in BLE; Gait steady, speech clear  Psych: Denies drug/ETOH abuse, no hx of depression.  Other systems: negative.    PE:  GENERAL: Well developed, well nourished.  PSYCH: AAOx3, appropriate mood and affect, pleasant expression, conversant, appears relaxed, well groomed.   EYES: Conjunctiva, corneas clear  NECK: Supple, trachea midline  CHEST: Resp even and unlabored, CTA bilateral.  CARDIAC: RRR, S1, S2 heard, no murmurs  ABDOMEN: Soft, non-tender, non-distended   VASCULAR: DP  pulses +2/4 bilaterally, no edema.  NEURO: Gait steady  SKIN: Skin warm and dry no acanthosis nigracans.  FOOT EXAMINATION: 10/30/19  No foot deformity, corns or callus formation, Onychomycosis, nails in good condition and well trimmed, no interspace maceration or ulceration noted.  Decreased hair growth present over toes/feet. Protective sensation decreased/ absent with 10 gram monofilament.  +2 dorsalis pedis and posterior pulses noted.      Lab Results   Component Value Date    MICALBCREAT 6.0 07/29/2019       Hemoglobin A1C   Date Value Ref Range Status   02/03/2020 8.1 (H) 4.0 - 5.6 % Final     Comment:     ADA Screening Guidelines:  5.7-6.4%  Consistent with prediabetes  >or=6.5%  Consistent with diabetes  High levels of fetal hemoglobin interfere with the HbA1C  assay. Heterozygous hemoglobin variants (HbS, HgC, etc)do  not significantly interfere with this assay.   However, presence of multiple variants may affect accuracy.     10/30/2019 7.7 (H) 4.0 - 5.6 % Final     Comment:     ADA Screening Guidelines:  5.7-6.4%  Consistent with prediabetes  >or=6.5%  Consistent with diabetes  High levels of fetal hemoglobin interfere with the HbA1C  assay. Heterozygous hemoglobin variants (HbS, HgC, etc)do  not significantly interfere with this assay.   However, presence of multiple variants may affect accuracy.     07/29/2019 8.5 (H) 4.0 - 5.6 % Final     Comment:     ADA Screening Guidelines:  5.7-6.4%  Consistent with prediabetes  >or=6.5%  Consistent with diabetes  High levels of fetal hemoglobin interfere with the HbA1C  assay. Heterozygous hemoglobin variants (HbS, HgC, etc)do  not significantly interfere with this assay.   However, presence of multiple variants may affect accuracy.          ASSESSMENT and PLAN:      1. T2DM with hyperglycemia, CKD , DM PN-   Start actos 15 mg qd  Discussed DM, progression of disease, long term complications, tx options.   Discussed A1c and BG goals. t.   - takes   ACEi,  statin    2. HTN - controlled, continue meds as previously prescribed and monitor. He is descrbing orthostatic hypotension changes when getting up quickly, has appt w PCP      3. HLP -  on statin therapy, LFTs WNL, Start vascepa 1 g bid    4.  RAFAEL- wears cpap    5. Vitamin d deficiency - takes MVI    6. Obesity - Body mass index is 36.15 kg/m². plans on starting exercise        Follow-up: in 3 months with lab prior

## 2020-02-18 DIAGNOSIS — E11.42 DIABETIC POLYNEUROPATHY ASSOCIATED WITH TYPE 2 DIABETES MELLITUS: Primary | ICD-10-CM

## 2020-02-29 DIAGNOSIS — E11.42 DIABETIC POLYNEUROPATHY ASSOCIATED WITH TYPE 2 DIABETES MELLITUS: ICD-10-CM

## 2020-02-29 RX ORDER — GABAPENTIN 300 MG/1
CAPSULE ORAL
Qty: 1080 CAPSULE | Refills: 3 | Status: SHIPPED | OUTPATIENT
Start: 2020-02-29 | End: 2020-03-02 | Stop reason: SDUPTHER

## 2020-03-02 ENCOUNTER — PATIENT OUTREACH (OUTPATIENT)
Dept: ADMINISTRATIVE | Facility: HOSPITAL | Age: 55
End: 2020-03-02

## 2020-03-02 DIAGNOSIS — E11.42 DIABETIC POLYNEUROPATHY ASSOCIATED WITH TYPE 2 DIABETES MELLITUS: ICD-10-CM

## 2020-03-02 RX ORDER — GABAPENTIN 300 MG/1
900 CAPSULE ORAL 4 TIMES DAILY
Qty: 1080 CAPSULE | Refills: 3 | Status: SHIPPED | OUTPATIENT
Start: 2020-03-02 | End: 2020-12-01 | Stop reason: SDUPTHER

## 2020-03-02 NOTE — PROGRESS NOTES
Chart review completed 03/02/2020.  Care Everywhere updates requested and reviewed.  Immunizations reconciled. Media reviewed.     A1C- REFERRAL TO DM EDU HAS BEEN PLACED, PENDING. PT IS FOLLOWED BY ENDOCRINE. PCP F/U SCHEDULED.     Health Maintenance Due   Topic Date Due    HIV Screening  03/16/1980    Pneumococcal Vaccine (Highest Risk) (2 of 3 - PCV13) 10/31/2019

## 2020-03-02 NOTE — TELEPHONE ENCOUNTER
----- Message from Valentina  sent at 3/2/2020  8:45 AM CST -----  Contact: wife  Type: Needs Medical Advice    Who Called:      Best Call Back Number:     Additional Information: Requesting a call back from Nurse, regarding Rx gabapentin (NEURONTIN) 300 MG capsule 1080 capsule ,pharmacy stated they can not fill because Rx is wrong ,please call back with advice

## 2020-03-27 ENCOUNTER — PATIENT OUTREACH (OUTPATIENT)
Dept: ADMINISTRATIVE | Facility: HOSPITAL | Age: 55
End: 2020-03-27

## 2020-03-27 NOTE — PROGRESS NOTES
Chart review completed 03/27/2020.  Care Everywhere updates requested and reviewed.  Immunizations reconciled. Media reviewed.

## 2020-05-05 ENCOUNTER — PATIENT MESSAGE (OUTPATIENT)
Dept: ADMINISTRATIVE | Facility: HOSPITAL | Age: 55
End: 2020-05-05

## 2020-05-09 ENCOUNTER — LAB VISIT (OUTPATIENT)
Dept: LAB | Facility: HOSPITAL | Age: 55
End: 2020-05-09
Attending: NURSE PRACTITIONER
Payer: COMMERCIAL

## 2020-05-09 DIAGNOSIS — E11.69 HYPERLIPIDEMIA ASSOCIATED WITH TYPE 2 DIABETES MELLITUS: ICD-10-CM

## 2020-05-09 DIAGNOSIS — E11.22 TYPE 2 DIABETES MELLITUS WITH STAGE 3 CHRONIC KIDNEY DISEASE, WITHOUT LONG-TERM CURRENT USE OF INSULIN: ICD-10-CM

## 2020-05-09 DIAGNOSIS — N18.30 TYPE 2 DIABETES MELLITUS WITH STAGE 3 CHRONIC KIDNEY DISEASE, WITHOUT LONG-TERM CURRENT USE OF INSULIN: ICD-10-CM

## 2020-05-09 DIAGNOSIS — E78.5 HYPERLIPIDEMIA ASSOCIATED WITH TYPE 2 DIABETES MELLITUS: ICD-10-CM

## 2020-05-09 LAB
ALBUMIN SERPL BCP-MCNC: 4.2 G/DL (ref 3.5–5.2)
ALP SERPL-CCNC: 78 U/L (ref 55–135)
ALT SERPL W/O P-5'-P-CCNC: 24 U/L (ref 10–44)
ANION GAP SERPL CALC-SCNC: 12 MMOL/L (ref 8–16)
AST SERPL-CCNC: 20 U/L (ref 10–40)
BILIRUB SERPL-MCNC: 0.6 MG/DL (ref 0.1–1)
BUN SERPL-MCNC: 23 MG/DL (ref 6–20)
CALCIUM SERPL-MCNC: 10 MG/DL (ref 8.7–10.5)
CHLORIDE SERPL-SCNC: 96 MMOL/L (ref 95–110)
CHOLEST SERPL-MCNC: 162 MG/DL (ref 120–199)
CHOLEST/HDLC SERPL: 4.8 {RATIO} (ref 2–5)
CO2 SERPL-SCNC: 28 MMOL/L (ref 23–29)
CREAT SERPL-MCNC: 1.3 MG/DL (ref 0.5–1.4)
EST. GFR  (AFRICAN AMERICAN): >60 ML/MIN/1.73 M^2
EST. GFR  (NON AFRICAN AMERICAN): >60 ML/MIN/1.73 M^2
ESTIMATED AVG GLUCOSE: 157 MG/DL (ref 68–131)
GLUCOSE SERPL-MCNC: 141 MG/DL (ref 70–110)
HBA1C MFR BLD HPLC: 7.1 % (ref 4–5.6)
HDLC SERPL-MCNC: 34 MG/DL (ref 40–75)
HDLC SERPL: 21 % (ref 20–50)
LDLC SERPL CALC-MCNC: 89.2 MG/DL (ref 63–159)
NONHDLC SERPL-MCNC: 128 MG/DL
POTASSIUM SERPL-SCNC: 4 MMOL/L (ref 3.5–5.1)
PROT SERPL-MCNC: 7.7 G/DL (ref 6–8.4)
SODIUM SERPL-SCNC: 136 MMOL/L (ref 136–145)
TRIGL SERPL-MCNC: 194 MG/DL (ref 30–150)

## 2020-05-09 PROCEDURE — 80061 LIPID PANEL: CPT

## 2020-05-09 PROCEDURE — 80053 COMPREHEN METABOLIC PANEL: CPT

## 2020-05-09 PROCEDURE — 83036 HEMOGLOBIN GLYCOSYLATED A1C: CPT

## 2020-05-09 PROCEDURE — 36415 COLL VENOUS BLD VENIPUNCTURE: CPT | Mod: PO

## 2020-05-13 ENCOUNTER — PATIENT OUTREACH (OUTPATIENT)
Dept: ADMINISTRATIVE | Facility: OTHER | Age: 55
End: 2020-05-13

## 2020-05-14 ENCOUNTER — OFFICE VISIT (OUTPATIENT)
Dept: ENDOCRINOLOGY | Facility: CLINIC | Age: 55
End: 2020-05-14
Payer: COMMERCIAL

## 2020-05-14 VITALS
BODY MASS INDEX: 34.13 KG/M2 | DIASTOLIC BLOOD PRESSURE: 90 MMHG | TEMPERATURE: 97 F | HEART RATE: 88 BPM | WEIGHT: 257.5 LBS | HEIGHT: 73 IN | SYSTOLIC BLOOD PRESSURE: 132 MMHG

## 2020-05-14 DIAGNOSIS — E11.69 HYPERLIPIDEMIA ASSOCIATED WITH TYPE 2 DIABETES MELLITUS: ICD-10-CM

## 2020-05-14 DIAGNOSIS — N18.30 TYPE 2 DIABETES MELLITUS WITH STAGE 3 CHRONIC KIDNEY DISEASE, WITHOUT LONG-TERM CURRENT USE OF INSULIN: Primary | ICD-10-CM

## 2020-05-14 DIAGNOSIS — E66.9 OBESITY (BMI 30-39.9): ICD-10-CM

## 2020-05-14 DIAGNOSIS — E55.9 VITAMIN D DEFICIENCY: ICD-10-CM

## 2020-05-14 DIAGNOSIS — E11.40 TYPE 2 DIABETES MELLITUS WITH DIABETIC NEUROPATHY, WITHOUT LONG-TERM CURRENT USE OF INSULIN: ICD-10-CM

## 2020-05-14 DIAGNOSIS — E11.42 DIABETIC POLYNEUROPATHY ASSOCIATED WITH TYPE 2 DIABETES MELLITUS: ICD-10-CM

## 2020-05-14 DIAGNOSIS — E11.22 TYPE 2 DIABETES MELLITUS WITH STAGE 3 CHRONIC KIDNEY DISEASE, WITHOUT LONG-TERM CURRENT USE OF INSULIN: Primary | ICD-10-CM

## 2020-05-14 DIAGNOSIS — E11.65 TYPE 2 DIABETES MELLITUS WITH HYPERGLYCEMIA, WITHOUT LONG-TERM CURRENT USE OF INSULIN: ICD-10-CM

## 2020-05-14 DIAGNOSIS — E78.5 HYPERLIPIDEMIA ASSOCIATED WITH TYPE 2 DIABETES MELLITUS: ICD-10-CM

## 2020-05-14 DIAGNOSIS — G47.33 OSA ON CPAP: ICD-10-CM

## 2020-05-14 DIAGNOSIS — I15.2 HYPERTENSION ASSOCIATED WITH DIABETES: ICD-10-CM

## 2020-05-14 DIAGNOSIS — E11.59 HYPERTENSION ASSOCIATED WITH DIABETES: ICD-10-CM

## 2020-05-14 PROCEDURE — 3051F HG A1C>EQUAL 7.0%<8.0%: CPT | Mod: CPTII,S$GLB,, | Performed by: NURSE PRACTITIONER

## 2020-05-14 PROCEDURE — 99999 PR PBB SHADOW E&M-EST. PATIENT-LVL IV: ICD-10-PCS | Mod: PBBFAC,,, | Performed by: NURSE PRACTITIONER

## 2020-05-14 PROCEDURE — 3051F PR MOST RECENT HEMOGLOBIN A1C LEVEL 7.0 - < 8.0%: ICD-10-PCS | Mod: CPTII,S$GLB,, | Performed by: NURSE PRACTITIONER

## 2020-05-14 PROCEDURE — 99999 PR PBB SHADOW E&M-EST. PATIENT-LVL IV: CPT | Mod: PBBFAC,,, | Performed by: NURSE PRACTITIONER

## 2020-05-14 PROCEDURE — 99213 OFFICE O/P EST LOW 20 MIN: CPT | Mod: S$GLB,,, | Performed by: NURSE PRACTITIONER

## 2020-05-14 PROCEDURE — 99213 PR OFFICE/OUTPT VISIT, EST, LEVL III, 20-29 MIN: ICD-10-PCS | Mod: S$GLB,,, | Performed by: NURSE PRACTITIONER

## 2020-05-14 RX ORDER — ATORVASTATIN CALCIUM 40 MG/1
40 TABLET, FILM COATED ORAL DAILY
Qty: 90 TABLET | Refills: 3 | Status: SHIPPED | OUTPATIENT
Start: 2020-05-14 | End: 2021-06-21

## 2020-05-14 RX ORDER — ICOSAPENT ETHYL 1000 MG/1
2 CAPSULE ORAL 2 TIMES DAILY
Qty: 180 CAPSULE | Refills: 3 | Status: SHIPPED | OUTPATIENT
Start: 2020-05-14 | End: 2020-10-15

## 2020-05-14 NOTE — PROGRESS NOTES
CC: This 55 y.o. male presents for management of diabetes and chronic conditions pending review including HTN, HLP, RAFAEL, vitamin d deficiency, obesity    HPI: He was diagnosed with T2DM in 2013. Has never been hospitalized r/t DM.  Family hx of DM: mother     No acute events since last visit  A1C much improved  Checking bg daily        hypoglycemia at home- none    Diet: Eats 3 Meals a day, snacks- carrots, crackers, apples and cheese.  Portion control, eating out less  Was off of work for the past month but back at now  BF-instant breakfast carnation  Snack 8-9am- crackers and cheese or fruit  LH- sandwich, fruit or cheese, tapioca or salad  Afternoon snack- lunch left overs- fruit or snack crackers  DN- wife cooks, grilled chicken, pork chops, steamed vegetables, rice or potatoes      Exercise: none   CURRENT DM MEDS:  metformin 850 mg TID, Ozempic 1 mg weekly Saturday, Jardiance 10 mg daily, glimepiride 4 mg qam, actos 15 mg qd  Glucometer type:  2019 [employer] brand     Standards of Care:  Eye exam: 9/2019 Dr Yost, No DR     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  /GYN: 1-2+ nocturia, no burning or pain.   MS/Neuro: + numbness/ tingling in BLE; Gait steady, speech clear  Psych: Denies drug/ETOH abuse, no hx of depression.  Other systems: negative.    PE:  GENERAL: Well developed, well nourished.  PSYCH: AAOx3, appropriate mood and affect, pleasant expression, conversant, appears relaxed, well groomed.   EYES: Conjunctiva, corneas clear  NECK: Supple, trachea midline  ABDOMEN: Soft, non-tender, non-distended   VASCULAR: DP pulses +2/4 bilaterally, no  edema.  NEURO: Gait steady  SKIN: Skin warm and dry no acanthosis nigracans.  FOOT EXAMINATION: 10/30/19  No foot deformity, corns or callus formation, Onychomycosis, nails in good condition and well trimmed, no interspace maceration or ulceration noted.  Decreased hair growth present over toes/feet. Protective sensation decreased/ absent with 10 gram monofilament.  +2 dorsalis pedis and posterior pulses noted.      Lab Results   Component Value Date    MICALBCREAT 11.5 05/09/2020       Hemoglobin A1C   Date Value Ref Range Status   05/09/2020 7.1 (H) 4.0 - 5.6 % Final     Comment:     ADA Screening Guidelines:  5.7-6.4%  Consistent with prediabetes  >or=6.5%  Consistent with diabetes  High levels of fetal hemoglobin interfere with the HbA1C  assay. Heterozygous hemoglobin variants (HbS, HgC, etc)do  not significantly interfere with this assay.   However, presence of multiple variants may affect accuracy.     02/03/2020 8.1 (H) 4.0 - 5.6 % Final     Comment:     ADA Screening Guidelines:  5.7-6.4%  Consistent with prediabetes  >or=6.5%  Consistent with diabetes  High levels of fetal hemoglobin interfere with the HbA1C  assay. Heterozygous hemoglobin variants (HbS, HgC, etc)do  not significantly interfere with this assay.   However, presence of multiple variants may affect accuracy.     10/30/2019 7.7 (H) 4.0 - 5.6 % Final     Comment:     ADA Screening Guidelines:  5.7-6.4%  Consistent with prediabetes  >or=6.5%  Consistent with diabetes  High levels of fetal hemoglobin interfere with the HbA1C  assay. Heterozygous hemoglobin variants (HbS, HgC, etc)do  not significantly interfere with this assay.   However, presence of multiple variants may affect accuracy.          ASSESSMENT and PLAN:      1. T2DM with hyperglycemia, CKD , DM PN-   No changes, continue current meds  Check bg qd, stagger time  Discussed A1c and BG goals. t.   - takes   ACEi, statin    2. HTN - controlled, continue meds as previously prescribed and  monitor. He is descrbing orthostatic hypotension changes when getting up quickly, has appt w PCP      3. HLP -  on statin therapy, LFTs WNL, resume vascepa 1 g bid    4.  RAFAEL- wears cpap    5. Vitamin d deficiency - takes MVI    6. Obesity -  Recommend exercise 30 minutes a day, 5 days a week plans on starting exercise. Continue weight loss!!       Follow-up: in 4 months with lab prior

## 2020-07-02 NOTE — PROGRESS NOTES
"Date: 19 October 2018  Study: A Post-Market, Multicenter, Prospective, Randomized, Crossover Clinical Trial Comparing 10 kHz Spinal Cord Stimulation (HF10 Therapy) Combined with Conventional Medical Management to Conventional Medical Management Alone in the Treatment of Chronic, Intractable, Neuropathic Limb Pain              IRB/Protocol #: 2017.196.B/MW0822-0   : Rahat Orantes MD  Site Number: 26          Subject Number:     Consent:    The informed consent form was discussed extensively with ample time provided for subjects questions and answers. An emphasis was placed on HIPPA practices, voluntary participation, and confidentiality language. The IRB was discussed with the subject and contact information was reviewed. Isadora Palma MD, was available to discuss the study indications, procedure(s), inclusion and exclusion criteria, alternative treatments, anticipated risks, expected/anticipated benefits, and to answer any additional questions if necessary. The subject was provided with adequate time to independently read and review the informed consent form. Subject expressed a clear understanding of the risks, benefits, indications, and alternatives to the investigational study and the associated procedure(s). All elements of the informed consent form, study requirements and expectations were reviewed with the patient and all concerns were identified and addressed. The subject was provided with a signed copy of the form to take home and study staff contact information was noted. No study related procedures were performed prior to subjects signing of the consent.        Eligibility Criteria:    Subject states he has experienced sharp, "stabbing" pains in both feet for the past 4 years, often preventing him from falling asleep. He confirms the use of a stable analgesic regimen for the past 30 days, which includes both cymbalta and gabapentin, and states willingness to remain on " these medications with no dose adjustments until activation of permanent spinal cord stimulator device (if randomized to [facility] group). He reports an average pain intensity of 8.25/10 cm on the VAS for his lower extremities and an average pain intensity of 0/10 cm in his upper extremities. Last hemoglobin A1c was 8.8%, collected 2 weeks ago. Lumbar MRI and psychology exam were ordered by Rahat Orantes MD, for confirmation of subject eligibility.     02-Jul-2020 11:47

## 2020-07-20 ENCOUNTER — OFFICE VISIT (OUTPATIENT)
Dept: PRIMARY CARE CLINIC | Facility: CLINIC | Age: 55
End: 2020-07-20
Payer: COMMERCIAL

## 2020-07-20 VITALS
OXYGEN SATURATION: 98 % | SYSTOLIC BLOOD PRESSURE: 135 MMHG | RESPIRATION RATE: 20 BRPM | DIASTOLIC BLOOD PRESSURE: 69 MMHG | TEMPERATURE: 98 F | HEART RATE: 108 BPM

## 2020-07-20 DIAGNOSIS — Z20.822 SUSPECTED COVID-19 VIRUS INFECTION: Primary | ICD-10-CM

## 2020-07-20 DIAGNOSIS — Z03.818 ENCOUNTER FOR OBSERVATION FOR SUSPECTED EXPOSURE TO OTHER BIOLOGICAL AGENTS RULED OUT: ICD-10-CM

## 2020-07-20 PROCEDURE — 99203 OFFICE O/P NEW LOW 30 MIN: CPT | Mod: S$GLB,,, | Performed by: NURSE PRACTITIONER

## 2020-07-20 PROCEDURE — U0003 INFECTIOUS AGENT DETECTION BY NUCLEIC ACID (DNA OR RNA); SEVERE ACUTE RESPIRATORY SYNDROME CORONAVIRUS 2 (SARS-COV-2) (CORONAVIRUS DISEASE [COVID-19]), AMPLIFIED PROBE TECHNIQUE, MAKING USE OF HIGH THROUGHPUT TECHNOLOGIES AS DESCRIBED BY CMS-2020-01-R: HCPCS

## 2020-07-20 PROCEDURE — 99203 PR OFFICE/OUTPT VISIT, NEW, LEVL III, 30-44 MIN: ICD-10-PCS | Mod: S$GLB,,, | Performed by: NURSE PRACTITIONER

## 2020-07-20 NOTE — PATIENT INSTRUCTIONS
Instructions for Patients with Confirmed or Suspected COVID-19    If you are awaiting your test result, you will either be called or it will be released to the patient portal.  If you have any questions about your test, please visit [email or web address] or call our COVID-19 information line at [phone].      Instructions for non-hospitalized or discharged patients with confirmed or suspected COVID-19:       Stay home except to get medical care.    Separate yourself from other people and animals in your home.    Call ahead before visiting your doctor.    Wear a face mask.    Cover your coughs and sneezes.    Clean your hands often.    Avoid sharing personal household items.    Clean all high-touch surfaces every day.    Monitor your symptoms. Seek prompt medical attention if your illness is worsening (e.g., difficulty breathing). Before seeking care, call your healthcare provider.    If you have a medical emergency and must call 911, notify the dispatcher that you have or are being evaluated for COVID-19. If possible, put on a face mask before emergency medical services arrive.    Use the following symptom-based strategy to return to normal activity following a suspected or confirmed case of COVID-19. Continue isolation until:   o At least 3 days (72 hours) have passed since recovery defined as resolution of fever without the use of fever-reducing medications and improvement in respiratory symptoms (e.g. cough, shortness of breath), and   o At least 10 days have passed since the first positive test.       As one of the next steps, you will receive a call or text from the [place] Department of Health ([facility] COVID-19 Tracing Team. See the contact information below so you know not to ignore the health departments call. It is important that you contact them back immediately so they can help.     Contact Tracer Number:  [phone]  Caller ID for most carriers: [place] [facility]    What is  contact tracing?   Contact tracing is a process that helps identify everyone who has been in close contact with an infected person. Contact tracers let those people know they may have been exposed and guide them on next steps. Confidentiality is important for everyone; no one will be told who may have exposed them to the virus.   Your involvement is important. The more we know about where and how this virus is spreading, the better chance we have at stopping it from spreading further.  What does exposure mean?   Exposure means you have been within 6 feet for more than 15 minutes with a person who has or had COVID-19.  What kind of questions do the contact tracers ask?   A contact tracer will confirm your basic contact information including name, address, phone number, and next of kin, as well as asking about any symptoms you may have had. Theyll also ask you how you think you may have gotten sick, such as places where you may have been exposed to the virus, and people you were with. Those names will never be shared with anyone outside of that call, and will only be used to help trace and stop the spread of the virus.   I have privacy concerns. How will the state use my information?   Your privacy about your health is important. All calls are completed using call centers that use the appropriate health privacy protection measures (HIPAA compliance), meaning that your patient information is safe. No one will ever ask you any questions related to immigration status. Your health comes first.   Do I have to participate?   You do not have to participate, but we strongly encourage you to. Contact tracing can help us catch and control new outbreaks as theyre developing to keep your friends and family safe.   What if I dont hear from anyone?   If you dont receive a call within 24 hours, you can call the number above right away to inquire about your status. That line is open from 8:00 am - 8:00 p.m., 7 days a  week.  Contact tracing saves lives! Together, we have the power to beat this virus and keep our loved ones and neighbors safe.       Instructions for household members, intimate partners and caregivers in a non-healthcare setting of a patient with confirmed or suspected COVID-19:         Close contacts should monitor their health and call their healthcare provider right away if they develop symptoms suggestive of COVID-19 (e.g., fever, cough, shortness of breath).    Stay home except to get medical care. Separate yourself from other people and animals in the home.   Monitor the patients symptoms. If the patient is getting sicker, call his or her healthcare provider. If the patient has a medical emergency and you need to call 911, notify the dispatch personnel that the patient has or is being evaluated for COVID-19.    Wear a facemask when around other people such as sharing a room or vehicle and before entering a healthcare provider's office.   Cover coughs and sneezes with a tissue. Throw used tissues in a lined trash can immediately and wash hands.   Clean hands often with soap and water for at least 20 seconds or with an alcohol-based hand , rubbing hands together until they feel dry. Avoid touching your eyes, nose, and mouth with unwashed hands.   Clean all high-touch; surfaces every day, including counters, tabletops, doorknobs, bathroom fixtures, toilets, phones, keyboards, tablets, bedside tables, etc. Use a household cleaning spray or wipe according to label instructions.   Avoid sharing personal household items such as dishes, drinking glasses, cups, towels, bedding, etc. After these items are used, they should be washed thoroughly with soap and water.   Continue isolation until:   At least 3 days (72 hours) have passed since recovery defined as resolution of fever without the use of fever-reducing medications and improvement in respiratory symptoms (e.g. cough, shortness of breath),  and    At least 10 days have passed since the patients first positive test.    https://www.cdc.gov/coronavirus/2019-ncov/your-health/index.htm

## 2020-07-20 NOTE — PROGRESS NOTES
Subjective:        Time seen by provider: 4:40 PM on 07/20/2020    Alfred Cuello is a 55 y.o. male with DM and HTN who presents for an evaluation of possible COVID-19. He complains of lethargy and HA. The patient states his symptoms began a couple of days ago and reports he works with the public but no positive exposure. He reports no improvement in his HA's with ibuprofen. No pertinent PSHx.     Review of Systems   Constitutional: Positive for fatigue. Negative for activity change, appetite change and fever.   HENT: Negative for congestion, rhinorrhea and sore throat.    Respiratory: Negative for cough, chest tightness, shortness of breath and wheezing.    Cardiovascular: Negative for chest pain and palpitations.   Gastrointestinal: Negative for diarrhea, nausea and vomiting.   Musculoskeletal: Negative for arthralgias and myalgias.   Skin: Negative for rash.   Neurological: Positive for headaches. Negative for weakness and light-headedness.       Objective:      Physical Exam  Vitals signs and nursing note reviewed.   Constitutional:       General: He is not in acute distress.     Appearance: He is well-developed. He is not diaphoretic.   HENT:      Head: Normocephalic and atraumatic.      Nose: Nose normal.   Eyes:      Conjunctiva/sclera: Conjunctivae normal.   Neck:      Musculoskeletal: Normal range of motion.   Cardiovascular:      Rate and Rhythm: Normal rate and regular rhythm.      Heart sounds: Normal heart sounds. No murmur.   Pulmonary:      Effort: Pulmonary effort is normal. No respiratory distress.      Breath sounds: Normal breath sounds. No wheezing.   Musculoskeletal: Normal range of motion.   Skin:     General: Skin is warm and dry.   Neurological:      Mental Status: He is alert and oriented to person, place, and time.         Assessment:       1. Suspected Covid-19 Virus Infection    2. Encounter for observation for suspected exposure to other biological agents ruled out        Plan:       1.  Suspected Covid-19 Virus Infection    2. Encounter for observation for suspected exposure to other biological agents ruled out    - COVID-19 Routine Screening    2. Discharge home and await results.   3. Return to clinic or ED for new or worsening symptoms.   4. Follow-up with PCP as needed.     Scribe Attestation:   I, Merissa Bruce, am scribing for, and in the presence of, JEFFERSON Hairston. I performed the above scribed service and the documentation accurately describes the services I performed. I attest to the accuracy of the note.    I, JEFFERSON Alvarez, personally performed the services described in this documentation. All medical record entries made by the scribe were at my direction and in my presence.  I have reviewed the chart and agree that the record reflects my personal performance and is accurate and complete. JEFFERSON Alvarez.  4:51 PM 07/20/2020

## 2020-07-21 LAB — SARS-COV-2 RNA RESP QL NAA+PROBE: NOT DETECTED

## 2020-09-25 ENCOUNTER — PATIENT MESSAGE (OUTPATIENT)
Dept: OTHER | Facility: OTHER | Age: 55
End: 2020-09-25

## 2020-10-07 ENCOUNTER — PATIENT OUTREACH (OUTPATIENT)
Dept: ADMINISTRATIVE | Facility: HOSPITAL | Age: 55
End: 2020-10-07

## 2020-10-07 NOTE — PROGRESS NOTES
LM on patient  VM asking patient to contact the clinic to schedule office visit for this year.  Portal message also sent to patient.

## 2020-10-10 ENCOUNTER — LAB VISIT (OUTPATIENT)
Dept: LAB | Facility: HOSPITAL | Age: 55
End: 2020-10-10
Attending: NURSE PRACTITIONER
Payer: COMMERCIAL

## 2020-10-10 DIAGNOSIS — E11.69 HYPERLIPIDEMIA ASSOCIATED WITH TYPE 2 DIABETES MELLITUS: ICD-10-CM

## 2020-10-10 DIAGNOSIS — E78.5 HYPERLIPIDEMIA ASSOCIATED WITH TYPE 2 DIABETES MELLITUS: ICD-10-CM

## 2020-10-10 DIAGNOSIS — E11.22 TYPE 2 DIABETES MELLITUS WITH STAGE 3 CHRONIC KIDNEY DISEASE, WITHOUT LONG-TERM CURRENT USE OF INSULIN: ICD-10-CM

## 2020-10-10 DIAGNOSIS — N18.30 TYPE 2 DIABETES MELLITUS WITH STAGE 3 CHRONIC KIDNEY DISEASE, WITHOUT LONG-TERM CURRENT USE OF INSULIN: ICD-10-CM

## 2020-10-10 LAB
ALBUMIN SERPL BCP-MCNC: 4.1 G/DL (ref 3.5–5.2)
ALP SERPL-CCNC: 93 U/L (ref 55–135)
ALT SERPL W/O P-5'-P-CCNC: 23 U/L (ref 10–44)
ANION GAP SERPL CALC-SCNC: 13 MMOL/L (ref 8–16)
AST SERPL-CCNC: 18 U/L (ref 10–40)
BILIRUB SERPL-MCNC: 0.5 MG/DL (ref 0.1–1)
BUN SERPL-MCNC: 23 MG/DL (ref 6–20)
CALCIUM SERPL-MCNC: 9.4 MG/DL (ref 8.7–10.5)
CHLORIDE SERPL-SCNC: 94 MMOL/L (ref 95–110)
CHOLEST SERPL-MCNC: 244 MG/DL (ref 120–199)
CHOLEST/HDLC SERPL: 7 {RATIO} (ref 2–5)
CO2 SERPL-SCNC: 30 MMOL/L (ref 23–29)
CREAT SERPL-MCNC: 1.3 MG/DL (ref 0.5–1.4)
EST. GFR  (AFRICAN AMERICAN): >60 ML/MIN/1.73 M^2
EST. GFR  (NON AFRICAN AMERICAN): >60 ML/MIN/1.73 M^2
ESTIMATED AVG GLUCOSE: 269 MG/DL (ref 68–131)
GLUCOSE SERPL-MCNC: 312 MG/DL (ref 70–110)
HBA1C MFR BLD HPLC: 11 % (ref 4–5.6)
HDLC SERPL-MCNC: 35 MG/DL (ref 40–75)
HDLC SERPL: 14.3 % (ref 20–50)
LDLC SERPL CALC-MCNC: ABNORMAL MG/DL (ref 63–159)
NONHDLC SERPL-MCNC: 209 MG/DL
POTASSIUM SERPL-SCNC: 4.3 MMOL/L (ref 3.5–5.1)
PROT SERPL-MCNC: 7.3 G/DL (ref 6–8.4)
SODIUM SERPL-SCNC: 137 MMOL/L (ref 136–145)
TRIGL SERPL-MCNC: 614 MG/DL (ref 30–150)

## 2020-10-10 PROCEDURE — 80061 LIPID PANEL: CPT

## 2020-10-10 PROCEDURE — 80053 COMPREHEN METABOLIC PANEL: CPT

## 2020-10-10 PROCEDURE — 83036 HEMOGLOBIN GLYCOSYLATED A1C: CPT

## 2020-10-10 PROCEDURE — 36415 COLL VENOUS BLD VENIPUNCTURE: CPT | Mod: PO

## 2020-10-13 ENCOUNTER — PATIENT OUTREACH (OUTPATIENT)
Dept: ADMINISTRATIVE | Facility: OTHER | Age: 55
End: 2020-10-13

## 2020-10-13 NOTE — PROGRESS NOTES
Chart was reviewed for overdue Proactive [facility] Encounters (RANDY)  topics  Updates were requested from care everywhere  Health Maintenance has been updated  LINKS immunization registry triggered

## 2020-10-15 ENCOUNTER — OFFICE VISIT (OUTPATIENT)
Dept: ENDOCRINOLOGY | Facility: CLINIC | Age: 55
End: 2020-10-15
Payer: COMMERCIAL

## 2020-10-15 VITALS
HEIGHT: 73 IN | TEMPERATURE: 98 F | OXYGEN SATURATION: 95 % | WEIGHT: 274.5 LBS | HEART RATE: 87 BPM | DIASTOLIC BLOOD PRESSURE: 70 MMHG | SYSTOLIC BLOOD PRESSURE: 100 MMHG | BODY MASS INDEX: 36.38 KG/M2

## 2020-10-15 DIAGNOSIS — G47.33 OSA ON CPAP: ICD-10-CM

## 2020-10-15 DIAGNOSIS — E55.9 VITAMIN D DEFICIENCY: ICD-10-CM

## 2020-10-15 DIAGNOSIS — E11.69 HYPERLIPIDEMIA ASSOCIATED WITH TYPE 2 DIABETES MELLITUS: ICD-10-CM

## 2020-10-15 DIAGNOSIS — E11.40 TYPE 2 DIABETES MELLITUS WITH DIABETIC NEUROPATHY, WITHOUT LONG-TERM CURRENT USE OF INSULIN: ICD-10-CM

## 2020-10-15 DIAGNOSIS — I10 ESSENTIAL HYPERTENSION: ICD-10-CM

## 2020-10-15 DIAGNOSIS — E78.5 HYPERLIPIDEMIA ASSOCIATED WITH TYPE 2 DIABETES MELLITUS: ICD-10-CM

## 2020-10-15 DIAGNOSIS — E11.65 TYPE 2 DIABETES MELLITUS WITH HYPERGLYCEMIA, WITHOUT LONG-TERM CURRENT USE OF INSULIN: ICD-10-CM

## 2020-10-15 DIAGNOSIS — E66.9 OBESITY (BMI 30-39.9): ICD-10-CM

## 2020-10-15 DIAGNOSIS — E11.42 DIABETIC POLYNEUROPATHY ASSOCIATED WITH TYPE 2 DIABETES MELLITUS: Primary | ICD-10-CM

## 2020-10-15 PROBLEM — N18.30 CKD (CHRONIC KIDNEY DISEASE) STAGE 3, GFR 30-59 ML/MIN: Status: RESOLVED | Noted: 2018-04-17 | Resolved: 2020-10-15

## 2020-10-15 PROCEDURE — 99214 PR OFFICE/OUTPT VISIT, EST, LEVL IV, 30-39 MIN: ICD-10-PCS | Mod: S$GLB,,, | Performed by: NURSE PRACTITIONER

## 2020-10-15 PROCEDURE — 99214 OFFICE O/P EST MOD 30 MIN: CPT | Mod: S$GLB,,, | Performed by: NURSE PRACTITIONER

## 2020-10-15 PROCEDURE — 99999 PR PBB SHADOW E&M-EST. PATIENT-LVL IV: CPT | Mod: PBBFAC,,, | Performed by: NURSE PRACTITIONER

## 2020-10-15 PROCEDURE — 99999 PR PBB SHADOW E&M-EST. PATIENT-LVL IV: ICD-10-PCS | Mod: PBBFAC,,, | Performed by: NURSE PRACTITIONER

## 2020-10-15 RX ORDER — METFORMIN HYDROCHLORIDE 850 MG/1
TABLET ORAL
Qty: 270 TABLET | Refills: 3 | Status: SHIPPED | OUTPATIENT
Start: 2020-10-15 | End: 2021-11-19 | Stop reason: SDUPTHER

## 2020-10-15 RX ORDER — LISINOPRIL AND HYDROCHLOROTHIAZIDE 20; 25 MG/1; MG/1
1 TABLET ORAL DAILY
Qty: 90 TABLET | Refills: 3 | Status: SHIPPED | OUTPATIENT
Start: 2020-10-15 | End: 2021-10-21

## 2020-10-15 RX ORDER — GLIMEPIRIDE 4 MG/1
TABLET ORAL
Qty: 180 TABLET | Refills: 4 | Status: SHIPPED | OUTPATIENT
Start: 2020-10-15 | End: 2021-11-01

## 2020-10-15 RX ORDER — PIOGLITAZONEHYDROCHLORIDE 30 MG/1
30 TABLET ORAL DAILY
Qty: 90 TABLET | Refills: 3 | Status: SHIPPED | OUTPATIENT
Start: 2020-10-15 | End: 2021-11-08

## 2020-10-15 NOTE — PROGRESS NOTES
CC: This 55 y.o. male presents for management of diabetes and chronic conditions pending review including HTN, HLP, RAFAEL, vitamin d deficiency, obesity    HPI: He was diagnosed with T2DM in 2013. Has never been hospitalized r/t DM.  Family hx of DM: mother    Having some depression and issues since last visit  Upset w Covid  Has new Insurance has a $2000 pharmacy deductible which he can not meet   He has not been checking his bg  hypoglycemia at home- none  Bg in office 306  Diet: Eats 3 Meals a day, snacks- carrots, crackers, apples and cheese.  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  /GYN: 1-2+ nocturia, no burning or pain.   MS/Neuro: + numbness/ tingling in BLE; Gait steady, speech clear  Psych: Denies drug/ETOH abuse, no hx of depression.  Other systems: negative.    PE:  GENERAL: Well developed, well nourished.  PSYCH: AAOx3, appropriate mood and affect, pleasant expression, conversant, appears relaxed, well groomed.   EYES: Conjunctiva, corneas clear  NECK: Supple, trachea midline  ABDOMEN: Soft, non-tender, non-distended   VASCULAR: DP pulses +2/4 bilaterally, no edema.  NEURO: Gait steady  SKIN: Skin warm and dry no acanthosis nigracans.  FOOT EXAMINATION: 10/15/2020  No foot deformity, corns or callus formation, Onychomycosis, nails in good condition and well trimmed, no interspace maceration or ulceration noted.  Decreased hair growth present over  toes/feet. Protective sensation decreased/ absent with 10 gram monofilament.  +2 dorsalis pedis and posterior pulses noted.    Lab Results   Component Value Date    MICALBCREAT 11.5 05/09/2020       Hemoglobin A1C   Date Value Ref Range Status   10/10/2020 11.0 (H) 4.0 - 5.6 % Final     Comment:     ADA Screening Guidelines:  5.7-6.4%  Consistent with prediabetes  >or=6.5%  Consistent with diabetes  High levels of fetal hemoglobin interfere with the HbA1C  assay. Heterozygous hemoglobin variants (HbS, HgC, etc)do  not significantly interfere with this assay.   However, presence of multiple variants may affect accuracy.     05/09/2020 7.1 (H) 4.0 - 5.6 % Final     Comment:     ADA Screening Guidelines:  5.7-6.4%  Consistent with prediabetes  >or=6.5%  Consistent with diabetes  High levels of fetal hemoglobin interfere with the HbA1C  assay. Heterozygous hemoglobin variants (HbS, HgC, etc)do  not significantly interfere with this assay.   However, presence of multiple variants may affect accuracy.     02/03/2020 8.1 (H) 4.0 - 5.6 % Final     Comment:     ADA Screening Guidelines:  5.7-6.4%  Consistent with prediabetes  >or=6.5%  Consistent with diabetes  High levels of fetal hemoglobin interfere with the HbA1C  assay. Heterozygous hemoglobin variants (HbS, HgC, etc)do  not significantly interfere with this assay.   However, presence of multiple variants may affect accuracy.          ASSESSMENT and PLAN:      1. T2DM with hyperglycemia, CKD , DM PN-   D/c ozempic and jardiance- can not afford  Start glimepiride 4 mg bid, Increase Actos to 30 mg qam, Continue metformin 850 mg TID  Check bg qd, stagger time  Discussed A1c and BG goals. t.   - takes   ACEi, statin    2. HTN - controlled, continue meds as previously prescribed and monitor.      3. HLP -  on statin therapy, LFTs WNL, resume vascepa 1 g bid    4.  RAFAEL- stopped wearing his cpap- long discussion on increased risk of death     5. Vitamin d deficiency - takes  MVI    6. Obesity -  Recommend exercise 30 minutes a day, 5 days a week plans on starting exercise. Resume weight loss!!  Body mass index is 36.21 kg/m².    Follow-up: in 3 months with lab prior

## 2020-10-20 ENCOUNTER — PATIENT MESSAGE (OUTPATIENT)
Dept: ADMINISTRATIVE | Facility: OTHER | Age: 55
End: 2020-10-20

## 2020-10-20 ENCOUNTER — PATIENT MESSAGE (OUTPATIENT)
Dept: FAMILY MEDICINE | Facility: CLINIC | Age: 55
End: 2020-10-20

## 2020-10-30 ENCOUNTER — OFFICE VISIT (OUTPATIENT)
Dept: FAMILY MEDICINE | Facility: CLINIC | Age: 55
End: 2020-10-30
Payer: COMMERCIAL

## 2020-10-30 ENCOUNTER — TELEPHONE (OUTPATIENT)
Dept: CARDIOLOGY | Facility: CLINIC | Age: 55
End: 2020-10-30

## 2020-10-30 VITALS
HEIGHT: 73 IN | WEIGHT: 286.63 LBS | HEART RATE: 100 BPM | SYSTOLIC BLOOD PRESSURE: 132 MMHG | TEMPERATURE: 99 F | DIASTOLIC BLOOD PRESSURE: 64 MMHG | BODY MASS INDEX: 37.99 KG/M2

## 2020-10-30 DIAGNOSIS — R09.89 PULSE IRREGULARITY: ICD-10-CM

## 2020-10-30 DIAGNOSIS — Z00.00 ROUTINE MEDICAL EXAM: Primary | ICD-10-CM

## 2020-10-30 DIAGNOSIS — I48.91 ATRIAL FIBRILLATION, UNSPECIFIED TYPE: ICD-10-CM

## 2020-10-30 PROCEDURE — 99215 PR OFFICE/OUTPT VISIT, EST, LEVL V, 40-54 MIN: ICD-10-PCS | Mod: S$GLB,,, | Performed by: NURSE PRACTITIONER

## 2020-10-30 PROCEDURE — 99999 PR PBB SHADOW E&M-EST. PATIENT-LVL IV: ICD-10-PCS | Mod: PBBFAC,,, | Performed by: NURSE PRACTITIONER

## 2020-10-30 PROCEDURE — 99999 PR PBB SHADOW E&M-EST. PATIENT-LVL IV: CPT | Mod: PBBFAC,,, | Performed by: NURSE PRACTITIONER

## 2020-10-30 PROCEDURE — 3008F PR BODY MASS INDEX (BMI) DOCUMENTED: ICD-10-PCS | Mod: CPTII,S$GLB,, | Performed by: NURSE PRACTITIONER

## 2020-10-30 PROCEDURE — 3078F PR MOST RECENT DIASTOLIC BLOOD PRESSURE < 80 MM HG: ICD-10-PCS | Mod: CPTII,S$GLB,, | Performed by: NURSE PRACTITIONER

## 2020-10-30 PROCEDURE — 3078F DIAST BP <80 MM HG: CPT | Mod: CPTII,S$GLB,, | Performed by: NURSE PRACTITIONER

## 2020-10-30 PROCEDURE — 3075F SYST BP GE 130 - 139MM HG: CPT | Mod: CPTII,S$GLB,, | Performed by: NURSE PRACTITIONER

## 2020-10-30 PROCEDURE — 93005 EKG 12-LEAD: ICD-10-PCS | Mod: S$GLB,,, | Performed by: NURSE PRACTITIONER

## 2020-10-30 PROCEDURE — 3075F PR MOST RECENT SYSTOLIC BLOOD PRESS GE 130-139MM HG: ICD-10-PCS | Mod: CPTII,S$GLB,, | Performed by: NURSE PRACTITIONER

## 2020-10-30 PROCEDURE — 99215 OFFICE O/P EST HI 40 MIN: CPT | Mod: S$GLB,,, | Performed by: NURSE PRACTITIONER

## 2020-10-30 PROCEDURE — 3008F BODY MASS INDEX DOCD: CPT | Mod: CPTII,S$GLB,, | Performed by: NURSE PRACTITIONER

## 2020-10-30 PROCEDURE — 93010 EKG 12-LEAD: ICD-10-PCS | Mod: S$GLB,,, | Performed by: INTERNAL MEDICINE

## 2020-10-30 PROCEDURE — 93005 ELECTROCARDIOGRAM TRACING: CPT | Mod: S$GLB,,, | Performed by: NURSE PRACTITIONER

## 2020-10-30 PROCEDURE — 93010 ELECTROCARDIOGRAM REPORT: CPT | Mod: S$GLB,,, | Performed by: INTERNAL MEDICINE

## 2020-10-30 RX ORDER — RIVAROXABAN 20 MG/1
20 TABLET, FILM COATED ORAL
Qty: 30 TABLET | Refills: 6 | Status: SHIPPED | OUTPATIENT
Start: 2020-10-30 | End: 2020-11-16

## 2020-10-30 NOTE — Clinical Note
I messaged your staff about this patient. Onset 2 weeks ago. No symptomatic and rate controlled. I sent pt blood thinner.

## 2020-10-30 NOTE — PROGRESS NOTES
This dictation has been generated using Modal Fluency Dictation some phonetic errors may occur. Please contact author for clarification if needed.     Problem List Items Addressed This Visit     None      Visit Diagnoses     Routine medical exam    -  Primary    Pulse irregularity        Relevant Orders    IN OFFICE EKG 12-LEAD (to Muse)    Atrial fibrillation, unspecified type        Relevant Orders    Holter monitor - 24 hour    Echo Color Flow Doppler? Yes    Ambulatory referral/consult to Cardiology          Orders Placed This Encounter    Ambulatory referral/consult to Cardiology    Holter monitor - 24 hour    IN OFFICE EKG 12-LEAD (to Muse)    Echo Color Flow Doppler? Yes    rivaroxaban (XARELTO) 20 mg Tab     New onset AFib rate controlled and asymptomatic. Messaged cards staff. Referral placed.  EKG AFib per my interpretation.  Incomplete right bundle branch block.  Symptoms present for 2 weeks now start blood thinner and get him in with cardiology as soon as possible.  I do not feel like this patient warrants emergent evaluation or ER referral.  Patient Instructions   Monitor for abnormal bruising or bleeding.  Let us know if you have any bruising when brushing her teeth.  Extremity bruising would not be unremarkable but any bruising on the trunk is important.  Seek care for petechial changes which is eruptions of blood vessels on the trunk.  Seek care immediately at the emergency room for any unilateral weakness, speech changes, trouble swallowing, facial droop or other symptoms of stroke.  You are at increased risk of stroke with heart attack due to atrial fibrillation.  Follow-up immediately at the emergency room if you are symptomatic with the following symptoms being warning signs chest pain, shortness of breath, syncopal episode and/or neuro symptoms as above.     No follow-ups on  file.    ________________________________________________________________  ________________________________________________________________      Chief Complaint   Patient presents with    Annual Exam     History of present illness  This 55 y.o. presents today for complaint of irregular pulse.  Patient went to the plasma center to donate plasma and they noticed and irregular pulse.  They listen to his heart at that time and recommended that he follow-up.  He had annual exam with his primary care doctor today and decided to wait.  He does not have any symptoms.  He has not had any chest pain shortness of breath or palpitations.  No syncopal episodes.  No unilateral weakness or stroke symptoms.  Will defer is annual exam at this time.  He is diabetic.  His diabetes is not controlled.  Recent med adjustments noted patient notes dietary noncompliance and cost of medications contributing.  The meds were appropriately adjusted already with Endo.  Complete review of systems negative  Past medical social surgical history reviewed  Past Medical History:   Diagnosis Date    Diabetes mellitus, type 2     GERD (gastroesophageal reflux disease)     Hyperlipidemia     Hypertension     Neuropathy        Past Surgical History:   Procedure Laterality Date    ABDOMINAL SURGERY      as a child    COLONOSCOPY N/A 7/20/2018    Procedure: COLONOSCOPY;  Surgeon: Marko Koo MD;  Location: [facility];  Service: Endoscopy;  Laterality: N/A;    TRIAL OF SPINAL CORD NERVE STIMULATOR N/A 6/17/2019    Procedure: Trial, Neurostimulator, LUMBAR SPINAL CORD STIMULATOR TRIAL;  Surgeon: Rahat Orantes MD;  Location: [facility];  Service: Pain Management;  Laterality: N/A;  PDN STUDY TRIAL, NEEDS CONSENT, NEVRO REP       Family History   Problem Relation Age of Onset    Glaucoma Neg Hx        Social History     Socioeconomic History    Marital status:      Spouse name: Not on file    Number of children: Not on file     Years of education: Not on file    Highest education level: Not on file   Occupational History    Not on file   Social Needs    Financial resource strain: Not on file    Food insecurity     Worry: Not on file     Inability: Not on file    Transportation needs     Medical: Not on file     Non-medical: Not on file   Tobacco Use    Smoking status: Former Smoker     Quit date: 3/20/2011     Years since quittin.6    Smokeless tobacco: Never Used   Substance and Sexual Activity    Alcohol use: Yes     Comment: occ    Drug use: No    Sexual activity: Not on file   Lifestyle    Physical activity     Days per week: Not on file     Minutes per session: Not on file    Stress: Not on file   Relationships    Social connections     Talks on phone: Not on file     Gets together: Not on file     Attends Baptism service: Not on file     Active member of club or organization: Not on file     Attends meetings of clubs or organizations: Not on file     Relationship status: Not on file   Other Topics Concern    Not on file   Social History Narrative    Not on file       Current Outpatient Medications   Medication Sig Dispense Refill    ascorbic acid, vitamin C, (VITAMIN C) 1000 MG tablet Take 1,000 mg by mouth once daily.      aspirin (ECOTRIN) 81 MG EC tablet Take 81 mg by mouth.      atorvastatin (LIPITOR) 40 MG tablet Take 1 tablet (40 mg total) by mouth once daily. 90 tablet 3    ferrous sulfate 325 mg (65 mg iron) Tab tablet Take 325 mg by mouth daily with breakfast.      gabapentin (NEURONTIN) 300 MG capsule Take 3 capsules (900 mg total) by mouth 4 (four) times daily. 1080 capsule 3    glimepiride (AMARYL) 4 MG tablet Take 1 tablet w breakfast and 1 tablet at dinner 180 tablet 4    lisinopriL-hydrochlorothiazide (PRINZIDE,ZESTORETIC) 20-25 mg Tab Take 1 tablet by mouth once daily. 90 tablet 3    loratadine (CLARITIN) 10 mg tablet Take 10 mg by mouth once daily.      LYRICA 50 mg capsule TAKE 1  CAPSULE BY MOUTH THREE TIMES DAILY 90 capsule 3    metFORMIN (GLUCOPHAGE) 850 MG tablet TAKE 1 TABLET BY MOUTH THREE TIMES DAILY WITH MEALS 270 tablet 3    multivitamin (ONE DAILY MULTIVITAMIN) per tablet Take 1 tablet by mouth once daily.      omeprazole (PRILOSEC) 20 MG capsule Take 1 capsule by mouth once daily 90 capsule 3    pioglitazone (ACTOS) 30 MG tablet Take 1 tablet (30 mg total) by mouth once daily. 90 tablet 3    rivaroxaban (XARELTO) 20 mg Tab Take 1 tablet (20 mg total) by mouth daily with dinner or evening meal. 30 tablet 6     No current facility-administered medications for this visit.        Review of patient's allergies indicates:   Allergen Reactions    Oxycodone Itching and Nausea Only       Physical examination  Vitals Reviewed\  Vitals:    10/30/20 1055   BP: 132/64   Pulse: 100   Temp: 98.5 °F (36.9 °C)   Weight: 130 kg (286 lb 9.6 oz)    Gen. Well-dressed well-nourished   Skin warm dry and intact.  No rashes noted.  Neck is supple without adenopathy  Chest.  Respirations are even unlabored.  Lungs are clear to auscultation.  Cardiac regular rate and rhythm.  No chest wall adenopathy noted.  Neuro. Awake alert oriented x4.  Normal judgment and cognition noted.  Extremities no clubbing cyanosis or edema noted.     Call or return to clinic prn if these symptoms worsen or fail to improve as anticipated.

## 2020-10-30 NOTE — PATIENT INSTRUCTIONS
Monitor for abnormal bruising or bleeding.  Let us know if you have any bruising when brushing her teeth.  Extremity bruising would not be unremarkable but any bruising on the trunk is important.  Seek care for petechial changes which is eruptions of blood vessels on the trunk.  Seek care immediately at the emergency room for any unilateral weakness, speech changes, trouble swallowing, facial droop or other symptoms of stroke.  You are at increased risk of stroke with heart attack due to atrial fibrillation.  Follow-up immediately at the emergency room if you are symptomatic with the following symptoms being warning signs chest pain, shortness of breath, syncopal episode and/or neuro symptoms as above.

## 2020-10-30 NOTE — TELEPHONE ENCOUNTER
Message sent from Matthew Mosqueda NP in Family Practice.    Hey, I have a 54 yo male pt new onset afib but pt is asymptomatic. He has been in afib likely over 2 weeks now. It was found at [facility] noted as an irregular pulse. Today was the first visit and ekg. I need to speak to someone in cards. [phone]. thanks      Advised him that NP, Kaur Valdez will reply to him about patient.

## 2020-11-02 NOTE — TELEPHONE ENCOUNTER
Patient needs blood thinner if normal kidney function start eliquis 5 mg PO BID  GET APT WITH CARDIOLOGIST SOON.     IF RATE IS CONTROLLED NO FURTHER MEDICATIONS NEEDED FOR NOW

## 2020-11-03 ENCOUNTER — PATIENT MESSAGE (OUTPATIENT)
Dept: FAMILY MEDICINE | Facility: CLINIC | Age: 55
End: 2020-11-03

## 2020-11-03 ENCOUNTER — PATIENT MESSAGE (OUTPATIENT)
Dept: ENDOCRINOLOGY | Facility: CLINIC | Age: 55
End: 2020-11-03

## 2020-11-03 NOTE — TELEPHONE ENCOUNTER
Patient will come in tomorrow for EKG.    Patient's wife says that they cannot afford the Eliquis that was sent to the pharmacy.    Advised will make new plan after EKG.

## 2020-11-04 ENCOUNTER — TELEPHONE (OUTPATIENT)
Dept: CARDIOLOGY | Facility: CLINIC | Age: 55
End: 2020-11-04

## 2020-11-04 NOTE — TELEPHONE ENCOUNTER
----- Message from Princess INGE Lugo sent at 11/4/2020  9:26 AM CST -----  Contact: Spouse  Type:  Patient Returning Call    Who Called:  Yessica Spouse   Who Left Message for Patient:  Nurse Jacques  Does the patient know what this is regarding?:  yes  Best Call Back Number:  [phone] (home)     Additional Information:

## 2020-11-04 NOTE — TELEPHONE ENCOUNTER
Patient had an ekg on 10/30/20, wife said that is current enough. He is not coming in for another one. He needs at least 2 weeks notice to get off early.

## 2020-11-16 ENCOUNTER — OFFICE VISIT (OUTPATIENT)
Dept: CARDIOLOGY | Facility: CLINIC | Age: 55
End: 2020-11-16
Payer: COMMERCIAL

## 2020-11-16 ENCOUNTER — TELEPHONE (OUTPATIENT)
Dept: FAMILY MEDICINE | Facility: CLINIC | Age: 55
End: 2020-11-16

## 2020-11-16 ENCOUNTER — PATIENT MESSAGE (OUTPATIENT)
Dept: FAMILY MEDICINE | Facility: CLINIC | Age: 55
End: 2020-11-16

## 2020-11-16 VITALS
OXYGEN SATURATION: 97 % | DIASTOLIC BLOOD PRESSURE: 76 MMHG | RESPIRATION RATE: 18 BRPM | SYSTOLIC BLOOD PRESSURE: 122 MMHG | HEIGHT: 73 IN | BODY MASS INDEX: 37.77 KG/M2 | WEIGHT: 285 LBS | HEART RATE: 81 BPM

## 2020-11-16 DIAGNOSIS — E78.5 DYSLIPIDEMIA: ICD-10-CM

## 2020-11-16 DIAGNOSIS — I48.91 ATRIAL FIBRILLATION, UNSPECIFIED TYPE: ICD-10-CM

## 2020-11-16 DIAGNOSIS — E66.01 MORBID OBESITY: ICD-10-CM

## 2020-11-16 DIAGNOSIS — E11.65 TYPE 2 DIABETES MELLITUS WITH HYPERGLYCEMIA, WITHOUT LONG-TERM CURRENT USE OF INSULIN: Primary | ICD-10-CM

## 2020-11-16 DIAGNOSIS — K21.9 GASTROESOPHAGEAL REFLUX DISEASE, UNSPECIFIED WHETHER ESOPHAGITIS PRESENT: ICD-10-CM

## 2020-11-16 PROCEDURE — 3074F PR MOST RECENT SYSTOLIC BLOOD PRESSURE < 130 MM HG: ICD-10-PCS | Mod: CPTII,S$GLB,, | Performed by: INTERNAL MEDICINE

## 2020-11-16 PROCEDURE — 99204 OFFICE O/P NEW MOD 45 MIN: CPT | Mod: 25,S$GLB,, | Performed by: INTERNAL MEDICINE

## 2020-11-16 PROCEDURE — 3078F DIAST BP <80 MM HG: CPT | Mod: CPTII,S$GLB,, | Performed by: INTERNAL MEDICINE

## 2020-11-16 PROCEDURE — 93000 ELECTROCARDIOGRAM COMPLETE: CPT | Mod: S$GLB,,, | Performed by: INTERNAL MEDICINE

## 2020-11-16 PROCEDURE — 99204 PR OFFICE/OUTPT VISIT, NEW, LEVL IV, 45-59 MIN: ICD-10-PCS | Mod: 25,S$GLB,, | Performed by: INTERNAL MEDICINE

## 2020-11-16 PROCEDURE — 3074F SYST BP LT 130 MM HG: CPT | Mod: CPTII,S$GLB,, | Performed by: INTERNAL MEDICINE

## 2020-11-16 PROCEDURE — 3046F HEMOGLOBIN A1C LEVEL >9.0%: CPT | Mod: CPTII,S$GLB,, | Performed by: INTERNAL MEDICINE

## 2020-11-16 PROCEDURE — 3046F PR MOST RECENT HEMOGLOBIN A1C LEVEL > 9.0%: ICD-10-PCS | Mod: CPTII,S$GLB,, | Performed by: INTERNAL MEDICINE

## 2020-11-16 PROCEDURE — 93000 EKG 12-LEAD: ICD-10-PCS | Mod: S$GLB,,, | Performed by: INTERNAL MEDICINE

## 2020-11-16 PROCEDURE — 3078F PR MOST RECENT DIASTOLIC BLOOD PRESSURE < 80 MM HG: ICD-10-PCS | Mod: CPTII,S$GLB,, | Performed by: INTERNAL MEDICINE

## 2020-11-16 NOTE — TELEPHONE ENCOUNTER
----- Message from Eleanor Vázquez sent at 11/16/2020 12:03 PM CST -----  Regarding: Upcoming Cardio Appt  Type: Needs Medical Advice  Who Called:  Yessica Cuate (Spouse)  Patients wife states she needs clarity on the appts for the patient with Cardiology. Patient has an appt today at 4:15. Please contact to further discuss and advise.      Best Call Back Number: [phone]  Additional Information: Wife is not sure if they should keep that appt.

## 2020-11-16 NOTE — PROGRESS NOTES
Subjective:    Patient ID:  Alfred Cuello is a 55 y.o. male patient here for evaluation Hypertension, Shortness of Breath, Gastroesophageal Reflux, and Atrial Fibrillation (new onset )      History of Present Illness:       55 year old male patient with a PMH significant for DM for 8 years, GERD, Dyslipidemia, HTN, and Neuropathy. He has felt fine over the past year. [facility] and they recognized an irregular beat and he went to Dr. Man office whom had him see his PA. He does not know how long he has been in AFIB. He does snore and does not use his mask as he is not comfortable with it. He denies any palpitations. No increase in fatigue or tiredness.         Review of patient's allergies indicates:   Allergen Reactions    Oxycodone Itching and Nausea Only       Past Medical History:   Diagnosis Date    Diabetes mellitus, type 2     GERD (gastroesophageal reflux disease)     Hyperlipidemia     Hypertension     Neuropathy      Past Surgical History:   Procedure Laterality Date    ABDOMINAL SURGERY      as a child    COLONOSCOPY N/A 2018    Procedure: COLONOSCOPY;  Surgeon: Marko Koo MD;  Location: [facility];  Service: Endoscopy;  Laterality: N/A;    TRIAL OF SPINAL CORD NERVE STIMULATOR N/A 2019    Procedure: Trial, Neurostimulator, LUMBAR SPINAL CORD STIMULATOR TRIAL;  Surgeon: Rahat Orantes MD;  Location: [facility];  Service: Pain Management;  Laterality: N/A;  PDN STUDY TRIAL, NEEDS CONSENT, [facility]     Social History     Tobacco Use    Smoking status: Former Smoker     Quit date: 3/20/2011     Years since quittin.6    Smokeless tobacco: Never Used   Substance Use Topics    Alcohol use: Yes     Comment: occ    Drug use: No        Review of Systems:    As noted in HPI in addition        REVIEW OF SYSTEMS  CARDIOVASCULAR: No recent chest pain, palpitations, arm, neck, or jaw pain  RESPIRATORY: No recent fever, cough chills, SOB or  congestion  : No blood in the urine  GI: No Nausea, vomiting, constipation, diarrhea, blood, or reflux.  MUSCULOSKELETAL: No myalgias  NEURO: No lightheadedness or dizziness  EYES: No Double vision, blurry, vision or headache              Objective:        Vitals:    11/16/20 1519   BP: 122/76   Pulse: 81   Resp: 18       LIPIDS - LAST 2   Lab Results   Component Value Date    CHOL 244 (H) 10/10/2020    CHOL 162 05/09/2020    HDL 35 (L) 10/10/2020    HDL 34 (L) 05/09/2020    LDLCALC Invalid, Trig>400.0 10/10/2020    LDLCALC 89.2 05/09/2020    TRIG 614 (H) 10/10/2020    TRIG 194 (H) 05/09/2020    CHOLHDL 14.3 (L) 10/10/2020    CHOLHDL 21.0 05/09/2020       CBC - LAST 2  Lab Results   Component Value Date    WBC 8.80 07/29/2019    WBC 8.40 11/13/2018    RBC 4.96 07/29/2019    RBC 4.42 (L) 11/13/2018    HGB 13.8 (L) 07/29/2019    HGB 13.0 (L) 11/13/2018    HCT 44.2 07/29/2019    HCT 38.7 (L) 11/13/2018    MCV 89 07/29/2019    MCV 88 11/13/2018    MCH 27.8 07/29/2019    MCH 29.4 11/13/2018    MCHC 31.2 (L) 07/29/2019    MCHC 33.6 11/13/2018    RDW 14.8 (H) 07/29/2019    RDW 13.7 11/13/2018     07/29/2019     11/13/2018    MPV 11.5 07/29/2019    MPV 11.0 11/13/2018    GRAN 5.1 07/29/2019    GRAN 57.7 07/29/2019    LYMPH 2.7 07/29/2019    LYMPH 30.2 07/29/2019    MONO 0.6 07/29/2019    MONO 7.0 07/29/2019    BASO 0.08 07/29/2019    BASO 0.10 11/13/2018    NRBC 0 07/29/2019    NRBC 0 11/13/2018       CHEMISTRY & LIVER FUNCTION - LAST 2  Lab Results   Component Value Date     10/10/2020     05/09/2020    K 4.3 10/10/2020    K 4.0 05/09/2020    CL 94 (L) 10/10/2020    CL 96 05/09/2020    CO2 30 (H) 10/10/2020    CO2 28 05/09/2020    ANIONGAP 13 10/10/2020    ANIONGAP 12 05/09/2020    BUN 23 (H) 10/10/2020    BUN 23 (H) 05/09/2020    CREATININE 1.3 10/10/2020    CREATININE 1.3 05/09/2020     (H) 10/10/2020     (H) 05/09/2020    CALCIUM 9.4 10/10/2020    CALCIUM 10.0 05/09/2020     ALBUMIN 4.1 10/10/2020    ALBUMIN 4.2 05/09/2020    PROT 7.3 10/10/2020    PROT 7.7 05/09/2020    ALKPHOS 93 10/10/2020    ALKPHOS 78 05/09/2020    ALT 23 10/10/2020    ALT 24 05/09/2020    AST 18 10/10/2020    AST 20 05/09/2020    BILITOT 0.5 10/10/2020    BILITOT 0.6 05/09/2020        CARDIAC PROFILE - LAST 2  No results found for: BNP, CPK, CPKMB, LDH, TROPONINI     COAGULATION - LAST 2  No results found for: LABPT, INR, APTT    ENDOCRINE & PSA - LAST 2  Lab Results   Component Value Date    HGBA1C 11.0 (H) 10/10/2020    HGBA1C 7.1 (H) 05/09/2020    TSH 0.758 02/03/2020    TSH 0.586 07/29/2019    PSA 0.76 03/20/2018        ECHOCARDIOGRAM RESULTS  No results found for this or any previous visit.    CURRENT/PREVIOUS VISIT EKG  Results for orders placed or performed in visit on 10/30/20   IN OFFICE EKG 12-LEAD (to [place])    Collection Time: 10/30/20 11:26 AM    Narrative    Test Reason : R09.89,    Vent. Rate : 080 BPM     Atrial Rate : 105 BPM     P-R Int : 000 ms          QRS Dur : 108 ms      QT Int : 376 ms       P-R-T Axes : 000 043 021 degrees     QTc Int : 433 ms    Atrial fibrillation  Incomplete right bundle branch block  Abnormal ECG  No previous ECGs available  Confirmed by Uday Schneider MD ([id number]) on 11/2/2020 2:50:53 PM    Referred By:  WILNER           Confirmed By:Uday Schneider MD         PHYSICAL EXAM  CONSTITUTIONAL: Well built, well nourished in no apparent distress  NECK: no carotid bruit, no JVD  LUNGS: CTA  CHEST WALL: no tenderness  HEART:  Irregular rhythm  ABDOMEN: soft, non-tender; bowel sounds normal; no masses,  no organomegaly  EXTREMITIES: Extremities normal, no edema, no calf tenderness noted  NEURO: AAO X 3    I HAVE REVIEWED :    The vital signs, nurses notes, and all the pertinent radiology and labs.         Current Outpatient Medications   Medication Instructions    ascorbic acid (vitamin C) (VITAMIN C) 1,000 mg, Oral, Daily    aspirin (ECOTRIN) 81 mg, Oral    atorvastatin (LIPITOR) 40 mg,  Oral, Daily    ferrous sulfate (FEOSOL) 325 mg, Oral, With breakfast    gabapentin (NEURONTIN) 900 mg, Oral, 4 times daily    glimepiride (AMARYL) 4 MG tablet Take 1 tablet w breakfast and 1 tablet at dinner    lisinopriL-hydrochlorothiazide (PRINZIDE,ZESTORETIC) 20-25 mg Tab 1 tablet, Oral, Daily    loratadine (CLARITIN) 10 mg, Oral, Daily    LYRICA 50 mg capsule TAKE 1 CAPSULE BY MOUTH THREE TIMES DAILY    metFORMIN (GLUCOPHAGE) 850 MG tablet TAKE 1 TABLET BY MOUTH THREE TIMES DAILY WITH MEALS    multivitamin (ONE DAILY MULTIVITAMIN) per tablet 1 tablet, Oral, Daily    omeprazole (PRILOSEC) 20 MG capsule Take 1 capsule by mouth once daily    pioglitazone (ACTOS) 30 mg, Oral, Daily          Assessment:     1. AFIB- new onset? CHADSVASC 3  2. DM with Hyperglycemia  3. Dyslipidemia  4. GERD  5. Obesity        Plan:     He has a very high CHADS score  He has been in AFIB for at least one month  We will give Eliquis 5 mg PO BID  Obtain ECHO to evaluate LV Function and Chamber sizes  Consider Effie Scan Myoview for a baseline as well as he has risk factors of CAD.   Strongly encouraged patient to use the sleep assist device on a regular basis.  His body habitus and probably sleep apnea is probably the cause relatively easy AFib but this time      No follow-ups on file.

## 2020-11-22 ENCOUNTER — HOSPITAL ENCOUNTER (OUTPATIENT)
Facility: HOSPITAL | Age: 55
Discharge: HOME | End: 2020-11-24
Attending: EMERGENCY MEDICINE | Admitting: INTERNAL MEDICINE
Payer: COMMERCIAL

## 2020-11-22 DIAGNOSIS — I48.91 A-FIB: ICD-10-CM

## 2020-11-22 DIAGNOSIS — I50.31 ACUTE DIASTOLIC CONGESTIVE HEART FAILURE: ICD-10-CM

## 2020-11-22 DIAGNOSIS — E86.0 DEHYDRATION: ICD-10-CM

## 2020-11-22 DIAGNOSIS — R19.7 DIARRHEA, UNSPECIFIED TYPE: ICD-10-CM

## 2020-11-22 DIAGNOSIS — I50.9 CHF (CONGESTIVE HEART FAILURE): ICD-10-CM

## 2020-11-22 DIAGNOSIS — M54.9 BACK PAIN: ICD-10-CM

## 2020-11-22 DIAGNOSIS — R42 DIZZINESS: ICD-10-CM

## 2020-11-22 DIAGNOSIS — R07.9 CHEST PAIN: ICD-10-CM

## 2020-11-22 DIAGNOSIS — E87.6 HYPOKALEMIA: ICD-10-CM

## 2020-11-22 DIAGNOSIS — I50.9 ACUTE CONGESTIVE HEART FAILURE, UNSPECIFIED HEART FAILURE TYPE: Primary | ICD-10-CM

## 2020-11-22 DIAGNOSIS — I48.0 PAROXYSMAL ATRIAL FIBRILLATION: ICD-10-CM

## 2020-11-22 LAB
ALBUMIN SERPL BCP-MCNC: 4.4 G/DL (ref 3.5–5.2)
ALP SERPL-CCNC: 69 U/L (ref 55–135)
ALT SERPL W/O P-5'-P-CCNC: 51 U/L (ref 10–44)
ANION GAP SERPL CALC-SCNC: 11 MMOL/L (ref 8–16)
AST SERPL-CCNC: 49 U/L (ref 10–40)
BASOPHILS # BLD AUTO: 0.1 K/UL (ref 0–0.2)
BASOPHILS NFR BLD: 0.8 % (ref 0–1.9)
BILIRUB SERPL-MCNC: 0.7 MG/DL (ref 0.1–1)
BNP SERPL-MCNC: 265 PG/ML (ref 0–99)
BUN SERPL-MCNC: 41 MG/DL (ref 6–20)
CALCIUM SERPL-MCNC: 9.1 MG/DL (ref 8.7–10.5)
CHLORIDE SERPL-SCNC: 100 MMOL/L (ref 95–110)
CO2 SERPL-SCNC: 27 MMOL/L (ref 23–29)
CREAT SERPL-MCNC: 1.6 MG/DL (ref 0.5–1.4)
DIFFERENTIAL METHOD: ABNORMAL
EOSINOPHIL # BLD AUTO: 0.5 K/UL (ref 0–0.5)
EOSINOPHIL NFR BLD: 3.9 % (ref 0–8)
ERYTHROCYTE [DISTWIDTH] IN BLOOD BY AUTOMATED COUNT: 15.1 % (ref 11.5–14.5)
EST. GFR  (AFRICAN AMERICAN): 54.5 ML/MIN/1.73 M^2
EST. GFR  (NON AFRICAN AMERICAN): 47.1 ML/MIN/1.73 M^2
GLUCOSE SERPL-MCNC: 142 MG/DL (ref 70–110)
HCT VFR BLD AUTO: 38 % (ref 40–54)
HGB BLD-MCNC: 12 G/DL (ref 14–18)
IMM GRANULOCYTES # BLD AUTO: 0.04 K/UL (ref 0–0.04)
IMM GRANULOCYTES NFR BLD AUTO: 0.3 % (ref 0–0.5)
LYMPHOCYTES # BLD AUTO: 4.1 K/UL (ref 1–4.8)
LYMPHOCYTES NFR BLD: 33 % (ref 18–48)
MCH RBC QN AUTO: 28.6 PG (ref 27–31)
MCHC RBC AUTO-ENTMCNC: 31.6 G/DL (ref 32–36)
MCV RBC AUTO: 91 FL (ref 82–98)
MONOCYTES # BLD AUTO: 1 K/UL (ref 0.3–1)
MONOCYTES NFR BLD: 7.6 % (ref 4–15)
NEUTROPHILS # BLD AUTO: 6.8 K/UL (ref 1.8–7.7)
NEUTROPHILS NFR BLD: 54.4 % (ref 38–73)
NRBC BLD-RTO: 0 /100 WBC
PLATELET # BLD AUTO: 266 K/UL (ref 150–350)
PMV BLD AUTO: 11.2 FL (ref 9.2–12.9)
POTASSIUM SERPL-SCNC: 3.4 MMOL/L (ref 3.5–5.1)
PROT SERPL-MCNC: 7 G/DL (ref 6–8.4)
RBC # BLD AUTO: 4.19 M/UL (ref 4.6–6.2)
SARS-COV-2 RDRP RESP QL NAA+PROBE: NEGATIVE
SODIUM SERPL-SCNC: 138 MMOL/L (ref 136–145)
TROPONIN I SERPL DL<=0.01 NG/ML-MCNC: <0.03 NG/ML
WBC # BLD AUTO: 12.48 K/UL (ref 3.9–12.7)

## 2020-11-22 PROCEDURE — 99285 EMERGENCY DEPT VISIT HI MDM: CPT | Mod: 25

## 2020-11-22 PROCEDURE — 93005 ELECTROCARDIOGRAM TRACING: CPT | Performed by: INTERNAL MEDICINE

## 2020-11-22 PROCEDURE — 83880 ASSAY OF NATRIURETIC PEPTIDE: CPT

## 2020-11-22 PROCEDURE — 80053 COMPREHEN METABOLIC PANEL: CPT

## 2020-11-22 PROCEDURE — 84484 ASSAY OF TROPONIN QUANT: CPT

## 2020-11-22 PROCEDURE — 25000003 PHARM REV CODE 250: Performed by: STUDENT IN AN ORGANIZED HEALTH CARE EDUCATION/TRAINING PROGRAM

## 2020-11-22 PROCEDURE — 93010 EKG 12-LEAD: ICD-10-PCS | Mod: ,,, | Performed by: INTERNAL MEDICINE

## 2020-11-22 PROCEDURE — 85025 COMPLETE CBC W/AUTO DIFF WBC: CPT

## 2020-11-22 PROCEDURE — 96361 HYDRATE IV INFUSION ADD-ON: CPT

## 2020-11-22 PROCEDURE — 93010 ELECTROCARDIOGRAM REPORT: CPT | Mod: ,,, | Performed by: INTERNAL MEDICINE

## 2020-11-22 PROCEDURE — U0002 COVID-19 LAB TEST NON-CDC: HCPCS

## 2020-11-22 RX ORDER — ASPIRIN 325 MG
325 TABLET ORAL
Status: COMPLETED | OUTPATIENT
Start: 2020-11-22 | End: 2020-11-22

## 2020-11-22 RX ORDER — ACETAMINOPHEN 500 MG
1000 TABLET ORAL
Status: COMPLETED | OUTPATIENT
Start: 2020-11-22 | End: 2020-11-22

## 2020-11-22 RX ORDER — METHOCARBAMOL 500 MG/1
500 TABLET, FILM COATED ORAL 4 TIMES DAILY
Status: DISCONTINUED | OUTPATIENT
Start: 2020-11-23 | End: 2020-11-23

## 2020-11-22 RX ADMIN — SODIUM CHLORIDE 1000 ML: 9 INJECTION, SOLUTION INTRAVENOUS at 11:11

## 2020-11-22 RX ADMIN — METHOCARBAMOL TABLETS 500 MG: 500 TABLET, COATED ORAL at 11:11

## 2020-11-22 RX ADMIN — ACETAMINOPHEN 1000 MG: 500 TABLET, FILM COATED ORAL at 11:11

## 2020-11-22 RX ADMIN — ASPIRIN 325 MG ORAL TABLET 325 MG: 325 PILL ORAL at 10:11

## 2020-11-23 ENCOUNTER — ANESTHESIA (OUTPATIENT)
Dept: CARDIOLOGY | Facility: HOSPITAL | Age: 55
End: 2020-11-23
Payer: COMMERCIAL

## 2020-11-23 ENCOUNTER — ANESTHESIA EVENT (OUTPATIENT)
Dept: CARDIOLOGY | Facility: HOSPITAL | Age: 55
End: 2020-11-23
Payer: COMMERCIAL

## 2020-11-23 ENCOUNTER — CLINICAL SUPPORT (OUTPATIENT)
Dept: CARDIOLOGY | Facility: HOSPITAL | Age: 55
End: 2020-11-23
Attending: INTERNAL MEDICINE
Payer: COMMERCIAL

## 2020-11-23 VITALS — WEIGHT: 282.19 LBS | HEIGHT: 74 IN | BODY MASS INDEX: 36.22 KG/M2

## 2020-11-23 PROBLEM — I50.9 ACUTE CONGESTIVE HEART FAILURE: Status: ACTIVE | Noted: 2020-11-23

## 2020-11-23 LAB
ALBUMIN SERPL BCP-MCNC: 4.1 G/DL (ref 3.5–5.2)
ALP SERPL-CCNC: 64 U/L (ref 55–135)
ALT SERPL W/O P-5'-P-CCNC: 47 U/L (ref 10–44)
ANION GAP SERPL CALC-SCNC: 13 MMOL/L (ref 8–16)
AORTIC ROOT ANNULUS: 3.72 CM
AORTIC VALVE CUSP SEPERATION: 2.28 CM
AST SERPL-CCNC: 38 U/L (ref 10–40)
AV INDEX (PROSTH): 0.81
AV MEAN GRADIENT: 5 MMHG
AV PEAK GRADIENT: 8 MMHG
AV VALVE AREA: 3.72 CM2
AV VELOCITY RATIO: 79.14
BASOPHILS # BLD AUTO: 0.05 K/UL (ref 0–0.2)
BASOPHILS NFR BLD: 0.7 % (ref 0–1.9)
BILIRUB SERPL-MCNC: 0.7 MG/DL (ref 0.1–1)
BSA FOR ECHO PROCEDURE: 2.59 M2
BUN SERPL-MCNC: 34 MG/DL (ref 6–20)
CALCIUM SERPL-MCNC: 8.9 MG/DL (ref 8.7–10.5)
CHLORIDE SERPL-SCNC: 100 MMOL/L (ref 95–110)
CHOLEST SERPL-MCNC: 127 MG/DL (ref 120–199)
CHOLEST/HDLC SERPL: 3.4 {RATIO} (ref 2–5)
CO2 SERPL-SCNC: 28 MMOL/L (ref 23–29)
CREAT SERPL-MCNC: 1.6 MG/DL (ref 0.5–1.4)
CV ECHO LV RWT: 0.43 CM
DIFFERENTIAL METHOD: ABNORMAL
DOP CALC AO PEAK VEL: 1.39 M/S
DOP CALC AO VTI: 27.19 CM
DOP CALC LVOT AREA: 4.6 CM2
DOP CALC LVOT DIAMETER: 2.42 CM
DOP CALC LVOT PEAK VEL: 110 M/S
DOP CALC LVOT STROKE VOLUME: 101.09 CM3
DOP CALCLVOT PEAK VEL VTI: 21.99 CM
E WAVE DECELERATION TIME: 152.69 MSEC
E/E' RATIO: 10.73 M/S
ECHO LV POSTERIOR WALL: 1.15 CM (ref 0.6–1.1)
EOSINOPHIL # BLD AUTO: 0.3 K/UL (ref 0–0.5)
EOSINOPHIL NFR BLD: 4.5 % (ref 0–8)
ERYTHROCYTE [DISTWIDTH] IN BLOOD BY AUTOMATED COUNT: 15.2 % (ref 11.5–14.5)
EST. GFR  (AFRICAN AMERICAN): 54.5 ML/MIN/1.73 M^2
EST. GFR  (NON AFRICAN AMERICAN): 47.1 ML/MIN/1.73 M^2
ESTIMATED AVG GLUCOSE: 229 MG/DL (ref 68–131)
FRACTIONAL SHORTENING: 27 % (ref 28–44)
GLUCOSE SERPL-MCNC: 110 MG/DL (ref 70–110)
GLUCOSE SERPL-MCNC: 162 MG/DL (ref 70–110)
GLUCOSE SERPL-MCNC: 181 MG/DL (ref 70–110)
GLUCOSE SERPL-MCNC: 204 MG/DL (ref 70–110)
GLUCOSE SERPL-MCNC: 214 MG/DL (ref 70–110)
HBA1C MFR BLD HPLC: 9.6 % (ref 4.5–6.2)
HCT VFR BLD AUTO: 34.5 % (ref 40–54)
HDLC SERPL-MCNC: 37 MG/DL (ref 40–75)
HDLC SERPL: 29.1 % (ref 20–50)
HGB BLD-MCNC: 10.8 G/DL (ref 14–18)
IMM GRANULOCYTES # BLD AUTO: 0.02 K/UL (ref 0–0.04)
IMM GRANULOCYTES NFR BLD AUTO: 0.3 % (ref 0–0.5)
INTERVENTRICULAR SEPTUM: 1.15 CM (ref 0.6–1.1)
IVRT: 58.01 MSEC
LDLC SERPL CALC-MCNC: 55.8 MG/DL (ref 63–159)
LEFT ATRIUM SIZE: 4.3 CM
LEFT INTERNAL DIMENSION IN SYSTOLE: 3.9 CM (ref 2.1–4)
LEFT VENTRICLE MASS INDEX: 97 G/M2
LEFT VENTRICULAR INTERNAL DIMENSION IN DIASTOLE: 5.33 CM (ref 3.5–6)
LEFT VENTRICULAR MASS: 244.19 G
LV LATERAL E/E' RATIO: 9.83 M/S
LV SEPTAL E/E' RATIO: 11.8 M/S
LYMPHOCYTES # BLD AUTO: 2 K/UL (ref 1–4.8)
LYMPHOCYTES NFR BLD: 28.7 % (ref 18–48)
MAGNESIUM SERPL-MCNC: 1.6 MG/DL (ref 1.6–2.6)
MAGNESIUM SERPL-MCNC: 1.7 MG/DL (ref 1.6–2.6)
MCH RBC QN AUTO: 28.2 PG (ref 27–31)
MCHC RBC AUTO-ENTMCNC: 31.3 G/DL (ref 32–36)
MCV RBC AUTO: 90 FL (ref 82–98)
MONOCYTES # BLD AUTO: 0.5 K/UL (ref 0.3–1)
MONOCYTES NFR BLD: 6.7 % (ref 4–15)
MV PEAK E VEL: 1.18 M/S
NEUTROPHILS # BLD AUTO: 4.2 K/UL (ref 1.8–7.7)
NEUTROPHILS NFR BLD: 59.1 % (ref 38–73)
NONHDLC SERPL-MCNC: 90 MG/DL
NRBC BLD-RTO: 0 /100 WBC
PISA TR MAX VEL: 2.55 M/S
PLATELET # BLD AUTO: 216 K/UL (ref 150–350)
PMV BLD AUTO: 11.2 FL (ref 9.2–12.9)
POTASSIUM SERPL-SCNC: 3.4 MMOL/L (ref 3.5–5.1)
PROT SERPL-MCNC: 6.6 G/DL (ref 6–8.4)
PV PEAK VELOCITY: 83.11 CM/S
RA PRESSURE: 3 MMHG
RBC # BLD AUTO: 3.83 M/UL (ref 4.6–6.2)
RIGHT VENTRICULAR END-DIASTOLIC DIMENSION: 249 CM
SODIUM SERPL-SCNC: 141 MMOL/L (ref 136–145)
TDI LATERAL: 0.12 M/S
TDI SEPTAL: 0.1 M/S
TDI: 0.11 M/S
TR MAX PG: 26 MMHG
TRIGL SERPL-MCNC: 171 MG/DL (ref 30–150)
TROPONIN I SERPL DL<=0.01 NG/ML-MCNC: <0.03 NG/ML
TROPONIN I SERPL DL<=0.01 NG/ML-MCNC: <0.03 NG/ML
TSH SERPL DL<=0.005 MIU/L-ACNC: 0.68 UIU/ML (ref 0.34–5.6)
TV REST PULMONARY ARTERY PRESSURE: 29 MMHG
WBC # BLD AUTO: 7.04 K/UL (ref 3.9–12.7)

## 2020-11-23 PROCEDURE — 99900031 HC PATIENT EDUCATION (STAT)

## 2020-11-23 PROCEDURE — 85025 COMPLETE CBC W/AUTO DIFF WBC: CPT

## 2020-11-23 PROCEDURE — 94761 N-INVAS EAR/PLS OXIMETRY MLT: CPT

## 2020-11-23 PROCEDURE — 93306 TTE W/DOPPLER COMPLETE: CPT

## 2020-11-23 PROCEDURE — 96374 THER/PROPH/DIAG INJ IV PUSH: CPT | Mod: 59

## 2020-11-23 PROCEDURE — 25000003 PHARM REV CODE 250: Performed by: FAMILY MEDICINE

## 2020-11-23 PROCEDURE — 83036 HEMOGLOBIN GLYCOSYLATED A1C: CPT

## 2020-11-23 PROCEDURE — 82962 GLUCOSE BLOOD TEST: CPT

## 2020-11-23 PROCEDURE — 93306 TTE W/DOPPLER COMPLETE: CPT | Mod: 26,,, | Performed by: INTERNAL MEDICINE

## 2020-11-23 PROCEDURE — 96372 THER/PROPH/DIAG INJ SC/IM: CPT | Mod: 59

## 2020-11-23 PROCEDURE — 93005 ELECTROCARDIOGRAM TRACING: CPT | Performed by: INTERNAL MEDICINE

## 2020-11-23 PROCEDURE — 63600175 PHARM REV CODE 636 W HCPCS: Performed by: EMERGENCY MEDICINE

## 2020-11-23 PROCEDURE — 25000003 PHARM REV CODE 250: Performed by: INTERNAL MEDICINE

## 2020-11-23 PROCEDURE — 25000003 PHARM REV CODE 250: Performed by: STUDENT IN AN ORGANIZED HEALTH CARE EDUCATION/TRAINING PROGRAM

## 2020-11-23 PROCEDURE — 99244 PR OFFICE CONSULTATION,LEVEL IV: ICD-10-PCS | Mod: 25,,, | Performed by: INTERNAL MEDICINE

## 2020-11-23 PROCEDURE — 83735 ASSAY OF MAGNESIUM: CPT

## 2020-11-23 PROCEDURE — 63600175 PHARM REV CODE 636 W HCPCS: Performed by: INTERNAL MEDICINE

## 2020-11-23 PROCEDURE — 93306 ECHO (CUPID ONLY): ICD-10-PCS | Mod: 26,,, | Performed by: INTERNAL MEDICINE

## 2020-11-23 PROCEDURE — 83735 ASSAY OF MAGNESIUM: CPT | Mod: 91

## 2020-11-23 PROCEDURE — G0378 HOSPITAL OBSERVATION PER HR: HCPCS

## 2020-11-23 PROCEDURE — 80053 COMPREHEN METABOLIC PANEL: CPT

## 2020-11-23 PROCEDURE — 96376 TX/PRO/DX INJ SAME DRUG ADON: CPT | Mod: 59

## 2020-11-23 PROCEDURE — 36415 COLL VENOUS BLD VENIPUNCTURE: CPT

## 2020-11-23 PROCEDURE — 80061 LIPID PANEL: CPT

## 2020-11-23 PROCEDURE — 84484 ASSAY OF TROPONIN QUANT: CPT

## 2020-11-23 PROCEDURE — 99900035 HC TECH TIME PER 15 MIN (STAT)

## 2020-11-23 PROCEDURE — 93010 EKG 12-LEAD: ICD-10-PCS | Mod: ,,, | Performed by: INTERNAL MEDICINE

## 2020-11-23 PROCEDURE — 93010 ELECTROCARDIOGRAM REPORT: CPT | Mod: ,,, | Performed by: INTERNAL MEDICINE

## 2020-11-23 PROCEDURE — 99244 OFF/OP CNSLTJ NEW/EST MOD 40: CPT | Mod: 25,,, | Performed by: INTERNAL MEDICINE

## 2020-11-23 PROCEDURE — S4991 NICOTINE PATCH NONLEGEND: HCPCS | Performed by: FAMILY MEDICINE

## 2020-11-23 PROCEDURE — 84443 ASSAY THYROID STIM HORMONE: CPT

## 2020-11-23 RX ORDER — PREGABALIN 50 MG/1
50 CAPSULE ORAL 3 TIMES DAILY
Status: DISCONTINUED | OUTPATIENT
Start: 2020-11-23 | End: 2020-11-23

## 2020-11-23 RX ORDER — IBUPROFEN 200 MG
16 TABLET ORAL
Status: DISCONTINUED | OUTPATIENT
Start: 2020-11-23 | End: 2020-11-24 | Stop reason: HOSPADM

## 2020-11-23 RX ORDER — ATORVASTATIN CALCIUM 40 MG/1
40 TABLET, FILM COATED ORAL DAILY
Status: DISCONTINUED | OUTPATIENT
Start: 2020-11-23 | End: 2020-11-24 | Stop reason: HOSPADM

## 2020-11-23 RX ORDER — MAGNESIUM SULFATE HEPTAHYDRATE 40 MG/ML
2 INJECTION, SOLUTION INTRAVENOUS
Status: DISCONTINUED | OUTPATIENT
Start: 2020-11-23 | End: 2020-11-24 | Stop reason: HOSPADM

## 2020-11-23 RX ORDER — POTASSIUM CHLORIDE 20 MEQ/1
20 TABLET, EXTENDED RELEASE ORAL
Status: DISCONTINUED | OUTPATIENT
Start: 2020-11-23 | End: 2020-11-24 | Stop reason: HOSPADM

## 2020-11-23 RX ORDER — FAMOTIDINE 20 MG/50ML
20 INJECTION, SOLUTION INTRAVENOUS ONCE
Status: DISCONTINUED | OUTPATIENT
Start: 2020-11-23 | End: 2020-11-23

## 2020-11-23 RX ORDER — POTASSIUM CHLORIDE 7.45 MG/ML
20 INJECTION INTRAVENOUS
Status: DISCONTINUED | OUTPATIENT
Start: 2020-11-23 | End: 2020-11-24 | Stop reason: HOSPADM

## 2020-11-23 RX ORDER — MAGNESIUM SULFATE 1 G/100ML
1 INJECTION INTRAVENOUS
Status: DISCONTINUED | OUTPATIENT
Start: 2020-11-23 | End: 2020-11-24 | Stop reason: HOSPADM

## 2020-11-23 RX ORDER — SODIUM CHLORIDE 0.9 % (FLUSH) 0.9 %
10 SYRINGE (ML) INJECTION EVERY 6 HOURS PRN
Status: DISCONTINUED | OUTPATIENT
Start: 2020-11-23 | End: 2020-11-24 | Stop reason: HOSPADM

## 2020-11-23 RX ORDER — PREGABALIN 50 MG/1
100 CAPSULE ORAL 3 TIMES DAILY
Status: DISCONTINUED | OUTPATIENT
Start: 2020-11-23 | End: 2020-11-23

## 2020-11-23 RX ORDER — TIZANIDINE 2 MG/1
2 TABLET ORAL EVERY 8 HOURS PRN
Status: DISCONTINUED | OUTPATIENT
Start: 2020-11-23 | End: 2020-11-24 | Stop reason: HOSPADM

## 2020-11-23 RX ORDER — POTASSIUM CHLORIDE 20 MEQ/1
40 TABLET, EXTENDED RELEASE ORAL
Status: DISCONTINUED | OUTPATIENT
Start: 2020-11-23 | End: 2020-11-24 | Stop reason: HOSPADM

## 2020-11-23 RX ORDER — FAMOTIDINE 10 MG/ML
20 INJECTION INTRAVENOUS ONCE
Status: COMPLETED | OUTPATIENT
Start: 2020-11-23 | End: 2020-11-23

## 2020-11-23 RX ORDER — GLUCAGON 1 MG
1 KIT INJECTION
Status: DISCONTINUED | OUTPATIENT
Start: 2020-11-23 | End: 2020-11-24 | Stop reason: HOSPADM

## 2020-11-23 RX ORDER — FUROSEMIDE 10 MG/ML
40 INJECTION INTRAMUSCULAR; INTRAVENOUS DAILY
Status: DISCONTINUED | OUTPATIENT
Start: 2020-11-23 | End: 2020-11-23

## 2020-11-23 RX ORDER — FERROUS SULFATE 325(65) MG
325 TABLET ORAL
Status: DISCONTINUED | OUTPATIENT
Start: 2020-11-23 | End: 2020-11-24 | Stop reason: HOSPADM

## 2020-11-23 RX ORDER — PANTOPRAZOLE SODIUM 40 MG/10ML
40 INJECTION, POWDER, LYOPHILIZED, FOR SOLUTION INTRAVENOUS DAILY
Status: DISCONTINUED | OUTPATIENT
Start: 2020-11-24 | End: 2020-11-24 | Stop reason: HOSPADM

## 2020-11-23 RX ORDER — HYDROCODONE BITARTRATE AND ACETAMINOPHEN 5; 325 MG/1; MG/1
1 TABLET ORAL EVERY 6 HOURS PRN
Status: DISCONTINUED | OUTPATIENT
Start: 2020-11-23 | End: 2020-11-24 | Stop reason: HOSPADM

## 2020-11-23 RX ORDER — POTASSIUM CHLORIDE 7.45 MG/ML
40 INJECTION INTRAVENOUS
Status: DISCONTINUED | OUTPATIENT
Start: 2020-11-23 | End: 2020-11-24 | Stop reason: HOSPADM

## 2020-11-23 RX ORDER — MAGNESIUM SULFATE HEPTAHYDRATE 40 MG/ML
4 INJECTION, SOLUTION INTRAVENOUS
Status: DISCONTINUED | OUTPATIENT
Start: 2020-11-23 | End: 2020-11-24 | Stop reason: HOSPADM

## 2020-11-23 RX ORDER — NAPROXEN SODIUM 220 MG/1
81 TABLET, FILM COATED ORAL DAILY
Status: DISCONTINUED | OUTPATIENT
Start: 2020-11-23 | End: 2020-11-24 | Stop reason: HOSPADM

## 2020-11-23 RX ORDER — TALC
9 POWDER (GRAM) TOPICAL NIGHTLY PRN
Status: DISCONTINUED | OUTPATIENT
Start: 2020-11-23 | End: 2020-11-24 | Stop reason: HOSPADM

## 2020-11-23 RX ORDER — PREGABALIN 50 MG/1
50 CAPSULE ORAL 3 TIMES DAILY
Status: DISCONTINUED | OUTPATIENT
Start: 2020-11-23 | End: 2020-11-24 | Stop reason: HOSPADM

## 2020-11-23 RX ORDER — IBUPROFEN 200 MG
24 TABLET ORAL
Status: DISCONTINUED | OUTPATIENT
Start: 2020-11-23 | End: 2020-11-24 | Stop reason: HOSPADM

## 2020-11-23 RX ORDER — FUROSEMIDE 10 MG/ML
40 INJECTION INTRAMUSCULAR; INTRAVENOUS
Status: COMPLETED | OUTPATIENT
Start: 2020-11-23 | End: 2020-11-23

## 2020-11-23 RX ORDER — FUROSEMIDE 10 MG/ML
80 INJECTION INTRAMUSCULAR; INTRAVENOUS
Status: DISCONTINUED | OUTPATIENT
Start: 2020-11-23 | End: 2020-11-23

## 2020-11-23 RX ORDER — ACETAMINOPHEN 325 MG/1
650 TABLET ORAL EVERY 4 HOURS PRN
Status: DISCONTINUED | OUTPATIENT
Start: 2020-11-23 | End: 2020-11-24 | Stop reason: HOSPADM

## 2020-11-23 RX ORDER — ACETAMINOPHEN 500 MG
1000 TABLET ORAL ONCE
Status: COMPLETED | OUTPATIENT
Start: 2020-11-23 | End: 2020-11-23

## 2020-11-23 RX ORDER — GABAPENTIN 300 MG/1
300 CAPSULE ORAL
Status: DISCONTINUED | OUTPATIENT
Start: 2020-11-23 | End: 2020-11-23

## 2020-11-23 RX ORDER — PANTOPRAZOLE SODIUM 40 MG/1
40 TABLET, DELAYED RELEASE ORAL DAILY
Status: DISCONTINUED | OUTPATIENT
Start: 2020-11-23 | End: 2020-11-23

## 2020-11-23 RX ORDER — NICOTINE 7MG/24HR
1 PATCH, TRANSDERMAL 24 HOURS TRANSDERMAL DAILY
Status: DISCONTINUED | OUTPATIENT
Start: 2020-11-23 | End: 2020-11-24 | Stop reason: HOSPADM

## 2020-11-23 RX ORDER — LANOLIN ALCOHOL/MO/W.PET/CERES
800 CREAM (GRAM) TOPICAL
Status: DISCONTINUED | OUTPATIENT
Start: 2020-11-23 | End: 2020-11-24 | Stop reason: HOSPADM

## 2020-11-23 RX ORDER — ONDANSETRON 4 MG/1
8 TABLET, ORALLY DISINTEGRATING ORAL EVERY 8 HOURS PRN
Status: DISCONTINUED | OUTPATIENT
Start: 2020-11-23 | End: 2020-11-24 | Stop reason: HOSPADM

## 2020-11-23 RX ORDER — GABAPENTIN 300 MG/1
300 CAPSULE ORAL 3 TIMES DAILY
Status: DISCONTINUED | OUTPATIENT
Start: 2020-11-23 | End: 2020-11-23

## 2020-11-23 RX ADMIN — PANTOPRAZOLE SODIUM 40 MG: 40 TABLET, DELAYED RELEASE ORAL at 06:11

## 2020-11-23 RX ADMIN — FUROSEMIDE 40 MG: 10 INJECTION, SOLUTION INTRAMUSCULAR; INTRAVENOUS at 12:11

## 2020-11-23 RX ADMIN — PREGABALIN 50 MG: 50 CAPSULE ORAL at 08:11

## 2020-11-23 RX ADMIN — APIXABAN 5 MG: 5 TABLET, FILM COATED ORAL at 08:11

## 2020-11-23 RX ADMIN — HUMAN INSULIN 4 UNITS: 100 INJECTION, SOLUTION SUBCUTANEOUS at 08:11

## 2020-11-23 RX ADMIN — POTASSIUM CHLORIDE 40 MEQ: 20 TABLET, EXTENDED RELEASE ORAL at 08:11

## 2020-11-23 RX ADMIN — MAGNESIUM OXIDE 800 MG: 400 TABLET ORAL at 10:11

## 2020-11-23 RX ADMIN — PREGABALIN 50 MG: 50 CAPSULE ORAL at 11:11

## 2020-11-23 RX ADMIN — ATORVASTATIN CALCIUM 40 MG: 40 TABLET, FILM COATED ORAL at 08:11

## 2020-11-23 RX ADMIN — FERROUS SULFATE TAB 325 MG (65 MG ELEMENTAL FE) 325 MG: 325 (65 FE) TAB at 08:11

## 2020-11-23 RX ADMIN — FUROSEMIDE 40 MG: 10 INJECTION, SOLUTION INTRAMUSCULAR; INTRAVENOUS at 08:11

## 2020-11-23 RX ADMIN — METHOCARBAMOL TABLETS 500 MG: 500 TABLET, COATED ORAL at 08:11

## 2020-11-23 RX ADMIN — NICOTINE 1 PATCH: 7 PATCH TRANSDERMAL at 10:11

## 2020-11-23 RX ADMIN — POTASSIUM CHLORIDE 40 MEQ: 20 TABLET, EXTENDED RELEASE ORAL at 06:11

## 2020-11-23 RX ADMIN — FAMOTIDINE 20 MG: 10 INJECTION, SOLUTION INTRAVENOUS at 10:11

## 2020-11-23 RX ADMIN — MAGNESIUM OXIDE 800 MG: 400 TABLET ORAL at 08:11

## 2020-11-23 RX ADMIN — ASPIRIN 81 MG: 81 TABLET, CHEWABLE ORAL at 08:11

## 2020-11-23 RX ADMIN — DICYCLOMINE HYDROCHLORIDE 50 ML: 10 SOLUTION ORAL at 10:11

## 2020-11-23 RX ADMIN — ACETAMINOPHEN 1000 MG: 500 TABLET, FILM COATED ORAL at 10:11

## 2020-11-23 RX ADMIN — MAGNESIUM OXIDE 800 MG: 400 TABLET ORAL at 06:11

## 2020-11-23 NOTE — ANESTHESIA PREPROCEDURE EVALUATION
11/23/2020  Alfred Cuello is a 55 y.o., male.      Patient Active Problem List   Diagnosis    Iron deficiency anemia    Hypertension associated with diabetes    Gastroesophageal reflux disease    H/O pyloric stenosis    Dyslipidemia    Obesity (BMI 30-39.9)    RAFAEL on CPAP    Type 2 diabetes mellitus with hyperglycemia    Painful diabetic neuropathy    Chronic pain syndrome    Diabetic polyneuropathy associated with type 2 diabetes mellitus    Research study patient    Type 2 diabetes mellitus with hyperglycemia, without long-term current use of insulin    Vitamin D deficiency    Morbid obesity    Atrial fibrillation    Acute congestive heart failure       Past Surgical History:   Procedure Laterality Date    ABDOMINAL SURGERY      as a child    COLONOSCOPY N/A 7/20/2018    Procedure: COLONOSCOPY;  Surgeon: Marko Koo MD;  Location: [facility];  Service: Endoscopy;  Laterality: N/A;    TRIAL OF SPINAL CORD NERVE STIMULATOR N/A 6/17/2019    Procedure: Trial, Neurostimulator, LUMBAR SPINAL CORD STIMULATOR TRIAL;  Surgeon: Rahat Orantes MD;  Location: [facility];  Service: Pain Management;  Laterality: N/A;  PDN STUDY TRIAL, NEEDS CONSENT, [facility]        Tobacco Use:  The patient  reports that he quit smoking about 9 years ago. He has never used smokeless tobacco.     Results for orders placed or performed during the hospital encounter of 11/22/20   EKG 12-lead    Collection Time: 11/23/20  9:39 AM    Narrative    Test Reason : R07.9,    Vent. Rate : 083 BPM     Atrial Rate : 127 BPM     P-R Int : 000 ms          QRS Dur : 098 ms      QT Int : 418 ms       P-R-T Axes : 000 052 045 degrees     QTc Int : 491 ms    Atrial fibrillation  Prolonged QT  Abnormal ECG  When compared with ECG of 22-NOV-2020 22:34,  QT has lengthened    Referred By: [email or web address]   SELF           Confirmed  By:         Imaging Results          X-Ray Chest AP Portable (Final result)  Result time 11/22/20 22:58:24    Final result by Clau Rodriguez MD (11/22/20 22:58:24)                 Narrative:    PROCEDURE:   XR CHEST AP PORTABLE  dated  11/22/2020 11:02 PM    CLINICAL HISTORY:   Male 55 years of age.   Chest Pain    TECHNIQUE: AP view of the chest obtained portably at 11:02 PM    PREVIOUS STUDIES:  None Available    FINDINGS:    Cardiac and mediastinal contours are normal. Lungs are moderately  expanded and clear. There is no pleural effusion or pneumothorax.  Bones are unremarkable.      IMPRESSION:    No acute findings.    Electronically Signed by Clau Rodriguez on 11/23/2020 6:40 AM                               Lab Results   Component Value Date    WBC 7.04 11/23/2020    HGB 10.8 (L) 11/23/2020    HCT 34.5 (L) 11/23/2020    MCV 90 11/23/2020     11/23/2020     BMP  Lab Results   Component Value Date     11/23/2020    K 3.4 (L) 11/23/2020     11/23/2020    CO2 28 11/23/2020    BUN 34 (H) 11/23/2020    CREATININE 1.6 (H) 11/23/2020    CALCIUM 8.9 11/23/2020    ANIONGAP 13 11/23/2020     (H) 11/23/2020     (H) 11/22/2020     (H) 10/10/2020       No results found for this or any previous visit.          Anesthesia Evaluation    I have reviewed the Patient Summary Reports.    I have reviewed the Nursing Notes. I have reviewed the NPO Status.   I have reviewed the Medications.     Review of Systems  Anesthesia Hx:  Denies Family Hx of Anesthesia complications.   Denies Personal Hx of Anesthesia complications.   Social:  Former Smoker    Hematology/Oncology:         -- Anemia:   Cardiovascular:   Hypertension Dysrhythmias atrial fibrillation ECG has been reviewed.    Pulmonary:   Sleep Apnea, CPAP  Obstructive Sleep Apnea (RAFAEL), CPAP used.   Education provided regarding risk of obstructive sleep apnea     Hepatic/GI:   GERD    Neurological:   Chronic Pain Syndrome  Peripheral  Neuropathy    Endocrine:   Diabetes        Physical Exam  General:  Well nourished, Obesity    Airway/Jaw/Neck:  Airway Findings: Mouth Opening: Normal Tongue: Normal  General Airway Assessment: Adult  Mallampati: II  TM Distance: Normal, at least 6 cm  Jaw/Neck Findings:  Neck ROM: Normal ROM      Dental:  Dental Findings:   Chest/Lungs:  Chest/Lungs Findings: Clear to auscultation, Normal Respiratory Rate     Heart/Vascular:  Heart Findings: Rate: Normal  Rhythm: Irregularly Irregular        Mental Status:  Mental Status Findings:  Alert and Oriented         Anesthesia Plan  Type of Anesthesia, risks & benefits discussed:  Anesthesia Type:  MAC  Patient's Preference:   Intra-op Monitoring Plan: standard ASA monitors  Intra-op Monitoring Plan Comments:   Post Op Pain Control Plan:   Post Op Pain Control Plan Comments:   Induction:    Beta Blocker:         Informed Consent: Patient understands risks and agrees with Anesthesia plan.  Questions answered. Anesthesia consent signed with patient.  ASA Score: 3     Day of Surgery Review of History & Physical:        Anesthesia Plan Notes: Propofol        Ready For Surgery From Anesthesia Perspective.

## 2020-11-23 NOTE — PLAN OF CARE
11/23/20 1330   PRE-TX-O2   O2 Device (Oxygen Therapy) BiPAP   Oxygen Concentration (%) 21   SpO2 97 %   Pulse Oximetry Type Intermittent   $ Pulse Oximetry - Multiple Charge Pulse Oximetry - Multiple   Pulse 70   Resp 16   Ready to Wean/Extubation Screen   FIO2<=50 (chart decimal) 0.21   Preset CPAP/BiPAP Settings   Mode Of Delivery auto titrating;BiPAP   $ Initial CPAP/BiPAP Setup? Yes   Size of Mask Medium/Large   Sized Appropriately? Yes   Equipment Type [place]Vil[place]iss   Airway Device Type medium full face mask   Education   $ Education BiPAP;15 min   Respiratory Evaluation   $ Care Plan Tech Time 15 min

## 2020-11-23 NOTE — CONSULTS
[facility]  Department of Cardiology  Consult Note      PATIENT NAME: Alfred Cuello  MRN: [id number]  TODAY'S DATE: 11/23/2020  ADMIT DATE: 11/22/2020                          CONSULT REQUESTED BY: Zandra Herrera MD    SUBJECTIVE     PRINCIPAL PROBLEM: Hypotension and SOB    REASON FOR CONSULT:  CHF      HPI:  Mr. Cuello is a 55-year-old male patient recently seen by me in the office.  He has a past medical history significant for hypertension, dyslipidemia, morbid obesity, diabetes and sleep apnea which he was prior to my visit not compliant with CPAP.  He was referred by his primary care provider for new onset of atrial fibrillation.  It was rate controlled without medications.  He was started on Eliquis 5 mg p.o. b.i.d..  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  That night he was extremely exhausted only slept about 5 hours and went back to work on Sunday to do the same physical labor he started having some lightheaded dizziness and very short of breath with minimal activity and had to hold onto something when walking.  He went inside took a 2 hour nap during the nap while supine he had episodes of shortness of breath equivocal to orthopnea.  This happened multiple times.  He went home and his wife had set up an appointment with home health care nurse to be tested for COVID.  The nurse who happened to be on the phone when he was taking his blood pressure which was 90/50.  The he does not recall the heart rate however he believes was in the 80s.  She came to the house and examined him and heard something in his lungs he tells me and she called the PDA that was on-call who told him to go to the hospital actually they wanted him to come via ambulance and he did not want to do this.  His wife brought him to the hospital for evaluation.  Upon evaluation in the ER he was given IV Lasix and has  diuresed well.  He tells me the swelling is better and he is breathing easier.  He currently denies chest pain or increase in shortness of breath.  He does have shortness of breath with exertion still.  He denies any recent cough or congestion.  It does seem on admission he was intravascularly dehydrated as his BUN and creatinine were elevated and he does tell me he was not drinking enough water.  Of note he does have peripheral edema and did have this in the office however he is on Actos.        Review of patient's allergies indicates:   Allergen Reactions    Oxycodone Itching and Nausea Only       Past Medical History:   Diagnosis Date    Diabetes mellitus, type 2     GERD (gastroesophageal reflux disease)     Hyperlipidemia     Hypertension     Neuropathy      Past Surgical History:   Procedure Laterality Date    ABDOMINAL SURGERY      as a child    COLONOSCOPY N/A 2018    Procedure: COLONOSCOPY;  Surgeon: Marko Koo MD;  Location: [facility];  Service: Endoscopy;  Laterality: N/A;    TRIAL OF SPINAL CORD NERVE STIMULATOR N/A 2019    Procedure: Trial, Neurostimulator, LUMBAR SPINAL CORD STIMULATOR TRIAL;  Surgeon: Rahat Orantes MD;  Location: [facility];  Service: Pain Management;  Laterality: N/A;  PDN STUDY TRIAL, NEEDS CONSENT, NEVRO REP     Social History     Tobacco Use    Smoking status: Former Smoker     Quit date: 3/20/2011     Years since quittin.6    Smokeless tobacco: Never Used    Tobacco comment: smoked regularly for 35 years prior to this   Substance Use Topics    Alcohol use: Yes     Comment: occ    Drug use: No        REVIEW OF SYSTEMS  CONSTITUTIONAL: +Fatigue.   EYES: No double vision, No blurred vision  NEURO:+ dizziness  RESPIRATORY: +SOB with exertion  CARDIOVASCULAR: Peripheral Edema  GI: Negative for abdominal pain, No melena, diarrhea, nausea and vomiting.   : Negative for dysuria and frequency, Negative for hematuria  SKIN: Negative for  bruising, Negative for edema or discoloration noted.   ENDOCRINE: Negative for polyphagia, Negative for heat intolerance, Negative for cold intolerance  PSYCHIATRIC: Negative for depression, Negative for anxiety, Negative for memory loss  MUSCULOSKELETAL: Negative for neck pain, Negative for muscle weakness, Negative for back pain     OBJECTIVE     VITAL SIGNS (Most Recent)  Temp: 97.3 °F (36.3 °C) (11/23/20 0704)  Pulse: (P) 65 (11/23/20 0937)  Resp: (P) 18 (11/23/20 0937)  BP: (P) 117/77 (11/23/20 0937)  SpO2: (P) 97 % (11/23/20 0937)    VENTILATION STATUS  Resp: (P) 18 (11/23/20 0937)  SpO2: (P) 97 % (11/23/20 0937)       I & O (Last 24H):    Intake/Output Summary (Last 24 hours) at 11/23/2020 0946  Last data filed at 11/23/2020 0828  Gross per 24 hour   Intake 1640 ml   Output 1000 ml   Net 640 ml       WEIGHTS  Wt Readings from Last 1 Encounters:   11/23/20 0233 128 kg (282 lb 3 oz)   11/22/20 2230 129.3 kg (285 lb)       PHYSICAL EXAM  GENERAL: well built, well nourished, well-developed in no apparent distress alert and oriented.   HEENT: Normocephalic. Pupils normal and conjunctivae normal.  Mucous membranes normal, no cyanosis or icterus, trachea central,no pallor or icterus is noted..   NECK: No JVD. No bruit..   THYROID: Thyroid not enlarged. No nodules present..   CARDIAC: Regular rate and irregular rhythm. S1 is normal.S2 is normal.No gallops, clicks or murmurs noted at this time.  CHEST ANATOMY: normal.   LUNGS: Diminished to the bases however overall CTA   ABDOMEN: Soft no masses or organomegaly.  No abdomen pulsations or bruits.  Normal bowel sounds. No pulsations and no masses felt, No guarding or rebound.   URINARY: No hobson catheter   EXTREMITIES: Mild edema bilateral legs  PERIPHERAL VASCULAR SYSTEM: Good palpable distal pulses.   CENTRAL NERVOUS SYSTEM: No focal motor or sensory deficits noted.   SKIN: Skin without lesions, moist, well perfused.   MUSCLE STRENGTH & TONE: No noteable weakness,  atrophy or abnormal movement.     HOME MEDICATIONS:  No current facility-administered medications on file prior to encounter.      Current Outpatient Medications on File Prior to Encounter   Medication Sig Dispense Refill    apixaban (ELIQUIS) 5 mg Tab Take 1 tablet (5 mg total) by mouth 2 (two) times daily. 60 tablet 3    ascorbic acid, vitamin C, (VITAMIN C) 1000 MG tablet Take 1,000 mg by mouth once daily.      aspirin 81 MG Chew Take 81 mg by mouth once daily.       atorvastatin (LIPITOR) 40 MG tablet Take 1 tablet (40 mg total) by mouth once daily. 90 tablet 3    ferrous sulfate 325 mg (65 mg iron) Tab tablet Take 325 mg by mouth daily with breakfast.      gabapentin (NEURONTIN) 300 MG capsule Take 3 capsules (900 mg total) by mouth 4 (four) times daily. 1080 capsule 3    glimepiride (AMARYL) 4 MG tablet Take 1 tablet w breakfast and 1 tablet at dinner (Patient taking differently: Take 4 mg by mouth 2 (two) times a day. Take 1 tablet w breakfast and 1 tablet at dinner) 180 tablet 4    lisinopriL-hydrochlorothiazide (PRINZIDE,ZESTORETIC) 20-25 mg Tab Take 1 tablet by mouth once daily. 90 tablet 3    metFORMIN (GLUCOPHAGE) 850 MG tablet TAKE 1 TABLET BY MOUTH THREE TIMES DAILY WITH MEALS (Patient taking differently: Take 850 mg by mouth 3 (three) times daily with meals. TAKE 1 TABLET BY MOUTH THREE TIMES DAILY WITH MEALS) 270 tablet 3    multivitamin (ONE DAILY MULTIVITAMIN) per tablet Take 1 tablet by mouth once daily.      omeprazole (PRILOSEC) 20 MG capsule Take 1 capsule by mouth once daily (Patient taking differently: Take 20 mg by mouth once daily. ) 90 capsule 3    pioglitazone (ACTOS) 30 MG tablet Take 1 tablet (30 mg total) by mouth once daily. 90 tablet 3    loratadine (CLARITIN) 10 mg tablet Take 10 mg by mouth once daily.      LYRICA 50 mg capsule TAKE 1 CAPSULE BY MOUTH THREE TIMES DAILY 90 capsule 3       SCHEDULED MEDS:   apixaban  5 mg Oral BID    aspirin  81 mg Oral Daily     atorvastatin  40 mg Oral Daily    ferrous sulfate  325 mg Oral Daily with breakfast    furosemide (LASIX) IV  40 mg Intravenous Daily    GI cocktail (mylanta 30 mL, lidocaine 2 % viscous 10 mL, dicyclomine 10 mL) 50 mL   Oral Once    methocarbamoL  500 mg Oral QID    pantoprazole  40 mg Oral Daily    pregabalin  100 mg Oral TID       CONTINUOUS INFUSIONS:    PRN MEDS:acetaminophen, calcium chloride IVPB, calcium chloride IVPB, calcium chloride IVPB, dextrose 50%, dextrose 50%, dextrose 50%, dextrose 50%, glucagon (human recombinant), glucose, glucose, HYDROcodone-acetaminophen, insulin regular, magnesium oxide, magnesium sulfate IVPB, magnesium sulfate IVPB, magnesium sulfate IVPB, magnesium sulfate IVPB, melatonin, ondansetron, potassium chloride in water, potassium chloride in water, potassium chloride in water, potassium chloride in water, potassium chloride, potassium chloride, potassium chloride, potassium chloride, sodium chloride 0.9%, sodium phosphate IVPB, sodium phosphate IVPB, sodium phosphate IVPB, sodium phosphate IVPB, sodium phosphate IVPB    LABS AND DIAGNOSTICS     CBC LAST 3 DAYS  Recent Labs   Lab 11/22/20 2246   WBC 12.48   RBC 4.19*   HGB 12.0*   HCT 38.0*   MCV 91   MCH 28.6   MCHC 31.6*   RDW 15.1*      MPV 11.2   GRAN 54.4  6.8   LYMPH 33.0  4.1   MONO 7.6  1.0   BASO 0.10   NRBC 0       COAGULATION LAST 3 DAYS  No results for input(s): LABPT, INR, APTT in the last 168 hours.    CHEMISTRY LAST 3 DAYS  Recent Labs   Lab 11/22/20 2246 11/23/20  0425     --    K 3.4*  --      --    CO2 27  --    ANIONGAP 11  --    BUN 41*  --    CREATININE 1.6*  --    *  --    CALCIUM 9.1  --    MG  --  1.7   ALBUMIN 4.4  --    PROT 7.0  --    ALKPHOS 69  --    ALT 51*  --    AST 49*  --    BILITOT 0.7  --        CARDIAC PROFILE LAST 3 DAYS  Recent Labs   Lab 11/22/20 2246   *   TROPONINI <0.030       ENDOCRINE LAST 3 DAYS  Recent Labs   Lab 11/23/20  0445   TSH 0.680        LAST ARTERIAL BLOOD GAS  ABG  No results for input(s): PH, PO2, PCO2, HCO3, BE in the last 168 hours.    LAST 7 DAYS MICROBIOLOGY   Microbiology Results (last 7 days)     ** No results found for the last 168 hours. **          MOST RECENT IMAGING  X-Ray Chest AP Portable  PROCEDURE:   XR CHEST AP PORTABLE  dated  11/22/2020 11:02 PM    CLINICAL HISTORY:   Male 55 years of age.   Chest Pain    TECHNIQUE: AP view of the chest obtained portably at 11:02 PM    PREVIOUS STUDIES:  None Available    FINDINGS:    Cardiac and mediastinal contours are normal. Lungs are moderately  expanded and clear. There is no pleural effusion or pneumothorax.  Bones are unremarkable.    IMPRESSION:    No acute findings.    Electronically Signed by Clau Rodriguez on 11/23/2020 6:40 AM      ECHOCARDIOGRAM RESULTS (last 5)  No results found for this or any previous visit.    CURRENT/PREVIOUS VISIT EKG  Results for orders placed or performed during the hospital encounter of 11/22/20   EKG 12-lead    Collection Time: 11/22/20 10:34 PM    Narrative    Test Reason : M54.9,    Vent. Rate : 069 BPM     Atrial Rate : 065 BPM     P-R Int : 000 ms          QRS Dur : 104 ms      QT Int : 420 ms       P-R-T Axes : 000 040 026 degrees     QTc Int : 450 ms    Atrial fibrillation  Low voltage QRS  Incomplete right bundle branch block  Abnormal ECG  When compared with ECG of 22-NOV-2020 22:34,  No significant change was found    Referred By: [email or web address]   SELF           Confirmed By:            ASSESSMENT/PLAN:     Active Hospital Problems    Diagnosis    Acute congestive heart failure       ASSESSMENT & PLAN:     1. Mild Acute on Chronic Probable Diastolic Heart failure- already euvolemic  2. 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 Mild dehydration on admit- patient reports not drinking enough water while working and JAMAL is seen-   4. Mild Hypokalemia  5. Mild Hypomagnesemia  6. Morbid Obesity  7.  DM  8. Peripheal Edema- on actos and has improved      RECOMMENDATIONS:    STOP IV LASIX as he is euvolemic- will assess the need for PO tomorrow  NOEMI/CV IN AM around 1030  Risks and Benefits of the procedure have been explained to the patient.  All questions have been answered.   Informed consent obtained.  SEE ORDERS FOR MORE DETAILS  Will Follow   Thank you for the consultation      Kaur Valdez NP  [facility]  Department of Cardiology  Date of Service: 11/23/2020        I have personally interviewed and examined the patient, I have reviewed the Nurse Practitioner's history and physical, assessment, and plan. I agree with the findings and plan.      Cristo Shannon M.D.  [facility]  Department of Cardiology  Date of Service: 11/23/2020  9:46 AM

## 2020-11-23 NOTE — PLAN OF CARE
Assessment completed at bedside with Pt and wife.  He is , has 2 adult children, and has not addressed POA / AD.  His PCP is Dr. Nuria Man, and he uses [facility].  He confirmed insurance, and has [employer].  Plan for discharge at this time is home, with assistance from his wife as needed.  Case Management to continue to follow.    Olivia Ryder [facility]  Case Management  Ext. [phone]     11/23/20 1124   Discharge Assessment   Assessment Type Discharge Planning Assessment   Confirmed/corrected address and phone number on facesheet? Yes   Assessment information obtained from? Patient;Caregiver;Medical Record   Communicated expected length of stay with patient/caregiver no   Prior to hospitilization cognitive status: Alert/Oriented   Prior to hospitalization functional status: Independent   Current cognitive status: Alert/Oriented   Current Functional Status: Independent   Facility Arrived From: Home   Lives With spouse   Able to Return to Prior Arrangements yes   Is patient able to care for self after discharge? Yes   Who are your caregiver(s) and their phone number(s)? Yessica Cuello, wife ([phone] / [phone])   Patient's perception of discharge disposition home or selfcare   Readmission Within the Last 30 Days no previous admission in last 30 days   Patient currently being followed by outpatient case management? No   Patient currently receives any other outside agency services? No   Equipment Currently Used at Home CPAP   Do you have any problems affording any of your prescribed medications? Yes   If yes, what medications? Jardiance and Ozempic   Is the patient taking medications as prescribed? no   If no, which medications is patient not taking? Pt quit taking Jardiance and Ozempic because of cost.   Does the patient have transportation home? Yes   Transportation Anticipated car, drives self;family or friend will provide   Dialysis Name and Scheduled days N/A   Does the patient receive  services at the [facility]? No   Discharge Plan A Home   Discharge Plan B Home   DME Needed Upon Discharge  none   Patient/Family in Agreement with Plan yes

## 2020-11-23 NOTE — ED PROVIDER NOTES
Encounter Date: 11/22/2020       History     Chief Complaint   Patient presents with    Back Pain    Diarrhea    Nausea    Chest Pain    Dizziness     HPI   Patient is a 55-year-old male with history of diabetes, hypertension, hyperlipidemia presenting with multiple complaints including chest pain, shortness of breath, nausea, diarrhea, positional dizziness and generalized fatigue.  Patient reports that symptoms began approximately 2-3 days ago with generalized fatigue and shortness of breath with exertion.  Today patient began having positional dizziness with going from squatting position until full standing position with sensation of room spinning.  Patient also is complaining of fullness in his right chest wall and axillary area improved with the breathing and placing his hand over his head.  Patient denies fever, chills.  Reports that patient is exposed to COVID occupationally.  Of note recently diagnosed with AFib and started on Eliquis 3 days ago.  Patient is not report any active bleeding.  Patient also reports that he took his blood pressure today and was found to be hypotensive blood pressure 90s over 60s upon time of arrival to emergency department blood pressure was back to 130 systolic.  Patient is a prior smoker.    Review of patient's allergies indicates:   Allergen Reactions    Oxycodone Itching and Nausea Only     Past Medical History:   Diagnosis Date    Diabetes mellitus, type 2     GERD (gastroesophageal reflux disease)     Hyperlipidemia     Hypertension     Neuropathy      Past Surgical History:   Procedure Laterality Date    ABDOMINAL SURGERY      as a child    COLONOSCOPY N/A 7/20/2018    Procedure: COLONOSCOPY;  Surgeon: Marko Koo MD;  Location: [facility];  Service: Endoscopy;  Laterality: N/A;    TRIAL OF SPINAL CORD NERVE STIMULATOR N/A 6/17/2019    Procedure: Trial, Neurostimulator, LUMBAR SPINAL CORD STIMULATOR TRIAL;  Surgeon: Rahat Orantes MD;  Location: [facility] [place]  PAIN [facility]GT;  Service: Pain Management;  Laterality: N/A;  PDN STUDY TRIAL, NEEDS CONSENT, DAMIÁNRODRIGO REP     Family History   Problem Relation Age of Onset    Diabetes Mother         has a pacemaker    Coronary artery disease Father         hx of cabg    Glaucoma Neg Hx      Social History     Tobacco Use    Smoking status: Former Smoker     Quit date: 3/20/2011     Years since quittin.6    Smokeless tobacco: Never Used    Tobacco comment: smoked regularly for 35 years prior to this   Substance Use Topics    Alcohol use: Yes     Comment: occ    Drug use: No     Review of Systems   Constitutional: Positive for fatigue. Negative for chills and fever.   HENT: Negative for congestion and sore throat.    Eyes: Negative for photophobia and visual disturbance.   Respiratory: Positive for shortness of breath. Negative for cough.    Cardiovascular: Positive for chest pain.   Gastrointestinal: Positive for diarrhea and vomiting. Negative for abdominal distention and nausea.   Genitourinary: Negative for dysuria and hematuria.   Musculoskeletal: Negative for gait problem and neck pain.   Skin: Negative for rash and wound.   Neurological: Negative for syncope and headaches.   Psychiatric/Behavioral: Negative for agitation and confusion.       Physical Exam     Initial Vitals [20 2230]   BP Pulse Resp Temp SpO2   134/67 71 18 98.1 °F (36.7 °C) 98 %      MAP       --         Physical Exam    Nursing note and vitals reviewed.  Constitutional: He is not diaphoretic. No distress.   HENT:   Head: Normocephalic and atraumatic.   Eyes: EOM are normal. Pupils are equal, round, and reactive to light.   Neck: Normal range of motion. Neck supple.   Cardiovascular: Normal rate and intact distal pulses. An irregularly irregular rhythm present.    Pulmonary/Chest: Breath sounds normal. No respiratory distress. He has no rhonchi.   Abdominal: Soft. There is no abdominal tenderness. There is no guarding.   Musculoskeletal: Normal  range of motion. No edema.   Neurological: He is alert and oriented to person, place, and time. He has normal strength. Coordination normal. GCS eye subscore is 4. GCS verbal subscore is 5. GCS motor subscore is 6.   Skin: Skin is warm and dry. Capillary refill takes less than 2 seconds.         ED Course   Procedures  Labs Reviewed   CBC W/ AUTO DIFFERENTIAL - Abnormal; Notable for the following components:       Result Value    RBC 4.19 (*)     Hemoglobin 12.0 (*)     Hematocrit 38.0 (*)     MCHC 31.6 (*)     RDW 15.1 (*)     All other components within normal limits   COMPREHENSIVE METABOLIC PANEL - Abnormal; Notable for the following components:    Potassium 3.4 (*)     Glucose 142 (*)     BUN 41 (*)     Creatinine 1.6 (*)     AST 49 (*)     ALT 51 (*)     eGFR if  54.5 (*)     eGFR if non  47.1 (*)     All other components within normal limits   B-TYPE NATRIURETIC PEPTIDE - Abnormal; Notable for the following components:     (*)     All other components within normal limits   TROPONIN I   SARS-COV-2 RNA AMPLIFICATION, QUAL     EKG Readings: (Independently Interpreted)   Initial Reading: No STEMI. Rhythm: Atrial Fibrillation. Heart Rate: 69. Ectopy: No Ectopy.   Incomplete right bundle-branch block       Imaging Results          X-Ray Chest AP Portable (In process)                  Medical Decision Making:   Independently Interpreted Test(s):   I have ordered and independently interpreted X-rays - see prior notes.  I have ordered and independently interpreted EKG Reading(s) - see prior notes  Clinical Tests:   Lab Tests: Ordered and Reviewed  The following lab test(s) were unremarkable: Troponin       <> Summary of Lab: Covid neg     H&H 12 AND 38  SATS POTASSIUM 3.4 GLUCOSE 142 BUN 41 CREATININE 1.6 AST 49 ALT 51    Radiological Study: Ordered and Reviewed  Medical Tests: Ordered and Reviewed  ED Management:  Attending Note:  I provided a face to face evaluation of  this patient.  I discussed the patient's care with the Resident.  I reviewed their note and agree with the history, physical, assessment, diagnosis, treatment, all procedures performed, xray and EKG interpretations and  plan provided by the Resident. My overall impression is RECENT DIAGNOSIS WITH AFIB CURRENTLY IN AFIB WITH NEW ONSET CHF MILD PULMONARY EDEMA, CHEST PAIN, SHORTNESS OF BREATH, DIZZINESS, diarrhea, and hypokalemia.  Patient originally presented with viral syndrome symptoms there is concern for COVID this was negative patient is given a L fluids while workup was in progress patient ended up being found to have mild renal insufficiency which he has had in the past, with a normal troponin elevated BNP of 265.  Chest x-ray revealed mild pulmonary edema.  Mild transaminitis and hypokalemia at 3.4 patient also has mild anemia.  Patient was then given 40 mg of Lasix due to the pulmonary edema patient already received 1 L fluids.  I spoke with Dr. Shannon who had just seen the patient last week to establish care for the new onset of AFib and he recommended admission for echocardiogram and further treatment since patient does not have any history of heart failure and he has not had an echocardiogram performed.  Spoke with Hospital Medicine for admission  .  Conchis Ryan M.D. 11/23/2020 3:37 AM    Other:   I have discussed this case with another health care provider.         Resident MDM PGY-4  Alfred Cuello is a 55 y.o. YO male presenting w/ multiple complaints including shortness of breath with exertion, fatigue, chest pain, positional dizziness  VSS, Afebrile, non toxic appearing.  O2 sat in the high 90s.  Differentials include viral syndrome, COVID, ACS, pneumonia, COPD, AFib, anemia   Labs significant for wbc's 12.48, hemoglobin 12, creatinine 1.6, BUN 41(baseline 1.3 and 21), mild hypokalemia, mild increase in LFTs, , troponin negative.  EKG with AFib rate 60 no ischemic changes.  Chest  x-ray with mild interstitial edema.  COVID negative.  Discussed case w/ Dr Shannon who is the patients cardiologist regarding possible new onset CHF in the setting of afib diagnosis and symptomatology. Dr Shannon recommends admission w/ echo. Discussed w/ hospitalist, anticipate admission.     Magui Mccoy MD MPH  [facility] Emergency Medicine PGY-4  10:36 PM 11/22/2020                         Clinical Impression:     ICD-10-CM ICD-9-CM   1. Dizziness  R42 780.4   2. Chest pain  R07.9 786.50   3. Dehydration  E86.0 276.51   4. Diarrhea, unspecified type  R19.7 787.91   5. Hypokalemia  E87.6 276.8                                               Magui Mccoy MD  Resident  11/23/20 0201       Conchis Ryan MD  11/23/20 0340       Conchis Ryan MD  11/23/20 0341

## 2020-11-23 NOTE — H&P
[facility] Medicine History & Physical Examination   Patient Name: Alfred Cuello  MRN: [id number]  Patient Class: OP- Observation   Admission Date: 11/22/2020 10:27 PM  Length of Stay: 0  Attending Physician: Daniel Cheng MD  Primary Care Provider: Nuria Man MD  Face-to-Face encounter date: 11/23/2020  Code Status: Full Code  MPOA: Wife  Chief Complaint: Back Pain, Diarrhea, Nausea, Chest Pain, and Dizziness        Patient information was obtained from patient, past medical records and ER records.   HISTORY OF PRESENT ILLNESS:   Alfred Cuello is a 55 y.o. White male with known history of Paroxysmal atrial fibrillation, hypertension, hyperlipidemia, chronic tobacco abuse who presnted with a primary complaint of shortness of breath. Patient reports chronic shortness of breath and leg swelling however symptoms got worst in the last 2 days ago. He attributes it to work related stress and thinks that he is exhausted. He symptoms are worst when he is in laying position. He also reports three episodes of diarrhea. Overall, his energy levels are low and reports being very weak. He denies change in vision, hearing, headache, fever, cough, congestion, runny nose, chest pain, palpitations, abdominal pain, constipation, vomiting, dysuria, hematuria, joint pain and back pain. Recently, he was diagnosed with Atrial fibrillation and has seen Dr. Shannon as outpatient     In the emergency room, patient CBC was unremarkable. CMP showed hypokalemia and CKD. BNP of 265 and troponin within reference ranges. I have reviewed the EKG and does not show new ischemic changes. It shows atrial fibrillation that is rate controlled. CXR shows bilateral infiltrates.     Decision to admit was taken and patient was informed about the plan of care.   REVIEW OF SYSTEMS:   10 Point Review of System was performed and was found to be negative except for that mentioned already in the HPI above.     PAST MEDICAL  HISTORY:     Past Medical History:   Diagnosis Date    Diabetes mellitus, type 2     GERD (gastroesophageal reflux disease)     Hyperlipidemia     Hypertension     Neuropathy        PAST SURGICAL HISTORY:     Past Surgical History:   Procedure Laterality Date    ABDOMINAL SURGERY      as a child    COLONOSCOPY N/A 2018    Procedure: COLONOSCOPY;  Surgeon: Marko Koo MD;  Location: [facility];  Service: Endoscopy;  Laterality: N/A;    TRIAL OF SPINAL CORD NERVE STIMULATOR N/A 2019    Procedure: Trial, Neurostimulator, LUMBAR SPINAL CORD STIMULATOR TRIAL;  Surgeon: Rahat Orantes MD;  [facility];  Service: Pain Management;  Laterality: N/A;  PDN STUDY TRIAL, NEEDS CONSENT, NEVRO REP       ALLERGIES:   Oxycodone    FAMILY HISTORY:     Family History   Problem Relation Age of Onset    Diabetes Mother         has a pacemaker    Coronary artery disease Father         hx of cabg    Glaucoma Neg Hx        SOCIAL HISTORY:     Social History     Tobacco Use    Smoking status: Former Smoker     Quit date: 3/20/2011     Years since quittin.6    Smokeless tobacco: Never Used    Tobacco comment: smoked regularly for 35 years prior to this   Substance Use Topics    Alcohol use: Yes     Comment: occ        Social History     Substance and Sexual Activity   Sexual Activity Not on file        HOME MEDICATIONS:     Prior to Admission medications    Medication Sig Start Date End Date Taking? Authorizing Provider   apixaban (ELIQUIS) 5 mg Tab Take 1 tablet (5 mg total) by mouth 2 (two) times daily. 20  Yes Kaur Valdez NP   ascorbic acid, vitamin C, (VITAMIN C) 1000 MG tablet Take 1,000 mg by mouth once daily.   Yes Historical Provider   aspirin 81 MG Chew Take 81 mg by mouth once daily.    Yes Historical Provider   atorvastatin (LIPITOR) 40 MG tablet Take 1 tablet (40 mg total) by mouth once daily. 20  Yes Ariadna Light DNP, NP   ferrous sulfate 325 mg (65 mg iron)  "Tab tablet Take 325 mg by mouth daily with breakfast.   Yes Historical Provider   gabapentin (NEURONTIN) 300 MG capsule Take 3 capsules (900 mg total) by mouth 4 (four) times daily. 3/2/20 10/30/21 Yes Trace Beach MD   glimepiride (AMARYL) 4 MG tablet Take 1 tablet w breakfast and 1 tablet at dinner  Patient taking differently: Take 4 mg by mouth 2 (two) times a day. Take 1 tablet w breakfast and 1 tablet at dinner 10/15/20  Yes Ariadna Light DNP, NP   lisinopriL-hydrochlorothiazide (PRINZIDE,ZESTORETIC) 20-25 mg Tab Take 1 tablet by mouth once daily. 10/15/20 10/15/21 Yes Ariadna Light DNP, NP   metFORMIN (GLUCOPHAGE) 850 MG tablet TAKE 1 TABLET BY MOUTH THREE TIMES DAILY WITH MEALS  Patient taking differently: Take 850 mg by mouth 3 (three) times daily with meals. TAKE 1 TABLET BY MOUTH THREE TIMES DAILY WITH MEALS 10/15/20  Yes Ariadna Light DNP, NP   multivitamin (ONE DAILY MULTIVITAMIN) per tablet Take 1 tablet by mouth once daily.   Yes Historical Provider   omeprazole (PRILOSEC) 20 MG capsule Take 1 capsule by mouth once daily  Patient taking differently: Take 20 mg by mouth once daily.  6/30/20  Yes Nuria Man MD   pioglitazone (ACTOS) 30 MG tablet Take 1 tablet (30 mg total) by mouth once daily. 10/15/20 10/15/21 Yes Ariadna Light DNP, NP   loratadine (CLARITIN) 10 mg tablet Take 10 mg by mouth once daily.    Historical Provider   LYRICA 50 mg capsule TAKE 1 CAPSULE BY MOUTH THREE TIMES DAILY 7/13/20   Trace Beach MD         PHYSICAL EXAM:   BP (!) 131/58 (BP Location: Left arm, Patient Position: Standing) Comment: Simultaneous filing. User may not have seen previous data.  Pulse 76   Temp 98.1 °F (36.7 °C) (Oral)   Resp (!) 46   Ht 6' 1.75" (1.873 m)   Wt 129.3 kg (285 lb)   SpO2 99% Comment: Simultaneous filing. User may not have seen previous data.  BMI 36.84 kg/m²   Vitals Reviewed  General appearance: lethargic appearing White male in no apparent distress.  Skin: " No Rash.   Neuro: Motor and sensory exams grossly intact. Good tone. Power in all 4 extremities 5/5.   HENT: Atraumatic head. Moist mucous membranes of oral cavity.  Eyes: Normal extraocular movements.   Neck: Supple. No evidence of lymphadenopathy. No thyroidomegaly.  Lungs: Clear to auscultation bilaterally. No wheezing present.   Heart: controlled rate and irregular rhythm. S1 and S2 present with no murmurs/gallop/rub. 1+ pedal edema. No JVD present.   Abdomen: Soft, non-distended, non-tender. No rebound tenderness/guarding. No masses or organomegaly. Bowel sounds are normal. Bladder is not palpable.   Extremities: No cyanosis, clubbing.  Psych/mental status: Alert and oriented. Cooperative. Responds appropriately to questions.   EMERGENCY DEPARTMENT LABS AND IMAGING:     Labs Reviewed   CBC W/ AUTO DIFFERENTIAL - Abnormal; Notable for the following components:       Result Value    RBC 4.19 (*)     Hemoglobin 12.0 (*)     Hematocrit 38.0 (*)     MCHC 31.6 (*)     RDW 15.1 (*)     All other components within normal limits   COMPREHENSIVE METABOLIC PANEL - Abnormal; Notable for the following components:    Potassium 3.4 (*)     Glucose 142 (*)     BUN 41 (*)     Creatinine 1.6 (*)     AST 49 (*)     ALT 51 (*)     eGFR if  54.5 (*)     eGFR if non  47.1 (*)     All other components within normal limits   B-TYPE NATRIURETIC PEPTIDE - Abnormal; Notable for the following components:     (*)     All other components within normal limits   TROPONIN I   SARS-COV-2 RNA AMPLIFICATION, QUAL       X-Ray Chest AP Portable    (Results Pending)       ASSESSMENT & PLAN:   Alfred Cuello is a 55 y.o. male admitted for    Acute congestive heart failure   Atrial fibrillation rate controlled on anticoagulation  Hypertension  Diabetes Mellitus type II  GERD  Hyperlipidemia  CKD stage II  Obesity  RAFAEL    Plan  Admit to Telemetry  IV lasix 40mg OD lasix  Strict I&O  Consulted cardiology  Resume  home medications  Insulin sliding scale    DVT Prophylaxis: will be placed on DOAC for DVT prophylaxis and will be advised to be as mobile as possible and sit in a chair as tolerated.   ________________________________________________________________________________    Discharge Planning and Disposition: No mobility needs.   Face-to-Face encounter date: 11/23/2020  Encounter included review of the medical records, interviewing and examining the patient face-to-face, discussion with family and other health care providers including emergency medicine physician, admission orders, interpreting lab/test results and formulating a plan of care.   Medical Decision Making during this encounter was  [_] Low Complexity  [_] Moderate Complexity  [x] High Complexity  _________________________________________________________________________________    INPATIENT LIST OF MEDICATIONS     Current Facility-Administered Medications:     methocarbamoL tablet 500 mg, 500 mg, Oral, QID, Magui Mccoy MD, 500 mg at 11/22/20 6621    Current Outpatient Medications:     apixaban (ELIQUIS) 5 mg Tab, Take 1 tablet (5 mg total) by mouth 2 (two) times daily., Disp: 60 tablet, Rfl: 3    ascorbic acid, vitamin C, (VITAMIN C) 1000 MG tablet, Take 1,000 mg by mouth once daily., Disp: , Rfl:     aspirin 81 MG Chew, Take 81 mg by mouth once daily. , Disp: , Rfl:     atorvastatin (LIPITOR) 40 MG tablet, Take 1 tablet (40 mg total) by mouth once daily., Disp: 90 tablet, Rfl: 3    ferrous sulfate 325 mg (65 mg iron) Tab tablet, Take 325 mg by mouth daily with breakfast., Disp: , Rfl:     gabapentin (NEURONTIN) 300 MG capsule, Take 3 capsules (900 mg total) by mouth 4 (four) times daily., Disp: 1080 capsule, Rfl: 3    glimepiride (AMARYL) 4 MG tablet, Take 1 tablet w breakfast and 1 tablet at dinner (Patient taking differently: Take 4 mg by mouth 2 (two) times a day. Take 1 tablet w breakfast and 1 tablet at dinner), Disp: 180 tablet, Rfl:  4    lisinopriL-hydrochlorothiazide (PRINZIDE,ZESTORETIC) 20-25 mg Tab, Take 1 tablet by mouth once daily., Disp: 90 tablet, Rfl: 3    metFORMIN (GLUCOPHAGE) 850 MG tablet, TAKE 1 TABLET BY MOUTH THREE TIMES DAILY WITH MEALS (Patient taking differently: Take 850 mg by mouth 3 (three) times daily with meals. TAKE 1 TABLET BY MOUTH THREE TIMES DAILY WITH MEALS), Disp: 270 tablet, Rfl: 3    multivitamin (ONE DAILY MULTIVITAMIN) per tablet, Take 1 tablet by mouth once daily., Disp: , Rfl:     omeprazole (PRILOSEC) 20 MG capsule, Take 1 capsule by mouth once daily (Patient taking differently: Take 20 mg by mouth once daily. ), Disp: 90 capsule, Rfl: 3    pioglitazone (ACTOS) 30 MG tablet, Take 1 tablet (30 mg total) by mouth once daily., Disp: 90 tablet, Rfl: 3    loratadine (CLARITIN) 10 mg tablet, Take 10 mg by mouth once daily., Disp: , Rfl:     LYRICA 50 mg capsule, TAKE 1 CAPSULE BY MOUTH THREE TIMES DAILY, Disp: 90 capsule, Rfl: 3      Scheduled Meds:   methocarbamoL  500 mg Oral QID     Continuous Infusions:  PRN Meds:.      Daniel Cheng  [facility] Hospitalist  11/23/2020

## 2020-11-23 NOTE — NURSING
Patients significant other at the desk demanding we give him his dose of Gabapentin now, stating it has been too long since his last dose. States she gave him a dose in the ER at 11pm and he needs a dose now. STANLEY Toure was just at the bedside and patient stated he was not in pain while significant other was not at the bedside . Significant other states she is going to go home and get his Gabapentin so he can take it as he does at home .....5 to 6 times a day. Now back at the desk, stating he has his med at the bedside and took his own.

## 2020-11-23 NOTE — PLAN OF CARE
Problem: Fall Injury Risk  Goal: Absence of Fall and Fall-Related Injury  Outcome: Ongoing, Progressing     Problem: Adult Inpatient Plan of Care  Goal: Plan of Care Review  Outcome: Ongoing, Progressing  Goal: Patient-Specific Goal (Individualization)  Outcome: Ongoing, Progressing  Goal: Absence of Hospital-Acquired Illness or Injury  Outcome: Ongoing, Progressing  Goal: Optimal Comfort and Wellbeing  Outcome: Ongoing, Progressing  Goal: Readiness for Transition of Care  Outcome: Ongoing, Progressing  Goal: Rounds/Family Conference  Outcome: Ongoing, Progressing     Problem: Diabetes Comorbidity  Goal: Blood Glucose Level Within Desired Range  Outcome: Ongoing, Progressing     Problem: Cardiac Output Decreased  Goal: Effective Cardiac Output  Outcome: Ongoing, Progressing

## 2020-11-24 ENCOUNTER — CLINICAL SUPPORT (OUTPATIENT)
Dept: CARDIOLOGY | Facility: HOSPITAL | Age: 55
End: 2020-11-24
Attending: EMERGENCY MEDICINE
Payer: COMMERCIAL

## 2020-11-24 VITALS
HEART RATE: 79 BPM | RESPIRATION RATE: 18 BRPM | OXYGEN SATURATION: 100 % | TEMPERATURE: 98 F | WEIGHT: 275.13 LBS | SYSTOLIC BLOOD PRESSURE: 138 MMHG | BODY MASS INDEX: 35.31 KG/M2 | DIASTOLIC BLOOD PRESSURE: 70 MMHG | HEIGHT: 74 IN

## 2020-11-24 VITALS — BODY MASS INDEX: 35.31 KG/M2 | HEIGHT: 74 IN | WEIGHT: 275.13 LBS

## 2020-11-24 LAB
ANION GAP SERPL CALC-SCNC: 13 MMOL/L (ref 8–16)
BASOPHILS # BLD AUTO: 0.05 K/UL (ref 0–0.2)
BASOPHILS NFR BLD: 0.8 % (ref 0–1.9)
BSA FOR ECHO PROCEDURE: 2.55 M2
BUN SERPL-MCNC: 30 MG/DL (ref 6–20)
CALCIUM SERPL-MCNC: 9.2 MG/DL (ref 8.7–10.5)
CHLORIDE SERPL-SCNC: 100 MMOL/L (ref 95–110)
CO2 SERPL-SCNC: 30 MMOL/L (ref 23–29)
CREAT SERPL-MCNC: 1.2 MG/DL (ref 0.5–1.4)
DIFFERENTIAL METHOD: ABNORMAL
EOSINOPHIL # BLD AUTO: 0.2 K/UL (ref 0–0.5)
EOSINOPHIL NFR BLD: 3.7 % (ref 0–8)
ERYTHROCYTE [DISTWIDTH] IN BLOOD BY AUTOMATED COUNT: 15 % (ref 11.5–14.5)
EST. GFR  (AFRICAN AMERICAN): >60 ML/MIN/1.73 M^2
EST. GFR  (NON AFRICAN AMERICAN): >60 ML/MIN/1.73 M^2
GLUCOSE SERPL-MCNC: 158 MG/DL (ref 70–110)
GLUCOSE SERPL-MCNC: 169 MG/DL (ref 70–110)
HCT VFR BLD AUTO: 35.3 % (ref 40–54)
HGB BLD-MCNC: 11 G/DL (ref 14–18)
IMM GRANULOCYTES # BLD AUTO: 0.02 K/UL (ref 0–0.04)
IMM GRANULOCYTES NFR BLD AUTO: 0.3 % (ref 0–0.5)
LYMPHOCYTES # BLD AUTO: 1.7 K/UL (ref 1–4.8)
LYMPHOCYTES NFR BLD: 27.3 % (ref 18–48)
MAGNESIUM SERPL-MCNC: 1.9 MG/DL (ref 1.6–2.6)
MCH RBC QN AUTO: 28.5 PG (ref 27–31)
MCHC RBC AUTO-ENTMCNC: 31.2 G/DL (ref 32–36)
MCV RBC AUTO: 92 FL (ref 82–98)
MONOCYTES # BLD AUTO: 0.4 K/UL (ref 0.3–1)
MONOCYTES NFR BLD: 6.9 % (ref 4–15)
NEUTROPHILS # BLD AUTO: 3.8 K/UL (ref 1.8–7.7)
NEUTROPHILS NFR BLD: 61 % (ref 38–73)
NRBC BLD-RTO: 0 /100 WBC
PLATELET # BLD AUTO: 210 K/UL (ref 150–350)
PMV BLD AUTO: 11.3 FL (ref 9.2–12.9)
POTASSIUM SERPL-SCNC: 3.8 MMOL/L (ref 3.5–5.1)
RBC # BLD AUTO: 3.86 M/UL (ref 4.6–6.2)
SODIUM SERPL-SCNC: 143 MMOL/L (ref 136–145)
WBC # BLD AUTO: 6.22 K/UL (ref 3.9–12.7)

## 2020-11-24 PROCEDURE — 83735 ASSAY OF MAGNESIUM: CPT

## 2020-11-24 PROCEDURE — 63600175 PHARM REV CODE 636 W HCPCS: Performed by: NURSE ANESTHETIST, CERTIFIED REGISTERED

## 2020-11-24 PROCEDURE — 25000003 PHARM REV CODE 250: Performed by: NURSE ANESTHETIST, CERTIFIED REGISTERED

## 2020-11-24 PROCEDURE — 93320 DOPPLER ECHO COMPLETE: CPT | Mod: 26,,, | Performed by: INTERNAL MEDICINE

## 2020-11-24 PROCEDURE — 63600175 PHARM REV CODE 636 W HCPCS: Performed by: FAMILY MEDICINE

## 2020-11-24 PROCEDURE — S4991 NICOTINE PATCH NONLEGEND: HCPCS | Performed by: FAMILY MEDICINE

## 2020-11-24 PROCEDURE — 99214 PR OFFICE/OUTPT VISIT, EST, LEVL IV, 30-39 MIN: ICD-10-PCS | Mod: ,,, | Performed by: INTERNAL MEDICINE

## 2020-11-24 PROCEDURE — 80048 BASIC METABOLIC PNL TOTAL CA: CPT

## 2020-11-24 PROCEDURE — 94761 N-INVAS EAR/PLS OXIMETRY MLT: CPT

## 2020-11-24 PROCEDURE — 85025 COMPLETE CBC W/AUTO DIFF WBC: CPT

## 2020-11-24 PROCEDURE — 99214 OFFICE O/P EST MOD 30 MIN: CPT | Mod: ,,, | Performed by: INTERNAL MEDICINE

## 2020-11-24 PROCEDURE — 93321 DOPPLER ECHO F-UP/LMTD STD: CPT

## 2020-11-24 PROCEDURE — 99900035 HC TECH TIME PER 15 MIN (STAT)

## 2020-11-24 PROCEDURE — 93325 DOPPLER ECHO COLOR FLOW MAPG: CPT | Mod: 26,,, | Performed by: INTERNAL MEDICINE

## 2020-11-24 PROCEDURE — 93312 ECHO TRANSESOPHAGEAL: CPT | Mod: 26,,, | Performed by: INTERNAL MEDICINE

## 2020-11-24 PROCEDURE — 94660 CPAP INITIATION&MGMT: CPT

## 2020-11-24 PROCEDURE — 36415 COLL VENOUS BLD VENIPUNCTURE: CPT

## 2020-11-24 PROCEDURE — 37000008 HC ANESTHESIA 1ST 15 MINUTES: Performed by: INTERNAL MEDICINE

## 2020-11-24 PROCEDURE — C9113 INJ PANTOPRAZOLE SODIUM, VIA: HCPCS | Performed by: FAMILY MEDICINE

## 2020-11-24 PROCEDURE — 93325 TRANSESOPHAGEAL ECHO (TEE) W/ POSSIBLE CARDIOVERSION: ICD-10-PCS | Mod: 26,,, | Performed by: INTERNAL MEDICINE

## 2020-11-24 PROCEDURE — 93320 TRANSESOPHAGEAL ECHO (TEE) W/ POSSIBLE CARDIOVERSION: ICD-10-PCS | Mod: 26,,, | Performed by: INTERNAL MEDICINE

## 2020-11-24 PROCEDURE — G0378 HOSPITAL OBSERVATION PER HR: HCPCS

## 2020-11-24 PROCEDURE — 25000003 PHARM REV CODE 250: Performed by: INTERNAL MEDICINE

## 2020-11-24 PROCEDURE — 96375 TX/PRO/DX INJ NEW DRUG ADDON: CPT

## 2020-11-24 PROCEDURE — 37000009 HC ANESTHESIA EA ADD 15 MINS: Performed by: INTERNAL MEDICINE

## 2020-11-24 PROCEDURE — 25000003 PHARM REV CODE 250: Performed by: FAMILY MEDICINE

## 2020-11-24 PROCEDURE — 93312 TRANSESOPHAGEAL ECHO (TEE) W/ POSSIBLE CARDIOVERSION: ICD-10-PCS | Mod: 26,,, | Performed by: INTERNAL MEDICINE

## 2020-11-24 RX ORDER — LIDOCAINE HYDROCHLORIDE 20 MG/ML
INJECTION, SOLUTION EPIDURAL; INFILTRATION; INTRACAUDAL; PERINEURAL
Status: DISCONTINUED | OUTPATIENT
Start: 2020-11-24 | End: 2020-11-24

## 2020-11-24 RX ORDER — SODIUM CHLORIDE 9 MG/ML
INJECTION, SOLUTION INTRAVENOUS CONTINUOUS PRN
Status: DISCONTINUED | OUTPATIENT
Start: 2020-11-24 | End: 2020-11-24

## 2020-11-24 RX ORDER — PROPOFOL 10 MG/ML
VIAL (ML) INTRAVENOUS
Status: DISCONTINUED | OUTPATIENT
Start: 2020-11-24 | End: 2020-11-24

## 2020-11-24 RX ADMIN — MAGNESIUM OXIDE 800 MG: 400 TABLET ORAL at 02:11

## 2020-11-24 RX ADMIN — APIXABAN 5 MG: 5 TABLET, FILM COATED ORAL at 12:11

## 2020-11-24 RX ADMIN — ASPIRIN 81 MG: 81 TABLET, CHEWABLE ORAL at 12:11

## 2020-11-24 RX ADMIN — PROPOFOL 50 MG: 10 INJECTION, EMULSION INTRAVENOUS at 10:11

## 2020-11-24 RX ADMIN — NICOTINE 1 PATCH: 7 PATCH TRANSDERMAL at 09:11

## 2020-11-24 RX ADMIN — PROPOFOL 100 MG: 10 INJECTION, EMULSION INTRAVENOUS at 10:11

## 2020-11-24 RX ADMIN — PANTOPRAZOLE SODIUM 40 MG: 40 INJECTION, POWDER, FOR SOLUTION INTRAVENOUS at 09:11

## 2020-11-24 RX ADMIN — POTASSIUM CHLORIDE 20 MEQ: 20 TABLET, EXTENDED RELEASE ORAL at 05:11

## 2020-11-24 RX ADMIN — LIDOCAINE HYDROCHLORIDE 60 MG: 20 INJECTION, SOLUTION INTRAVENOUS at 10:11

## 2020-11-24 RX ADMIN — SODIUM CHLORIDE: 0.9 INJECTION, SOLUTION INTRAVENOUS at 10:11

## 2020-11-24 NOTE — PLAN OF CARE
11/24/20 1239   Final Note   Assessment Type Final Discharge Note   Anticipated Discharge Disposition Home     Discharge orders reviewed.  Patient discharged home with no needs.    Olivia Ryder LCSW  Case Management  Ext. [phone]

## 2020-11-24 NOTE — HOSPITAL COURSE
11/24/2020  Mr Cuello Is feeling well post NOEMI. He was noted to have an auricular clot and should remain on apixiban. Case discussed with Dr JOSE C Shannon and he feel that the patient may be sent home on apixiban with an appointment to see him in 1 week.  ROS: minimal back pain < 4/10-otherwise negative X 10  PE: In no distress, HEENT EOM intact wet mucus membranes, Neck:supple no use of accessory muscles of respiration, Lungs no crackles wheezes or rhonchi, Heart irregular irregular rhythm, Abdomen: BS+ nontender, EXT: without CC or E pulses 1-2+, skin no finding, Neuro no lateralizing findings

## 2020-11-24 NOTE — PLAN OF CARE
11/24/20 0834   Patient Assessment/Suction   Level of Consciousness (AVPU) alert   Respiratory Effort Normal;Unlabored   All Lung Fields Breath Sounds clear   PRE-TX-O2   O2 Device (Oxygen Therapy) room air   SpO2 98 %   Pulse Oximetry Type Intermittent   $ Pulse Oximetry - Multiple Charge Pulse Oximetry - Multiple   Pulse 93   Resp 16   Preset CPAP/BiPAP Settings   Mode Of Delivery Standby   $ CPAP/BiPAP Daily Charge BiPAP/CPAP Daily   Size of Mask Medium/Large   Sized Appropriately? Yes   Equipment Type DeVilbiss   Airway Device Type medium full face mask   Respiratory Evaluation   $ Care Plan Tech Time 15 min

## 2020-11-24 NOTE — NURSING TRANSFER
Nursing Transfer Note      11/24/2020     Transfer To: 2501 from 1401    Transfer via wheelchair    Transfer with cardiac monitoring

## 2020-11-24 NOTE — DISCHARGE SUMMARY
[facility] Medicine  Discharge Summary      Patient Name: Alfred Cuello  MRN: [id number]  Admission Date: 11/22/2020  Hospital Length of Stay: 0 days  Discharge Date and Time:  11/24/2020 12:30 PM  Attending Physician: Uday Pulido MD   Discharging Provider: Uday Pulido MD  Primary Care Provider: Nuria Man MD      HPI:   HISTORY OF PRESENT ILLNESS:   Alfred Cuello is a 55 y.o. White male with known history of Paroxysmal atrial fibrillation, hypertension, hyperlipidemia, chronic tobacco abuse who presnted with a primary complaint of shortness of breath. Patient reports chronic shortness of breath and leg swelling however symptoms got worst in the last 2 days ago. He attributes it to work related stress and thinks that he is exhausted. He symptoms are worst when he is in laying position. He also reports three episodes of diarrhea. Overall, his energy levels are low and reports being very weak. He denies change in vision, hearing, headache, fever, cough, congestion, runny nose, chest pain, palpitations, abdominal pain, constipation, vomiting, dysuria, hematuria, joint pain and back pain. Recently, he was diagnosed with Atrial fibrillation and has seen Dr. Shannon as outpatient      In the emergency room, patient CBC was unremarkable. CMP showed hypokalemia and CKD. BNP of 265 and troponin within reference ranges. I have reviewed the EKG and does not show new ischemic changes. It shows atrial fibrillation that is rate controlled. CXR shows bilateral infiltrates.      Decision to admit was taken and patient was informed about the plan of care.       Procedure(s) (LRB):  Transesophageal echo (NOEMI) intra-procedure log documentation (N/A)      Hospital Course:   11/24/2020  Mr Cuello Is feeling well post NOEMI. He was noted to have an auricular clot and should remain on apixiban. Case discussed with Dr JOSE C Shannon and he feel that the patient may be sent home on apixiban with an  appointment to see him in 1 week.  ROS: minimal back pain < 4/10-otherwise negative X 10  PE: In no distress, HEENT EOM intact wet mucus membranes, Neck:supple no use of accessory muscles of respiration, Lungs no crackles wheezes or rhonchi, Heart irregular irregular rhythm, Abdomen: BS+ nontender, EXT: without CC or E pulses 1-2+, skin no finding, Neuro no lateralizing findings     Consults:   Consults (From admission, onward)        Status Ordering Provider     Inpatient consult to Anesthesiology  Once     Provider:  Yoan Chris MD    Acknowledged ELVA JACOBSON     Inpatient consult to Cardiology  Once     Provider:  Cristo Shannon MD    Completed BRDAY ECHEVRARIA     Inpatient consult to Hospitalist  Once     Provider:  Daniel Cheng MD    Acknowledged HALEY PURVIS          No new Assessment & Plan notes have been filed under this hospital service since the last note was generated.  Service: Hospital Medicine    Final Active Diagnoses:    Diagnosis Date Noted POA    PRINCIPAL PROBLEM:  Acute congestive heart failure [I50.9] 11/23/2020 Yes      Problems Resolved During this Admission:       Discharged Condition: good    Disposition: Home or Self Care    Follow Up:  Follow-up Information     Nuria Man MD In 2 weeks.    Specialty: Family Medicine  Contact information:  [address] [facility] [address] [phone]             Cristo Shannon MD.    Specialties: INTERVENTIONAL CARDIOLOGY, Cardiology  Contact information:  [address] [place] [address]  [facility]Y [place]  [place] [id number] [phone]                 Patient Instructions:      Comprehensive metabolic panel   Standing Status: Future Standing Exp. Date: 01/23/22     CBC auto differential   Standing Status: Future Standing Exp. Date: 01/23/22     Diet Cardiac     Activity as tolerated       Significant Diagnostic Studies: Labs:   BMP:   Recent Labs   Lab 11/22/20  2246 11/23/20  0425 11/23/20  1016 11/24/20  0415    *  --  181* 158*     --  141 143   K 3.4*  --  3.4* 3.8     --  100 100   CO2 27  --  28 30*   BUN 41*  --  34* 30*   CREATININE 1.6*  --  1.6* 1.2   CALCIUM 9.1  --  8.9 9.2   MG  --  1.7 1.6 1.9   , CMP   Recent Labs   Lab 11/22/20 2246 11/23/20  1016 11/24/20  0415    141 143   K 3.4* 3.4* 3.8    100 100   CO2 27 28 30*   * 181* 158*   BUN 41* 34* 30*   CREATININE 1.6* 1.6* 1.2   CALCIUM 9.1 8.9 9.2   PROT 7.0 6.6  --    ALBUMIN 4.4 4.1  --    BILITOT 0.7 0.7  --    ALKPHOS 69 64  --    AST 49* 38  --    ALT 51* 47*  --    ANIONGAP 11 13 13   ESTGFRAFRICA 54.5* 54.5* >60.0   EGFRNONAA 47.1* 47.1* >60.0   , CBC   Recent Labs   Lab 11/22/20 2246 11/23/20  1016 11/24/20  0415   WBC 12.48 7.04 6.22   HGB 12.0* 10.8* 11.0*   HCT 38.0* 34.5* 35.3*    216 210   , INR No results found for: INR, PROTIME, Lipid Panel   Lab Results   Component Value Date    CHOL 127 11/23/2020    HDL 37 (L) 11/23/2020    LDLCALC 55.8 (L) 11/23/2020    TRIG 171 (H) 11/23/2020    CHOLHDL 29.1 11/23/2020   , Troponin   Recent Labs   Lab 11/22/20 2246 11/23/20  1016 11/23/20  1517   TROPONINI <0.030 <0.030 <0.030    and All labs within the past 24 hours have been reviewed  NOEMI with clot in auricular appendage    Pending Diagnostic Studies:     Procedure Component Value Units Date/Time    Transesophageal echo (NOEMI) with possible cardioversion [[id number]] Resulted: 11/24/20 1058    Order Status: Sent Lab Status: In process Updated: 11/24/20 1059     BSA 2.55 m2          Medications:  Reconciled Home Medications:      Medication List      CHANGE how you take these medications    glimepiride 4 MG tablet  Commonly known as: AMARYL  Take 1 tablet w breakfast and 1 tablet at dinner  What changed:   · how much to take  · how to take this  · when to take this     metFORMIN 850 MG tablet  Commonly known as: GLUCOPHAGE  TAKE 1 TABLET BY MOUTH THREE TIMES DAILY WITH MEALS  What changed:   · how much to  take  · how to take this  · when to take this        CONTINUE taking these medications    aspirin 81 MG Chew  Take 81 mg by mouth once daily.     atorvastatin 40 MG tablet  Commonly known as: LIPITOR  Take 1 tablet (40 mg total) by mouth once daily.     ELIQUIS 5 mg Tab  Generic drug: apixaban  Take 1 tablet (5 mg total) by mouth 2 (two) times daily.     ferrous sulfate 325 mg (65 mg iron) Tab tablet  Commonly known as: FEOSOL  Take 325 mg by mouth daily with breakfast.     gabapentin 300 MG capsule  Commonly known as: NEURONTIN  Take 3 capsules (900 mg total) by mouth 4 (four) times daily.     lisinopriL-hydrochlorothiazide 20-25 mg Tab  Commonly known as: PRINZIDE,ZESTORETIC  Take 1 tablet by mouth once daily.     loratadine 10 mg tablet  Commonly known as: CLARITIN  Take 10 mg by mouth once daily.     omeprazole 20 MG capsule  Commonly known as: PRILOSEC  Take 1 capsule by mouth once daily     ONE DAILY MULTIVITAMIN per tablet  Generic drug: multivitamin  Take 1 tablet by mouth once daily.     pioglitazone 30 MG tablet  Commonly known as: ACTOS  Take 1 tablet (30 mg total) by mouth once daily.     VITAMIN C 1000 MG tablet  Generic drug: ascorbic acid (vitamin C)  Take 1,000 mg by mouth once daily.        ASK your doctor about these medications    LYRICA 50 MG capsule  Generic drug: pregabalin  TAKE 1 CAPSULE BY MOUTH THREE TIMES DAILY            Indwelling Lines/Drains at time of discharge:   Lines/Drains/Airways     None                 Time spent on the discharge of patient: 23 minutes  Patient was seen and examined on the date of discharge and determined to be suitable for discharge.         Uday Pulido MD  Department of Hospital Medicine  [facility]

## 2020-11-24 NOTE — RESPIRATORY THERAPY
11/23/20 8263   Patient Assessment/Suction   Respiratory Effort Unlabored   Rhythm/Pattern, Respiratory pattern regular   PRE-TX-O2   O2 Device (Oxygen Therapy) room air   SpO2 95 %   Pulse 73   Resp 12   Preset CPAP/BiPAP Settings   Mode Of Delivery auto titrating;BiPAP   $ Initial CPAP/BiPAP Setup? No   $ Is patient using? Yes   Size of Mask Medium/Large   Sized Appropriately? Yes   Equipment Type DeVilbiss   Airway Device Type medium full face mask   Patient CPAP/BiPAP Settings   RR Total (Breaths/Min) 12   Tidal Volume (mL) 900   VE Minute Ventilation (L/min) 7.9 L/min   Total Leak (L/Min) 50   Education   $ Education 15 min;BiPAP   Respiratory Evaluation   $ Care Plan Tech Time 15 min   Evaluation For New Orders   Admitting Diagnosis CHF

## 2020-11-24 NOTE — DISCHARGE INSTRUCTIONS
NOEMI and Cardioversion Discharge Instructions:   Start with soft foods, once you can tolerate soft foods easily, you may begin eating solid foods.    Ask your physician when you can return to your normal activities   Do not drive for at least 24 hours    SEEK CARE IMMEDIATELY IF:   You feel your heart beating fast or fluttering   You feel weak or faint   Your leg or arm is larger than usual, painful, or warm      CALL YOUR DOCTOR IMMEDIATELY IF:   You have fever or chills   You taste blood in your mouth   You have severe sore throat   You have difficulty swallowing   Your skin is itchy, swollen, or if you have a rash    You have questions or concerns about your condition or care.

## 2020-11-24 NOTE — ANESTHESIA POSTPROCEDURE EVALUATION
Anesthesia Post Evaluation    Patient: Alfred Cuello    Procedure(s) Performed: Procedure(s) (LRB):  Transesophageal echo (NOEMI) intra-procedure log documentation (N/A)    Final Anesthesia Type: MAC    Patient location during evaluation: PACU  Patient participation: Yes- Able to Participate  Level of consciousness: awake and alert  Post-procedure vital signs: reviewed and stable  Pain management: adequate  Airway patency: patent    PONV status at discharge: No PONV  Anesthetic complications: no      Cardiovascular status: blood pressure returned to baseline and stable  Respiratory status: unassisted and room air  Hydration status: euvolemic  Follow-up not needed.          Vitals Value Taken Time   /86 11/24/20 1055   Temp 97.9 11/24/20 1055   Pulse 76 11/24/20 1055   Resp 18 11/24/20 1055   SpO2 99% 11/24/20 1055         No case tracking events are documented in the log.      Pain/Bean Score: Pain Rating Prior to Med Admin: 0 (11/24/2020  7:45 AM)  Pain Rating Post Med Admin: 0 (11/24/2020  7:45 AM)  Bean Score: 10 (11/24/2020 10:22 AM)        
JACKELIN

## 2020-11-24 NOTE — HPI
HISTORY OF PRESENT ILLNESS:   Alfred Cuello is a 55 y.o. White male with known history of Paroxysmal atrial fibrillation, hypertension, hyperlipidemia, chronic tobacco abuse who presnted with a primary complaint of shortness of breath. Patient reports chronic shortness of breath and leg swelling however symptoms got worst in the last 2 days ago. He attributes it to work related stress and thinks that he is exhausted. He symptoms are worst when he is in laying position. He also reports three episodes of diarrhea. Overall, his energy levels are low and reports being very weak. He denies change in vision, hearing, headache, fever, cough, congestion, runny nose, chest pain, palpitations, abdominal pain, constipation, vomiting, dysuria, hematuria, joint pain and back pain. Recently, he was diagnosed with Atrial fibrillation and has seen Dr. Shannon as outpatient      In the emergency room, patient CBC was unremarkable. CMP showed hypokalemia and CKD. BNP of 265 and troponin within reference ranges. I have reviewed the EKG and does not show new ischemic changes. It shows atrial fibrillation that is rate controlled. CXR shows bilateral infiltrates.      Decision to admit was taken and patient was informed about the plan of care.

## 2020-11-24 NOTE — TRANSFER OF CARE
"Anesthesia Transfer of Care Note    Patient: Alfred Cuello    Procedure(s) Performed: Procedure(s) (LRB):  Transesophageal echo (NOEMI) intra-procedure log documentation (N/A)    Patient location: Telemetry/Step Down Unit    Transport from OR: Transported from OR on 2-3 L/min O2 by NC with adequate spontaneous ventilation    Post pain: adequate analgesia    Post assessment: no apparent anesthetic complications    Post vital signs: stable    Level of consciousness: awake and alert    Nausea/Vomiting: no nausea/vomiting    Complications: none    Transfer of care protocol was followed      Last vitals:   Visit Vitals  /80 (BP Location: Left arm, Patient Position: Sitting)   Pulse 76   Temp 36.8 °C (98.3 °F) (Oral)   Resp 18   Ht 6' 2" (1.88 m)   Wt 124.8 kg (275 lb 2.2 oz)   SpO2 99%   BMI 35.33 kg/m²     "

## 2020-11-25 NOTE — PROGRESS NOTES
[facility]  Department of Cardiology  Progress note      PATIENT NAME: Alfred Cuello  MRN: [id number]  TODAY'S DATE: 11/24/2020  ADMIT DATE: 11/22/2020                          CONSULT REQUESTED BY: No att. providers found    SUBJECTIVE     PRINCIPAL PROBLEM: Hypotension and SOB    REASON FOR CONSULT:  CHF  Interval history 11/24/2020  Patient appears to be doing much better.  Less short of breath and underwent transesophageal echocardiography.        HPI:  Mr. Cuello is a 55-year-old male patient recently seen by me in the office.  He has a past medical history significant for hypertension, dyslipidemia, morbid obesity, diabetes and sleep apnea which he was prior to my visit not compliant with CPAP.  He was referred by his primary care provider for new onset of atrial fibrillation.  It was rate controlled without medications.  He was started on Eliquis 5 mg p.o. b.i.d..  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  That night he was extremely exhausted only slept about 5 hours and went back to work on Sunday to do the same physical labor he started having some lightheaded dizziness and very short of breath with minimal activity and had to hold onto something when walking.  He went inside took a 2 hour nap during the nap while supine he had episodes of shortness of breath equivocal to orthopnea.  This happened multiple times.  He went home and his wife had set up an appointment with home health care nurse to be tested for COVID.  The nurse who happened to be on the phone when he was taking his blood pressure which was 90/50.  The he does not recall the heart rate however he believes was in the 80s.  She came to the house and examined him and heard something in his lungs he tells me and she called the PDA that was on-call who told him to go to the hospital actually they wanted him to come via  ambulance and he did not want to do this.  His wife brought him to the hospital for evaluation.  Upon evaluation in the ER he was given IV Lasix and has diuresed well.  He tells me the swelling is better and he is breathing easier.  He currently denies chest pain or increase in shortness of breath.  He does have shortness of breath with exertion still.  He denies any recent cough or congestion.  It does seem on admission he was intravascularly dehydrated as his BUN and creatinine were elevated and he does tell me he was not drinking enough water.  Of note he does have peripheral edema and did have this in the office however he is on Actos.        Review of patient's allergies indicates:   Allergen Reactions    Oxycodone Itching and Nausea Only       Past Medical History:   Diagnosis Date    Diabetes mellitus, type 2     GERD (gastroesophageal reflux disease)     Hyperlipidemia     Hypertension     Neuropathy      Past Surgical History:   Procedure Laterality Date    ABDOMINAL SURGERY      as a child    COLONOSCOPY N/A 2018    Procedure: COLONOSCOPY;  Surgeon: Marko Koo MD;  Location: [facility];  Service: Endoscopy;  Laterality: N/A;    TRIAL OF SPINAL CORD NERVE STIMULATOR N/A 2019    Procedure: Trial, Neurostimulator, LUMBAR SPINAL CORD STIMULATOR TRIAL;  Surgeon: Rahat Orantes MD;  Location: [facility];  Service: Pain Management;  Laterality: N/A;  PDN STUDY TRIAL, NEEDS CONSENT, [facility]     Social History     Tobacco Use    Smoking status: Former Smoker     Quit date: 3/20/2011     Years since quittin.6    Smokeless tobacco: Never Used    Tobacco comment: smoked regularly for 35 years prior to this   Substance Use Topics    Alcohol use: Yes     Comment: occ    Drug use: No        Review of Systems     Constitutional: Negative for chills, fatigue and fever.   Eyes: No double vision, No blurred vision  Neuro: No headaches, No dizziness  Respiratory: Negative for cough,  shortness of breath and wheezing.    Cardiovascular: Negative for chest pain. Negative for palpitations and leg swelling.   Gastrointestinal: Negative for abdominal pain, No melena, diarrhea, nausea and vomiting.   Genitourinary: Negative for dysuria and frequency, Negative for hematuria  Skin: Negative for bruising, Negative for edema or discoloration noted.     To    OBJECTIVE     VITAL SIGNS (Most Recent)  Temp: (Procedure) (11/24/20 1100)  Pulse: 79 (11/24/20 1145)  Resp: 18 (11/24/20 1145)  BP: 138/70 (11/24/20 1145)  SpO2: 100 % (11/24/20 1145)    VENTILATION STATUS  Resp: 18 (11/24/20 1145)  SpO2: 100 % (11/24/20 1145)       I & O (Last 24H):    Intake/Output Summary (Last 24 hours) at 11/24/2020 2041  Last data filed at 11/24/2020 1050  Gross per 24 hour   Intake 270 ml   Output 1075 ml   Net -805 ml       WEIGHTS  Wt Readings from Last 1 Encounters:   11/24/20 0233 124.8 kg (275 lb 2.2 oz)   11/23/20 0233 128 kg (282 lb 3 oz)   11/22/20 2230 129.3 kg (285 lb)       Physical examination  Morbidly obese male in no distress  Neck: no carotid bruit, no JVD, supple, symmetrical, trachea midline and hepatojugular reflux  Lungs: diminished breath sounds bibasilar and bilaterally, rhonchi bilaterally  Chest Wall: no tenderness  Heart:  Irregular rhythm normal S1 diminished S2  Abdomen: soft, non-tender; bowel sounds normal; no masses,  no organomegaly  Extremities: Extremities normal, atraumatic, no cyanosis, clubbing, or edema  Skin: Skin color, texture, turgor normal. No rashes or lesions  Neuro: Alert and responsive. Grossly non focal      HOME MEDICATIONS:  No current facility-administered medications on file prior to encounter.      Current Outpatient Medications on File Prior to Encounter   Medication Sig Dispense Refill    apixaban (ELIQUIS) 5 mg Tab Take 1 tablet (5 mg total) by mouth 2 (two) times daily. 60 tablet 3    ascorbic acid, vitamin C, (VITAMIN C) 1000 MG tablet Take 1,000 mg by mouth once  daily.      aspirin 81 MG Chew Take 81 mg by mouth once daily.       atorvastatin (LIPITOR) 40 MG tablet Take 1 tablet (40 mg total) by mouth once daily. 90 tablet 3    ferrous sulfate 325 mg (65 mg iron) Tab tablet Take 325 mg by mouth daily with breakfast.      gabapentin (NEURONTIN) 300 MG capsule Take 3 capsules (900 mg total) by mouth 4 (four) times daily. 1080 capsule 3    glimepiride (AMARYL) 4 MG tablet Take 1 tablet w breakfast and 1 tablet at dinner (Patient taking differently: Take 4 mg by mouth 2 (two) times a day. Take 1 tablet w breakfast and 1 tablet at dinner) 180 tablet 4    lisinopriL-hydrochlorothiazide (PRINZIDE,ZESTORETIC) 20-25 mg Tab Take 1 tablet by mouth once daily. 90 tablet 3    metFORMIN (GLUCOPHAGE) 850 MG tablet TAKE 1 TABLET BY MOUTH THREE TIMES DAILY WITH MEALS (Patient taking differently: Take 850 mg by mouth 3 (three) times daily with meals. TAKE 1 TABLET BY MOUTH THREE TIMES DAILY WITH MEALS) 270 tablet 3    multivitamin (ONE DAILY MULTIVITAMIN) per tablet Take 1 tablet by mouth once daily.      omeprazole (PRILOSEC) 20 MG capsule Take 1 capsule by mouth once daily (Patient taking differently: Take 20 mg by mouth once daily. ) 90 capsule 3    pioglitazone (ACTOS) 30 MG tablet Take 1 tablet (30 mg total) by mouth once daily. 90 tablet 3    loratadine (CLARITIN) 10 mg tablet Take 10 mg by mouth once daily.      LYRICA 50 mg capsule TAKE 1 CAPSULE BY MOUTH THREE TIMES DAILY 90 capsule 3       SCHEDULED MEDS:      CONTINUOUS INFUSIONS:    PRN MEDS:    LABS AND DIAGNOSTICS     CBC LAST 3 DAYS  Recent Labs   Lab 11/22/20  2246 11/23/20  1016 11/24/20  0415   WBC 12.48 7.04 6.22   RBC 4.19* 3.83* 3.86*   HGB 12.0* 10.8* 11.0*   HCT 38.0* 34.5* 35.3*   MCV 91 90 92   MCH 28.6 28.2 28.5   MCHC 31.6* 31.3* 31.2*   RDW 15.1* 15.2* 15.0*    216 210   MPV 11.2 11.2 11.3   GRAN 54.4  6.8 59.1  4.2 61.0  3.8   LYMPH 33.0  4.1 28.7  2.0 27.3  1.7   MONO 7.6  1.0 6.7  0.5  6.9  0.4   BASO 0.10 0.05 0.05   NRBC 0 0 0       COAGULATION LAST 3 DAYS  No results for input(s): LABPT, INR, APTT in the last 168 hours.    CHEMISTRY LAST 3 DAYS  Recent Labs   Lab 11/22/20 2246 11/23/20  0425 11/23/20  1016 11/24/20  0415     --  141 143   K 3.4*  --  3.4* 3.8     --  100 100   CO2 27  --  28 30*   ANIONGAP 11  --  13 13   BUN 41*  --  34* 30*   CREATININE 1.6*  --  1.6* 1.2   *  --  181* 158*   CALCIUM 9.1  --  8.9 9.2   MG  --  1.7 1.6 1.9   ALBUMIN 4.4  --  4.1  --    PROT 7.0  --  6.6  --    ALKPHOS 69  --  64  --    ALT 51*  --  47*  --    AST 49*  --  38  --    BILITOT 0.7  --  0.7  --        CARDIAC PROFILE LAST 3 DAYS  Recent Labs   Lab 11/22/20 2246 11/23/20  1016 11/23/20  1517   *  --   --    TROPONINI <0.030 <0.030 <0.030       ENDOCRINE LAST 3 DAYS  Recent Labs   Lab 11/23/20  0445   TSH 0.680       LAST ARTERIAL BLOOD GAS  ABG  No results for input(s): PH, PO2, PCO2, HCO3, BE in the last 168 hours.    LAST 7 DAYS MICROBIOLOGY   Microbiology Results (last 7 days)     ** No results found for the last 168 hours. **          MOST RECENT IMAGING  Intra-Procedure Documentation  NOEMI performed in the Invasive Lab    - See Procedure Log link below for nursing documentation    - See NOEMI order on Card Proc Tab for physician findings       ECHOCARDIOGRAM RESULTS (last 5)  No results found for this or any previous visit.    CURRENT/PREVIOUS VISIT EKG  Results for orders placed or performed during the hospital encounter of 11/22/20   EKG 12-lead    Collection Time: 11/23/20  9:39 AM    Narrative    Test Reason : R07.9,    Vent. Rate : 083 BPM     Atrial Rate : 127 BPM     P-R Int : 000 ms          QRS Dur : 098 ms      QT Int : 418 ms       P-R-T Axes : 000 052 045 degrees     QTc Int : 491 ms    Atrial fibrillation  Prolonged QT  Abnormal ECG  When compared with ECG of 22-NOV-2020 22:34,  QT has lengthened    Referred By: AAAREFSRAVAN   SELF           Confirmed By:       Patient tolerated transesophageal echocardiography fairly well.  There is evidence of small echodense structure in the left atrial appendage possibly secondary to thrombus.  There is biatrial enlargement.  LV ejection fraction is about 55%.  There is at least 2+ mitral insufficiency  Two to 3+ tricuspid insufficiency noted.  Recommend oral anticoagulation therapy for next 6 weeks and consider evaluation with transesophageal echocardiography.      ASSESSMENT/PLAN:     Active Hospital Problems    Diagnosis    *Acute congestive heart failure       ASSESSMENT & PLAN:     1. Mild Acute on Chronic Probable Diastolic Heart failure- already euvolemic  2. 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 Mild dehydration on admit- patient reports not drinking enough water while working and JAMAL is seen-   4. Mild Hypokalemia  5. Mild Hypomagnesemia  6. Morbid Obesity  7. DM  8. Peripheal Edema- on actos and has improved      RECOMMENDATIONS:    Los confirmed evidence echodense structure in the left atrial appendage suspicious for thrombus.  There is biatrial enlargement moderate mitral insufficiency moderate to significant tricuspid insufficiency or noted.  LV ejection fraction is about 50-55%.  Recommend to maintain on Eliquis next for 6 to 8weeks  AFib has been rate controlled fairly well.  Continue other medications same doses I will see him in the office in a few weeks to re-evaluate his clinical condition.  Otherwise he appears stable for discharge    Thank you for the consultation            Cristo Shannon M.D.  Pending sale to [facility]  Department of Cardiology  Date of Service: 11/24/2020  9:46 AM

## 2020-11-30 ENCOUNTER — PATIENT MESSAGE (OUTPATIENT)
Dept: ENDOCRINOLOGY | Facility: CLINIC | Age: 55
End: 2020-11-30

## 2020-11-30 DIAGNOSIS — E11.42 DIABETIC POLYNEUROPATHY ASSOCIATED WITH TYPE 2 DIABETES MELLITUS: ICD-10-CM

## 2020-12-01 RX ORDER — GABAPENTIN 300 MG/1
900 CAPSULE ORAL 4 TIMES DAILY
Qty: 1080 CAPSULE | Refills: 3 | Status: SHIPPED | OUTPATIENT
Start: 2020-12-01 | End: 2021-09-16

## 2020-12-02 ENCOUNTER — PATIENT OUTREACH (OUTPATIENT)
Dept: ADMINISTRATIVE | Facility: OTHER | Age: 55
End: 2020-12-02

## 2020-12-02 NOTE — PROGRESS NOTES
Chart was reviewed for overdue Proactive [facility] Encounters (RANDY)  topics  Updates were requested from care everywhere  Health Maintenance was unable to be updated  LINKS immunization registry triggered  Eye exam appt. 12/3/20

## 2020-12-03 ENCOUNTER — OFFICE VISIT (OUTPATIENT)
Dept: OPTOMETRY | Facility: CLINIC | Age: 55
End: 2020-12-03
Payer: COMMERCIAL

## 2020-12-03 DIAGNOSIS — E11.9 DIABETES MELLITUS TYPE 2 WITHOUT RETINOPATHY: Primary | ICD-10-CM

## 2020-12-03 DIAGNOSIS — E11.36 DIABETIC CATARACT: ICD-10-CM

## 2020-12-03 DIAGNOSIS — H52.7 REFRACTIVE ERROR: ICD-10-CM

## 2020-12-03 DIAGNOSIS — H25.13 NUCLEAR SCLEROSIS, BILATERAL: ICD-10-CM

## 2020-12-03 DIAGNOSIS — E11.42 DIABETIC POLYNEUROPATHY ASSOCIATED WITH TYPE 2 DIABETES MELLITUS: ICD-10-CM

## 2020-12-03 PROCEDURE — 92014 COMPRE OPH EXAM EST PT 1/>: CPT | Mod: S$GLB,,, | Performed by: OPTOMETRIST

## 2020-12-03 PROCEDURE — 2023F PR DILATED RETINAL EXAM W/O EVID OF RETINOPATHY: ICD-10-PCS | Mod: S$GLB,,, | Performed by: OPTOMETRIST

## 2020-12-03 PROCEDURE — 1126F PR PAIN SEVERITY QUANTIFIED, NO PAIN PRESENT: ICD-10-PCS | Mod: S$GLB,,, | Performed by: OPTOMETRIST

## 2020-12-03 PROCEDURE — 99999 PR PBB SHADOW E&M-EST. PATIENT-LVL III: ICD-10-PCS | Mod: PBBFAC,,, | Performed by: OPTOMETRIST

## 2020-12-03 PROCEDURE — 99999 PR PBB SHADOW E&M-EST. PATIENT-LVL III: CPT | Mod: PBBFAC,,, | Performed by: OPTOMETRIST

## 2020-12-03 PROCEDURE — 1126F AMNT PAIN NOTED NONE PRSNT: CPT | Mod: S$GLB,,, | Performed by: OPTOMETRIST

## 2020-12-03 PROCEDURE — 92014 PR EYE EXAM, EST PATIENT,COMPREHESV: ICD-10-PCS | Mod: S$GLB,,, | Performed by: OPTOMETRIST

## 2020-12-03 PROCEDURE — 2023F DILAT RTA XM W/O RTNOPTHY: CPT | Mod: S$GLB,,, | Performed by: OPTOMETRIST

## 2020-12-03 NOTE — LETTER
December 3, 2020      Ariadna Light, KEELEY, NP  [address]  [facility] [address]           [facility] - Optometry  [address] [place] [address] [address]  [facility] [address]  Phone: [phone]          Patient: Alfred Cuello   MR Number: [id number]   YOB: 1965   Date of Visit: 12/3/2020       Dear Ariadna Light:    Thank you for referring Alfred Cuello to me for evaluation. Attached you will find relevant portions of my assessment and plan of care.    If you have questions, please do not hesitate to call me. I look forward to following Alfred Cuello along with you.    Sincerely,    Carl Yost, OD    Enclosure  CC:  No Recipients    If you would like to receive this communication electronically, please contact [email or web address] or [phone] to request more information on [facility] access.    For providers and/or their staff who would like to refer a patient to [facility], please contact us through our one-stop-shop provider referral line, Tod Jacob, at [phone].    If you feel you have received this communication in error or would no longer like to receive these types of communications, please e-mail [email or web address]

## 2020-12-03 NOTE — PROGRESS NOTES
HPI     Annual Exam      Additional comments: DLE 9-19 (nin)                 Diabetic Eye Exam      Additional comments: BSL flucutating              Comments     Pt here today for annual DM eye exam.  Hemoglobin A1C       Date                     Value               Ref Range             Status                11/23/2020               9.6 (H)             4.5 - 6.2 %           Final                 10/10/2020               11.0 (H)            4.0 - 5.6 %           Final                 05/09/2020               7.1 (H)             4.0 - 5.6 %           Final                Pt states decrease at both near & distance --- wearing OTC for both.  (+) headaches -- related to BP  (+) itching from allergies --- +Visine A qam    Denies light flashes or floaters.          Last edited by Sanjuana Strong on 12/3/2020  3:22 PM. (History)            Assessment /Plan     For exam results, see Encounter Report.    Diabetes mellitus type 2 without retinopathy    Diabetic polyneuropathy associated with type 2 diabetes mellitus  -     Ambulatory referral/consult to Optometry    Diabetic cataract    Nuclear sclerosis, bilateral    Refractive error      DM type 2 w/o ocular retinopathy OU. Discussed possible ocular affects of uncontrolled blood sugar with patient. Recommended continued strong blood sugar control and continued care with PCP. Monitor yearly.     Mild NS OU. Not yet significant. Discussed possible ocular affects of cataracts. Acceptable BCVA OU. Discussed treatment options. Surgery not recommended at this time. Monitor yearly.     Discussed spec options, pt happy with otc readers. Return prn for spec Rx.      RTC in 1 year for comprehensive eye exam, or sooner prn.

## 2020-12-29 ENCOUNTER — TELEPHONE (OUTPATIENT)
Dept: FAMILY MEDICINE | Facility: CLINIC | Age: 55
End: 2020-12-29
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
## 2020-12-30 ENCOUNTER — OFFICE VISIT (OUTPATIENT)
Dept: FAMILY MEDICINE | Facility: CLINIC | Age: 55
End: 2020-12-30
Payer: COMMERCIAL

## 2020-12-30 VITALS
WEIGHT: 288.13 LBS | BODY MASS INDEX: 36.98 KG/M2 | OXYGEN SATURATION: 95 % | RESPIRATION RATE: 16 BRPM | HEIGHT: 74 IN | HEART RATE: 90 BPM | SYSTOLIC BLOOD PRESSURE: 114 MMHG | TEMPERATURE: 98 F | DIASTOLIC BLOOD PRESSURE: 70 MMHG

## 2020-12-30 DIAGNOSIS — T24.202A SECOND DEGREE BURN OF LEFT LEG, INITIAL ENCOUNTER: Primary | ICD-10-CM

## 2020-12-30 DIAGNOSIS — E87.6 HYPOKALEMIA: ICD-10-CM

## 2020-12-30 PROCEDURE — 3072F LOW RISK FOR RETINOPATHY: CPT | Mod: S$GLB,,, | Performed by: FAMILY MEDICINE

## 2020-12-30 PROCEDURE — 3074F PR MOST RECENT SYSTOLIC BLOOD PRESSURE < 130 MM HG: ICD-10-PCS | Mod: CPTII,S$GLB,, | Performed by: FAMILY MEDICINE

## 2020-12-30 PROCEDURE — 3078F DIAST BP <80 MM HG: CPT | Mod: CPTII,S$GLB,, | Performed by: FAMILY MEDICINE

## 2020-12-30 PROCEDURE — 99999 PR PBB SHADOW E&M-EST. PATIENT-LVL IV: ICD-10-PCS | Mod: PBBFAC,,, | Performed by: FAMILY MEDICINE

## 2020-12-30 PROCEDURE — 90471 IMMUNIZATION ADMIN: CPT | Mod: 59,S$GLB,, | Performed by: FAMILY MEDICINE

## 2020-12-30 PROCEDURE — 1125F PR PAIN SEVERITY QUANTIFIED, PAIN PRESENT: ICD-10-PCS | Mod: S$GLB,,, | Performed by: FAMILY MEDICINE

## 2020-12-30 PROCEDURE — 99999 PR PBB SHADOW E&M-EST. PATIENT-LVL IV: CPT | Mod: PBBFAC,,, | Performed by: FAMILY MEDICINE

## 2020-12-30 PROCEDURE — 3078F PR MOST RECENT DIASTOLIC BLOOD PRESSURE < 80 MM HG: ICD-10-PCS | Mod: CPTII,S$GLB,, | Performed by: FAMILY MEDICINE

## 2020-12-30 PROCEDURE — 96372 PR INJECTION,THERAP/PROPH/DIAG2ST, IM OR SUBCUT: ICD-10-PCS | Mod: S$GLB,,, | Performed by: FAMILY MEDICINE

## 2020-12-30 PROCEDURE — 3074F SYST BP LT 130 MM HG: CPT | Mod: CPTII,S$GLB,, | Performed by: FAMILY MEDICINE

## 2020-12-30 PROCEDURE — 3008F PR BODY MASS INDEX (BMI) DOCUMENTED: ICD-10-PCS | Mod: CPTII,S$GLB,, | Performed by: FAMILY MEDICINE

## 2020-12-30 PROCEDURE — 99214 PR OFFICE/OUTPT VISIT, EST, LEVL IV, 30-39 MIN: ICD-10-PCS | Mod: 25,S$GLB,, | Performed by: FAMILY MEDICINE

## 2020-12-30 PROCEDURE — 3072F PR LOW RISK FOR RETINOPATHY: ICD-10-PCS | Mod: S$GLB,,, | Performed by: FAMILY MEDICINE

## 2020-12-30 PROCEDURE — 90714 TD VACCINE GREATER THAN OR EQUAL TO 7YO PRESERVATIVE FREE IM: ICD-10-PCS | Mod: S$GLB,,, | Performed by: FAMILY MEDICINE

## 2020-12-30 PROCEDURE — 99214 OFFICE O/P EST MOD 30 MIN: CPT | Mod: 25,S$GLB,, | Performed by: FAMILY MEDICINE

## 2020-12-30 PROCEDURE — 1125F AMNT PAIN NOTED PAIN PRSNT: CPT | Mod: S$GLB,,, | Performed by: FAMILY MEDICINE

## 2020-12-30 PROCEDURE — 96372 THER/PROPH/DIAG INJ SC/IM: CPT | Mod: S$GLB,,, | Performed by: FAMILY MEDICINE

## 2020-12-30 PROCEDURE — 90471 TD VACCINE GREATER THAN OR EQUAL TO 7YO PRESERVATIVE FREE IM: ICD-10-PCS | Mod: 59,S$GLB,, | Performed by: FAMILY MEDICINE

## 2020-12-30 PROCEDURE — 3008F BODY MASS INDEX DOCD: CPT | Mod: CPTII,S$GLB,, | Performed by: FAMILY MEDICINE

## 2020-12-30 PROCEDURE — 90714 TD VACC NO PRESV 7 YRS+ IM: CPT | Mod: S$GLB,,, | Performed by: FAMILY MEDICINE

## 2020-12-30 RX ORDER — POTASSIUM CHLORIDE 20 MEQ/1
20 TABLET, EXTENDED RELEASE ORAL DAILY
Qty: 30 TABLET | Refills: 4 | Status: SHIPPED | OUTPATIENT
Start: 2020-12-30 | End: 2021-08-16

## 2020-12-30 RX ORDER — MAGNESIUM CHLORIDE 71.5 G/G
2 TABLET ORAL ONCE
Qty: 30 TABLET | Refills: 4 | COMMUNITY
Start: 2020-12-30 | End: 2020-12-30

## 2020-12-30 RX ORDER — AMOXICILLIN AND CLAVULANATE POTASSIUM 875; 125 MG/1; MG/1
1 TABLET, FILM COATED ORAL EVERY 12 HOURS
Qty: 20 TABLET | Refills: 0 | Status: SHIPPED | OUTPATIENT
Start: 2020-12-30 | End: 2021-01-28

## 2020-12-30 RX ORDER — CEFTRIAXONE 500 MG/1
500 INJECTION, POWDER, FOR SOLUTION INTRAMUSCULAR; INTRAVENOUS
Status: COMPLETED | OUTPATIENT
Start: 2020-12-30 | End: 2020-12-30

## 2020-12-30 RX ADMIN — CEFTRIAXONE 500 MG: 500 INJECTION, POWDER, FOR SOLUTION INTRAMUSCULAR; INTRAVENOUS at 03:12

## 2021-01-16 ENCOUNTER — LAB VISIT (OUTPATIENT)
Dept: LAB | Facility: HOSPITAL | Age: 56
End: 2021-01-16
Attending: NURSE PRACTITIONER
Payer: COMMERCIAL

## 2021-01-16 DIAGNOSIS — E78.5 HYPERLIPIDEMIA ASSOCIATED WITH TYPE 2 DIABETES MELLITUS: ICD-10-CM

## 2021-01-16 DIAGNOSIS — E11.69 HYPERLIPIDEMIA ASSOCIATED WITH TYPE 2 DIABETES MELLITUS: ICD-10-CM

## 2021-01-16 DIAGNOSIS — E11.42 DIABETIC POLYNEUROPATHY ASSOCIATED WITH TYPE 2 DIABETES MELLITUS: ICD-10-CM

## 2021-01-16 DIAGNOSIS — E55.9 VITAMIN D DEFICIENCY: ICD-10-CM

## 2021-01-16 LAB
25(OH)D3+25(OH)D2 SERPL-MCNC: 44 NG/ML (ref 30–96)
ALBUMIN SERPL BCP-MCNC: 4 G/DL (ref 3.5–5.2)
ALP SERPL-CCNC: 72 U/L (ref 55–135)
ALT SERPL W/O P-5'-P-CCNC: 21 U/L (ref 10–44)
ANION GAP SERPL CALC-SCNC: 10 MMOL/L (ref 8–16)
AST SERPL-CCNC: 20 U/L (ref 10–40)
BILIRUB SERPL-MCNC: 0.5 MG/DL (ref 0.1–1)
BUN SERPL-MCNC: 20 MG/DL (ref 6–20)
CALCIUM SERPL-MCNC: 8.9 MG/DL (ref 8.7–10.5)
CHLORIDE SERPL-SCNC: 102 MMOL/L (ref 95–110)
CHOLEST SERPL-MCNC: 165 MG/DL (ref 120–199)
CHOLEST/HDLC SERPL: 3.8 {RATIO} (ref 2–5)
CO2 SERPL-SCNC: 31 MMOL/L (ref 23–29)
CREAT SERPL-MCNC: 1.2 MG/DL (ref 0.5–1.4)
EST. GFR  (AFRICAN AMERICAN): >60 ML/MIN/1.73 M^2
EST. GFR  (NON AFRICAN AMERICAN): >60 ML/MIN/1.73 M^2
ESTIMATED AVG GLUCOSE: 169 MG/DL (ref 68–131)
GLUCOSE SERPL-MCNC: 143 MG/DL (ref 70–110)
HBA1C MFR BLD HPLC: 7.5 % (ref 4–5.6)
HDLC SERPL-MCNC: 44 MG/DL (ref 40–75)
HDLC SERPL: 26.7 % (ref 20–50)
LDLC SERPL CALC-MCNC: 92.4 MG/DL (ref 63–159)
NONHDLC SERPL-MCNC: 121 MG/DL
POTASSIUM SERPL-SCNC: 4.1 MMOL/L (ref 3.5–5.1)
PROT SERPL-MCNC: 7 G/DL (ref 6–8.4)
SODIUM SERPL-SCNC: 143 MMOL/L (ref 136–145)
TRIGL SERPL-MCNC: 143 MG/DL (ref 30–150)
TSH SERPL DL<=0.005 MIU/L-ACNC: 0.62 UIU/ML (ref 0.4–4)

## 2021-01-16 PROCEDURE — 80061 LIPID PANEL: CPT

## 2021-01-16 PROCEDURE — 83036 HEMOGLOBIN GLYCOSYLATED A1C: CPT

## 2021-01-16 PROCEDURE — 80053 COMPREHEN METABOLIC PANEL: CPT

## 2021-01-16 PROCEDURE — 82306 VITAMIN D 25 HYDROXY: CPT

## 2021-01-16 PROCEDURE — 36415 COLL VENOUS BLD VENIPUNCTURE: CPT | Mod: PO

## 2021-01-16 PROCEDURE — 84443 ASSAY THYROID STIM HORMONE: CPT

## 2021-01-25 ENCOUNTER — PATIENT OUTREACH (OUTPATIENT)
Dept: ADMINISTRATIVE | Facility: OTHER | Age: 56
End: 2021-01-25

## 2021-01-27 ENCOUNTER — OFFICE VISIT (OUTPATIENT)
Dept: ENDOCRINOLOGY | Facility: CLINIC | Age: 56
End: 2021-01-27
Payer: COMMERCIAL

## 2021-01-27 VITALS
HEIGHT: 74 IN | BODY MASS INDEX: 36.92 KG/M2 | TEMPERATURE: 98 F | OXYGEN SATURATION: 98 % | SYSTOLIC BLOOD PRESSURE: 130 MMHG | WEIGHT: 287.69 LBS | HEART RATE: 88 BPM | DIASTOLIC BLOOD PRESSURE: 70 MMHG

## 2021-01-27 DIAGNOSIS — G47.33 OSA ON CPAP: ICD-10-CM

## 2021-01-27 DIAGNOSIS — E11.42 DIABETIC POLYNEUROPATHY ASSOCIATED WITH TYPE 2 DIABETES MELLITUS: Primary | ICD-10-CM

## 2021-01-27 DIAGNOSIS — E11.65 TYPE 2 DIABETES MELLITUS WITH HYPERGLYCEMIA, WITHOUT LONG-TERM CURRENT USE OF INSULIN: ICD-10-CM

## 2021-01-27 DIAGNOSIS — E78.5 DYSLIPIDEMIA: ICD-10-CM

## 2021-01-27 DIAGNOSIS — E11.59 HYPERTENSION ASSOCIATED WITH DIABETES: ICD-10-CM

## 2021-01-27 DIAGNOSIS — E66.9 OBESITY (BMI 30-39.9): ICD-10-CM

## 2021-01-27 DIAGNOSIS — I15.2 HYPERTENSION ASSOCIATED WITH DIABETES: ICD-10-CM

## 2021-01-27 PROBLEM — E66.01 MORBID OBESITY: Status: RESOLVED | Noted: 2020-11-16 | Resolved: 2021-01-27

## 2021-01-27 PROCEDURE — 1125F AMNT PAIN NOTED PAIN PRSNT: CPT | Mod: S$GLB,,, | Performed by: NURSE PRACTITIONER

## 2021-01-27 PROCEDURE — 1125F PR PAIN SEVERITY QUANTIFIED, PAIN PRESENT: ICD-10-PCS | Mod: S$GLB,,, | Performed by: NURSE PRACTITIONER

## 2021-01-27 PROCEDURE — 99999 PR PBB SHADOW E&M-EST. PATIENT-LVL IV: CPT | Mod: PBBFAC,,, | Performed by: NURSE PRACTITIONER

## 2021-01-27 PROCEDURE — 3072F LOW RISK FOR RETINOPATHY: CPT | Mod: S$GLB,,, | Performed by: NURSE PRACTITIONER

## 2021-01-27 PROCEDURE — 3008F BODY MASS INDEX DOCD: CPT | Mod: CPTII,S$GLB,, | Performed by: NURSE PRACTITIONER

## 2021-01-27 PROCEDURE — 3072F PR LOW RISK FOR RETINOPATHY: ICD-10-PCS | Mod: S$GLB,,, | Performed by: NURSE PRACTITIONER

## 2021-01-27 PROCEDURE — 99999 PR PBB SHADOW E&M-EST. PATIENT-LVL IV: ICD-10-PCS | Mod: PBBFAC,,, | Performed by: NURSE PRACTITIONER

## 2021-01-27 PROCEDURE — 99214 OFFICE O/P EST MOD 30 MIN: CPT | Mod: S$GLB,,, | Performed by: NURSE PRACTITIONER

## 2021-01-27 PROCEDURE — 3051F PR MOST RECENT HEMOGLOBIN A1C LEVEL 7.0 - < 8.0%: ICD-10-PCS | Mod: CPTII,S$GLB,, | Performed by: NURSE PRACTITIONER

## 2021-01-27 PROCEDURE — 3008F PR BODY MASS INDEX (BMI) DOCUMENTED: ICD-10-PCS | Mod: CPTII,S$GLB,, | Performed by: NURSE PRACTITIONER

## 2021-01-27 PROCEDURE — 3051F HG A1C>EQUAL 7.0%<8.0%: CPT | Mod: CPTII,S$GLB,, | Performed by: NURSE PRACTITIONER

## 2021-01-27 PROCEDURE — 99214 PR OFFICE/OUTPT VISIT, EST, LEVL IV, 30-39 MIN: ICD-10-PCS | Mod: S$GLB,,, | Performed by: NURSE PRACTITIONER

## 2021-01-27 RX ORDER — OMEPRAZOLE 20 MG/1
20 CAPSULE, DELAYED RELEASE ORAL DAILY
Qty: 90 CAPSULE | Refills: 3 | Status: SHIPPED | OUTPATIENT
Start: 2021-01-27 | End: 2022-03-07

## 2021-01-27 RX ORDER — GABAPENTIN 300 MG/1
900 CAPSULE ORAL 4 TIMES DAILY
Qty: 1080 CAPSULE | Refills: 3 | Status: SHIPPED | OUTPATIENT
Start: 2021-01-27 | End: 2021-04-27

## 2021-05-06 ENCOUNTER — PATIENT MESSAGE (OUTPATIENT)
Dept: RESEARCH | Facility: HOSPITAL | Age: 56
End: 2021-05-06

## 2021-06-15 NOTE — TELEPHONE ENCOUNTER
----- Message from Key Shelley sent at 5/31/2019  9:33 AM CDT -----  Regarding: PDN study trial SCS procedure scheduling  Good morning Adriana,    I have received approval for this patient to receive a SCS. He has already completed his psych evaluation and has met with Dr. Orantes. Please schedule this patient for a Nevro, lumbar SCS trial with Dr. Orantes and a post-op with Sanjuana Sharp when you get a chance. Thank you and let me know if you have any questions!    Best,     Key   Dr. Hyde    This person called and is requesting a call back:    Name Of Person Who Called: Patient    Why Did The Person Call: Patient is wondering if Dr Hyde has gotten the results/report from Dr Morrow. She wants to know if she should come to her appointment with Dr Hyde on 03/18/21 or wait.     Best Phone Number To Call Back: [phone]    Okay To Leave A Detailed Voicemail? Yes    Thank you.

## 2021-07-01 ENCOUNTER — PATIENT MESSAGE (OUTPATIENT)
Dept: FAMILY MEDICINE | Facility: CLINIC | Age: 56
End: 2021-07-01

## 2021-07-01 DIAGNOSIS — E11.42 DIABETIC POLYNEUROPATHY ASSOCIATED WITH TYPE 2 DIABETES MELLITUS: ICD-10-CM

## 2021-07-01 RX ORDER — GABAPENTIN 300 MG/1
900 CAPSULE ORAL 4 TIMES DAILY
Qty: 1080 CAPSULE | Refills: 3 | Status: CANCELLED | OUTPATIENT
Start: 2021-07-01 | End: 2021-09-29

## 2021-08-04 ENCOUNTER — PATIENT MESSAGE (OUTPATIENT)
Dept: ADMINISTRATIVE | Facility: HOSPITAL | Age: 56
End: 2021-08-04

## 2021-08-16 DIAGNOSIS — E87.6 HYPOKALEMIA: ICD-10-CM

## 2021-08-16 RX ORDER — POTASSIUM CHLORIDE 20 MEQ/1
TABLET, EXTENDED RELEASE ORAL
Qty: 30 TABLET | Refills: 3 | Status: SHIPPED | OUTPATIENT
Start: 2021-08-16 | End: 2022-03-17 | Stop reason: SDUPTHER

## 2021-08-18 ENCOUNTER — PATIENT MESSAGE (OUTPATIENT)
Dept: FAMILY MEDICINE | Facility: CLINIC | Age: 56
End: 2021-08-18

## 2021-08-18 ENCOUNTER — PATIENT MESSAGE (OUTPATIENT)
Dept: ADMINISTRATIVE | Facility: HOSPITAL | Age: 56
End: 2021-08-18

## 2021-08-18 DIAGNOSIS — Z86.010 HISTORY OF COLON POLYPS: ICD-10-CM

## 2021-08-18 DIAGNOSIS — Z12.11 ENCOUNTER FOR FIT (FECAL IMMUNOCHEMICAL TEST) SCREENING: Primary | ICD-10-CM

## 2021-08-23 ENCOUNTER — OFFICE VISIT (OUTPATIENT)
Dept: FAMILY MEDICINE | Facility: CLINIC | Age: 56
End: 2021-08-23
Payer: COMMERCIAL

## 2021-08-23 ENCOUNTER — PATIENT OUTREACH (OUTPATIENT)
Dept: ADMINISTRATIVE | Facility: OTHER | Age: 56
End: 2021-08-23

## 2021-08-23 VITALS
OXYGEN SATURATION: 96 % | DIASTOLIC BLOOD PRESSURE: 70 MMHG | HEIGHT: 74 IN | WEIGHT: 289.69 LBS | HEART RATE: 76 BPM | RESPIRATION RATE: 18 BRPM | BODY MASS INDEX: 37.18 KG/M2 | TEMPERATURE: 99 F | SYSTOLIC BLOOD PRESSURE: 136 MMHG

## 2021-08-23 DIAGNOSIS — T24.201A PARTIAL THICKNESS BURN OF RIGHT LOWER EXTREMITY, INITIAL ENCOUNTER: Primary | ICD-10-CM

## 2021-08-23 PROCEDURE — 3072F PR LOW RISK FOR RETINOPATHY: ICD-10-PCS | Mod: CPTII,S$GLB,, | Performed by: PHYSICIAN ASSISTANT

## 2021-08-23 PROCEDURE — 99213 PR OFFICE/OUTPT VISIT, EST, LEVL III, 20-29 MIN: ICD-10-PCS | Mod: S$GLB,,, | Performed by: PHYSICIAN ASSISTANT

## 2021-08-23 PROCEDURE — 3075F PR MOST RECENT SYSTOLIC BLOOD PRESS GE 130-139MM HG: ICD-10-PCS | Mod: CPTII,S$GLB,, | Performed by: PHYSICIAN ASSISTANT

## 2021-08-23 PROCEDURE — 3051F PR MOST RECENT HEMOGLOBIN A1C LEVEL 7.0 - < 8.0%: ICD-10-PCS | Mod: CPTII,S$GLB,, | Performed by: PHYSICIAN ASSISTANT

## 2021-08-23 PROCEDURE — 99999 PR PBB SHADOW E&M-EST. PATIENT-LVL IV: ICD-10-PCS | Mod: PBBFAC,,, | Performed by: PHYSICIAN ASSISTANT

## 2021-08-23 PROCEDURE — 99213 OFFICE O/P EST LOW 20 MIN: CPT | Mod: S$GLB,,, | Performed by: PHYSICIAN ASSISTANT

## 2021-08-23 PROCEDURE — 3078F PR MOST RECENT DIASTOLIC BLOOD PRESSURE < 80 MM HG: ICD-10-PCS | Mod: CPTII,S$GLB,, | Performed by: PHYSICIAN ASSISTANT

## 2021-08-23 PROCEDURE — 3075F SYST BP GE 130 - 139MM HG: CPT | Mod: CPTII,S$GLB,, | Performed by: PHYSICIAN ASSISTANT

## 2021-08-23 PROCEDURE — 99999 PR PBB SHADOW E&M-EST. PATIENT-LVL IV: CPT | Mod: PBBFAC,,, | Performed by: PHYSICIAN ASSISTANT

## 2021-08-23 PROCEDURE — 3051F HG A1C>EQUAL 7.0%<8.0%: CPT | Mod: CPTII,S$GLB,, | Performed by: PHYSICIAN ASSISTANT

## 2021-08-23 PROCEDURE — 3078F DIAST BP <80 MM HG: CPT | Mod: CPTII,S$GLB,, | Performed by: PHYSICIAN ASSISTANT

## 2021-08-23 PROCEDURE — 3008F BODY MASS INDEX DOCD: CPT | Mod: CPTII,S$GLB,, | Performed by: PHYSICIAN ASSISTANT

## 2021-08-23 PROCEDURE — 3008F PR BODY MASS INDEX (BMI) DOCUMENTED: ICD-10-PCS | Mod: CPTII,S$GLB,, | Performed by: PHYSICIAN ASSISTANT

## 2021-08-23 PROCEDURE — 3072F LOW RISK FOR RETINOPATHY: CPT | Mod: CPTII,S$GLB,, | Performed by: PHYSICIAN ASSISTANT

## 2021-08-23 RX ORDER — SILVER SULFADIAZINE 10 G/1000G
CREAM TOPICAL 2 TIMES DAILY
Qty: 50 G | Refills: 1 | Status: SHIPPED | OUTPATIENT
Start: 2021-08-23 | End: 2021-12-08

## 2021-08-24 ENCOUNTER — OFFICE VISIT (OUTPATIENT)
Dept: ORTHOPEDICS | Facility: CLINIC | Age: 56
End: 2021-08-24
Payer: COMMERCIAL

## 2021-08-24 ENCOUNTER — HOSPITAL ENCOUNTER (OUTPATIENT)
Dept: RADIOLOGY | Facility: HOSPITAL | Age: 56
Discharge: HOME | End: 2021-08-24
Attending: NURSE PRACTITIONER
Payer: COMMERCIAL

## 2021-08-24 VITALS
BODY MASS INDEX: 36.79 KG/M2 | HEIGHT: 74 IN | WEIGHT: 286.69 LBS | HEART RATE: 98 BPM | SYSTOLIC BLOOD PRESSURE: 139 MMHG | DIASTOLIC BLOOD PRESSURE: 73 MMHG

## 2021-08-24 DIAGNOSIS — M25.511 ACUTE PAIN OF BOTH SHOULDERS: Primary | ICD-10-CM

## 2021-08-24 DIAGNOSIS — M25.511 CHRONIC RIGHT SHOULDER PAIN: Primary | ICD-10-CM

## 2021-08-24 DIAGNOSIS — G89.29 CHRONIC RIGHT SHOULDER PAIN: Primary | ICD-10-CM

## 2021-08-24 DIAGNOSIS — M25.512 ACUTE PAIN OF BOTH SHOULDERS: ICD-10-CM

## 2021-08-24 DIAGNOSIS — M25.512 ACUTE PAIN OF BOTH SHOULDERS: Primary | ICD-10-CM

## 2021-08-24 DIAGNOSIS — M25.511 ACUTE PAIN OF BOTH SHOULDERS: ICD-10-CM

## 2021-08-24 PROCEDURE — 3008F BODY MASS INDEX DOCD: CPT | Mod: CPTII,S$GLB,, | Performed by: NURSE PRACTITIONER

## 2021-08-24 PROCEDURE — 99999 PR PBB SHADOW E&M-EST. PATIENT-LVL IV: ICD-10-PCS | Mod: PBBFAC,,, | Performed by: NURSE PRACTITIONER

## 2021-08-24 PROCEDURE — 3072F LOW RISK FOR RETINOPATHY: CPT | Mod: CPTII,S$GLB,, | Performed by: NURSE PRACTITIONER

## 2021-08-24 PROCEDURE — 73030 X-RAY EXAM OF SHOULDER: CPT | Mod: 26,,, | Performed by: RADIOLOGY

## 2021-08-24 PROCEDURE — 1159F MED LIST DOCD IN RCRD: CPT | Mod: CPTII,S$GLB,, | Performed by: NURSE PRACTITIONER

## 2021-08-24 PROCEDURE — 3078F DIAST BP <80 MM HG: CPT | Mod: CPTII,S$GLB,, | Performed by: NURSE PRACTITIONER

## 2021-08-24 PROCEDURE — 73030 X-RAY EXAM OF SHOULDER: CPT | Mod: TC,50

## 2021-08-24 PROCEDURE — 3078F PR MOST RECENT DIASTOLIC BLOOD PRESSURE < 80 MM HG: ICD-10-PCS | Mod: CPTII,S$GLB,, | Performed by: NURSE PRACTITIONER

## 2021-08-24 PROCEDURE — 1160F RVW MEDS BY RX/DR IN RCRD: CPT | Mod: CPTII,S$GLB,, | Performed by: NURSE PRACTITIONER

## 2021-08-24 PROCEDURE — 99204 PR OFFICE/OUTPT VISIT, NEW, LEVL IV, 45-59 MIN: ICD-10-PCS | Mod: S$GLB,,, | Performed by: NURSE PRACTITIONER

## 2021-08-24 PROCEDURE — 99204 OFFICE O/P NEW MOD 45 MIN: CPT | Mod: S$GLB,,, | Performed by: NURSE PRACTITIONER

## 2021-08-24 PROCEDURE — 1125F PR PAIN SEVERITY QUANTIFIED, PAIN PRESENT: ICD-10-PCS | Mod: CPTII,S$GLB,, | Performed by: NURSE PRACTITIONER

## 2021-08-24 PROCEDURE — 1160F PR REVIEW ALL MEDS BY PRESCRIBER/CLIN PHARMACIST DOCUMENTED: ICD-10-PCS | Mod: CPTII,S$GLB,, | Performed by: NURSE PRACTITIONER

## 2021-08-24 PROCEDURE — 3075F PR MOST RECENT SYSTOLIC BLOOD PRESS GE 130-139MM HG: ICD-10-PCS | Mod: CPTII,S$GLB,, | Performed by: NURSE PRACTITIONER

## 2021-08-24 PROCEDURE — 3008F PR BODY MASS INDEX (BMI) DOCUMENTED: ICD-10-PCS | Mod: CPTII,S$GLB,, | Performed by: NURSE PRACTITIONER

## 2021-08-24 PROCEDURE — 3051F HG A1C>EQUAL 7.0%<8.0%: CPT | Mod: CPTII,S$GLB,, | Performed by: NURSE PRACTITIONER

## 2021-08-24 PROCEDURE — 3051F PR MOST RECENT HEMOGLOBIN A1C LEVEL 7.0 - < 8.0%: ICD-10-PCS | Mod: CPTII,S$GLB,, | Performed by: NURSE PRACTITIONER

## 2021-08-24 PROCEDURE — 99999 PR PBB SHADOW E&M-EST. PATIENT-LVL IV: CPT | Mod: PBBFAC,,, | Performed by: NURSE PRACTITIONER

## 2021-08-24 PROCEDURE — 1159F PR MEDICATION LIST DOCUMENTED IN MEDICAL RECORD: ICD-10-PCS | Mod: CPTII,S$GLB,, | Performed by: NURSE PRACTITIONER

## 2021-08-24 PROCEDURE — 3075F SYST BP GE 130 - 139MM HG: CPT | Mod: CPTII,S$GLB,, | Performed by: NURSE PRACTITIONER

## 2021-08-24 PROCEDURE — 1125F AMNT PAIN NOTED PAIN PRSNT: CPT | Mod: CPTII,S$GLB,, | Performed by: NURSE PRACTITIONER

## 2021-08-24 PROCEDURE — 73030 XR SHOULDER COMPLETE 2 OR MORE VIEWS BILATERAL: ICD-10-PCS | Mod: 26,,, | Performed by: RADIOLOGY

## 2021-08-24 PROCEDURE — 3072F PR LOW RISK FOR RETINOPATHY: ICD-10-PCS | Mod: CPTII,S$GLB,, | Performed by: NURSE PRACTITIONER

## 2021-08-25 ENCOUNTER — PATIENT MESSAGE (OUTPATIENT)
Dept: FAMILY MEDICINE | Facility: CLINIC | Age: 56
End: 2021-08-25

## 2021-08-25 ENCOUNTER — TELEPHONE (OUTPATIENT)
Dept: ORTHOPEDICS | Facility: CLINIC | Age: 56
End: 2021-08-25

## 2021-09-16 ENCOUNTER — TELEPHONE (OUTPATIENT)
Dept: ENDOCRINOLOGY | Facility: CLINIC | Age: 56
End: 2021-09-16

## 2021-09-16 DIAGNOSIS — E11.42 DIABETIC POLYNEUROPATHY ASSOCIATED WITH TYPE 2 DIABETES MELLITUS: ICD-10-CM

## 2021-09-16 RX ORDER — GABAPENTIN 300 MG/1
900 CAPSULE ORAL 3 TIMES DAILY
Qty: 270 CAPSULE | Refills: 0 | Status: SHIPPED | OUTPATIENT
Start: 2021-09-16 | End: 2021-09-20 | Stop reason: SDUPTHER

## 2021-09-20 DIAGNOSIS — E11.42 DIABETIC POLYNEUROPATHY ASSOCIATED WITH TYPE 2 DIABETES MELLITUS: ICD-10-CM

## 2021-09-21 RX ORDER — GABAPENTIN 300 MG/1
900 CAPSULE ORAL 3 TIMES DAILY
Qty: 270 CAPSULE | Refills: 0 | Status: SHIPPED | OUTPATIENT
Start: 2021-09-21 | End: 2021-10-31 | Stop reason: SDUPTHER

## 2021-10-06 DIAGNOSIS — E11.9 TYPE 2 DIABETES MELLITUS WITHOUT COMPLICATION, UNSPECIFIED WHETHER LONG TERM INSULIN USE: ICD-10-CM

## 2021-10-09 ENCOUNTER — LAB VISIT (OUTPATIENT)
Dept: LAB | Facility: HOSPITAL | Age: 56
End: 2021-10-09
Attending: NURSE PRACTITIONER
Payer: COMMERCIAL

## 2021-10-09 DIAGNOSIS — E11.42 DIABETIC POLYNEUROPATHY ASSOCIATED WITH TYPE 2 DIABETES MELLITUS: ICD-10-CM

## 2021-10-09 LAB
ALBUMIN SERPL BCP-MCNC: 4.1 G/DL (ref 3.5–5.2)
ALP SERPL-CCNC: 62 U/L (ref 55–135)
ALT SERPL W/O P-5'-P-CCNC: 29 U/L (ref 10–44)
ANION GAP SERPL CALC-SCNC: 12 MMOL/L (ref 8–16)
AST SERPL-CCNC: 21 U/L (ref 10–40)
BILIRUB SERPL-MCNC: 0.6 MG/DL (ref 0.1–1)
BUN SERPL-MCNC: 21 MG/DL (ref 6–20)
CALCIUM SERPL-MCNC: 9.8 MG/DL (ref 8.7–10.5)
CHLORIDE SERPL-SCNC: 100 MMOL/L (ref 95–110)
CO2 SERPL-SCNC: 29 MMOL/L (ref 23–29)
CREAT SERPL-MCNC: 1.3 MG/DL (ref 0.5–1.4)
EST. GFR  (AFRICAN AMERICAN): >60 ML/MIN/1.73 M^2
EST. GFR  (NON AFRICAN AMERICAN): >60 ML/MIN/1.73 M^2
ESTIMATED AVG GLUCOSE: 160 MG/DL (ref 68–131)
GLUCOSE SERPL-MCNC: 145 MG/DL (ref 70–110)
HBA1C MFR BLD: 7.2 % (ref 4–5.6)
MAGNESIUM SERPL-MCNC: 1.7 MG/DL (ref 1.6–2.6)
POTASSIUM SERPL-SCNC: 4.2 MMOL/L (ref 3.5–5.1)
PROT SERPL-MCNC: 7.3 G/DL (ref 6–8.4)
SODIUM SERPL-SCNC: 141 MMOL/L (ref 136–145)

## 2021-10-09 PROCEDURE — 36415 COLL VENOUS BLD VENIPUNCTURE: CPT | Performed by: NURSE PRACTITIONER

## 2021-10-09 PROCEDURE — 83036 HEMOGLOBIN GLYCOSYLATED A1C: CPT | Performed by: NURSE PRACTITIONER

## 2021-10-09 PROCEDURE — 83735 ASSAY OF MAGNESIUM: CPT | Performed by: NURSE PRACTITIONER

## 2021-10-09 PROCEDURE — 80053 COMPREHEN METABOLIC PANEL: CPT | Performed by: NURSE PRACTITIONER

## 2021-10-22 ENCOUNTER — TELEPHONE (OUTPATIENT)
Dept: GASTROENTEROLOGY | Facility: CLINIC | Age: 56
End: 2021-10-22

## 2021-10-22 ENCOUNTER — TELEPHONE (OUTPATIENT)
Dept: ENDOCRINOLOGY | Facility: CLINIC | Age: 56
End: 2021-10-22
Payer: COMMERCIAL

## 2021-10-31 DIAGNOSIS — E11.42 DIABETIC POLYNEUROPATHY ASSOCIATED WITH TYPE 2 DIABETES MELLITUS: ICD-10-CM

## 2021-11-01 ENCOUNTER — OFFICE VISIT (OUTPATIENT)
Dept: URGENT CARE | Facility: CLINIC | Age: 56
End: 2021-11-01
Payer: COMMERCIAL

## 2021-11-01 VITALS
TEMPERATURE: 99 F | SYSTOLIC BLOOD PRESSURE: 148 MMHG | DIASTOLIC BLOOD PRESSURE: 73 MMHG | BODY MASS INDEX: 37.73 KG/M2 | HEIGHT: 74 IN | HEART RATE: 94 BPM | OXYGEN SATURATION: 97 % | RESPIRATION RATE: 20 BRPM | WEIGHT: 294 LBS

## 2021-11-01 DIAGNOSIS — R05.9 COUGH: Primary | ICD-10-CM

## 2021-11-01 DIAGNOSIS — Z20.822 COVID-19 VIRUS NOT DETECTED: ICD-10-CM

## 2021-11-01 DIAGNOSIS — J40 BRONCHITIS: ICD-10-CM

## 2021-11-01 LAB
CTP QC/QA: YES
SARS-COV-2 RDRP RESP QL NAA+PROBE: NEGATIVE

## 2021-11-01 PROCEDURE — 1159F PR MEDICATION LIST DOCUMENTED IN MEDICAL RECORD: ICD-10-PCS | Mod: CPTII,S$GLB,, | Performed by: NURSE PRACTITIONER

## 2021-11-01 PROCEDURE — 1160F RVW MEDS BY RX/DR IN RCRD: CPT | Mod: CPTII,S$GLB,, | Performed by: NURSE PRACTITIONER

## 2021-11-01 PROCEDURE — 1160F PR REVIEW ALL MEDS BY PRESCRIBER/CLIN PHARMACIST DOCUMENTED: ICD-10-PCS | Mod: CPTII,S$GLB,, | Performed by: NURSE PRACTITIONER

## 2021-11-01 PROCEDURE — 3078F DIAST BP <80 MM HG: CPT | Mod: CPTII,S$GLB,, | Performed by: NURSE PRACTITIONER

## 2021-11-01 PROCEDURE — 99204 OFFICE O/P NEW MOD 45 MIN: CPT | Mod: S$GLB,,, | Performed by: NURSE PRACTITIONER

## 2021-11-01 PROCEDURE — 3051F HG A1C>EQUAL 7.0%<8.0%: CPT | Mod: CPTII,S$GLB,, | Performed by: NURSE PRACTITIONER

## 2021-11-01 PROCEDURE — 3078F PR MOST RECENT DIASTOLIC BLOOD PRESSURE < 80 MM HG: ICD-10-PCS | Mod: CPTII,S$GLB,, | Performed by: NURSE PRACTITIONER

## 2021-11-01 PROCEDURE — 3008F BODY MASS INDEX DOCD: CPT | Mod: CPTII,S$GLB,, | Performed by: NURSE PRACTITIONER

## 2021-11-01 PROCEDURE — 4010F ACE/ARB THERAPY RXD/TAKEN: CPT | Mod: CPTII,S$GLB,, | Performed by: NURSE PRACTITIONER

## 2021-11-01 PROCEDURE — 3051F PR MOST RECENT HEMOGLOBIN A1C LEVEL 7.0 - < 8.0%: ICD-10-PCS | Mod: CPTII,S$GLB,, | Performed by: NURSE PRACTITIONER

## 2021-11-01 PROCEDURE — U0002: ICD-10-PCS | Mod: QW,S$GLB,, | Performed by: NURSE PRACTITIONER

## 2021-11-01 PROCEDURE — 99204 PR OFFICE/OUTPT VISIT, NEW, LEVL IV, 45-59 MIN: ICD-10-PCS | Mod: S$GLB,,, | Performed by: NURSE PRACTITIONER

## 2021-11-01 PROCEDURE — 4010F PR ACE/ARB THEARPY RXD/TAKEN: ICD-10-PCS | Mod: CPTII,S$GLB,, | Performed by: NURSE PRACTITIONER

## 2021-11-01 PROCEDURE — U0002 COVID-19 LAB TEST NON-CDC: HCPCS | Mod: QW,S$GLB,, | Performed by: NURSE PRACTITIONER

## 2021-11-01 PROCEDURE — 3072F LOW RISK FOR RETINOPATHY: CPT | Mod: CPTII,S$GLB,, | Performed by: NURSE PRACTITIONER

## 2021-11-01 PROCEDURE — 3077F SYST BP >= 140 MM HG: CPT | Mod: CPTII,S$GLB,, | Performed by: NURSE PRACTITIONER

## 2021-11-01 PROCEDURE — 3008F PR BODY MASS INDEX (BMI) DOCUMENTED: ICD-10-PCS | Mod: CPTII,S$GLB,, | Performed by: NURSE PRACTITIONER

## 2021-11-01 PROCEDURE — 3072F PR LOW RISK FOR RETINOPATHY: ICD-10-PCS | Mod: CPTII,S$GLB,, | Performed by: NURSE PRACTITIONER

## 2021-11-01 PROCEDURE — 3077F PR MOST RECENT SYSTOLIC BLOOD PRESSURE >= 140 MM HG: ICD-10-PCS | Mod: CPTII,S$GLB,, | Performed by: NURSE PRACTITIONER

## 2021-11-01 PROCEDURE — 1159F MED LIST DOCD IN RCRD: CPT | Mod: CPTII,S$GLB,, | Performed by: NURSE PRACTITIONER

## 2021-11-01 RX ORDER — DOXYCYCLINE 100 MG/1
100 CAPSULE ORAL 2 TIMES DAILY
Qty: 14 CAPSULE | Refills: 0 | Status: SHIPPED | OUTPATIENT
Start: 2021-11-01 | End: 2021-11-08

## 2021-11-01 RX ORDER — GABAPENTIN 300 MG/1
900 CAPSULE ORAL 3 TIMES DAILY
Qty: 270 CAPSULE | Refills: 0 | Status: SHIPPED | OUTPATIENT
Start: 2021-11-01 | End: 2021-11-19 | Stop reason: SDUPTHER

## 2021-11-01 RX ORDER — ALBUTEROL SULFATE 90 UG/1
2 AEROSOL, METERED RESPIRATORY (INHALATION) EVERY 6 HOURS PRN
Qty: 8.5 G | Refills: 0 | Status: SHIPPED | OUTPATIENT
Start: 2021-11-01 | End: 2021-11-16 | Stop reason: SDUPTHER

## 2021-11-02 ENCOUNTER — TELEPHONE (OUTPATIENT)
Dept: ENDOCRINOLOGY | Facility: CLINIC | Age: 56
End: 2021-11-02
Payer: COMMERCIAL

## 2021-11-12 ENCOUNTER — PATIENT MESSAGE (OUTPATIENT)
Dept: ENDOCRINOLOGY | Facility: CLINIC | Age: 56
End: 2021-11-12
Payer: COMMERCIAL

## 2021-11-16 DIAGNOSIS — J40 BRONCHITIS: ICD-10-CM

## 2021-11-16 DIAGNOSIS — R05.9 COUGH: ICD-10-CM

## 2021-11-16 RX ORDER — ALBUTEROL SULFATE 90 UG/1
2 AEROSOL, METERED RESPIRATORY (INHALATION) EVERY 6 HOURS PRN
Qty: 8.5 G | Refills: 0 | Status: CANCELLED | OUTPATIENT
Start: 2021-11-16

## 2021-11-17 RX ORDER — ALBUTEROL SULFATE 90 UG/1
2 AEROSOL, METERED RESPIRATORY (INHALATION) EVERY 6 HOURS PRN
Qty: 8.5 G | Refills: 0 | Status: SHIPPED | OUTPATIENT
Start: 2021-11-17 | End: 2023-08-15

## 2021-11-18 ENCOUNTER — PATIENT MESSAGE (OUTPATIENT)
Dept: ENDOCRINOLOGY | Facility: CLINIC | Age: 56
End: 2021-11-18
Payer: COMMERCIAL

## 2021-11-18 DIAGNOSIS — I10 ESSENTIAL HYPERTENSION: ICD-10-CM

## 2021-11-18 DIAGNOSIS — E11.42 DIABETIC POLYNEUROPATHY ASSOCIATED WITH TYPE 2 DIABETES MELLITUS: ICD-10-CM

## 2021-11-18 DIAGNOSIS — E11.40 TYPE 2 DIABETES MELLITUS WITH DIABETIC NEUROPATHY, WITHOUT LONG-TERM CURRENT USE OF INSULIN: ICD-10-CM

## 2021-11-19 RX ORDER — LISINOPRIL AND HYDROCHLOROTHIAZIDE 20; 25 MG/1; MG/1
1 TABLET ORAL DAILY
Qty: 30 TABLET | Refills: 0 | Status: SHIPPED | OUTPATIENT
Start: 2021-11-19 | End: 2022-01-19

## 2021-11-19 RX ORDER — GABAPENTIN 300 MG/1
900 CAPSULE ORAL 4 TIMES DAILY
Qty: 360 CAPSULE | Refills: 0 | Status: SHIPPED | OUTPATIENT
Start: 2021-11-19 | End: 2021-12-09 | Stop reason: SDUPTHER

## 2021-11-19 RX ORDER — METFORMIN HYDROCHLORIDE 850 MG/1
TABLET ORAL
Qty: 90 TABLET | Refills: 0 | Status: SHIPPED | OUTPATIENT
Start: 2021-11-19 | End: 2021-12-13 | Stop reason: SDUPTHER

## 2021-11-19 RX ORDER — PIOGLITAZONEHYDROCHLORIDE 30 MG/1
30 TABLET ORAL DAILY
Qty: 30 TABLET | Refills: 0 | Status: SHIPPED | OUTPATIENT
Start: 2021-11-19 | End: 2021-12-13 | Stop reason: SDUPTHER

## 2021-12-01 ENCOUNTER — PATIENT MESSAGE (OUTPATIENT)
Dept: FAMILY MEDICINE | Facility: CLINIC | Age: 56
End: 2021-12-01
Payer: COMMERCIAL

## 2021-12-02 ENCOUNTER — PATIENT MESSAGE (OUTPATIENT)
Dept: FAMILY MEDICINE | Facility: CLINIC | Age: 56
End: 2021-12-02
Payer: COMMERCIAL

## 2021-12-02 DIAGNOSIS — G47.33 OSA ON CPAP: Primary | ICD-10-CM

## 2021-12-06 ENCOUNTER — TELEPHONE (OUTPATIENT)
Dept: ENDOCRINOLOGY | Facility: CLINIC | Age: 56
End: 2021-12-06
Payer: COMMERCIAL

## 2021-12-08 ENCOUNTER — TELEPHONE (OUTPATIENT)
Dept: ENDOCRINOLOGY | Facility: CLINIC | Age: 56
End: 2021-12-08
Payer: COMMERCIAL

## 2021-12-08 ENCOUNTER — OFFICE VISIT (OUTPATIENT)
Dept: PODIATRY | Facility: CLINIC | Age: 56
End: 2021-12-08
Payer: COMMERCIAL

## 2021-12-08 VITALS
BODY MASS INDEX: 37.22 KG/M2 | WEIGHT: 290 LBS | OXYGEN SATURATION: 98 % | HEART RATE: 90 BPM | DIASTOLIC BLOOD PRESSURE: 70 MMHG | RESPIRATION RATE: 18 BRPM | HEIGHT: 74 IN | SYSTOLIC BLOOD PRESSURE: 139 MMHG

## 2021-12-08 DIAGNOSIS — M20.11 HALLUX VALGUS, BILATERAL: ICD-10-CM

## 2021-12-08 DIAGNOSIS — E11.65 TYPE 2 DIABETES MELLITUS WITH HYPERGLYCEMIA, WITHOUT LONG-TERM CURRENT USE OF INSULIN: Primary | ICD-10-CM

## 2021-12-08 DIAGNOSIS — E11.42 DIABETIC POLYNEUROPATHY ASSOCIATED WITH TYPE 2 DIABETES MELLITUS: Primary | ICD-10-CM

## 2021-12-08 DIAGNOSIS — E11.9 ENCOUNTER FOR DIABETIC FOOT EXAM: ICD-10-CM

## 2021-12-08 DIAGNOSIS — L85.1 ACQUIRED KERATODERMA: ICD-10-CM

## 2021-12-08 DIAGNOSIS — M20.12 HALLUX VALGUS, BILATERAL: ICD-10-CM

## 2021-12-08 DIAGNOSIS — I87.2 VENOUS INSUFFICIENCY OF BOTH LOWER EXTREMITIES: ICD-10-CM

## 2021-12-08 DIAGNOSIS — M20.41 HAMMER TOES OF BOTH FEET: ICD-10-CM

## 2021-12-08 DIAGNOSIS — M20.42 HAMMER TOES OF BOTH FEET: ICD-10-CM

## 2021-12-08 PROCEDURE — 4010F ACE/ARB THERAPY RXD/TAKEN: CPT | Mod: CPTII,S$GLB,, | Performed by: PODIATRIST

## 2021-12-08 PROCEDURE — 4010F PR ACE/ARB THEARPY RXD/TAKEN: ICD-10-PCS | Mod: CPTII,S$GLB,, | Performed by: PODIATRIST

## 2021-12-08 PROCEDURE — 3072F PR LOW RISK FOR RETINOPATHY: ICD-10-PCS | Mod: CPTII,S$GLB,, | Performed by: PODIATRIST

## 2021-12-08 PROCEDURE — 99204 OFFICE O/P NEW MOD 45 MIN: CPT | Mod: S$GLB,,, | Performed by: PODIATRIST

## 2021-12-08 PROCEDURE — 3072F LOW RISK FOR RETINOPATHY: CPT | Mod: CPTII,S$GLB,, | Performed by: PODIATRIST

## 2021-12-08 PROCEDURE — 99204 PR OFFICE/OUTPT VISIT, NEW, LEVL IV, 45-59 MIN: ICD-10-PCS | Mod: S$GLB,,, | Performed by: PODIATRIST

## 2021-12-09 DIAGNOSIS — E11.42 DIABETIC POLYNEUROPATHY ASSOCIATED WITH TYPE 2 DIABETES MELLITUS: ICD-10-CM

## 2021-12-09 RX ORDER — GABAPENTIN 300 MG/1
900 CAPSULE ORAL 4 TIMES DAILY
Qty: 360 CAPSULE | Refills: 0 | Status: SHIPPED | OUTPATIENT
Start: 2021-12-09 | End: 2021-12-13 | Stop reason: SDUPTHER

## 2021-12-11 ENCOUNTER — LAB VISIT (OUTPATIENT)
Dept: LAB | Facility: HOSPITAL | Age: 56
End: 2021-12-11
Attending: NURSE PRACTITIONER
Payer: COMMERCIAL

## 2021-12-11 DIAGNOSIS — E11.65 TYPE 2 DIABETES MELLITUS WITH HYPERGLYCEMIA, WITHOUT LONG-TERM CURRENT USE OF INSULIN: ICD-10-CM

## 2021-12-11 LAB
ALBUMIN/CREAT UR: NORMAL UG/MG (ref 0–30)
CREAT UR-MCNC: 36 MG/DL (ref 23–375)
MICROALBUMIN UR DL<=1MG/L-MCNC: <5 UG/ML

## 2021-12-11 PROCEDURE — 82570 ASSAY OF URINE CREATININE: CPT | Performed by: NURSE PRACTITIONER

## 2021-12-12 ENCOUNTER — PATIENT MESSAGE (OUTPATIENT)
Dept: ENDOCRINOLOGY | Facility: CLINIC | Age: 56
End: 2021-12-12
Payer: COMMERCIAL

## 2021-12-13 ENCOUNTER — PATIENT MESSAGE (OUTPATIENT)
Dept: ENDOCRINOLOGY | Facility: CLINIC | Age: 56
End: 2021-12-13

## 2021-12-13 ENCOUNTER — OFFICE VISIT (OUTPATIENT)
Dept: ENDOCRINOLOGY | Facility: CLINIC | Age: 56
End: 2021-12-13
Payer: COMMERCIAL

## 2021-12-13 DIAGNOSIS — I15.2 HYPERTENSION ASSOCIATED WITH DIABETES: ICD-10-CM

## 2021-12-13 DIAGNOSIS — E11.65 TYPE 2 DIABETES MELLITUS WITH HYPERGLYCEMIA, WITHOUT LONG-TERM CURRENT USE OF INSULIN: ICD-10-CM

## 2021-12-13 DIAGNOSIS — E78.5 DYSLIPIDEMIA: ICD-10-CM

## 2021-12-13 DIAGNOSIS — E11.40 TYPE 2 DIABETES MELLITUS WITH DIABETIC NEUROPATHY, WITHOUT LONG-TERM CURRENT USE OF INSULIN: ICD-10-CM

## 2021-12-13 DIAGNOSIS — E11.42 DIABETIC POLYNEUROPATHY ASSOCIATED WITH TYPE 2 DIABETES MELLITUS: Primary | ICD-10-CM

## 2021-12-13 DIAGNOSIS — I48.91 ATRIAL FIBRILLATION, UNSPECIFIED TYPE: ICD-10-CM

## 2021-12-13 DIAGNOSIS — G47.33 OSA ON CPAP: ICD-10-CM

## 2021-12-13 DIAGNOSIS — E11.59 HYPERTENSION ASSOCIATED WITH DIABETES: ICD-10-CM

## 2021-12-13 PROCEDURE — 3072F PR LOW RISK FOR RETINOPATHY: ICD-10-PCS | Mod: CPTII,95,, | Performed by: NURSE PRACTITIONER

## 2021-12-13 PROCEDURE — 99214 PR OFFICE/OUTPT VISIT, EST, LEVL IV, 30-39 MIN: ICD-10-PCS | Mod: 95,,, | Performed by: NURSE PRACTITIONER

## 2021-12-13 PROCEDURE — 3061F NEG MICROALBUMINURIA REV: CPT | Mod: CPTII,95,, | Performed by: NURSE PRACTITIONER

## 2021-12-13 PROCEDURE — 99214 OFFICE O/P EST MOD 30 MIN: CPT | Mod: 95,,, | Performed by: NURSE PRACTITIONER

## 2021-12-13 PROCEDURE — 3066F NEPHROPATHY DOC TX: CPT | Mod: CPTII,95,, | Performed by: NURSE PRACTITIONER

## 2021-12-13 PROCEDURE — 4010F PR ACE/ARB THEARPY RXD/TAKEN: ICD-10-PCS | Mod: CPTII,95,, | Performed by: NURSE PRACTITIONER

## 2021-12-13 PROCEDURE — 3066F PR DOCUMENTATION OF TREATMENT FOR NEPHROPATHY: ICD-10-PCS | Mod: CPTII,95,, | Performed by: NURSE PRACTITIONER

## 2021-12-13 PROCEDURE — 3072F LOW RISK FOR RETINOPATHY: CPT | Mod: CPTII,95,, | Performed by: NURSE PRACTITIONER

## 2021-12-13 PROCEDURE — 3061F PR NEG MICROALBUMINURIA RESULT DOCUMENTED/REVIEW: ICD-10-PCS | Mod: CPTII,95,, | Performed by: NURSE PRACTITIONER

## 2021-12-13 PROCEDURE — 4010F ACE/ARB THERAPY RXD/TAKEN: CPT | Mod: CPTII,95,, | Performed by: NURSE PRACTITIONER

## 2021-12-13 RX ORDER — PIOGLITAZONEHYDROCHLORIDE 30 MG/1
30 TABLET ORAL DAILY
Qty: 90 TABLET | Refills: 4 | Status: SHIPPED | OUTPATIENT
Start: 2021-12-13 | End: 2023-01-04 | Stop reason: SDUPTHER

## 2021-12-13 RX ORDER — METFORMIN HYDROCHLORIDE 850 MG/1
TABLET ORAL
Qty: 270 TABLET | Refills: 4 | Status: SHIPPED | OUTPATIENT
Start: 2021-12-13 | End: 2023-03-12 | Stop reason: SDUPTHER

## 2021-12-13 RX ORDER — GLIMEPIRIDE 4 MG/1
4 TABLET ORAL 2 TIMES DAILY
Qty: 180 TABLET | Refills: 4 | Status: SHIPPED | OUTPATIENT
Start: 2021-12-13 | End: 2023-02-21 | Stop reason: SDUPTHER

## 2021-12-13 RX ORDER — GABAPENTIN 300 MG/1
1200 CAPSULE ORAL 3 TIMES DAILY
Qty: 1080 CAPSULE | Refills: 1 | Status: SHIPPED | OUTPATIENT
Start: 2021-12-13 | End: 2022-05-28 | Stop reason: SDUPTHER

## 2021-12-15 ENCOUNTER — PATIENT MESSAGE (OUTPATIENT)
Dept: PODIATRY | Facility: CLINIC | Age: 56
End: 2021-12-15
Payer: COMMERCIAL

## 2021-12-15 ENCOUNTER — PATIENT MESSAGE (OUTPATIENT)
Dept: ENDOCRINOLOGY | Facility: CLINIC | Age: 56
End: 2021-12-15
Payer: COMMERCIAL

## 2021-12-20 ENCOUNTER — PATIENT MESSAGE (OUTPATIENT)
Dept: FAMILY MEDICINE | Facility: CLINIC | Age: 56
End: 2021-12-20
Payer: COMMERCIAL

## 2022-02-01 ENCOUNTER — PATIENT OUTREACH (OUTPATIENT)
Dept: ADMINISTRATIVE | Facility: OTHER | Age: 57
End: 2022-02-01
Payer: COMMERCIAL

## 2022-02-01 ENCOUNTER — PATIENT MESSAGE (OUTPATIENT)
Dept: FAMILY MEDICINE | Facility: CLINIC | Age: 57
End: 2022-02-01
Payer: COMMERCIAL

## 2022-02-08 ENCOUNTER — OFFICE VISIT (OUTPATIENT)
Dept: OPHTHALMOLOGY | Facility: CLINIC | Age: 57
End: 2022-02-08
Payer: COMMERCIAL

## 2022-02-08 DIAGNOSIS — H25.13 NUCLEAR SCLEROTIC CATARACT, BILATERAL: ICD-10-CM

## 2022-02-08 DIAGNOSIS — E11.9 DIABETES MELLITUS TYPE 2 WITHOUT RETINOPATHY: Primary | ICD-10-CM

## 2022-02-08 PROCEDURE — 1160F PR REVIEW ALL MEDS BY PRESCRIBER/CLIN PHARMACIST DOCUMENTED: ICD-10-PCS | Mod: CPTII,S$GLB,, | Performed by: OPHTHALMOLOGY

## 2022-02-08 PROCEDURE — 1159F MED LIST DOCD IN RCRD: CPT | Mod: CPTII,S$GLB,, | Performed by: OPHTHALMOLOGY

## 2022-02-08 PROCEDURE — 99999 PR PBB SHADOW E&M-EST. PATIENT-LVL III: ICD-10-PCS | Mod: PBBFAC,,, | Performed by: OPHTHALMOLOGY

## 2022-02-08 PROCEDURE — 2023F PR DILATED RETINAL EXAM W/O EVID OF RETINOPATHY: ICD-10-PCS | Mod: CPTII,S$GLB,, | Performed by: OPHTHALMOLOGY

## 2022-02-08 PROCEDURE — 4010F ACE/ARB THERAPY RXD/TAKEN: CPT | Mod: CPTII,S$GLB,, | Performed by: OPHTHALMOLOGY

## 2022-02-08 PROCEDURE — 99999 PR PBB SHADOW E&M-EST. PATIENT-LVL III: CPT | Mod: PBBFAC,,, | Performed by: OPHTHALMOLOGY

## 2022-02-08 PROCEDURE — 2023F DILAT RTA XM W/O RTNOPTHY: CPT | Mod: CPTII,S$GLB,, | Performed by: OPHTHALMOLOGY

## 2022-02-08 PROCEDURE — 92004 PR EYE EXAM, NEW PATIENT,COMPREHESV: ICD-10-PCS | Mod: S$GLB,,, | Performed by: OPHTHALMOLOGY

## 2022-02-08 PROCEDURE — 4010F PR ACE/ARB THEARPY RXD/TAKEN: ICD-10-PCS | Mod: CPTII,S$GLB,, | Performed by: OPHTHALMOLOGY

## 2022-02-08 PROCEDURE — 1160F RVW MEDS BY RX/DR IN RCRD: CPT | Mod: CPTII,S$GLB,, | Performed by: OPHTHALMOLOGY

## 2022-02-08 PROCEDURE — 1159F PR MEDICATION LIST DOCUMENTED IN MEDICAL RECORD: ICD-10-PCS | Mod: CPTII,S$GLB,, | Performed by: OPHTHALMOLOGY

## 2022-02-08 PROCEDURE — 92004 COMPRE OPH EXAM NEW PT 1/>: CPT | Mod: S$GLB,,, | Performed by: OPHTHALMOLOGY

## 2022-02-08 NOTE — PROGRESS NOTES
HPI     Diabetic Eye Exam      Additional comments: LDE: 10/2020              Comments     55 YO male presents today for an diabetic eye exam. Patient states that   he is doing well, notes no problems or complaints.     OTC allergy drops OU QAM     Hemoglobin A1C       Date                     Value               Ref Range             Status                12/11/2021               7.4 (H)             4.0 - 5.6 %           Final                  10/09/2021               7.2 (H)             4.0 - 5.6 %           Final                 01/16/2021               7.5 (H)             4.0 - 5.6 %           Final                      Last edited by Jamila Rodrigez, PCT on 2/8/2022  3:50 PM. (History)            Assessment /Plan     For exam results, see Encounter Report.    Diabetes mellitus type 2 without retinopathy    Nuclear sclerotic cataract, bilateral      1. Diabetes mellitus type 2 without retinopathy  Diabetes without retinopathy, discussed with patient importance of glucose control and follow up.  Patient voices understanding.    2. Nuclear sclerotic cataract, bilateral  Mild, observe    F/u 1 year, routine exam

## 2022-03-12 ENCOUNTER — LAB VISIT (OUTPATIENT)
Dept: LAB | Facility: HOSPITAL | Age: 57
End: 2022-03-12
Attending: NURSE PRACTITIONER
Payer: COMMERCIAL

## 2022-03-12 DIAGNOSIS — E11.42 DIABETIC POLYNEUROPATHY ASSOCIATED WITH TYPE 2 DIABETES MELLITUS: ICD-10-CM

## 2022-03-12 DIAGNOSIS — E78.5 DYSLIPIDEMIA: ICD-10-CM

## 2022-03-12 LAB
25(OH)D3+25(OH)D2 SERPL-MCNC: 55 NG/ML (ref 30–96)
ALBUMIN SERPL BCP-MCNC: 4 G/DL (ref 3.5–5.2)
ALP SERPL-CCNC: 73 U/L (ref 55–135)
ALT SERPL W/O P-5'-P-CCNC: 31 U/L (ref 10–44)
ANION GAP SERPL CALC-SCNC: 11 MMOL/L (ref 8–16)
AST SERPL-CCNC: 23 U/L (ref 10–40)
BILIRUB SERPL-MCNC: 0.7 MG/DL (ref 0.1–1)
BUN SERPL-MCNC: 18 MG/DL (ref 6–20)
CALCIUM SERPL-MCNC: 9.7 MG/DL (ref 8.7–10.5)
CHLORIDE SERPL-SCNC: 100 MMOL/L (ref 95–110)
CHOLEST SERPL-MCNC: 162 MG/DL (ref 120–199)
CHOLEST/HDLC SERPL: 4.2 {RATIO} (ref 2–5)
CO2 SERPL-SCNC: 30 MMOL/L (ref 23–29)
CREAT SERPL-MCNC: 1.2 MG/DL (ref 0.5–1.4)
EST. GFR  (AFRICAN AMERICAN): >60 ML/MIN/1.73 M^2
EST. GFR  (NON AFRICAN AMERICAN): >60 ML/MIN/1.73 M^2
ESTIMATED AVG GLUCOSE: 180 MG/DL (ref 68–131)
GLUCOSE SERPL-MCNC: 141 MG/DL (ref 70–110)
HBA1C MFR BLD: 7.9 % (ref 4–5.6)
HDLC SERPL-MCNC: 39 MG/DL (ref 40–75)
HDLC SERPL: 24.1 % (ref 20–50)
LDLC SERPL CALC-MCNC: 86.6 MG/DL (ref 63–159)
NONHDLC SERPL-MCNC: 123 MG/DL
POTASSIUM SERPL-SCNC: 4.3 MMOL/L (ref 3.5–5.1)
PROT SERPL-MCNC: 7.3 G/DL (ref 6–8.4)
SODIUM SERPL-SCNC: 141 MMOL/L (ref 136–145)
TRIGL SERPL-MCNC: 182 MG/DL (ref 30–150)

## 2022-03-12 PROCEDURE — 83036 HEMOGLOBIN GLYCOSYLATED A1C: CPT | Performed by: NURSE PRACTITIONER

## 2022-03-12 PROCEDURE — 80061 LIPID PANEL: CPT | Performed by: NURSE PRACTITIONER

## 2022-03-12 PROCEDURE — 36415 COLL VENOUS BLD VENIPUNCTURE: CPT | Performed by: NURSE PRACTITIONER

## 2022-03-12 PROCEDURE — 80053 COMPREHEN METABOLIC PANEL: CPT | Performed by: NURSE PRACTITIONER

## 2022-03-12 PROCEDURE — 82306 VITAMIN D 25 HYDROXY: CPT | Performed by: NURSE PRACTITIONER

## 2022-03-17 ENCOUNTER — OFFICE VISIT (OUTPATIENT)
Dept: ENDOCRINOLOGY | Facility: CLINIC | Age: 57
End: 2022-03-17
Payer: COMMERCIAL

## 2022-03-17 VITALS
WEIGHT: 282.88 LBS | SYSTOLIC BLOOD PRESSURE: 116 MMHG | HEIGHT: 74 IN | HEART RATE: 89 BPM | OXYGEN SATURATION: 98 % | BODY MASS INDEX: 36.3 KG/M2 | DIASTOLIC BLOOD PRESSURE: 70 MMHG | TEMPERATURE: 98 F

## 2022-03-17 DIAGNOSIS — E66.9 OBESITY (BMI 30-39.9): ICD-10-CM

## 2022-03-17 DIAGNOSIS — G47.33 OSA ON CPAP: ICD-10-CM

## 2022-03-17 DIAGNOSIS — E11.69 HYPERLIPIDEMIA ASSOCIATED WITH TYPE 2 DIABETES MELLITUS: ICD-10-CM

## 2022-03-17 DIAGNOSIS — E78.5 DYSLIPIDEMIA: ICD-10-CM

## 2022-03-17 DIAGNOSIS — E11.42 DIABETIC POLYNEUROPATHY ASSOCIATED WITH TYPE 2 DIABETES MELLITUS: ICD-10-CM

## 2022-03-17 DIAGNOSIS — K21.9 GASTROESOPHAGEAL REFLUX DISEASE WITHOUT ESOPHAGITIS: ICD-10-CM

## 2022-03-17 DIAGNOSIS — E87.6 HYPOKALEMIA: ICD-10-CM

## 2022-03-17 DIAGNOSIS — E11.59 HYPERTENSION ASSOCIATED WITH DIABETES: ICD-10-CM

## 2022-03-17 DIAGNOSIS — I15.2 HYPERTENSION ASSOCIATED WITH DIABETES: ICD-10-CM

## 2022-03-17 DIAGNOSIS — I48.91 ATRIAL FIBRILLATION, UNSPECIFIED TYPE: ICD-10-CM

## 2022-03-17 DIAGNOSIS — E78.5 HYPERLIPIDEMIA ASSOCIATED WITH TYPE 2 DIABETES MELLITUS: ICD-10-CM

## 2022-03-17 DIAGNOSIS — E11.65 TYPE 2 DIABETES MELLITUS WITH HYPERGLYCEMIA, WITHOUT LONG-TERM CURRENT USE OF INSULIN: Primary | ICD-10-CM

## 2022-03-17 PROCEDURE — 99214 OFFICE O/P EST MOD 30 MIN: CPT | Mod: S$GLB,,, | Performed by: NURSE PRACTITIONER

## 2022-03-17 PROCEDURE — 3051F PR MOST RECENT HEMOGLOBIN A1C LEVEL 7.0 - < 8.0%: ICD-10-PCS | Mod: CPTII,S$GLB,, | Performed by: NURSE PRACTITIONER

## 2022-03-17 PROCEDURE — 3074F PR MOST RECENT SYSTOLIC BLOOD PRESSURE < 130 MM HG: ICD-10-PCS | Mod: CPTII,S$GLB,, | Performed by: NURSE PRACTITIONER

## 2022-03-17 PROCEDURE — 3078F DIAST BP <80 MM HG: CPT | Mod: CPTII,S$GLB,, | Performed by: NURSE PRACTITIONER

## 2022-03-17 PROCEDURE — 3078F PR MOST RECENT DIASTOLIC BLOOD PRESSURE < 80 MM HG: ICD-10-PCS | Mod: CPTII,S$GLB,, | Performed by: NURSE PRACTITIONER

## 2022-03-17 PROCEDURE — 4010F PR ACE/ARB THEARPY RXD/TAKEN: ICD-10-PCS | Mod: CPTII,S$GLB,, | Performed by: NURSE PRACTITIONER

## 2022-03-17 PROCEDURE — 3008F PR BODY MASS INDEX (BMI) DOCUMENTED: ICD-10-PCS | Mod: CPTII,S$GLB,, | Performed by: NURSE PRACTITIONER

## 2022-03-17 PROCEDURE — 3008F BODY MASS INDEX DOCD: CPT | Mod: CPTII,S$GLB,, | Performed by: NURSE PRACTITIONER

## 2022-03-17 PROCEDURE — 4010F ACE/ARB THERAPY RXD/TAKEN: CPT | Mod: CPTII,S$GLB,, | Performed by: NURSE PRACTITIONER

## 2022-03-17 PROCEDURE — 99214 PR OFFICE/OUTPT VISIT, EST, LEVL IV, 30-39 MIN: ICD-10-PCS | Mod: S$GLB,,, | Performed by: NURSE PRACTITIONER

## 2022-03-17 PROCEDURE — 99999 PR PBB SHADOW E&M-EST. PATIENT-LVL V: ICD-10-PCS | Mod: PBBFAC,,, | Performed by: NURSE PRACTITIONER

## 2022-03-17 PROCEDURE — 1159F PR MEDICATION LIST DOCUMENTED IN MEDICAL RECORD: ICD-10-PCS | Mod: CPTII,S$GLB,, | Performed by: NURSE PRACTITIONER

## 2022-03-17 PROCEDURE — 3074F SYST BP LT 130 MM HG: CPT | Mod: CPTII,S$GLB,, | Performed by: NURSE PRACTITIONER

## 2022-03-17 PROCEDURE — 99999 PR PBB SHADOW E&M-EST. PATIENT-LVL V: CPT | Mod: PBBFAC,,, | Performed by: NURSE PRACTITIONER

## 2022-03-17 PROCEDURE — 3051F HG A1C>EQUAL 7.0%<8.0%: CPT | Mod: CPTII,S$GLB,, | Performed by: NURSE PRACTITIONER

## 2022-03-17 PROCEDURE — 1159F MED LIST DOCD IN RCRD: CPT | Mod: CPTII,S$GLB,, | Performed by: NURSE PRACTITIONER

## 2022-03-17 RX ORDER — OMEPRAZOLE 20 MG/1
20 CAPSULE, DELAYED RELEASE ORAL DAILY
Qty: 90 CAPSULE | Refills: 3 | Status: SHIPPED | OUTPATIENT
Start: 2022-03-17 | End: 2023-04-10

## 2022-03-17 RX ORDER — POTASSIUM CHLORIDE 20 MEQ/1
20 TABLET, EXTENDED RELEASE ORAL DAILY
Qty: 30 TABLET | Refills: 6 | Status: SHIPPED | OUTPATIENT
Start: 2022-03-17 | End: 2023-06-08

## 2022-03-17 RX ORDER — ATORVASTATIN CALCIUM 40 MG/1
40 TABLET, FILM COATED ORAL DAILY
Qty: 90 TABLET | Refills: 4 | Status: SHIPPED | OUTPATIENT
Start: 2022-03-17 | End: 2023-04-18 | Stop reason: SDUPTHER

## 2022-03-17 RX ORDER — SEMAGLUTIDE 1.34 MG/ML
INJECTION, SOLUTION SUBCUTANEOUS
Qty: 1 PEN | Refills: 12 | Status: SHIPPED | OUTPATIENT
Start: 2022-03-17 | End: 2022-06-23

## 2022-03-18 ENCOUNTER — TELEPHONE (OUTPATIENT)
Dept: FAMILY MEDICINE | Facility: CLINIC | Age: 57
End: 2022-03-18
Payer: COMMERCIAL

## 2022-03-18 ENCOUNTER — PATIENT MESSAGE (OUTPATIENT)
Dept: FAMILY MEDICINE | Facility: CLINIC | Age: 57
End: 2022-03-18
Payer: COMMERCIAL

## 2022-03-21 ENCOUNTER — PATIENT MESSAGE (OUTPATIENT)
Dept: FAMILY MEDICINE | Facility: CLINIC | Age: 57
End: 2022-03-21
Payer: COMMERCIAL

## 2022-03-29 ENCOUNTER — TELEPHONE (OUTPATIENT)
Dept: FAMILY MEDICINE | Facility: CLINIC | Age: 57
End: 2022-03-29
Payer: COMMERCIAL

## 2022-04-12 ENCOUNTER — OFFICE VISIT (OUTPATIENT)
Dept: CARDIOLOGY | Facility: CLINIC | Age: 57
End: 2022-04-12
Payer: COMMERCIAL

## 2022-04-12 VITALS
SYSTOLIC BLOOD PRESSURE: 133 MMHG | BODY MASS INDEX: 35.77 KG/M2 | DIASTOLIC BLOOD PRESSURE: 68 MMHG | HEART RATE: 84 BPM | OXYGEN SATURATION: 96 % | WEIGHT: 278.69 LBS | HEIGHT: 74 IN

## 2022-04-12 DIAGNOSIS — E66.9 OBESITY (BMI 30-39.9): ICD-10-CM

## 2022-04-12 DIAGNOSIS — I48.91 ATRIAL FIBRILLATION, UNSPECIFIED TYPE: ICD-10-CM

## 2022-04-12 DIAGNOSIS — G47.33 OSA ON CPAP: ICD-10-CM

## 2022-04-12 DIAGNOSIS — E78.5 DYSLIPIDEMIA: ICD-10-CM

## 2022-04-12 DIAGNOSIS — I15.2 HYPERTENSION ASSOCIATED WITH DIABETES: ICD-10-CM

## 2022-04-12 DIAGNOSIS — E11.59 HYPERTENSION ASSOCIATED WITH DIABETES: ICD-10-CM

## 2022-04-12 DIAGNOSIS — I50.32 CHRONIC DIASTOLIC CONGESTIVE HEART FAILURE: ICD-10-CM

## 2022-04-12 DIAGNOSIS — I48.11 LONGSTANDING PERSISTENT ATRIAL FIBRILLATION: Primary | ICD-10-CM

## 2022-04-12 DIAGNOSIS — E11.65 TYPE 2 DIABETES MELLITUS WITH HYPERGLYCEMIA, WITHOUT LONG-TERM CURRENT USE OF INSULIN: ICD-10-CM

## 2022-04-12 PROCEDURE — 1160F PR REVIEW ALL MEDS BY PRESCRIBER/CLIN PHARMACIST DOCUMENTED: ICD-10-PCS | Mod: CPTII,S$GLB,, | Performed by: PHYSICIAN ASSISTANT

## 2022-04-12 PROCEDURE — 3008F PR BODY MASS INDEX (BMI) DOCUMENTED: ICD-10-PCS | Mod: CPTII,S$GLB,, | Performed by: PHYSICIAN ASSISTANT

## 2022-04-12 PROCEDURE — 4010F ACE/ARB THERAPY RXD/TAKEN: CPT | Mod: CPTII,S$GLB,, | Performed by: PHYSICIAN ASSISTANT

## 2022-04-12 PROCEDURE — 1159F PR MEDICATION LIST DOCUMENTED IN MEDICAL RECORD: ICD-10-PCS | Mod: CPTII,S$GLB,, | Performed by: PHYSICIAN ASSISTANT

## 2022-04-12 PROCEDURE — 93000 ELECTROCARDIOGRAM COMPLETE: CPT | Mod: S$GLB,,, | Performed by: INTERNAL MEDICINE

## 2022-04-12 PROCEDURE — 99214 PR OFFICE/OUTPT VISIT, EST, LEVL IV, 30-39 MIN: ICD-10-PCS | Mod: S$GLB,,, | Performed by: PHYSICIAN ASSISTANT

## 2022-04-12 PROCEDURE — 1159F MED LIST DOCD IN RCRD: CPT | Mod: CPTII,S$GLB,, | Performed by: PHYSICIAN ASSISTANT

## 2022-04-12 PROCEDURE — 1160F RVW MEDS BY RX/DR IN RCRD: CPT | Mod: CPTII,S$GLB,, | Performed by: PHYSICIAN ASSISTANT

## 2022-04-12 PROCEDURE — 99214 OFFICE O/P EST MOD 30 MIN: CPT | Mod: S$GLB,,, | Performed by: PHYSICIAN ASSISTANT

## 2022-04-12 PROCEDURE — 93000 EKG 12-LEAD: ICD-10-PCS | Mod: S$GLB,,, | Performed by: INTERNAL MEDICINE

## 2022-04-12 PROCEDURE — 99999 PR PBB SHADOW E&M-EST. PATIENT-LVL V: CPT | Mod: PBBFAC,,, | Performed by: PHYSICIAN ASSISTANT

## 2022-04-12 PROCEDURE — 3075F SYST BP GE 130 - 139MM HG: CPT | Mod: CPTII,S$GLB,, | Performed by: PHYSICIAN ASSISTANT

## 2022-04-12 PROCEDURE — 99999 PR PBB SHADOW E&M-EST. PATIENT-LVL V: ICD-10-PCS | Mod: PBBFAC,,, | Performed by: PHYSICIAN ASSISTANT

## 2022-04-12 PROCEDURE — 3051F HG A1C>EQUAL 7.0%<8.0%: CPT | Mod: CPTII,S$GLB,, | Performed by: PHYSICIAN ASSISTANT

## 2022-04-12 PROCEDURE — 3078F PR MOST RECENT DIASTOLIC BLOOD PRESSURE < 80 MM HG: ICD-10-PCS | Mod: CPTII,S$GLB,, | Performed by: PHYSICIAN ASSISTANT

## 2022-04-12 PROCEDURE — 3075F PR MOST RECENT SYSTOLIC BLOOD PRESS GE 130-139MM HG: ICD-10-PCS | Mod: CPTII,S$GLB,, | Performed by: PHYSICIAN ASSISTANT

## 2022-04-12 PROCEDURE — 3078F DIAST BP <80 MM HG: CPT | Mod: CPTII,S$GLB,, | Performed by: PHYSICIAN ASSISTANT

## 2022-04-12 PROCEDURE — 3008F BODY MASS INDEX DOCD: CPT | Mod: CPTII,S$GLB,, | Performed by: PHYSICIAN ASSISTANT

## 2022-04-12 PROCEDURE — 4010F PR ACE/ARB THEARPY RXD/TAKEN: ICD-10-PCS | Mod: CPTII,S$GLB,, | Performed by: PHYSICIAN ASSISTANT

## 2022-04-12 PROCEDURE — 3051F PR MOST RECENT HEMOGLOBIN A1C LEVEL 7.0 - < 8.0%: ICD-10-PCS | Mod: CPTII,S$GLB,, | Performed by: PHYSICIAN ASSISTANT

## 2022-04-12 NOTE — PROGRESS NOTES
"    General Cardiology Clinic Note  Reason for Visit: Re-establish care  Last Clinic Visit: 11/16/2020 with Kaur Valdez NP    HPI:   Alfred Cuello is a 57 y.o. male who presents to establish care     Problems:  Atrial fibrillation   HFpEF  Hypertension  Dyslipidemia  DM2  RAFAEL on CPAP   Obesity     HPI  Pt was incidentally found to be in Afib by PCP on 11/2020 and was referred to Cardiology. He was started on Eliquis. Later that month, he ended up admitted with acute diastolic CHF. He was scheduled for DCCV; however, NOEMI showed a TAWNY thrombus so cardioversion was aborted. It seems he was lost to follow up after this.     He presents today for follow up. He notes he "fatigues more easily" than he used to. Also reports a rare mild, left sided chest discomfort. Seems to be unrelated to physical activity. Lasts no more than a couple minutes. Has occurred ~ 6 times over the past year. He reports mild BYRNE which he has attributed to obesity and deconditioning. Occasional orthostatic symptoms. He has chronic pedal edema which is improved with compression stockings. Denies syncope, palpitations, TIA, bleeding issues. He does not do any aerobic exercise. He recently started dieting and has lost about 20 lbs. He is compliant with CPAP. BP at home 130s/70s.      ROS:      Review of Systems   Constitutional: Positive for malaise/fatigue and weight loss. Negative for diaphoresis and weight gain.   HENT: Negative for nosebleeds.    Eyes: Negative for vision loss in left eye, vision loss in right eye and visual disturbance.   Cardiovascular: Positive for chest pain, dyspnea on exertion and leg swelling. Negative for claudication, irregular heartbeat, near-syncope, orthopnea, palpitations, paroxysmal nocturnal dyspnea and syncope.   Respiratory: Positive for shortness of breath and snoring. Negative for cough, sleep disturbances due to breathing and wheezing.    Hematologic/Lymphatic: Negative for bleeding problem. Does not " bruise/bleed easily.   Skin: Negative for poor wound healing and rash.   Musculoskeletal: Negative for muscle cramps and myalgias.   Gastrointestinal: Negative for bloating, abdominal pain, diarrhea, heartburn, melena, nausea and vomiting.   Genitourinary: Negative for hematuria and nocturia.   Neurological: Positive for light-headedness. Negative for brief paralysis, dizziness, headaches, numbness and weakness.   Psychiatric/Behavioral: Negative for depression.   Allergic/Immunologic: Negative for hives.       PMH:     Past Medical History:   Diagnosis Date    Diabetes mellitus, type 2     GERD (gastroesophageal reflux disease)     Hyperlipidemia     Hypertension     Neuropathy      Past Surgical History:   Procedure Laterality Date    ABDOMINAL SURGERY      as a child    COLONOSCOPY N/A 7/20/2018    Procedure: COLONOSCOPY;  Surgeon: Marko Koo MD;  Location: [facility];  Service: Endoscopy;  Laterality: N/A;    TRIAL OF SPINAL CORD NERVE STIMULATOR N/A 6/17/2019    Procedure: Trial, Neurostimulator, LUMBAR SPINAL CORD STIMULATOR TRIAL;  Surgeon: Rahat Orantes MD;  Location: [facility];  Service: Pain Management;  Laterality: N/A;  PDN STUDY TRIAL, NEEDS CONSENT, NEVRO REP     Allergies:     Review of patient's allergies indicates:   Allergen Reactions    Oxycodone Itching and Nausea Only     Medications:     Current Outpatient Medications on File Prior to Visit   Medication Sig Dispense Refill    albuterol (VENTOLIN HFA) 90 mcg/actuation inhaler Inhale 2 puffs into the lungs every 6 (six) hours as needed for Wheezing. Rescue 8.5 g 0    apixaban (ELIQUIS) 5 mg Tab Take 1 tablet (5 mg total) by mouth 2 (two) times daily. 60 tablet 3    atorvastatin (LIPITOR) 40 MG tablet Take 1 tablet (40 mg total) by mouth once daily. 90 tablet 4    ferrous sulfate 325 mg (65 mg iron) Tab tablet Take 325 mg by mouth daily with breakfast.      gabapentin (NEURONTIN) 300 MG capsule Take 4 capsules (1,200  mg total) by mouth 3 (three) times daily. 1080 capsule 1    glimepiride (AMARYL) 4 MG tablet Take 1 tablet (4 mg total) by mouth 2 (two) times a day. Take 1 tablet with breakfast and 1 tablet at dinner. 180 tablet 4    HYDROcodone-acetaminophen (NORCO) 5-325 mg per tablet Take 1 tablet every 4 to 6 hours as needed for pain. 14 tablet 0    KRILL OIL ORAL Take by mouth once daily.      lisinopriL-hydrochlorothiazide (PRINZIDE,ZESTORETIC) 20-25 mg Tab Take 1 tablet by mouth once daily 90 tablet 4    loratadine (CLARITIN) 10 mg tablet Take 10 mg by mouth once daily.      metFORMIN (GLUCOPHAGE) 850 MG tablet TAKE 1 TABLET BY MOUTH THREE TIMES DAILY WITH MEALS 270 tablet 4    multivitamin (THERAGRAN) per tablet Take 1 tablet by mouth once daily.      omeprazole (PRILOSEC) 20 MG capsule Take 1 capsule (20 mg total) by mouth once daily. 90 capsule 3    pioglitazone (ACTOS) 30 MG tablet Take 1 tablet (30 mg total) by mouth once daily. 90 tablet 4    potassium chloride SA (K-DUR,KLOR-CON) 20 MEQ tablet Take 1 tablet (20 mEq total) by mouth once daily. 30 tablet 6    sars-cov-2, covid-19, (PFIZER COVID-19) 30 mcg/0.3 ml injection       semaglutide (OZEMPIC) 0.25 mg or 0.5 mg(2 mg/1.5 mL) pen injector Start Ozempic 0.25 mg once weekly x 4 weeks. At week 5 increase to 0.5 mg weekly 1 pen 12     No current facility-administered medications on file prior to visit.     Social History:     Social History     Tobacco Use    Smoking status: Former Smoker     Quit date: 3/20/2011     Years since quittin.0    Smokeless tobacco: Never Used    Tobacco comment: smoked regularly for 35 years prior to this   Substance Use Topics    Alcohol use: Yes     Comment: occ     Family History:     Family History   Problem Relation Age of Onset    Diabetes Mother         has a pacemaker    Coronary artery disease Father         hx of cabg    Glaucoma Neg Hx      Physical Exam:   /68 (BP Location: Left arm, Patient  "Position: Sitting, BP Method: Large (Automatic))   Pulse 84   Ht 6' 2" (1.88 m)   Wt 126.4 kg (278 lb 10.6 oz)   SpO2 96%   BMI 35.78 kg/m²        Physical Exam  Vitals and nursing note reviewed.   Constitutional:       General: He is not in acute distress.     Appearance: Normal appearance. He is obese.   HENT:      Head: Normocephalic and atraumatic.      Nose: Nose normal.   Eyes:      General: No scleral icterus.     Extraocular Movements: Extraocular movements intact.      Conjunctiva/sclera: Conjunctivae normal.   Neck:      Thyroid: No thyromegaly.      Vascular: No carotid bruit or JVD.   Cardiovascular:      Rate and Rhythm: Normal rate. Rhythm irregularly irregular.      Pulses: Normal pulses.      Heart sounds: Normal heart sounds. No murmur heard.    No friction rub. No gallop.   Pulmonary:      Effort: Pulmonary effort is normal.      Breath sounds: Normal breath sounds. No wheezing, rhonchi or rales.   Chest:      Chest wall: No tenderness.   Abdominal:      General: Bowel sounds are normal. There is no distension.      Palpations: Abdomen is soft.      Tenderness: There is no abdominal tenderness.   Musculoskeletal:      Cervical back: Neck supple.      Right lower le+ Pitting Edema present.      Left lower le+ Pitting Edema present.   Skin:     General: Skin is warm and dry.      Coloration: Skin is not pale.      Findings: No erythema or rash.      Nails: There is no clubbing.   Neurological:      Mental Status: He is alert and oriented to person, place, and time.   Psychiatric:         Mood and Affect: Mood and affect normal.         Behavior: Behavior normal.          Labs:     Lab Results   Component Value Date     2022    K 4.3 2022     2022    CO2 30 (H) 2022    BUN 18 2022    CREATININE 1.2 2022    ANIONGAP 11 2022     Lab Results   Component Value Date    HGBA1C 7.9 (H) 2022     Lab Results   Component Value Date    BNP " 265 (H) 11/22/2020    Lab Results   Component Value Date    WBC 6.22 11/24/2020    HGB 11.0 (L) 11/24/2020    HCT 35.3 (L) 11/24/2020     11/24/2020    GRAN 3.8 11/24/2020    GRAN 61.0 11/24/2020     Lab Results   Component Value Date    CHOL 162 03/12/2022    HDL 39 (L) 03/12/2022    LDLCALC 86.6 03/12/2022    TRIG 182 (H) 03/12/2022          Imaging:   Echocardiograms:   NOEMI 11/24/2020  · With normal systolic function. The estimated ejection fraction is 55%.  · Normal left ventricular diastolic function.  · Normal right ventricular size with normal right ventricular systolic function.  · Moderate left atrial enlargement.  · Moderate right atrial enlargement.  · Moderate mitral regurgitation.  · Moderate to severe tricuspid regurgitation.    TTE 11/23/2020  · The estimated PA systolic pressure is 29 mmHg.  · There is left ventricular concentric remodeling.  · The left ventricle is normal in size with normal systolic function. The estimated ejection fraction is 65%.  · Normal left ventricular diastolic function.  · Normal right ventricular size with normal right ventricular systolic function.  · Mild left atrial enlargement.  · Mild-to-moderate mitral regurgitation.  · Mild tricuspid regurgitation.  · Normal central venous pressure (3 mmHg).     Stress Tests:   None    Caths:   None      EKG 11/23/2020: Afib  EKG 4/12/2022: Afib     Assessment:     1. Longstanding persistent atrial fibrillation    2. Hypertension associated with diabetes    3. Dyslipidemia    4. Obesity (BMI 30-39.9)    5. Type 2 diabetes mellitus with hyperglycemia, without long-term current use of insulin    6. RAFAEL on CPAP    7. Chronic diastolic congestive heart failure    8. Atrial fibrillation, unspecified type      Plan:     Persistent atrial fibrillation  Diagnosed 11/2020. Admitted with CHF shortly after diagnosis and planned to undergo DCCV, but found to have TAWNY thrombus on NOEMI.   Difficult to say if he is symptomatic   Rate  controlled without medication   CHADSVASC 3. Continue Eliquis for CVA prophylaxis  Refer to EP for further management    HFpEF  Compensated  Recommend weight loss, exercise, BP control     Hypertension  Controlled  Continue Lisinopril-HCTZ    Dyslipidemia  Continue Lipitor 40 mg   Mediterranean diet     Obesity   Increase aerobic exercise with a goal of at least 30 minutes, 5 days a week.    RAFAEL  Continue CPAP     Follow up in one year.     Signed:  Steffi Barthoolmew PA-C  Cardiology   [phone] - General  4/12/2022 12:29 PM

## 2022-04-27 ENCOUNTER — TELEPHONE (OUTPATIENT)
Dept: ELECTROPHYSIOLOGY | Facility: CLINIC | Age: 57
End: 2022-04-27
Payer: COMMERCIAL

## 2022-04-27 NOTE — TELEPHONE ENCOUNTER
Called pt in regards to NP appt on Monday 5/2, asked NP questions. He does not have any outside records, no device, no arrhythmia surgeries. He had an EKG done on 4/12. 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 He verbalized understanding of appt date, time, and location.

## 2022-05-02 ENCOUNTER — OFFICE VISIT (OUTPATIENT)
Dept: ELECTROPHYSIOLOGY | Facility: CLINIC | Age: 57
End: 2022-05-02
Payer: COMMERCIAL

## 2022-05-02 VITALS
WEIGHT: 280.63 LBS | DIASTOLIC BLOOD PRESSURE: 78 MMHG | HEART RATE: 78 BPM | BODY MASS INDEX: 36.01 KG/M2 | HEIGHT: 74 IN | SYSTOLIC BLOOD PRESSURE: 141 MMHG

## 2022-05-02 DIAGNOSIS — I48.19 PERSISTENT ATRIAL FIBRILLATION: Primary | ICD-10-CM

## 2022-05-02 DIAGNOSIS — I10 PRIMARY HYPERTENSION: ICD-10-CM

## 2022-05-02 DIAGNOSIS — G47.33 OSA ON CPAP: ICD-10-CM

## 2022-05-02 DIAGNOSIS — E11.42 TYPE 2 DIABETES MELLITUS WITH DIABETIC POLYNEUROPATHY, WITHOUT LONG-TERM CURRENT USE OF INSULIN: ICD-10-CM

## 2022-05-02 DIAGNOSIS — I50.32 CHRONIC HEART FAILURE WITH PRESERVED EJECTION FRACTION: ICD-10-CM

## 2022-05-02 DIAGNOSIS — I51.3 THROMBUS OF ATRIAL APPENDAGE: ICD-10-CM

## 2022-05-02 DIAGNOSIS — E78.2 MIXED HYPERLIPIDEMIA: ICD-10-CM

## 2022-05-02 DIAGNOSIS — K21.9 GASTROESOPHAGEAL REFLUX DISEASE, UNSPECIFIED WHETHER ESOPHAGITIS PRESENT: ICD-10-CM

## 2022-05-02 DIAGNOSIS — E66.9 CLASS 2 OBESITY WITH BODY MASS INDEX (BMI) OF 36.0 TO 36.9 IN ADULT, UNSPECIFIED OBESITY TYPE, UNSPECIFIED WHETHER SERIOUS COMORBIDITY PRESENT: ICD-10-CM

## 2022-05-02 PROCEDURE — 3051F HG A1C>EQUAL 7.0%<8.0%: CPT | Mod: CPTII,S$GLB,, | Performed by: STUDENT IN AN ORGANIZED HEALTH CARE EDUCATION/TRAINING PROGRAM

## 2022-05-02 PROCEDURE — 3008F BODY MASS INDEX DOCD: CPT | Mod: CPTII,S$GLB,, | Performed by: STUDENT IN AN ORGANIZED HEALTH CARE EDUCATION/TRAINING PROGRAM

## 2022-05-02 PROCEDURE — 3077F PR MOST RECENT SYSTOLIC BLOOD PRESSURE >= 140 MM HG: ICD-10-PCS | Mod: CPTII,S$GLB,, | Performed by: STUDENT IN AN ORGANIZED HEALTH CARE EDUCATION/TRAINING PROGRAM

## 2022-05-02 PROCEDURE — 1159F PR MEDICATION LIST DOCUMENTED IN MEDICAL RECORD: ICD-10-PCS | Mod: CPTII,S$GLB,, | Performed by: STUDENT IN AN ORGANIZED HEALTH CARE EDUCATION/TRAINING PROGRAM

## 2022-05-02 PROCEDURE — 4010F ACE/ARB THERAPY RXD/TAKEN: CPT | Mod: CPTII,S$GLB,, | Performed by: STUDENT IN AN ORGANIZED HEALTH CARE EDUCATION/TRAINING PROGRAM

## 2022-05-02 PROCEDURE — 4010F PR ACE/ARB THEARPY RXD/TAKEN: ICD-10-PCS | Mod: CPTII,S$GLB,, | Performed by: STUDENT IN AN ORGANIZED HEALTH CARE EDUCATION/TRAINING PROGRAM

## 2022-05-02 PROCEDURE — 3051F PR MOST RECENT HEMOGLOBIN A1C LEVEL 7.0 - < 8.0%: ICD-10-PCS | Mod: CPTII,S$GLB,, | Performed by: STUDENT IN AN ORGANIZED HEALTH CARE EDUCATION/TRAINING PROGRAM

## 2022-05-02 PROCEDURE — 99205 OFFICE O/P NEW HI 60 MIN: CPT | Mod: S$GLB,,, | Performed by: STUDENT IN AN ORGANIZED HEALTH CARE EDUCATION/TRAINING PROGRAM

## 2022-05-02 PROCEDURE — 3008F PR BODY MASS INDEX (BMI) DOCUMENTED: ICD-10-PCS | Mod: CPTII,S$GLB,, | Performed by: STUDENT IN AN ORGANIZED HEALTH CARE EDUCATION/TRAINING PROGRAM

## 2022-05-02 PROCEDURE — 99999 PR PBB SHADOW E&M-EST. PATIENT-LVL IV: CPT | Mod: PBBFAC,,, | Performed by: STUDENT IN AN ORGANIZED HEALTH CARE EDUCATION/TRAINING PROGRAM

## 2022-05-02 PROCEDURE — 99205 PR OFFICE/OUTPT VISIT, NEW, LEVL V, 60-74 MIN: ICD-10-PCS | Mod: S$GLB,,, | Performed by: STUDENT IN AN ORGANIZED HEALTH CARE EDUCATION/TRAINING PROGRAM

## 2022-05-02 PROCEDURE — 3078F PR MOST RECENT DIASTOLIC BLOOD PRESSURE < 80 MM HG: ICD-10-PCS | Mod: CPTII,S$GLB,, | Performed by: STUDENT IN AN ORGANIZED HEALTH CARE EDUCATION/TRAINING PROGRAM

## 2022-05-02 PROCEDURE — 99999 PR PBB SHADOW E&M-EST. PATIENT-LVL IV: ICD-10-PCS | Mod: PBBFAC,,, | Performed by: STUDENT IN AN ORGANIZED HEALTH CARE EDUCATION/TRAINING PROGRAM

## 2022-05-02 PROCEDURE — 3078F DIAST BP <80 MM HG: CPT | Mod: CPTII,S$GLB,, | Performed by: STUDENT IN AN ORGANIZED HEALTH CARE EDUCATION/TRAINING PROGRAM

## 2022-05-02 PROCEDURE — 1159F MED LIST DOCD IN RCRD: CPT | Mod: CPTII,S$GLB,, | Performed by: STUDENT IN AN ORGANIZED HEALTH CARE EDUCATION/TRAINING PROGRAM

## 2022-05-02 PROCEDURE — 3077F SYST BP >= 140 MM HG: CPT | Mod: CPTII,S$GLB,, | Performed by: STUDENT IN AN ORGANIZED HEALTH CARE EDUCATION/TRAINING PROGRAM

## 2022-05-02 NOTE — PROGRESS NOTES
"Electrophysiology Clinic Note    Reason for new patient visit: Evaluation and recommendations regarding persistent atrial fibrillation.     PRESENTING HISTORY:     History of Present Illness:  Mr. Alfred Cuello is a marley 57-year-old gentleman who presents today for evaluation and recommendations regarding persistent atrial fibrillation. He has a past medical history significant for persistent atrial fibrillation, a history of HFpEF with most recent LVEF 65%, hypertension, hyperlipidemia, type II diabetes mellitus, GERD, RAFAEL maintained on CPAP therapy, and obesity.      He is followed in general cardiology clinic with SANDRA Bartholomew and Dr. Shannon and was recently seen in clinic on 4/12/2022. At that visit, they discussed his atrial fibrillation. He was initially diagnosed with asymptomatic atrial fibrillation in November of 2020, when this was incidentally noted on an ECG. He was seen in cardiology with Dr. Shannon and was initiated on oral anticoagulation with apixaban 5mg po BID. He was subsequently admitted from 11/22-24/2020 in the setting of acute decompensation of heart failure with atrial fibrillation with RVR. He was planned to undergo a NOEMI with cardioversion; however, a NOEMI on 11/24/2020 revealed a left atrial appendage thrombus, and cardioversion was not performed. Unfortunately, he has been lost to follow up since this encounter.     In discussion with Mr. Cuello today, he tells me that he is feeling overall "OK". He denies any episodes of dizziness, lightheadedness, syncope/presyncope, chest pain or chest discomfort, nausea or vomiting, orthopnea, changes in his baseline lower extremity edema, or PND. He reports brief, intermittent palpitations that do not limit his functioning. He reports baseline shortness of breath and dyspnea with exertion, and feels that this has remained worse than his prior baseline while he has been in atrial fibrillation. He reports increased exercise intolerance and easy " "fatigue while in atrial fibrillation. He was recently helping his brother clear a piece of land when he reported worsened fatigue and exercise intolerance, and had to take several breaks, which he feels is not his usual level of exercise capacity. He reports transient periods of dizziness with abrupt positional changes. He can climb more than one flight of stairs prior to needing to take a break provided he can take his time. 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 He wears compression stockings for comfort. He and his wife are trying to "get healthy", and he has intentionally lost about 20lbs with diet and exercise modification.      Review of Systems:  Review of Systems   Constitutional: Positive for fatigue. Negative for activity change.   HENT: Negative for nasal congestion, nosebleeds, postnasal drip, rhinorrhea, sinus pressure/congestion, sneezing and sore throat.    Respiratory: Positive for shortness of breath. Negative for apnea, cough, chest tightness and wheezing.    Cardiovascular: Positive for palpitations and leg swelling. Negative for chest pain and claudication.   Gastrointestinal: Negative for abdominal distention, abdominal pain, blood in stool, change in bowel habit, constipation, diarrhea, nausea, vomiting and change in bowel habit.   Genitourinary: Negative for dysuria and hematuria.   Musculoskeletal: Positive for arthralgias. Negative for gait problem.   Neurological: Positive for dizziness. Negative for seizures, syncope, weakness, light-headedness, headaches, disturbances in coordination and coordination difficulties.        PAST HISTORY:     Past Medical History:   Diagnosis Date    Diabetes mellitus, type 2     GERD (gastroesophageal reflux disease)     Hyperlipidemia     Hypertension     Neuropathy    - Persistent atrial fibrillation  - History of HFpEF with most " recent LVEF 65%  - RAFAEL maintained on CPAP therapy  - Obesity     Past Surgical History:   Procedure Laterality Date    ABDOMINAL SURGERY      as a child    COLONOSCOPY N/A 7/20/2018    Procedure: COLONOSCOPY;  Surgeon: Marko Koo MD;  Location: [facility];  Service: Endoscopy;  Laterality: N/A;    TRIAL OF SPINAL CORD NERVE STIMULATOR N/A 6/17/2019    Procedure: Trial, Neurostimulator, LUMBAR SPINAL CORD STIMULATOR TRIAL;  Surgeon: Rahat Orantes MD;  Location: [facility];  Service: Pain Management;  Laterality: N/A;  PDN STUDY TRIAL, NEEDS CONSENT, NEVRO REP       Family History:  Family History   Problem Relation Age of Onset    Diabetes Mother         has a pacemaker    Coronary artery disease Father         hx of cabg    Glaucoma Neg Hx        Social History:  He  reports that he quit smoking about 11 years ago. He has never used smokeless tobacco. He reports current alcohol use. He reports that he does not use drugs. He is .       MEDICATIONS & ALLERGIES:     Review of patient's allergies indicates:   Allergen Reactions    Oxycodone Itching and Nausea Only       Current Outpatient Medications on File Prior to Visit   Medication Sig Dispense Refill    albuterol (VENTOLIN HFA) 90 mcg/actuation inhaler Inhale 2 puffs into the lungs every 6 (six) hours as needed for Wheezing. Rescue 8.5 g 0    apixaban (ELIQUIS) 5 mg Tab Take 1 tablet (5 mg total) by mouth 2 (two) times daily. 60 tablet 3    atorvastatin (LIPITOR) 40 MG tablet Take 1 tablet (40 mg total) by mouth once daily. 90 tablet 4    ferrous sulfate 325 mg (65 mg iron) Tab tablet Take 325 mg by mouth daily with breakfast.      gabapentin (NEURONTIN) 300 MG capsule Take 4 capsules (1,200 mg total) by mouth 3 (three) times daily. 1080 capsule 1    glimepiride (AMARYL) 4 MG tablet Take 1 tablet (4 mg total) by mouth 2 (two) times a day. Take 1 tablet with breakfast and 1 tablet at dinner. 180 tablet 4     "HYDROcodone-acetaminophen (NORCO) 5-325 mg per tablet Take 1 tablet every 4 to 6 hours as needed for pain. 14 tablet 0    KRILL OIL ORAL Take by mouth once daily.      lisinopriL-hydrochlorothiazide (PRINZIDE,ZESTORETIC) 20-25 mg Tab Take 1 tablet by mouth once daily 90 tablet 4    loratadine (CLARITIN) 10 mg tablet Take 10 mg by mouth once daily.      metFORMIN (GLUCOPHAGE) 850 MG tablet TAKE 1 TABLET BY MOUTH THREE TIMES DAILY WITH MEALS 270 tablet 4    multivitamin (THERAGRAN) per tablet Take 1 tablet by mouth once daily.      omeprazole (PRILOSEC) 20 MG capsule Take 1 capsule (20 mg total) by mouth once daily. 90 capsule 3    pioglitazone (ACTOS) 30 MG tablet Take 1 tablet (30 mg total) by mouth once daily. 90 tablet 4    potassium chloride SA (K-DUR,KLOR-CON) 20 MEQ tablet Take 1 tablet (20 mEq total) by mouth once daily. 30 tablet 6    sars-cov-2, covid-19, (PFIZER COVID-19) 30 mcg/0.3 ml injection       semaglutide (OZEMPIC) 0.25 mg or 0.5 mg(2 mg/1.5 mL) pen injector Start Ozempic 0.25 mg once weekly x 4 weeks. At week 5 increase to 0.5 mg weekly 1 pen 12     No current facility-administered medications on file prior to visit.        OBJECTIVE:     Vital Signs:  BP (!) 141/78   Pulse 78   Ht 6' 2" (1.88 m)   Wt 127.3 kg (280 lb 10.3 oz)   BMI 36.03 kg/m²     Physical Exam:  Physical Exam  Constitutional:       General: He is not in acute distress.     Appearance: Normal appearance. He is obese. He is not ill-appearing or diaphoretic.      Comments: Well-appearing man in NAD.    HENT:      Head: Normocephalic and atraumatic.      Comments: Mask worn in clinic in the setting of COVID precautions.   Eyes:      Pupils: Pupils are equal, round, and reactive to light.   Cardiovascular:      Rate and Rhythm: Normal rate. Rhythm irregular.      Pulses: Normal pulses.      Heart sounds: Normal heart sounds. No murmur heard.    No friction rub. No gallop.      Comments: Irregularly irregular rhythm on " right radial artery palpation.   Pulmonary:      Effort: Pulmonary effort is normal. No respiratory distress.      Breath sounds: Normal breath sounds. No wheezing, rhonchi or rales.   Chest:      Chest wall: No tenderness.   Abdominal:      General: There is no distension.      Palpations: Abdomen is soft.      Tenderness: There is no abdominal tenderness.   Musculoskeletal:         General: No swelling or tenderness.      Cervical back: Normal range of motion.      Right lower leg: Edema present.      Left lower leg: Edema present.      Comments: Trace bilateral pedal edema.    Skin:     General: Skin is warm and dry.      Findings: No erythema, lesion or rash.   Neurological:      General: No focal deficit present.      Mental Status: He is alert and oriented to person, place, and time. Mental status is at baseline.      Motor: No weakness.      Gait: Gait normal.   Psychiatric:         Mood and Affect: Mood normal.         Behavior: Behavior normal.        Laboratory Data:  Lab Results   Component Value Date    WBC 6.22 11/24/2020    HGB 11.0 (L) 11/24/2020    HCT 35.3 (L) 11/24/2020    MCV 92 11/24/2020     11/24/2020     Lab Results   Component Value Date     (H) 03/12/2022     03/12/2022    K 4.3 03/12/2022     03/12/2022    CO2 30 (H) 03/12/2022    BUN 18 03/12/2022    CREATININE 1.2 03/12/2022    CALCIUM 9.7 03/12/2022    MG 1.7 10/09/2021     No results found for: INR, PROTIME    Pertinent Cardiac Data:  ECG: Atrial fibrillation with rate of 78 bpm, iRBBB with QRS 96 ms, QT/QTc 410/467 ms, nonspecific STT changes.     Resting 2D Transthoracic Echocardiogram - 11/23/2020:  · The estimated PA systolic pressure is 29 mmHg.  · There is left ventricular concentric remodeling.  · The left ventricle is normal in size with normal systolic function. The estimated ejection fraction is 65%.  · Normal left ventricular diastolic function.  · Normal right ventricular size with normal right  ventricular systolic function.  · Mild left atrial enlargement.  · Mild-to-moderate mitral regurgitation.  · Mild tricuspid regurgitation.  · Normal central venous pressure (3 mmHg).      ASSESSMENT & PLAN:   Mr. Alfred Cuello is a marley 57-year-old gentleman who presents today for evaluation and recommendations regarding persistent atrial fibrillation. He has a past medical history significant for persistent atrial fibrillation, a history of HFpEF with most recent LVEF 65%, hypertension, hyperlipidemia, type II diabetes mellitus, GERD, RAFAEL maintained on CPAP therapy, and obesity. He was previously scheduled to have a NOEMI/cardioversion performed; however, this procedure was aborted in the setting of left atrial appendage thrombus. He remains in symptomatic persistent atrial fibrillation today.     - We discussed the pathophysiology of atrial fibrillation; specifically, we discussed persistent atrial fibrillation and the concept that the longer a patient remains in atrial fibrillation, the more challenging rhythm restoration and maintenance of sinus rhythm may become. We discussed that atrial fibrillation has an increased risk of CVA.   - As he remains symptomatic in his atrial fibrillation, we discussed a repeat attempt at NOEMI and cardioversion for rhythm restoration. This procedure was discussed in detail, in addition to potential risks, benefits, and alternative options. Mr. Cuello voices understanding and is in agreement, and informed consent was obtained in clinic today.  - He has never had a trial of antiarrhythmic therapy. He has no history of underlying coronary artery disease, although he has been admitted in the past with volume overload and acute decompensation of HFpEF. Amiodarone may be the best suited antiarrhythmic agent for him, and we will plan to initiate this therapy following NOEMI/cardioversion.   - He is not currently maintained on rate control, and demonstrates excellent control of his rates  while in atrial fibrillation without paul suppression.   - His [id number] is 3 (CHF, HTN, T2DM, male gender, age less than 64), portending an annual adjusted risk of CVA of 3.2%. He remains on uninterrupted apixaban therapy with no bleeding events reported.   - We discussed the possibility of catheter ablation with pulmonary vein isolation should he continue to have symptoms and AF despite cardioversion and initiation of AAD therapy. He voices understanding, although he would like to attempt rhythm restoration with DCCV and rhythm control with medication at this time.   - Possible underlying drivers of atrial fibrillation were addressed at this appointment, including recommendations for weight loss - now a class I recommendation. Review of laboratory data revealed acceptable TSH at 0.618. He remains compliant with his CPAP machine for treatment of his underlying obstructive sleep apnea.      This patient will present to the EP laboratory to undergo NOEMI and cardioversion at my next availability. He will remain on apixaban therapy, with plans for amiodarone initiation following cardioversion. All questions and concerns were addressed at this encounter.     Signing Physician:       ALEIDA Lee MD  Electrophysiology Attending

## 2022-05-04 ENCOUNTER — TELEPHONE (OUTPATIENT)
Dept: ELECTROPHYSIOLOGY | Facility: CLINIC | Age: 57
End: 2022-05-04
Payer: COMMERCIAL

## 2022-05-04 DIAGNOSIS — I48.19 PERSISTENT ATRIAL FIBRILLATION: Primary | ICD-10-CM

## 2022-05-05 ENCOUNTER — PATIENT MESSAGE (OUTPATIENT)
Dept: ELECTROPHYSIOLOGY | Facility: CLINIC | Age: 57
End: 2022-05-05
Payer: COMMERCIAL

## 2022-05-14 ENCOUNTER — LAB VISIT (OUTPATIENT)
Dept: LAB | Facility: HOSPITAL | Age: 57
End: 2022-05-14
Attending: STUDENT IN AN ORGANIZED HEALTH CARE EDUCATION/TRAINING PROGRAM
Payer: COMMERCIAL

## 2022-05-14 DIAGNOSIS — I48.19 PERSISTENT ATRIAL FIBRILLATION: ICD-10-CM

## 2022-05-14 LAB
ANION GAP SERPL CALC-SCNC: 12 MMOL/L (ref 8–16)
APTT BLDCRRT: 28.3 SEC (ref 21–32)
BASOPHILS # BLD AUTO: 0.06 K/UL (ref 0–0.2)
BASOPHILS NFR BLD: 0.8 % (ref 0–1.9)
BUN SERPL-MCNC: 17 MG/DL (ref 6–20)
CALCIUM SERPL-MCNC: 9.9 MG/DL (ref 8.7–10.5)
CHLORIDE SERPL-SCNC: 100 MMOL/L (ref 95–110)
CO2 SERPL-SCNC: 30 MMOL/L (ref 23–29)
CREAT SERPL-MCNC: 1.1 MG/DL (ref 0.5–1.4)
DIFFERENTIAL METHOD: ABNORMAL
EOSINOPHIL # BLD AUTO: 0.2 K/UL (ref 0–0.5)
EOSINOPHIL NFR BLD: 3 % (ref 0–8)
ERYTHROCYTE [DISTWIDTH] IN BLOOD BY AUTOMATED COUNT: 13.8 % (ref 11.5–14.5)
EST. GFR  (AFRICAN AMERICAN): >60 ML/MIN/1.73 M^2
EST. GFR  (NON AFRICAN AMERICAN): >60 ML/MIN/1.73 M^2
GLUCOSE SERPL-MCNC: 135 MG/DL (ref 70–110)
HCT VFR BLD AUTO: 37.9 % (ref 40–54)
HGB BLD-MCNC: 12.4 G/DL (ref 14–18)
IMM GRANULOCYTES # BLD AUTO: 0.03 K/UL (ref 0–0.04)
IMM GRANULOCYTES NFR BLD AUTO: 0.4 % (ref 0–0.5)
INR PPP: 1.1 (ref 0.8–1.2)
LYMPHOCYTES # BLD AUTO: 2 K/UL (ref 1–4.8)
LYMPHOCYTES NFR BLD: 26.2 % (ref 18–48)
MCH RBC QN AUTO: 28.4 PG (ref 27–31)
MCHC RBC AUTO-ENTMCNC: 32.7 G/DL (ref 32–36)
MCV RBC AUTO: 87 FL (ref 82–98)
MONOCYTES # BLD AUTO: 0.5 K/UL (ref 0.3–1)
MONOCYTES NFR BLD: 7.1 % (ref 4–15)
NEUTROPHILS # BLD AUTO: 4.8 K/UL (ref 1.8–7.7)
NEUTROPHILS NFR BLD: 62.5 % (ref 38–73)
NRBC BLD-RTO: 0 /100 WBC
PLATELET # BLD AUTO: 242 K/UL (ref 150–450)
PMV BLD AUTO: 10.9 FL (ref 9.2–12.9)
POTASSIUM SERPL-SCNC: 4.2 MMOL/L (ref 3.5–5.1)
PROTHROMBIN TIME: 11.3 SEC (ref 9–12.5)
RBC # BLD AUTO: 4.37 M/UL (ref 4.6–6.2)
SODIUM SERPL-SCNC: 142 MMOL/L (ref 136–145)
WBC # BLD AUTO: 7.63 K/UL (ref 3.9–12.7)

## 2022-05-14 PROCEDURE — 85610 PROTHROMBIN TIME: CPT | Performed by: STUDENT IN AN ORGANIZED HEALTH CARE EDUCATION/TRAINING PROGRAM

## 2022-05-14 PROCEDURE — 80048 BASIC METABOLIC PNL TOTAL CA: CPT | Performed by: STUDENT IN AN ORGANIZED HEALTH CARE EDUCATION/TRAINING PROGRAM

## 2022-05-14 PROCEDURE — 36415 COLL VENOUS BLD VENIPUNCTURE: CPT | Performed by: STUDENT IN AN ORGANIZED HEALTH CARE EDUCATION/TRAINING PROGRAM

## 2022-05-14 PROCEDURE — 85730 THROMBOPLASTIN TIME PARTIAL: CPT | Performed by: STUDENT IN AN ORGANIZED HEALTH CARE EDUCATION/TRAINING PROGRAM

## 2022-05-14 PROCEDURE — 85025 COMPLETE CBC W/AUTO DIFF WBC: CPT | Performed by: STUDENT IN AN ORGANIZED HEALTH CARE EDUCATION/TRAINING PROGRAM

## 2022-05-17 ENCOUNTER — PATIENT MESSAGE (OUTPATIENT)
Dept: ELECTROPHYSIOLOGY | Facility: CLINIC | Age: 57
End: 2022-05-17
Payer: COMMERCIAL

## 2022-05-17 ENCOUNTER — TELEPHONE (OUTPATIENT)
Dept: ELECTROPHYSIOLOGY | Facility: CLINIC | Age: 57
End: 2022-05-17
Payer: COMMERCIAL

## 2022-05-17 NOTE — TELEPHONE ENCOUNTER
----- Message from Flori De Santiago RN sent at 5/17/2022  2:08 PM CDT -----  Contact: Kelli Juan  I think this is a cardioversion  ----- Message -----  From: Luis Alberto Guzman MA  Sent: 5/17/2022  10:49 AM CDT  To: Flori De Santiago RN    Please advise.  ----- Message -----  From: Molly Bowie RN  Sent: 5/17/2022  10:14 AM CDT  To: Jesus [facility]b Staff    See Below  ----- Message -----  From: Steffi Bartholomew PA-C  Sent: 5/17/2022  10:12 AM CDT  To: Molly Bowie RN, [facility] Staff    I think his is in reference to the NOEMI/DCCV he is scheduled for with Dr. Lee. I don't have him scheduled for any tests.     -Nitza  ----- Message -----  From: Molly Bowie RN  Sent: 5/17/2022  10:03 AM CDT  To: Winter Alba MA, Steffi Bartholomew PA-C    See below  ----- Message -----  From: Marisela Rodrigez  Sent: 5/17/2022   9:55 AM CDT  To: [place] Kapadia Staff    Patients wife is calling in regards to patients heart procedure that is scheduled for 05/20 and they need to cancel this procedure due to financial issues. Stated the insurance won't cover it the way the order was written and they would have to pay over $1100 to have this done, and they do not qualify for financial assistance. If there is anyway we can resubmit to the insurance with different codes she stated the insurance will cover but they need the right codes submitted first. Please call patients wife back to discuss what needs to be done or if they need to cancel/reschedule to have this authorized first at [phone]. Thank You

## 2022-05-17 NOTE — TELEPHONE ENCOUNTER
Called and spoke with patient's wife Yessica who states that the insurance will only cover procedure if Medically urgent.  They do not qualify for financial assistance and they are expected to come up with 2500.00 by Friday.   Will talk with Dr. Lee for advice on this.   Wife states they will have to cancel if this cannot be approved.   Will reach out to financial counselor as well.

## 2022-05-19 ENCOUNTER — ANESTHESIA EVENT (OUTPATIENT)
Dept: MEDSURG UNIT | Facility: HOSPITAL | Age: 57
End: 2022-05-19
Payer: COMMERCIAL

## 2022-05-19 ENCOUNTER — TELEPHONE (OUTPATIENT)
Dept: RESEARCH | Facility: HOSPITAL | Age: 57
End: 2022-05-19
Payer: COMMERCIAL

## 2022-05-19 ENCOUNTER — TELEPHONE (OUTPATIENT)
Dept: ELECTROPHYSIOLOGY | Facility: CLINIC | Age: 57
End: 2022-05-19
Payer: COMMERCIAL

## 2022-05-19 NOTE — TELEPHONE ENCOUNTER
Study title: Efficacy and Safety of Dual Direct Current Cardioversion Versus Single Direct Current  Cardioversion as an Initial Treatment Strategy in Obese Patients   IRB #: [id number]  IRB approval date: 9/15/2020  Sponsor: [facility]  Subject ID: N/A  Date of Encounter: 05/19/2022    Called patient to introduce the Dual vs Single DCCV study. Patient's wife answered. She stated that he is currently not home, but will return at around 6:00 PM. Voiced understanding. Will call at that time to address DCCV study.

## 2022-05-19 NOTE — ANESTHESIA PREPROCEDURE EVALUATION
05/19/2022  Alfred Cuello is a 57 y.o., male.  Patient Active Problem List   Diagnosis    Iron deficiency anemia    Hypertension associated with diabetes    Gastroesophageal reflux disease    H/O pyloric stenosis    Dyslipidemia    Obesity (BMI 30-39.9)    RAFAEL on CPAP    Painful diabetic neuropathy    Chronic pain syndrome    Diabetic polyneuropathy associated with type 2 diabetes mellitus    Research study patient    Type 2 diabetes mellitus with hyperglycemia, without long-term current use of insulin    Vitamin D deficiency    Persistent atrial fibrillation    Chronic diastolic congestive heart failure           Pre-op Assessment          Review of Systems      Physical Exam    Airway:  No airway management difficulties anticipated  Dental:No active dental issues noted  Chest/Lungs:  Clear to auscultation    Heart:  Rate: Normal  Rhythm: Regular Rhythm  Sounds: Normal        Anesthesia Plan  Type of Anesthesia, risks & benefits discussed:    Anesthesia Type: Gen Supraglottic Airway, Gen Natural Airway  Informed Consent: Informed consent signed with the Patient and all parties understand the risks and agree with anesthesia plan.  All questions answered.   ASA Score: 3  Anesthesia Plan Notes: Chart reviewed. Patient seen and examined. Anesthesia plan discussed and questions answered. E-consent signed. Moiz Gilliland MD    Ready For Surgery From Anesthesia Perspective.     .

## 2022-05-19 NOTE — TELEPHONE ENCOUNTER
Attempted to contact patient to confirm procedure details. No answer. Left detailed voicemail including: arrival time, NPO after midnight and medication instructions, along with callback number.     Labs Completed and reviewed no alerts     Covid not required for vaccinated patients

## 2022-05-20 ENCOUNTER — RESEARCH ENCOUNTER (OUTPATIENT)
Dept: RESEARCH | Facility: HOSPITAL | Age: 57
End: 2022-05-20
Payer: COMMERCIAL

## 2022-05-20 ENCOUNTER — HOSPITAL ENCOUNTER (OUTPATIENT)
Facility: HOSPITAL | Age: 57
Discharge: HOME | End: 2022-05-20
Attending: STUDENT IN AN ORGANIZED HEALTH CARE EDUCATION/TRAINING PROGRAM | Admitting: STUDENT IN AN ORGANIZED HEALTH CARE EDUCATION/TRAINING PROGRAM
Payer: COMMERCIAL

## 2022-05-20 ENCOUNTER — HOSPITAL ENCOUNTER (OUTPATIENT)
Dept: CARDIOLOGY | Facility: HOSPITAL | Age: 57
Discharge: HOME | End: 2022-05-20
Attending: STUDENT IN AN ORGANIZED HEALTH CARE EDUCATION/TRAINING PROGRAM | Admitting: STUDENT IN AN ORGANIZED HEALTH CARE EDUCATION/TRAINING PROGRAM
Payer: COMMERCIAL

## 2022-05-20 ENCOUNTER — ANESTHESIA (OUTPATIENT)
Dept: MEDSURG UNIT | Facility: HOSPITAL | Age: 57
End: 2022-05-20
Payer: COMMERCIAL

## 2022-05-20 VITALS
RESPIRATION RATE: 18 BRPM | OXYGEN SATURATION: 97 % | SYSTOLIC BLOOD PRESSURE: 116 MMHG | BODY MASS INDEX: 35.6 KG/M2 | HEIGHT: 74 IN | HEART RATE: 60 BPM | DIASTOLIC BLOOD PRESSURE: 69 MMHG | WEIGHT: 277.44 LBS | TEMPERATURE: 98 F

## 2022-05-20 VITALS
WEIGHT: 277 LBS | HEIGHT: 74 IN | SYSTOLIC BLOOD PRESSURE: 137 MMHG | RESPIRATION RATE: 16 BRPM | DIASTOLIC BLOOD PRESSURE: 71 MMHG | HEART RATE: 70 BPM | BODY MASS INDEX: 35.55 KG/M2

## 2022-05-20 DIAGNOSIS — E11.40 PAINFUL DIABETIC NEUROPATHY: ICD-10-CM

## 2022-05-20 DIAGNOSIS — E66.9 OBESITY (BMI 30-39.9): ICD-10-CM

## 2022-05-20 DIAGNOSIS — I48.19 PERSISTENT ATRIAL FIBRILLATION: ICD-10-CM

## 2022-05-20 DIAGNOSIS — I15.2 HYPERTENSION ASSOCIATED WITH DIABETES: ICD-10-CM

## 2022-05-20 DIAGNOSIS — E11.42 DIABETIC POLYNEUROPATHY ASSOCIATED WITH TYPE 2 DIABETES MELLITUS: ICD-10-CM

## 2022-05-20 DIAGNOSIS — I48.91 ATRIAL FIBRILLATION: ICD-10-CM

## 2022-05-20 DIAGNOSIS — E11.65 TYPE 2 DIABETES MELLITUS WITH HYPERGLYCEMIA, WITHOUT LONG-TERM CURRENT USE OF INSULIN: ICD-10-CM

## 2022-05-20 DIAGNOSIS — I50.32 CHRONIC DIASTOLIC CONGESTIVE HEART FAILURE: Primary | ICD-10-CM

## 2022-05-20 DIAGNOSIS — G89.4 CHRONIC PAIN SYNDROME: ICD-10-CM

## 2022-05-20 DIAGNOSIS — Z87.19 H/O PYLORIC STENOSIS: ICD-10-CM

## 2022-05-20 DIAGNOSIS — K21.9 GASTROESOPHAGEAL REFLUX DISEASE WITHOUT ESOPHAGITIS: ICD-10-CM

## 2022-05-20 DIAGNOSIS — Z00.6 RESEARCH STUDY PATIENT: ICD-10-CM

## 2022-05-20 DIAGNOSIS — E55.9 VITAMIN D DEFICIENCY: ICD-10-CM

## 2022-05-20 DIAGNOSIS — E11.59 HYPERTENSION ASSOCIATED WITH DIABETES: ICD-10-CM

## 2022-05-20 DIAGNOSIS — G47.33 OSA ON CPAP: ICD-10-CM

## 2022-05-20 DIAGNOSIS — I48.91 AF (ATRIAL FIBRILLATION): ICD-10-CM

## 2022-05-20 DIAGNOSIS — E78.5 DYSLIPIDEMIA: ICD-10-CM

## 2022-05-20 LAB
BSA FOR ECHO PROCEDURE: 2.56 M2
EJECTION FRACTION: 45 %
POCT GLUCOSE: 116 MG/DL (ref 70–110)
POCT GLUCOSE: 152 MG/DL (ref 70–110)
PROX AORTA: 3.8 CM
RIGHT VENTRICULAR END-DIASTOLIC DIMENSION: 4.2 CM
SINUS: 3.9 CM
STJ: 3.3 CM

## 2022-05-20 PROCEDURE — 93010 EKG 12-LEAD: ICD-10-PCS | Mod: ,,, | Performed by: INTERNAL MEDICINE

## 2022-05-20 PROCEDURE — 93325 DOPPLER ECHO COLOR FLOW MAPG: CPT | Mod: 26,,, | Performed by: INTERNAL MEDICINE

## 2022-05-20 PROCEDURE — 92960 PR CARDIOVERSION, ELECTIVE;EXTERN: ICD-10-PCS | Mod: ,,, | Performed by: STUDENT IN AN ORGANIZED HEALTH CARE EDUCATION/TRAINING PROGRAM

## 2022-05-20 PROCEDURE — 93325 TRANSESOPHAGEAL ECHO (TEE) (CUPID ONLY): ICD-10-PCS | Mod: 26,,, | Performed by: INTERNAL MEDICINE

## 2022-05-20 PROCEDURE — 37000008 HC ANESTHESIA 1ST 15 MINUTES: Performed by: STUDENT IN AN ORGANIZED HEALTH CARE EDUCATION/TRAINING PROGRAM

## 2022-05-20 PROCEDURE — 25000003 PHARM REV CODE 250: Performed by: NURSE ANESTHETIST, CERTIFIED REGISTERED

## 2022-05-20 PROCEDURE — 93312 ECHO TRANSESOPHAGEAL: CPT | Mod: 26,,, | Performed by: INTERNAL MEDICINE

## 2022-05-20 PROCEDURE — 93005 ELECTROCARDIOGRAM TRACING: CPT | Mod: 59

## 2022-05-20 PROCEDURE — 92960 CARDIOVERSION ELECTRIC EXT: CPT | Performed by: STUDENT IN AN ORGANIZED HEALTH CARE EDUCATION/TRAINING PROGRAM

## 2022-05-20 PROCEDURE — 25500020 PHARM REV CODE 255: Performed by: STUDENT IN AN ORGANIZED HEALTH CARE EDUCATION/TRAINING PROGRAM

## 2022-05-20 PROCEDURE — 93320 TRANSESOPHAGEAL ECHO (TEE) (CUPID ONLY): ICD-10-PCS | Mod: 26,,, | Performed by: INTERNAL MEDICINE

## 2022-05-20 PROCEDURE — 63600175 PHARM REV CODE 636 W HCPCS: Performed by: NURSE ANESTHETIST, CERTIFIED REGISTERED

## 2022-05-20 PROCEDURE — 37000009 HC ANESTHESIA EA ADD 15 MINS: Performed by: STUDENT IN AN ORGANIZED HEALTH CARE EDUCATION/TRAINING PROGRAM

## 2022-05-20 PROCEDURE — 93010 ELECTROCARDIOGRAM REPORT: CPT | Mod: ,,, | Performed by: INTERNAL MEDICINE

## 2022-05-20 PROCEDURE — 92960 CARDIOVERSION ELECTRIC EXT: CPT | Mod: ,,, | Performed by: STUDENT IN AN ORGANIZED HEALTH CARE EDUCATION/TRAINING PROGRAM

## 2022-05-20 PROCEDURE — D9220A PRA ANESTHESIA: Mod: ANES,,, | Performed by: ANESTHESIOLOGY

## 2022-05-20 PROCEDURE — 93325 DOPPLER ECHO COLOR FLOW MAPG: CPT

## 2022-05-20 PROCEDURE — D9220A PRA ANESTHESIA: ICD-10-PCS | Mod: CRNA,,, | Performed by: NURSE ANESTHETIST, CERTIFIED REGISTERED

## 2022-05-20 PROCEDURE — D9220A PRA ANESTHESIA: ICD-10-PCS | Mod: ANES,,, | Performed by: ANESTHESIOLOGY

## 2022-05-20 PROCEDURE — 93320 DOPPLER ECHO COMPLETE: CPT | Mod: 26,,, | Performed by: INTERNAL MEDICINE

## 2022-05-20 PROCEDURE — 93312 TRANSESOPHAGEAL ECHO (TEE) (CUPID ONLY): ICD-10-PCS | Mod: 26,,, | Performed by: INTERNAL MEDICINE

## 2022-05-20 PROCEDURE — D9220A PRA ANESTHESIA: Mod: CRNA,,, | Performed by: NURSE ANESTHETIST, CERTIFIED REGISTERED

## 2022-05-20 RX ORDER — HYDROMORPHONE HYDROCHLORIDE 1 MG/ML
0.2 INJECTION, SOLUTION INTRAMUSCULAR; INTRAVENOUS; SUBCUTANEOUS EVERY 5 MIN PRN
Status: DISCONTINUED | OUTPATIENT
Start: 2022-05-20 | End: 2022-05-20 | Stop reason: HOSPADM

## 2022-05-20 RX ORDER — PROPOFOL 10 MG/ML
VIAL (ML) INTRAVENOUS CONTINUOUS PRN
Status: DISCONTINUED | OUTPATIENT
Start: 2022-05-20 | End: 2022-05-20

## 2022-05-20 RX ORDER — ONDANSETRON 2 MG/ML
4 INJECTION INTRAMUSCULAR; INTRAVENOUS ONCE AS NEEDED
Status: DISCONTINUED | OUTPATIENT
Start: 2022-05-20 | End: 2022-05-20 | Stop reason: HOSPADM

## 2022-05-20 RX ORDER — AMIODARONE HYDROCHLORIDE 200 MG/1
TABLET ORAL
Qty: 30 TABLET | Refills: 11 | Status: SHIPPED | OUTPATIENT
Start: 2022-05-20 | End: 2023-04-08 | Stop reason: SDUPTHER

## 2022-05-20 RX ORDER — DEXMEDETOMIDINE HYDROCHLORIDE 100 UG/ML
INJECTION, SOLUTION INTRAVENOUS
Status: DISCONTINUED | OUTPATIENT
Start: 2022-05-20 | End: 2022-05-20

## 2022-05-20 RX ORDER — PROPOFOL 10 MG/ML
VIAL (ML) INTRAVENOUS
Status: DISCONTINUED | OUTPATIENT
Start: 2022-05-20 | End: 2022-05-20

## 2022-05-20 RX ORDER — DIPHENHYDRAMINE HYDROCHLORIDE 50 MG/ML
25 INJECTION INTRAMUSCULAR; INTRAVENOUS EVERY 6 HOURS PRN
Status: DISCONTINUED | OUTPATIENT
Start: 2022-05-20 | End: 2022-05-20 | Stop reason: HOSPADM

## 2022-05-20 RX ORDER — FENTANYL CITRATE 50 UG/ML
25 INJECTION, SOLUTION INTRAMUSCULAR; INTRAVENOUS EVERY 5 MIN PRN
Status: DISCONTINUED | OUTPATIENT
Start: 2022-05-20 | End: 2022-05-20 | Stop reason: HOSPADM

## 2022-05-20 RX ORDER — LIDOCAINE HYDROCHLORIDE 20 MG/ML
INJECTION INTRAVENOUS
Status: DISCONTINUED | OUTPATIENT
Start: 2022-05-20 | End: 2022-05-20

## 2022-05-20 RX ADMIN — Medication 50 MG: at 10:05

## 2022-05-20 RX ADMIN — PROPOFOL 175 MCG/KG/MIN: 10 INJECTION, EMULSION INTRAVENOUS at 10:05

## 2022-05-20 RX ADMIN — GLYCOPYRROLATE 0.1 MG: 0.2 INJECTION, SOLUTION INTRAMUSCULAR; INTRAVITREAL at 10:05

## 2022-05-20 RX ADMIN — HUMAN ALBUMIN MICROSPHERES AND PERFLUTREN 0.66 MG: 10; .22 INJECTION, SOLUTION INTRAVENOUS at 11:05

## 2022-05-20 RX ADMIN — LIDOCAINE HYDROCHLORIDE 100 MG: 20 INJECTION, SOLUTION INTRAVENOUS at 10:05

## 2022-05-20 RX ADMIN — DEXMEDETOMIDINE HYDROCHLORIDE 30 MCG: 100 INJECTION, SOLUTION, CONCENTRATE INTRAVENOUS at 10:05

## 2022-05-20 RX ADMIN — SODIUM CHLORIDE: 0.9 INJECTION, SOLUTION INTRAVENOUS at 10:05

## 2022-05-20 NOTE — H&P
[facility] Short Stay Cardiac [facility]  NOEMI Cardiology  History and Physical     Patient Name: Alfred Cuello  MRN: [id number]  Admission Date: 5/20/2022  Code Status: Prior   Attending Provider: SKYLER Lee MD   Primary Care Physician: Nuria Man MD  Principal Problem:<principal problem not specified>    Patient information was obtained from patient and ER records.     Subjective:         HPI:   57-year-old gentleman who presents today for NOEMI/DCCV  persistent atrial fibrillation (CHADSVasc 3). He has a past medical history significant for persistent atrial fibrillation, a history of HFpEF with most recent LVEF 65%, hypertension, hyperlipidemia, type II diabetes mellitus, GERD, RAFAEL maintained on CPAP therapy, and obesity.    NOEMI 2020  · With normal systolic function. The estimated ejection fraction is 55%.  · Normal left ventricular diastolic function.  · Normal right ventricular size with normal right ventricular systolic function.  · Moderate left atrial enlargement.  · Moderate right atrial enlargement.  · Moderate mitral regurgitation.  · Moderate to severe tricuspid regurgitation.     Anticoagulant/antiplatelets: eliquis  ECG: AF    Dysphagia or odynophagia:  No  Liver Disease, esophageal disease, or known varices:  No  Upper GI Bleeding: No  Snoring:  Yes  Sleep Apnea:  No  Prior neck surgery or radiation:  No  History of anesthetic difficulties:  No  Family history of anesthetic difficulties:  No  Last oral intake: yesterday before midnight  Able to move neck in all directions:  Yes          Past Medical History:   Diagnosis Date    Diabetes mellitus, type 2     GERD (gastroesophageal reflux disease)     Hyperlipidemia     Hypertension     Neuropathy        Past Surgical History:   Procedure Laterality Date    ABDOMINAL SURGERY      as a child    COLONOSCOPY N/A 7/20/2018    Procedure: COLONOSCOPY;  Surgeon: Marko Koo MD;  Location: [facility];  Service: Endoscopy;  Laterality: N/A;     TRIAL OF SPINAL CORD NERVE STIMULATOR N/A 6/17/2019    Procedure: Trial, Neurostimulator, LUMBAR SPINAL CORD STIMULATOR TRIAL;  Surgeon: Rahat Orantes MD;  Location: [facility];  Service: Pain Management;  Laterality: N/A;  PDN STUDY TRIAL, NEEDS CONSENT, NEVRO REP       Review of patient's allergies indicates:   Allergen Reactions    Oxycodone Itching and Nausea Only       No current facility-administered medications on file prior to encounter.     Current Outpatient Medications on File Prior to Encounter   Medication Sig    albuterol (VENTOLIN HFA) 90 mcg/actuation inhaler Inhale 2 puffs into the lungs every 6 (six) hours as needed for Wheezing. Rescue    apixaban (ELIQUIS) 5 mg Tab Take 1 tablet (5 mg total) by mouth 2 (two) times daily.    atorvastatin (LIPITOR) 40 MG tablet Take 1 tablet (40 mg total) by mouth once daily.    ferrous sulfate 325 mg (65 mg iron) Tab tablet Take 325 mg by mouth daily with breakfast.    gabapentin (NEURONTIN) 300 MG capsule Take 4 capsules (1,200 mg total) by mouth 3 (three) times daily.    glimepiride (AMARYL) 4 MG tablet Take 1 tablet (4 mg total) by mouth 2 (two) times a day. Take 1 tablet with breakfast and 1 tablet at dinner.    HYDROcodone-acetaminophen (NORCO) 5-325 mg per tablet Take 1 tablet every 4 to 6 hours as needed for pain.    KRILL OIL ORAL Take by mouth once daily.    lisinopriL-hydrochlorothiazide (PRINZIDE,ZESTORETIC) 20-25 mg Tab Take 1 tablet by mouth once daily    loratadine (CLARITIN) 10 mg tablet Take 10 mg by mouth once daily.    metFORMIN (GLUCOPHAGE) 850 MG tablet TAKE 1 TABLET BY MOUTH THREE TIMES DAILY WITH MEALS    multivitamin (THERAGRAN) per tablet Take 1 tablet by mouth once daily.    omeprazole (PRILOSEC) 20 MG capsule Take 1 capsule (20 mg total) by mouth once daily.    pioglitazone (ACTOS) 30 MG tablet Take 1 tablet (30 mg total) by mouth once daily.    potassium chloride SA (K-DUR,KLOR-CON) 20 MEQ tablet Take 1 tablet (20  mEq total) by mouth once daily.    sars-cov-2, covid-19, (PFIZER COVID-19) 30 mcg/0.3 ml injection     semaglutide (OZEMPIC) 0.25 mg or 0.5 mg(2 mg/1.5 mL) pen injector Start Ozempic 0.25 mg once weekly x 4 weeks. At week 5 increase to 0.5 mg weekly     Family History       Problem Relation (Age of Onset)    Coronary artery disease Father    Diabetes Mother          Tobacco Use    Smoking status: Former Smoker     Quit date: 3/20/2011     Years since quittin.1    Smokeless tobacco: Never Used    Tobacco comment: smoked regularly for 35 years prior to this   Substance and Sexual Activity    Alcohol use: Yes     Comment: occ    Drug use: No    Sexual activity: Not on file     ROS see HPI   Objective:     Vital Signs (Most Recent):    Vital Signs (24h Range):           There is no height or weight on file to calculate BMI.            No intake or output data in the 24 hours ending 22 0827    Lines/Drains/Airways       None                   Physical Exam  Constitutional: No distress, obese, conversant  HEENT: Sclera anicteric, PERRLA, EOMI  Neck: No JVD, no masses, good movement  CV: irregularly irregular  Pulm: Clear to auscultation bilaterally with symmetrical expansion. Chest wall palpated for reproduction of pain symptoms, and no pain was able to be produced on palpation or resistance exercises  GI: Abdomen soft, non-tender, good bowel sounds  Extremities: Both extremities intact and grossly normal, skin is warm, no edema noted  Skin: No ecchymosis, erythema, or ulcers  Psych: AOx3, appropriate affect  Neuro: CNII-XII intact, no focal deficits    Significant Labs: BMP: No results for input(s): GLU, NA, K, CL, CO2, BUN, CREATININE, CALCIUM, MG in the last 48 hours., CMP No results for input(s): NA, K, CL, CO2, GLU, BUN, CREATININE, CALCIUM, PROT, ALBUMIN, BILITOT, ALKPHOS, AST, ALT, ANIONGAP, ESTGFRAFRICA, EGFRNONAA in the last 48 hours., CBC No results for input(s): WBC, HGB, HCT, PLT in the last  48 hours., INR No results for input(s): INR, PROTIME in the last 48 hours., Lipid Panel No results for input(s): CHOL, HDL, LDLCALC, TRIG, CHOLHDL in the last 48 hours., and Troponin No results for input(s): TROPONINI in the last 48 hours.    Significant Imaging: CT scan: CT ABDOMEN PELVIS WITH CONTRAST: No results found for this visit on 05/20/22. and CT ABDOMEN PELVIS WITHOUT CONTRAST: No results found for this visit on 05/20/22. and Echocardiogram: 2D echo with color flow doppler: No results found for this or any previous visit. and Transthoracic echo (TTE) complete (Cupid Only):   Results for orders placed or performed during the hospital encounter of 11/22/20   Echo Color Flow Doppler? Yes; Bubble Contrast? No   Result Value Ref Range    BSA 2.59 m2    TDI SEPTAL 0.10 m/s    LV LATERAL E/E' RATIO 9.83 m/s    LV SEPTAL E/E' RATIO 11.80 m/s    Right Atrial Pressure (from IVC) 3 mmHg    AORTIC VALVE CUSP SEPERATION 2.28 cm    TDI LATERAL 0.12 m/s    PV PEAK VELOCITY 83.11 cm/s    LVIDd 5.33 3.5 - 6.0 cm    IVS 1.15 (A) 0.6 - 1.1 cm    Posterior Wall 1.15 (A) 0.6 - 1.1 cm    Ao root annulus 3.72 cm    LVIDs 3.90 2.1 - 4.0 cm    FS 27 28 - 44 %    LV mass 244.19 g    LA size 4.30 cm    RVDD 249.00 cm    Left Ventricle Relative Wall Thickness 0.43 cm    AV mean gradient 5 mmHg    AV valve area 3.72 cm2    AV Velocity Ratio 79.14     AV index (prosthetic) 0.81     Mean e' 0.11 m/s    E wave deceleration time 152.69 msec    IVRT 58.01 msec    LVOT diameter 2.42 cm    LVOT area 4.6 cm2    LVOT peak gee 110.00 m/s    LVOT peak VTI 21.99 cm    Ao peak gee 1.39 m/s    Ao VTI 27.19 cm    LVOT stroke volume 101.09 cm3    AV peak gradient 8 mmHg    TV rest pulmonary artery pressure 29 mmHg    E/E' ratio 10.73 m/s    MV Peak E Gee 1.18 m/s    TR Max Gee 2.55 m/s    LV Mass Index 97 g/m2    Triscuspid Valve Regurgitation Peak Gradient 26 mmHg    Narrative    · The estimated PA systolic pressure is 29 mmHg.  · There is left  ventricular concentric remodeling.  · The left ventricle is normal in size with normal systolic function. The   estimated ejection fraction is 65%.  · Normal left ventricular diastolic function.  · Normal right ventricular size with normal right ventricular systolic   function.  · Mild left atrial enlargement.  · Mild-to-moderate mitral regurgitation.  · Mild tricuspid regurgitation.  · Normal central venous pressure (3 mmHg).        Assessment and Plan:     Persistent atrial fibrillation  Here today for NOEMI / DCCV  - afterwards plan to initiate amiodarone load per Dr. Lee    -No absolute contraindications of esophageal stricture, tumor, perforation, laceration,or diverticulum and/or active GI bleed  -The risks, benefits & alternatives of the procedure were explained to the patient.   -The risks of transesophageal echo include but are not limited to:  Dental trauma, esophageal trauma/perforation, bleeding, laryngospasm/brochospasm, aspiration, sore throat/hoarseness, & dislodgement of the endotracheal tube/nasogastric tube (where applicable).    -The risks of ANES monitored sedation include hypotension, respiratory depression, arrhythmias, bronchospasm, & death.    -Informed consent was obtained. The patient is agreeable to proceed with the procedure and all questions and concerns addressed.    Case discussed with an attending in echocardiography lab.    Further recommendations per attending addendum          VTE Risk Mitigation (From admission, onward)    None          Natali Knutson MD  Cardiology   [facility] Short Stay Card[facility]

## 2022-05-20 NOTE — SUBJECTIVE & OBJECTIVE
Past Medical History:   Diagnosis Date    Diabetes mellitus, type 2     GERD (gastroesophageal reflux disease)     Hyperlipidemia     Hypertension     Neuropathy        Past Surgical History:   Procedure Laterality Date    ABDOMINAL SURGERY      as a child    COLONOSCOPY N/A 7/20/2018    Procedure: COLONOSCOPY;  Surgeon: Marko Koo MD;  Location: [facility];  Service: Endoscopy;  Laterality: N/A;    TRIAL OF SPINAL CORD NERVE STIMULATOR N/A 6/17/2019    Procedure: Trial, Neurostimulator, LUMBAR SPINAL CORD STIMULATOR TRIAL;  Surgeon: Rahat Orantes MD;  Location: [facility];  Service: Pain Management;  Laterality: N/A;  PDN STUDY TRIAL, NEEDS CONSENT, NEVRO REP       Review of patient's allergies indicates:   Allergen Reactions    Oxycodone Itching and Nausea Only       No current facility-administered medications on file prior to encounter.     Current Outpatient Medications on File Prior to Encounter   Medication Sig    albuterol (VENTOLIN HFA) 90 mcg/actuation inhaler Inhale 2 puffs into the lungs every 6 (six) hours as needed for Wheezing. Rescue    apixaban (ELIQUIS) 5 mg Tab Take 1 tablet (5 mg total) by mouth 2 (two) times daily.    atorvastatin (LIPITOR) 40 MG tablet Take 1 tablet (40 mg total) by mouth once daily.    ferrous sulfate 325 mg (65 mg iron) Tab tablet Take 325 mg by mouth daily with breakfast.    gabapentin (NEURONTIN) 300 MG capsule Take 4 capsules (1,200 mg total) by mouth 3 (three) times daily.    glimepiride (AMARYL) 4 MG tablet Take 1 tablet (4 mg total) by mouth 2 (two) times a day. Take 1 tablet with breakfast and 1 tablet at dinner.    HYDROcodone-acetaminophen (NORCO) 5-325 mg per tablet Take 1 tablet every 4 to 6 hours as needed for pain.    KRILL OIL ORAL Take by mouth once daily.    lisinopriL-hydrochlorothiazide (PRINZIDE,ZESTORETIC) 20-25 mg Tab Take 1 tablet by mouth once daily    loratadine (CLARITIN) 10 mg tablet Take 10 mg by mouth once daily.    metFORMIN  (GLUCOPHAGE) 850 MG tablet TAKE 1 TABLET BY MOUTH THREE TIMES DAILY WITH MEALS    multivitamin (THERAGRAN) per tablet Take 1 tablet by mouth once daily.    omeprazole (PRILOSEC) 20 MG capsule Take 1 capsule (20 mg total) by mouth once daily.    pioglitazone (ACTOS) 30 MG tablet Take 1 tablet (30 mg total) by mouth once daily.    potassium chloride SA (K-DUR,KLOR-CON) 20 MEQ tablet Take 1 tablet (20 mEq total) by mouth once daily.    sars-cov-2, covid-19, (PFIZER COVID-19) 30 mcg/0.3 ml injection     semaglutide (OZEMPIC) 0.25 mg or 0.5 mg(2 mg/1.5 mL) pen injector Start Ozempic 0.25 mg once weekly x 4 weeks. At week 5 increase to 0.5 mg weekly     Family History       Problem Relation (Age of Onset)    Coronary artery disease Father    Diabetes Mother          Tobacco Use    Smoking status: Former Smoker     Quit date: 3/20/2011     Years since quittin.1    Smokeless tobacco: Never Used    Tobacco comment: smoked regularly for 35 years prior to this   Substance and Sexual Activity    Alcohol use: Yes     Comment: occ    Drug use: No    Sexual activity: Not on file     ROS see HPI   Objective:     Vital Signs (Most Recent):    Vital Signs (24h Range):           There is no height or weight on file to calculate BMI.            No intake or output data in the 24 hours ending 22 0827    Lines/Drains/Airways       None                   Physical Exam  Constitutional: No distress, obese, conversant  HEENT: Sclera anicteric, PERRLA, EOMI  Neck: No JVD, no masses, good movement  CV: irregularly irregular  Pulm: Clear to auscultation bilaterally with symmetrical expansion. Chest wall palpated for reproduction of pain symptoms, and no pain was able to be produced on palpation or resistance exercises  GI: Abdomen soft, non-tender, good bowel sounds  Extremities: Both extremities intact and grossly normal, skin is warm, no edema noted  Skin: No ecchymosis, erythema, or ulcers  Psych: AOx3, appropriate affect  Neuro:  CNII-XII intact, no focal deficits    Significant Labs: BMP: No results for input(s): GLU, NA, K, CL, CO2, BUN, CREATININE, CALCIUM, MG in the last 48 hours., CMP No results for input(s): NA, K, CL, CO2, GLU, BUN, CREATININE, CALCIUM, PROT, ALBUMIN, BILITOT, ALKPHOS, AST, ALT, ANIONGAP, ESTGFRAFRICA, EGFRNONAA in the last 48 hours., CBC No results for input(s): WBC, HGB, HCT, PLT in the last 48 hours., INR No results for input(s): INR, PROTIME in the last 48 hours., Lipid Panel No results for input(s): CHOL, HDL, LDLCALC, TRIG, CHOLHDL in the last 48 hours., and Troponin No results for input(s): TROPONINI in the last 48 hours.    Significant Imaging: CT scan: CT ABDOMEN PELVIS WITH CONTRAST: No results found for this visit on 05/20/22. and CT ABDOMEN PELVIS WITHOUT CONTRAST: No results found for this visit on 05/20/22. and Echocardiogram: 2D echo with color flow doppler: No results found for this or any previous visit. and Transthoracic echo (TTE) complete (Cupid Only):   Results for orders placed or performed during the hospital encounter of 11/22/20   Echo Color Flow Doppler? Yes; Bubble Contrast? No   Result Value Ref Range    BSA 2.59 m2    TDI SEPTAL 0.10 m/s    LV LATERAL E/E' RATIO 9.83 m/s    LV SEPTAL E/E' RATIO 11.80 m/s    Right Atrial Pressure (from IVC) 3 mmHg    AORTIC VALVE CUSP SEPERATION 2.28 cm    TDI LATERAL 0.12 m/s    PV PEAK VELOCITY 83.11 cm/s    LVIDd 5.33 3.5 - 6.0 cm    IVS 1.15 (A) 0.6 - 1.1 cm    Posterior Wall 1.15 (A) 0.6 - 1.1 cm    Ao root annulus 3.72 cm    LVIDs 3.90 2.1 - 4.0 cm    FS 27 28 - 44 %    LV mass 244.19 g    LA size 4.30 cm    RVDD 249.00 cm    Left Ventricle Relative Wall Thickness 0.43 cm    AV mean gradient 5 mmHg    AV valve area 3.72 cm2    AV Velocity Ratio 79.14     AV index (prosthetic) 0.81     Mean e' 0.11 m/s    E wave deceleration time 152.69 msec    IVRT 58.01 msec    LVOT diameter 2.42 cm    LVOT area 4.6 cm2    LVOT peak antonio 110.00 m/s    LVOT peak VTI  21.99 cm    Ao peak gee 1.39 m/s    Ao VTI 27.19 cm    LVOT stroke volume 101.09 cm3    AV peak gradient 8 mmHg    TV rest pulmonary artery pressure 29 mmHg    E/E' ratio 10.73 m/s    MV Peak E Gee 1.18 m/s    TR Max Gee 2.55 m/s    LV Mass Index 97 g/m2    Triscuspid Valve Regurgitation Peak Gradient 26 mmHg    Narrative    · The estimated PA systolic pressure is 29 mmHg.  · There is left ventricular concentric remodeling.  · The left ventricle is normal in size with normal systolic function. The   estimated ejection fraction is 65%.  · Normal left ventricular diastolic function.  · Normal right ventricular size with normal right ventricular systolic   function.  · Mild left atrial enlargement.  · Mild-to-moderate mitral regurgitation.  · Mild tricuspid regurgitation.  · Normal central venous pressure (3 mmHg).

## 2022-05-20 NOTE — ANESTHESIA POSTPROCEDURE EVALUATION
Anesthesia Post Evaluation    Patient: Alfred Nicholsrt Pool    Procedure(s) Performed: Procedure(s) (LRB):  CARDIOVERSION (N/A)  Transesophageal echo (NOEMI) intra-procedure log documentation (N/A)    Final Anesthesia Type: general      Level of consciousness: awake and alert  Post-procedure vital signs: reviewed and stable  Pain control: Pain has been treated.  Airway patency: patent    PONV status: Absent or treated.  Anesthetic complications: no      Cardiovascular status: hemodynamically stable  Respiratory status: unassisted  Hydration status: euvolemic            Vitals Value Taken Time   /69 05/20/22 1232   Temp 36.4 °C (97.5 °F) 05/20/22 1125   Pulse 65 05/20/22 1236   Resp 23 05/20/22 1236   SpO2 98 % 05/20/22 1236   Vitals shown include unvalidated device data.      No case tracking events are documented in the log.      Pain/Bean Score: Bean Score: 8 (5/20/2022 12:15 PM)

## 2022-05-20 NOTE — ASSESSMENT & PLAN NOTE
Here today for NOEMI / DCCV  - afterwards plan to initiate amiodarone load per Dr. Lee    -No absolute contraindications of esophageal stricture, tumor, perforation, laceration,or diverticulum and/or active GI bleed  -The risks, benefits & alternatives of the procedure were explained to the patient.   -The risks of transesophageal echo include but are not limited to:  Dental trauma, esophageal trauma/perforation, bleeding, laryngospasm/brochospasm, aspiration, sore throat/hoarseness, & dislodgement of the endotracheal tube/nasogastric tube (where applicable).    -The risks of ANES monitored sedation include hypotension, respiratory depression, arrhythmias, bronchospasm, & death.    -Informed consent was obtained. The patient is agreeable to proceed with the procedure and all questions and concerns addressed.    Case discussed with an attending in echocardiography lab.    Further recommendations per attending addendum

## 2022-05-20 NOTE — HPI
57-year-old gentleman who presents today for NOEMI/DCCV  persistent atrial fibrillation (CHADSVasc 3). He has a past medical history significant for persistent atrial fibrillation, a history of HFpEF with most recent LVEF 65%, hypertension, hyperlipidemia, type II diabetes mellitus, GERD, RAFAEL maintained on CPAP therapy, and obesity.    NOEMI 2020  With normal systolic function. The estimated ejection fraction is 55%.  Normal left ventricular diastolic function.  Normal right ventricular size with normal right ventricular systolic function.  Moderate left atrial enlargement.  Moderate right atrial enlargement.  Moderate mitral regurgitation.  Moderate to severe tricuspid regurgitation.     Anticoagulant/antiplatelets: eliquis  ECG: AF    Dysphagia or odynophagia:  No  Liver Disease, esophageal disease, or known varices:  No  Upper GI Bleeding: No  Snoring:  Yes  Sleep Apnea:  No  Prior neck surgery or radiation:  No  History of anesthetic difficulties:  No  Family history of anesthetic difficulties:  No  Last oral intake: yesterday before midnight  Able to move neck in all directions:  Yes

## 2022-05-20 NOTE — TRANSFER OF CARE
"Anesthesia Transfer of Care Note    Patient: Alfred Cuello    Procedure(s) Performed: Procedure(s) (LRB):  CARDIOVERSION (N/A)  Transesophageal echo (NOEMI) intra-procedure log documentation (N/A)    Patient location: PACU    Anesthesia Type: general    Transport from OR: Transported from OR on 6-10 L/min O2 by face mask with adequate spontaneous ventilation    Post pain: adequate analgesia    Post assessment: no apparent anesthetic complications    Post vital signs: stable    Level of consciousness: sedated    Nausea/Vomiting: no nausea/vomiting    Complications: none    Transfer of care protocol was followed      Last vitals:   Visit Vitals  /86 (BP Location: Right arm, Patient Position: Lying)   Pulse 68   Temp 37.1 °C (98.8 °F) (Temporal)   Resp 20   Ht 6' 2" (1.88 m)   Wt 125.8 kg (277 lb 7.2 oz)   SpO2 97%   BMI 35.62 kg/m²     "

## 2022-05-20 NOTE — PLAN OF CARE
Pt arrived to cath lab holding bed 8 s/p NOEMI/DCCV. Assumed care of pt. Pt laying in bed with no S/S of distress or SOB. Denies pain. Needs met. Awaiting EKG. Bed in lowest position. Will continue to monitor.

## 2022-05-20 NOTE — DISCHARGE SUMMARY
[facility]  Cardiology  Discharge Summary      Patient Name: Alfred Cuello  MRN: [id number]  Admission Date: 5/20/2022  Hospital Length of Stay: 0 days  Discharge Date and Time:  05/20/2022 8:33 AM  Attending Physician: SKYLER Lee MD    Discharging Provider: Natali Knutson  Primary Care Physician: Nuria Man MD    Procedure(s) (LRB):  NOEMI     Hospital Course:   57-year-old gentleman who presents today for NOEMI/DCCV  persistent atrial fibrillation (CHADSVasc 3). 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 Repeat NOEMI prelim findings with EF 45-50% and clear TAWNY (contrast used). He was discharged on a amiodarone load and continued on home eliquis with plans to fup with Dr. Lee in clinic for discussion of PVI in future.     NOEMI 2020  · With normal systolic function. The estimated ejection fraction is 55%.  · Normal left ventricular diastolic function.  · Normal right ventricular size with normal right ventricular systolic function.  · Moderate left atrial enlargement.  · Moderate right atrial enlargement.  · Moderate mitral regurgitation.  · Moderate to severe tricuspid regurgitation.            Discharged Condition: good    Disposition:   Home    Follow Up:  No follow-ups on file.    Patient Instructions:       Alfred Cuello was given education on their disease process and medications.      Medications:  Reconciled Home Medications:      Medication List      START taking these medications    amiodarone 200 MG Tab  Commonly known as: PACERONE  Take one tablet every 12 hours for two weeks then take one tablet daily indefinitely.        CONTINUE taking these medications    albuterol 90 mcg/actuation inhaler  Commonly known as: VENTOLIN HFA  Inhale 2 puffs into the  lungs every 6 (six) hours as needed for Wheezing. Rescue     atorvastatin 40 MG tablet  Commonly known as: LIPITOR  Take 1 tablet (40 mg total) by mouth once daily.     ELIQUIS 5 mg Tab  Generic drug: apixaban  Take 1 tablet (5 mg total) by mouth 2 (two) times daily.     ferrous sulfate 325 mg (65 mg iron) Tab tablet  Commonly known as: FEOSOL  Take 325 mg by mouth daily with breakfast.     gabapentin 300 MG capsule  Commonly known as: NEURONTIN  Take 4 capsules (1,200 mg total) by mouth 3 (three) times daily.     glimepiride 4 MG tablet  Commonly known as: AMARYL  Take 1 tablet (4 mg total) by mouth 2 (two) times a day. Take 1 tablet with breakfast and 1 tablet at dinner.     HYDROcodone-acetaminophen 5-325 mg per tablet  Commonly known as: NORCO  Take 1 tablet every 4 to 6 hours as needed for pain.     KRILL OIL ORAL  Take by mouth once daily.     lisinopriL-hydrochlorothiazide 20-25 mg Tab  Commonly known as: PRINZIDE,ZESTORETIC  Take 1 tablet by mouth once daily     loratadine 10 mg tablet  Commonly known as: CLARITIN  Take 10 mg by mouth once daily.     metFORMIN 850 MG tablet  Commonly known as: GLUCOPHAGE  TAKE 1 TABLET BY MOUTH THREE TIMES DAILY WITH MEALS     multivitamin per tablet  Commonly known as: THERAGRAN  Take 1 tablet by mouth once daily.     omeprazole 20 MG capsule  Commonly known as: PRILOSEC  Take 1 capsule (20 mg total) by mouth once daily.     OZEMPIC 0.25 mg or 0.5 mg(2 mg/1.5 mL) pen injector  Generic drug: semaglutide  Start Ozempic 0.25 mg once weekly x 4 weeks. At week 5 increase to 0.5 mg weekly     pioglitazone 30 MG tablet  Commonly known as: ACTOS  Take 1 tablet (30 mg total) by mouth once daily.     potassium chloride SA 20 MEQ tablet  Commonly known as: K-DUR,KLOR-CON  Take 1 tablet (20 mEq total) by mouth once daily.     sars-cov-2 (covid-19) 30 mcg/0.3 ml injection  Commonly known as: Pfizer COVID-19            Time spent on the discharge of patient: 30 minutes    Signed:  Natali  Rubina Kntuson M.D.  Cardiology Fellow  [facility]

## 2022-05-20 NOTE — PROGRESS NOTES
Date Consent signed: 05/20/2022    Sponsor: [facility]    Study Title/IRB Number: Efficacy and Safety of Dual Direct Cardioversion Versus Direct Current Cardioversion as an Initial Treatment Strategy in Obese patients / 2020.048    Principle Investigator: Fran Ramos MD    Present for Discussion: myself, patient, patient's wife     Is LAR Consenting for Subject: no    Prior to the Informed Consent (IC) being signed, or any study protocol required data collection, testing, procedure, or intervention being performed, the following was done and/or discussed:   Patient was given a copy of the IC for review    Purpose of the study and qualifications to participate    Study design, Follow up schedule, and tests or procedures done at each visit   Confidentiality and HIPAA Authorization for Release of Medical Records for the research trial/ subject's rights/research related injury   Risk, Benefits, Alternative Treatments, Compensation and Costs   Participation in the research trial is voluntary and patient may withdraw at anytime   Contact information for study related questions    Patient verbalizes understanding of the above: Yes  Contact information for CRC and PI given to patient: Yes  Patient able to adequately summarize: the purpose of the study, the risks associated with the study, and all procedures, testing, and follow-ups associated with the study: Yes    Alfred Cuello signed the informed consent form for the Dual vs Single DCCV research study with an IRB approval date of 9/15/2020.  Each page of the consent form was reviewed with Alfred Cuello (and pts family) and all questions answered satisfactorily. Alfred Cuello signed the consent form and received a copy of same. The original consent was scanned into electronic medical records (EPIC) and filed into the subject's research study binder.    After signing consent, patient was randomized to Single DCCV in [employer].    Yen Martino Dr.  Dr. Richard Lee, and Dr. Johnston were notified of assignment via e-mail. Expressed that patient is blinded to assignment. Randomization sign was placed inside patient's chart.

## 2022-05-20 NOTE — PROGRESS NOTES
Study title: Efficacy and Safety of Dual Direct Current Cardioversion Versus Single Direct Current  Cardioversion as an Initial Treatment Strategy in Obese Patients   IRB #: 2020.048  IRB approval date: 9/15/2020  Sponsor: [facility]  Subject ID: [id number]     Met with patient to do post-procedure action item. Asked patient if they experience any chest discomfort related to cardioversion.     Patient denied any chest discomfort.They did state having throat soreness.  Pain Scale: 0

## 2022-05-20 NOTE — PLAN OF CARE
Patient arrived to room. PIV placed. Admit assessment completed. Plan of care discussed with patient. Wife at bedside. Nurse call bell within reach. Will monitor

## 2022-05-23 ENCOUNTER — TELEPHONE (OUTPATIENT)
Dept: ELECTROPHYSIOLOGY | Facility: CLINIC | Age: 57
End: 2022-05-23
Payer: COMMERCIAL

## 2022-05-23 DIAGNOSIS — I49.8 OTHER SPECIFIED CARDIAC ARRHYTHMIAS: Primary | ICD-10-CM

## 2022-05-23 NOTE — TELEPHONE ENCOUNTER
Called pt regarding scheduling appt post cardioversion. No answer, left message and call back number.

## 2022-05-27 ENCOUNTER — HOSPITAL ENCOUNTER (OUTPATIENT)
Dept: CARDIOLOGY | Facility: HOSPITAL | Age: 57
Discharge: HOME | End: 2022-05-27
Attending: STUDENT IN AN ORGANIZED HEALTH CARE EDUCATION/TRAINING PROGRAM
Payer: COMMERCIAL

## 2022-05-27 DIAGNOSIS — I48.19 PERSISTENT ATRIAL FIBRILLATION: ICD-10-CM

## 2022-05-27 PROCEDURE — 93010 ELECTROCARDIOGRAM REPORT: CPT | Mod: ,,, | Performed by: SPECIALIST

## 2022-05-27 PROCEDURE — 93005 ELECTROCARDIOGRAM TRACING: CPT

## 2022-05-27 PROCEDURE — 93010 EKG 12-LEAD: ICD-10-PCS | Mod: ,,, | Performed by: SPECIALIST

## 2022-05-28 DIAGNOSIS — E11.42 DIABETIC POLYNEUROPATHY ASSOCIATED WITH TYPE 2 DIABETES MELLITUS: ICD-10-CM

## 2022-05-30 RX ORDER — GABAPENTIN 300 MG/1
1200 CAPSULE ORAL 3 TIMES DAILY
Qty: 1080 CAPSULE | Refills: 1 | Status: SHIPPED | OUTPATIENT
Start: 2022-05-30 | End: 2022-11-25 | Stop reason: SDUPTHER

## 2022-06-03 ENCOUNTER — TELEPHONE (OUTPATIENT)
Dept: ENDOCRINOLOGY | Facility: CLINIC | Age: 57
End: 2022-06-03
Payer: COMMERCIAL

## 2022-06-03 NOTE — TELEPHONE ENCOUNTER
Mrs. Cuello reports that Mr. Prado has an ulcer on the back of his leg (calf area) that started yesterday & has progressively grown bigger in size, is red & inflamed. She was instructed to bring him to the ER for treatment & she verbalized an understanding.

## 2022-06-03 NOTE — TELEPHONE ENCOUNTER
----- Message from Jilmarco Pearsonard sent at 6/3/2022 10:14 AM CDT -----  Contact: Patient  Type: Patient Call Back         Who called: Wife         What is the request in detail: calling to get patient sched for cyst on his leg; states he was suppose to reach out whenever it happens; states it has progress so much in the last 24hrs; wanted patient to e seen tmrw but want to get advice; notified that appts are not avail on Saturdays; please advise         Best call back number: [phone] - Alfred; [phone] - Fabby if he is not avail         Additional Information:            Thank You

## 2022-06-09 ENCOUNTER — PATIENT MESSAGE (OUTPATIENT)
Dept: ELECTROPHYSIOLOGY | Facility: CLINIC | Age: 57
End: 2022-06-09
Payer: COMMERCIAL

## 2022-06-18 ENCOUNTER — LAB VISIT (OUTPATIENT)
Dept: LAB | Facility: HOSPITAL | Age: 57
End: 2022-06-18
Attending: NURSE PRACTITIONER
Payer: COMMERCIAL

## 2022-06-18 DIAGNOSIS — E11.65 TYPE 2 DIABETES MELLITUS WITH HYPERGLYCEMIA, WITHOUT LONG-TERM CURRENT USE OF INSULIN: ICD-10-CM

## 2022-06-18 LAB
ESTIMATED AVG GLUCOSE: 143 MG/DL (ref 68–131)
HBA1C MFR BLD: 6.6 % (ref 4–5.6)

## 2022-06-18 PROCEDURE — 36415 COLL VENOUS BLD VENIPUNCTURE: CPT | Performed by: NURSE PRACTITIONER

## 2022-06-18 PROCEDURE — 83036 HEMOGLOBIN GLYCOSYLATED A1C: CPT | Performed by: NURSE PRACTITIONER

## 2022-06-22 DIAGNOSIS — K21.9 GASTROESOPHAGEAL REFLUX DISEASE WITHOUT ESOPHAGITIS: ICD-10-CM

## 2022-06-22 RX ORDER — OMEPRAZOLE 20 MG/1
20 CAPSULE, DELAYED RELEASE ORAL DAILY
Qty: 90 CAPSULE | Refills: 3 | Status: CANCELLED | OUTPATIENT
Start: 2022-06-22

## 2022-06-22 NOTE — TELEPHONE ENCOUNTER
Medication  omeprazole (PRILOSEC) 20 MG capsule [[id number]]    Outpatient Medication Detail     Disp Refills Start End GEOVANNA   omeprazole (PRILOSEC) 20 MG capsule 90 capsule 3 3/17/2022  No   Sig - Route: Take 1 capsule (20 mg total) by mouth once daily. - Oral   Sent to pharmacy as: omeprazole (PRILOSEC) 20 MG capsule   Class: Normal   Order: [id number]   Date/Time Signed: 3/17/2022 14:01       E-Prescribing Status: Receipt confirmed by pharmacy (3/17/2022  2:01 PM CDT)         Pharmacy    [facility] - [place]        Associated Diagnoses    Gastroesophageal reflux disease without esophagitis

## 2022-06-23 ENCOUNTER — OFFICE VISIT (OUTPATIENT)
Dept: ENDOCRINOLOGY | Facility: CLINIC | Age: 57
End: 2022-06-23
Payer: COMMERCIAL

## 2022-06-23 ENCOUNTER — PATIENT MESSAGE (OUTPATIENT)
Dept: ELECTROPHYSIOLOGY | Facility: CLINIC | Age: 57
End: 2022-06-23
Payer: COMMERCIAL

## 2022-06-23 VITALS
HEIGHT: 74 IN | BODY MASS INDEX: 34.78 KG/M2 | TEMPERATURE: 98 F | DIASTOLIC BLOOD PRESSURE: 70 MMHG | SYSTOLIC BLOOD PRESSURE: 110 MMHG | HEART RATE: 83 BPM | OXYGEN SATURATION: 97 % | WEIGHT: 271 LBS

## 2022-06-23 DIAGNOSIS — E11.42 DIABETIC POLYNEUROPATHY ASSOCIATED WITH TYPE 2 DIABETES MELLITUS: ICD-10-CM

## 2022-06-23 DIAGNOSIS — E11.59 HYPERTENSION ASSOCIATED WITH DIABETES: ICD-10-CM

## 2022-06-23 DIAGNOSIS — E11.65 TYPE 2 DIABETES MELLITUS WITH HYPERGLYCEMIA, WITHOUT LONG-TERM CURRENT USE OF INSULIN: ICD-10-CM

## 2022-06-23 DIAGNOSIS — I15.2 HYPERTENSION ASSOCIATED WITH DIABETES: ICD-10-CM

## 2022-06-23 DIAGNOSIS — E78.5 DYSLIPIDEMIA: ICD-10-CM

## 2022-06-23 DIAGNOSIS — I48.19 PERSISTENT ATRIAL FIBRILLATION: ICD-10-CM

## 2022-06-23 DIAGNOSIS — E66.9 OBESITY (BMI 30-39.9): ICD-10-CM

## 2022-06-23 DIAGNOSIS — G47.33 OSA ON CPAP: ICD-10-CM

## 2022-06-23 PROCEDURE — 99999 PR PBB SHADOW E&M-EST. PATIENT-LVL IV: CPT | Mod: PBBFAC,,, | Performed by: NURSE PRACTITIONER

## 2022-06-23 PROCEDURE — 99214 OFFICE O/P EST MOD 30 MIN: CPT | Mod: S$GLB,,, | Performed by: NURSE PRACTITIONER

## 2022-06-23 PROCEDURE — 3078F PR MOST RECENT DIASTOLIC BLOOD PRESSURE < 80 MM HG: ICD-10-PCS | Mod: CPTII,S$GLB,, | Performed by: NURSE PRACTITIONER

## 2022-06-23 PROCEDURE — 4010F PR ACE/ARB THEARPY RXD/TAKEN: ICD-10-PCS | Mod: CPTII,S$GLB,, | Performed by: NURSE PRACTITIONER

## 2022-06-23 PROCEDURE — 99999 PR PBB SHADOW E&M-EST. PATIENT-LVL IV: ICD-10-PCS | Mod: PBBFAC,,, | Performed by: NURSE PRACTITIONER

## 2022-06-23 PROCEDURE — 3008F BODY MASS INDEX DOCD: CPT | Mod: CPTII,S$GLB,, | Performed by: NURSE PRACTITIONER

## 2022-06-23 PROCEDURE — 1159F PR MEDICATION LIST DOCUMENTED IN MEDICAL RECORD: ICD-10-PCS | Mod: CPTII,S$GLB,, | Performed by: NURSE PRACTITIONER

## 2022-06-23 PROCEDURE — 3074F PR MOST RECENT SYSTOLIC BLOOD PRESSURE < 130 MM HG: ICD-10-PCS | Mod: CPTII,S$GLB,, | Performed by: NURSE PRACTITIONER

## 2022-06-23 PROCEDURE — 4010F ACE/ARB THERAPY RXD/TAKEN: CPT | Mod: CPTII,S$GLB,, | Performed by: NURSE PRACTITIONER

## 2022-06-23 PROCEDURE — 3078F DIAST BP <80 MM HG: CPT | Mod: CPTII,S$GLB,, | Performed by: NURSE PRACTITIONER

## 2022-06-23 PROCEDURE — 3074F SYST BP LT 130 MM HG: CPT | Mod: CPTII,S$GLB,, | Performed by: NURSE PRACTITIONER

## 2022-06-23 PROCEDURE — 3008F PR BODY MASS INDEX (BMI) DOCUMENTED: ICD-10-PCS | Mod: CPTII,S$GLB,, | Performed by: NURSE PRACTITIONER

## 2022-06-23 PROCEDURE — 1159F MED LIST DOCD IN RCRD: CPT | Mod: CPTII,S$GLB,, | Performed by: NURSE PRACTITIONER

## 2022-06-23 PROCEDURE — 3044F HG A1C LEVEL LT 7.0%: CPT | Mod: CPTII,S$GLB,, | Performed by: NURSE PRACTITIONER

## 2022-06-23 PROCEDURE — 3066F NEPHROPATHY DOC TX: CPT | Mod: CPTII,S$GLB,, | Performed by: NURSE PRACTITIONER

## 2022-06-23 PROCEDURE — 3044F PR MOST RECENT HEMOGLOBIN A1C LEVEL <7.0%: ICD-10-PCS | Mod: CPTII,S$GLB,, | Performed by: NURSE PRACTITIONER

## 2022-06-23 PROCEDURE — 99214 PR OFFICE/OUTPT VISIT, EST, LEVL IV, 30-39 MIN: ICD-10-PCS | Mod: S$GLB,,, | Performed by: NURSE PRACTITIONER

## 2022-06-23 PROCEDURE — 3060F POS MICROALBUMINURIA REV: CPT | Mod: CPTII,S$GLB,, | Performed by: NURSE PRACTITIONER

## 2022-06-23 PROCEDURE — 3060F PR POS MICROALBUMINURIA RESULT DOCUMENTED/REVIEW: ICD-10-PCS | Mod: CPTII,S$GLB,, | Performed by: NURSE PRACTITIONER

## 2022-06-23 PROCEDURE — 3066F PR DOCUMENTATION OF TREATMENT FOR NEPHROPATHY: ICD-10-PCS | Mod: CPTII,S$GLB,, | Performed by: NURSE PRACTITIONER

## 2022-06-23 NOTE — PROGRESS NOTES
CC: This 56 y.o. male presents for management of diabetes and chronic conditions pending review including HTN, HLP, RAFAEL, vitamin d deficiency, obesity    HPI: He was diagnosed with T2DM in . Has never been hospitalized r/t DM.  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  /GYN: 1-2+ nocturia, no burning or pain.   MS/Neuro: + numbness/ tingling in BLE;  speech clear  Psych: Denies drug/ETOH abuse, no hx of depression.  Other systems: negative.    PE:  GENERAL: Well developed, well nourished.  PSYCH: AAOx3, appropriate mood and affect, pleasant expression, conversant, appears relaxed, well groomed.   EYES: Conjunctiva, corneas clear  NECK: Supple, trachea midline  FOOT EXAMINATION:  2021 Dr Glez    Lab Results   Component Value Date    MICALBCREAT 31.9 (H) 2022       Hemoglobin A1C   Date Value Ref Range Status   2022 6.6 (H) 4.0 - 5.6 % Final     Comment:     ADA Screening Guidelines:  5.7-6.4%  Consistent with prediabetes  >or=6.5%  Consistent with diabetes    High levels of fetal hemoglobin interfere with the HbA1C  assay. Heterozygous hemoglobin variants (HbS,  HgC, etc)do  not significantly interfere with this assay.   However, presence of multiple variants may affect accuracy.     03/12/2022 7.9 (H) 4.0 - 5.6 % Final     Comment:     ADA Screening Guidelines:  5.7-6.4%  Consistent with prediabetes  >or=6.5%  Consistent with diabetes    High levels of fetal hemoglobin interfere with the HbA1C  assay. Heterozygous hemoglobin variants (HbS, HgC, etc)do  not significantly interfere with this assay.   However, presence of multiple variants may affect accuracy.     12/11/2021 7.4 (H) 4.0 - 5.6 % Final     Comment:     ADA Screening Guidelines:  5.7-6.4%  Consistent with prediabetes  >or=6.5%  Consistent with diabetes    High levels of fetal hemoglobin interfere with the HbA1C  assay. Heterozygous hemoglobin variants (HbS, HgC, etc)do  not significantly interfere with this assay.   However, presence of multiple variants may affect accuracy.          ASSESSMENT and PLAN:      1. T2DM controlled CKD , DM PN-   Continue  glimepiride 4 mg bid,  Actos to 30 mg qam,  metformin 850 mg TID  Check bg qd, stagger time- notify me for issues  - dietary and lifestyle modifications have been impactful  Unable to have brand meds r/t large pharmacy deductible and/or large monthly co-pays  - takes  ACEi, statin    2. HTN - controlled, continue meds as previously prescribed and monitor.      3. HLP -  on statin therapy, LFTs WNL     4.  RAFAEL- wearing his cpap-    5. afib- following w cardiology     6. Vitamin d deficiency - takes D3 OTC,      7. Obesity -  Continue exercise and weight loss  Body mass index is 34.79 kg/m².      Follow-up: in 3 months with lab prior

## 2022-06-24 ENCOUNTER — TELEPHONE (OUTPATIENT)
Dept: ELECTROPHYSIOLOGY | Facility: CLINIC | Age: 57
End: 2022-06-24
Payer: COMMERCIAL

## 2022-06-24 NOTE — TELEPHONE ENCOUNTER
Called pt regarding message about rescheduling 7/1 appt because he will be out of town. Spoke with pt and offered him 8/12 @ 3:40 pm but he declined because that date doesn't work for him either, I offered 8/25 and pt decided to give us a call back because he was driving and could not look at his calender. He raleigh call when he is ready to reschedule.

## 2022-07-01 ENCOUNTER — TELEPHONE (OUTPATIENT)
Dept: PODIATRY | Facility: CLINIC | Age: 57
End: 2022-07-01
Payer: COMMERCIAL

## 2022-07-06 ENCOUNTER — OFFICE VISIT (OUTPATIENT)
Dept: PODIATRY | Facility: CLINIC | Age: 57
End: 2022-07-06
Payer: COMMERCIAL

## 2022-07-06 VITALS — OXYGEN SATURATION: 98 % | BODY MASS INDEX: 35.68 KG/M2 | WEIGHT: 278 LBS | HEIGHT: 74 IN | HEART RATE: 88 BPM

## 2022-07-06 DIAGNOSIS — M20.42 HAMMER TOES OF BOTH FEET: ICD-10-CM

## 2022-07-06 DIAGNOSIS — M20.41 HAMMER TOES OF BOTH FEET: ICD-10-CM

## 2022-07-06 DIAGNOSIS — E11.621 TYPE 2 DIABETES MELLITUS WITH FOOT ULCER, UNSPECIFIED WHETHER LONG TERM INSULIN USE: ICD-10-CM

## 2022-07-06 DIAGNOSIS — E11.42 DIABETIC POLYNEUROPATHY ASSOCIATED WITH TYPE 2 DIABETES MELLITUS: Primary | ICD-10-CM

## 2022-07-06 DIAGNOSIS — L97.521 SKIN ULCER OF TOE OF LEFT FOOT, LIMITED TO BREAKDOWN OF SKIN: ICD-10-CM

## 2022-07-06 DIAGNOSIS — L97.509 TYPE 2 DIABETES MELLITUS WITH FOOT ULCER, UNSPECIFIED WHETHER LONG TERM INSULIN USE: ICD-10-CM

## 2022-07-06 DIAGNOSIS — L85.1 ACQUIRED KERATODERMA: ICD-10-CM

## 2022-07-06 PROCEDURE — 99214 OFFICE O/P EST MOD 30 MIN: CPT | Mod: 25,S$GLB,, | Performed by: PODIATRIST

## 2022-07-06 PROCEDURE — 3044F PR MOST RECENT HEMOGLOBIN A1C LEVEL <7.0%: ICD-10-PCS | Mod: CPTII,S$GLB,, | Performed by: PODIATRIST

## 2022-07-06 PROCEDURE — 3066F NEPHROPATHY DOC TX: CPT | Mod: CPTII,S$GLB,, | Performed by: PODIATRIST

## 2022-07-06 PROCEDURE — 4010F ACE/ARB THERAPY RXD/TAKEN: CPT | Mod: CPTII,S$GLB,, | Performed by: PODIATRIST

## 2022-07-06 PROCEDURE — 1160F RVW MEDS BY RX/DR IN RCRD: CPT | Mod: CPTII,S$GLB,, | Performed by: PODIATRIST

## 2022-07-06 PROCEDURE — 3008F PR BODY MASS INDEX (BMI) DOCUMENTED: ICD-10-PCS | Mod: CPTII,S$GLB,, | Performed by: PODIATRIST

## 2022-07-06 PROCEDURE — 3060F PR POS MICROALBUMINURIA RESULT DOCUMENTED/REVIEW: ICD-10-PCS | Mod: CPTII,S$GLB,, | Performed by: PODIATRIST

## 2022-07-06 PROCEDURE — 3066F PR DOCUMENTATION OF TREATMENT FOR NEPHROPATHY: ICD-10-PCS | Mod: CPTII,S$GLB,, | Performed by: PODIATRIST

## 2022-07-06 PROCEDURE — 4010F PR ACE/ARB THEARPY RXD/TAKEN: ICD-10-PCS | Mod: CPTII,S$GLB,, | Performed by: PODIATRIST

## 2022-07-06 PROCEDURE — 3060F POS MICROALBUMINURIA REV: CPT | Mod: CPTII,S$GLB,, | Performed by: PODIATRIST

## 2022-07-06 PROCEDURE — 97597 DBRDMT OPN WND 1ST 20 CM/<: CPT | Mod: S$GLB,,, | Performed by: PODIATRIST

## 2022-07-06 PROCEDURE — 97597 WOUND DEBRIDEMENT: ICD-10-PCS | Mod: S$GLB,,, | Performed by: PODIATRIST

## 2022-07-06 PROCEDURE — 1159F PR MEDICATION LIST DOCUMENTED IN MEDICAL RECORD: ICD-10-PCS | Mod: CPTII,S$GLB,, | Performed by: PODIATRIST

## 2022-07-06 PROCEDURE — 3044F HG A1C LEVEL LT 7.0%: CPT | Mod: CPTII,S$GLB,, | Performed by: PODIATRIST

## 2022-07-06 PROCEDURE — 1159F MED LIST DOCD IN RCRD: CPT | Mod: CPTII,S$GLB,, | Performed by: PODIATRIST

## 2022-07-06 PROCEDURE — 1160F PR REVIEW ALL MEDS BY PRESCRIBER/CLIN PHARMACIST DOCUMENTED: ICD-10-PCS | Mod: CPTII,S$GLB,, | Performed by: PODIATRIST

## 2022-07-06 PROCEDURE — 3008F BODY MASS INDEX DOCD: CPT | Mod: CPTII,S$GLB,, | Performed by: PODIATRIST

## 2022-07-06 PROCEDURE — 99214 PR OFFICE/OUTPT VISIT, EST, LEVL IV, 30-39 MIN: ICD-10-PCS | Mod: 25,S$GLB,, | Performed by: PODIATRIST

## 2022-07-06 NOTE — PROGRESS NOTES
"  [address] [facility] [address]  [place] [address]  Phone: [phone]   [phone]    Patient's PCP:Nuria Man MD  Referring Provider: No ref. provider found    Subjective:      Chief Complaint:: Toe Pain (Blister on LEFT great toe )    SUZIE Cuello is a 57 y.o. male who presents today with a complaint of "blister" on LEFT great toe,  lasting for one week and a half. Onset of symptoms noticed taking off his sock and reports no trauma.  Current symptoms include bleeding , dry skin, and discoloration that has grown in size   Aggravating factors are no pain, patient has neuropathy. Symptoms have stayed the same. Treatment to date have included soaking in foot bath with water and epsom salts 2x since initial, daily cleaning of area and applying triple antibiotic ointment.     Systemic Doctor: Ariadna Light DNP, NP  Date Last Seen: 06/23/2022  Blood Sugar:  Doesn't test regularly   Hemoglobin A1c: 6.6 ( 06/18/2022)     Vitals:    07/06/22 1517   Pulse: 88   SpO2: 98%   Weight: 126.1 kg (278 lb)   Height: 6' 2" (1.88 m)   PainSc:   1      Shoe Size: 12.5 EE/extra wide    Past Surgical History:   Procedure Laterality Date    ABDOMINAL SURGERY      as a child    COLONOSCOPY N/A 7/20/2018    Procedure: COLONOSCOPY;  Surgeon: Marko Koo MD;  Location: [facility];  Service: Endoscopy;  Laterality: N/A;    TREATMENT OF CARDIAC ARRHYTHMIA N/A 5/20/2022    Procedure: CARDIOVERSION;  Surgeon: SKYLER Lee MD;  Location: [facility] EP LAB;  Service: Cardiology;  Laterality: N/A;  afib, NOEMI, DCCV, anes, MB, 3 Prep    TRIAL OF SPINAL CORD NERVE STIMULATOR N/A 6/17/2019    Procedure: Trial, Neurostimulator, LUMBAR SPINAL CORD STIMULATOR TRIAL;  Surgeon: Rahat Orantes MD;  Location: [facility] PAIN MGT;  Service: Pain Management;  Laterality: N/A;  PDN STUDY TRIAL, NEEDS CONSENT, NEVRO REP     Past Medical History:   Diagnosis Date    Atrial fibrillation status post cardioversion     2 months ago    " Diabetes mellitus, type 2     GERD (gastroesophageal reflux disease)     Hyperlipidemia     Hypertension     Neuropathy      Family History   Problem Relation Age of Onset    Diabetes Mother         has a pacemaker    Coronary artery disease Father         hx of cabg    Glaucoma Neg Hx         Social History:   Marital Status:   Alcohol History:  reports current alcohol use.  Tobacco History:  reports that he quit smoking about 11 years ago. He has never used smokeless tobacco.  Drug History:  reports no history of drug use.    Review of patient's allergies indicates:   Allergen Reactions    Oxycodone Itching and Nausea Only       Current Outpatient Medications   Medication Sig Dispense Refill    albuterol (VENTOLIN HFA) 90 mcg/actuation inhaler Inhale 2 puffs into the lungs every 6 (six) hours as needed for Wheezing. Rescue 8.5 g 0    amiodarone (PACERONE) 200 MG Tab Take one tablet every 12 hours for two weeks then take one tablet daily indefinitely. 30 tablet 11    apixaban (ELIQUIS) 5 mg Tab Take 1 tablet (5 mg total) by mouth 2 (two) times daily. 60 tablet 3    atorvastatin (LIPITOR) 40 MG tablet Take 1 tablet (40 mg total) by mouth once daily. 90 tablet 4    ferrous sulfate 325 mg (65 mg iron) Tab tablet Take 325 mg by mouth daily with breakfast.      gabapentin (NEURONTIN) 300 MG capsule Take 4 capsules (1,200 mg total) by mouth 3 (three) times daily. 1080 capsule 1    glimepiride (AMARYL) 4 MG tablet Take 1 tablet (4 mg total) by mouth 2 (two) times a day. Take 1 tablet with breakfast and 1 tablet at dinner. 180 tablet 4    KRILL OIL ORAL Take by mouth once daily.      lisinopriL-hydrochlorothiazide (PRINZIDE,ZESTORETIC) 20-25 mg Tab Take 1 tablet by mouth once daily 90 tablet 4    loratadine (CLARITIN) 10 mg tablet Take 10 mg by mouth once daily.      metFORMIN (GLUCOPHAGE) 850 MG tablet TAKE 1 TABLET BY MOUTH THREE TIMES DAILY WITH MEALS 270 tablet 4    multivitamin (THERAGRAN)  per tablet Take 1 tablet by mouth once daily.      omeprazole (PRILOSEC) 20 MG capsule Take 1 capsule (20 mg total) by mouth once daily. 90 capsule 3    pioglitazone (ACTOS) 30 MG tablet Take 1 tablet (30 mg total) by mouth once daily. 90 tablet 4    potassium chloride SA (K-DUR,KLOR-CON) 20 MEQ tablet Take 1 tablet (20 mEq total) by mouth once daily. 30 tablet 6    sars-cov-2, covid-19, (PFIZER COVID-19) 30 mcg/0.3 ml injection       doxycycline (MONODOX) 100 MG capsule Take 1 capsule (100 mg total) by mouth 2 (two) times daily. for 7 days 14 capsule 0     No current facility-administered medications for this visit.     Facility-Administered Medications Ordered in Other Visits   Medication Dose Route Frequency Provider Last Rate Last Admin    sodium chloride 0.9% bolus 1,000 mL  1,000 mL Intravenous Once Rain Pickett NP           Review of Systems   Constitutional: Negative for chills, fatigue, fever and unexpected weight change.   HENT: Negative for hearing loss and trouble swallowing.    Eyes: Negative for photophobia and visual disturbance.   Respiratory: Negative for cough, shortness of breath and wheezing.    Cardiovascular: Positive for leg swelling. Negative for chest pain and palpitations.   Gastrointestinal: Negative for abdominal pain and nausea.   Genitourinary: Negative for dysuria and frequency.   Musculoskeletal: Positive for back pain. Negative for arthralgias, gait problem, joint swelling and myalgias.   Skin: Positive for color change and wound. Negative for rash.   Neurological: Positive for numbness. Negative for tremors, seizures, weakness and headaches.   Hematological: Does not bruise/bleed easily.         Objective:        Physical Exam:   Foot Exam    General  General Appearance: appears stated age and healthy   Orientation: alert and oriented to person, place, and time   Affect: appropriate   Gait: antalgic       Left Foot/Ankle      Inspection and Palpation  Ecchymosis:  none  Tenderness: none   Swelling: (Mild edema of the lower extremity)  Arch: normal  Hammertoes: first toe  Skin Exam: callus, dry skin, abnormal color and ulcer; no drainage and no erythema   Neurovascular  Dorsalis pedis: 2+  Posterior tibial: 2+  Capillary refill: 2+  Varicose veins: present  Saphenous nerve sensation: diminished  Tibial nerve sensation: diminished  Superficial peroneal nerve sensation: diminished  Deep peroneal nerve sensation: diminished  Sural nerve sensation: diminished    Edema  Type of edema: non-pitting    Muscle Strength  Ankle dorsiflexion: 5  Ankle plantar flexion: 5  Ankle inversion: 5  Ankle eversion: 5  Great toe extension: 5  Great toe flexion: 5    Range of Motion    Normal left ankle ROM    Tests  Anterior drawer: negative   Talar tilt: negative   PT Tinel's sign: negative  Paresthesia: positive        Physical Exam  Cardiovascular:      Pulses:           Dorsalis pedis pulses are 2+ on the left side.        Posterior tibial pulses are 2+ on the left side.   Musculoskeletal:        Feet:    Feet:      Left foot:      Skin integrity: Ulcer, callus and dry skin present. No erythema.               Left Ankle/Foot Exam     Range of Motion   The patient has normal left ankle ROM.       Muscle Strength   Left Lower Extremity   Ankle Dorsiflexion:  5   Plantar flexion:  5/5     Reflexes     Left Side  Paresthesia: present    Vascular Exam       Left Pulses  Dorsalis Pedis:      2+  Posterior Tibial:      2+             Imaging: none            Assessment:       1. Diabetic polyneuropathy associated with type 2 diabetes mellitus    2. Type 2 diabetes mellitus with foot ulcer, unspecified whether long term insulin use    3. Skin ulcer of toe of left foot, limited to breakdown of skin    4. Hammer toes of both feet    5. Acquired keratoderma      Plan:   Diabetic polyneuropathy associated with type 2 diabetes mellitus  -     Wound Debridement    Type 2 diabetes mellitus with foot ulcer,  "unspecified whether long term insulin use    Skin ulcer of toe of left foot, limited to breakdown of skin  -     doxycycline (MONODOX) 100 MG capsule; Take 1 capsule (100 mg total) by mouth 2 (two) times daily. for 7 days  Dispense: 14 capsule; Refill: 0  -     Wound Debridement    Hammer toes of both feet    Acquired keratoderma      Follow up if symptoms worsen or fail to improve.    Wound Debridement    Date/Time: 7/6/2022 3:00 PM  Performed by: John Glez DPM  Authorized by: John Glez DPM     Time out: Immediately prior to procedure a "time out" was called to verify the correct patient, procedure, equipment, support staff and site/side marked as required.    Consent Done?:  Yes (Verbal)    Preparation: Patient was prepped and draped in usual sterile fashion    Local anesthesia used?: No      Wound Details:    Location:  Left foot    Location:  Left 1st Toe    Type of Debridement:  Excisional       Length (cm):  0.2       Area (sq cm):  0.02       Width (cm):  0.1       Percent Debrided (%):  100       Depth (cm):  0.1       Total Area Debrided (sq cm):  0.02    Depth of debridement:  Epidermis/Dermis    Tissue debrided:  Dermis and Epidermis    Devitalized tissue debrided:  Biofilm, Callus and Slough    Instruments:  Blade    Bleeding:  Minimal  Hemostasis Achieved: Yes    Patient tolerance:  Patient tolerated the procedure well with no immediate complications            Patient instructed on daily dressing changes.  I recommend that he continue with his current routine.  I did also give him accommodative pads to decrease pressure to the plantar great toe.  I am going to send in doxycycline for him as a precaution.    Counseling:     I provided patient education verbally regarding:   Patient diagnosis, treatment options, as well as alternatives, risks, and benefits.     Ulcers    Ulcers, which are open sores in the skin, occur when the outer layers of the skin are injured and the deeper tissues become " exposed. They can be caused by excess pressure due to ill-fitting shoes, long periods in bed or after an injury that breaks the skin. Ulcers are commonly seen in patients living with diabetes, neuropathy or vascular disease. Open wounds can put patients at increased risk of developing infection in the skin and bone.    The signs and symptoms of ulcers may include drainage, odor or red, inflamed, thickened tissue. Pain may or may not be present.    Diagnosis may include x-rays to evaluate possible bone involvement. Other advanced imaging studies may also be ordered to evaluate for vascular disease, which may affect a patients ability to heal the wound.    Ulcers are treated by removing the unhealthy tissue and performing local wound care to assist in healing. Special shoes or padding may be used to remove excess pressure on the area. If infection is present, antibiotics will be necessary. In severe cases that involve extensive infection or are slow to heal, surgery or other advanced wound care treatments may be necessary.      This note was created using Dragon voice recognition software that occasionally misinterpreted phrases or words.

## 2022-07-07 RX ORDER — DOXYCYCLINE 100 MG/1
100 CAPSULE ORAL 2 TIMES DAILY
Qty: 14 CAPSULE | Refills: 0 | Status: SHIPPED | OUTPATIENT
Start: 2022-07-07 | End: 2022-07-18

## 2022-07-07 NOTE — PATIENT INSTRUCTIONS
Simple Skin Ulcer  A skin ulcer is a sore on the skin. Skin ulcers often form when blood circulation is impaired. Being bed- or wheelchair-bound can cause pressure that may lead to skin ulcers. Ulcers are generally round areas of red, swollen, thickened skin around a crater-like depression. They are often very slow to heal. If a skin ulcer isn't properly treated, it may become infected. If the infection spreads, it can cause serious health issues.    Symptoms of a skin ulcer include:  Reddish area on the skin  Skin color and texture changes  Swelling  Wound that isn't healing  Crater in the skin  Pain  Drainage or pus    Causes  There are many causes of skin ulcers. Some of these include:  Decreased blood flow to a part of the skin, vascular insufficiency  Trauma  Lack of movement of a part of the body for long periods of time  Infection  Poor hygiene  Varicose veins  Vitamin deficiency    Pressure ulcers  Pressure ulcers are a type of ulcer most commonly seen in people who are confined to bed or a wheelchair. They are caused by prolonged pressure to a spot on the skin. Pressure ulcers usually occur on the back, buttocks, or backs or sides of the legs, arms, or feet (especially the heels).    Home care  You may be prescribed antibiotics to prevent infection. If this is the case, be sure to take all of the medicine, even if your symptoms get better. You may also be given medicines to help relieve pain. Follow the healthcare providers instructions when using these medicines.    General care  Care for the skin ulcer as instructed. Always wash your hands with soap and warm water before and after caring for your wound.  Cover the ulcer with a clean, dry bandage. Remove and change the bandage as instructed. If the bandage becomes wet or dirty, change it as soon as possible.  Follow the doctors instructions about washing. You can shower, but do not soak the healing ulcer until the doctor says its OK.  Do not scratch,  rub, or pick at the healing skin.  Check the area every day for signs of infection, such as increasing pain, redness, warmth, red streaking, swelling, or pus draining from the ulcer.  When resting, raise the area where the ulcer is above the level of the heart.  Avoid smoking or drinking alcohol, as these can delay wound healing.  If you are able, try to walk regularly. This can help with circulation.  Avoid prolonged standing or sitting in one position.  The following tips can help prevent future skin ulcers:  Know your risks for skin ulcers.  Keep the skin clean and dry.  Reposition frequently.  Use protective devices such as pillows, foam wedges, and heel protectors for the knees, ankles, and heels.  Avoid immobilization.    Follow-up care  Follow up with your healthcare provider, or as advised.    When to seek medical advice  Call your healthcare provider right away if any of these occur:  Fever of 100.4°F (38°C) or higher, or as advised by your healthcare provider  Signs of infection. These include increasing pain, warmth, redness, or pus draining from the skin ulcer.  Bleeding from the skin ulcer  Pain in or around the ulcer that doesn't get better even with medicines  Increased swelling  Changes in skin color    Date Last Reviewed: 9/1/2016  © 1614-1256 [employer], [address] [place] [address], [place] [address]. All rights reserved. This information is not intended as a substitute for professional medical care. Always follow your healthcare professional's instructions.

## 2022-08-12 ENCOUNTER — TELEPHONE (OUTPATIENT)
Dept: ELECTROPHYSIOLOGY | Facility: CLINIC | Age: 57
End: 2022-08-12
Payer: COMMERCIAL

## 2022-08-12 NOTE — TELEPHONE ENCOUNTER
Called pt to schedule EKG prior to appointment with Dr. Lee on 8/22/22. No answer, left a message with this information.

## 2022-08-19 ENCOUNTER — PATIENT MESSAGE (OUTPATIENT)
Dept: ELECTROPHYSIOLOGY | Facility: CLINIC | Age: 57
End: 2022-08-19
Payer: COMMERCIAL

## 2022-08-25 ENCOUNTER — TELEPHONE (OUTPATIENT)
Dept: CARDIOLOGY | Facility: HOSPITAL | Age: 57
End: 2022-08-25
Payer: COMMERCIAL

## 2022-08-25 DIAGNOSIS — I48.19 PERSISTENT ATRIAL FIBRILLATION: Primary | ICD-10-CM

## 2022-08-25 NOTE — TELEPHONE ENCOUNTER
Patient on long term amiodarone, ordered CXR, TFTs and referral for annual eye exam.    Natali Knutson, PGY6  Cardiovascular Disease  [facility]

## 2022-10-01 ENCOUNTER — LAB VISIT (OUTPATIENT)
Dept: LAB | Facility: HOSPITAL | Age: 57
End: 2022-10-01
Attending: NURSE PRACTITIONER
Payer: COMMERCIAL

## 2022-10-01 DIAGNOSIS — E66.9 OBESITY (BMI 30-39.9): ICD-10-CM

## 2022-10-01 DIAGNOSIS — E11.65 TYPE 2 DIABETES MELLITUS WITH HYPERGLYCEMIA, WITHOUT LONG-TERM CURRENT USE OF INSULIN: ICD-10-CM

## 2022-10-01 DIAGNOSIS — I48.19 PERSISTENT ATRIAL FIBRILLATION: ICD-10-CM

## 2022-10-01 LAB
25(OH)D3+25(OH)D2 SERPL-MCNC: 66 NG/ML (ref 30–96)
ESTIMATED AVG GLUCOSE: 143 MG/DL (ref 68–131)
HBA1C MFR BLD: 6.6 % (ref 4–5.6)
T4 FREE SERPL-MCNC: 0.97 NG/DL (ref 0.71–1.51)
TSH SERPL DL<=0.005 MIU/L-ACNC: 0.93 UIU/ML (ref 0.4–4)

## 2022-10-01 PROCEDURE — 84443 ASSAY THYROID STIM HORMONE: CPT | Performed by: NURSE PRACTITIONER

## 2022-10-01 PROCEDURE — 82306 VITAMIN D 25 HYDROXY: CPT | Performed by: NURSE PRACTITIONER

## 2022-10-01 PROCEDURE — 36415 COLL VENOUS BLD VENIPUNCTURE: CPT | Performed by: NURSE PRACTITIONER

## 2022-10-01 PROCEDURE — 84439 ASSAY OF FREE THYROXINE: CPT | Performed by: STUDENT IN AN ORGANIZED HEALTH CARE EDUCATION/TRAINING PROGRAM

## 2022-10-01 PROCEDURE — 83036 HEMOGLOBIN GLYCOSYLATED A1C: CPT | Performed by: NURSE PRACTITIONER

## 2022-10-13 ENCOUNTER — OFFICE VISIT (OUTPATIENT)
Dept: ENDOCRINOLOGY | Facility: CLINIC | Age: 57
End: 2022-10-13
Payer: COMMERCIAL

## 2022-10-13 VITALS
DIASTOLIC BLOOD PRESSURE: 70 MMHG | WEIGHT: 275.13 LBS | HEIGHT: 74 IN | OXYGEN SATURATION: 97 % | BODY MASS INDEX: 35.31 KG/M2 | SYSTOLIC BLOOD PRESSURE: 138 MMHG | TEMPERATURE: 98 F | HEART RATE: 70 BPM

## 2022-10-13 DIAGNOSIS — E11.42 DIABETIC POLYNEUROPATHY ASSOCIATED WITH TYPE 2 DIABETES MELLITUS: Primary | ICD-10-CM

## 2022-10-13 DIAGNOSIS — I48.19 PERSISTENT ATRIAL FIBRILLATION: ICD-10-CM

## 2022-10-13 DIAGNOSIS — I15.2 HYPERTENSION ASSOCIATED WITH DIABETES: ICD-10-CM

## 2022-10-13 DIAGNOSIS — E66.9 OBESITY (BMI 30-39.9): ICD-10-CM

## 2022-10-13 DIAGNOSIS — G47.33 OSA ON CPAP: ICD-10-CM

## 2022-10-13 DIAGNOSIS — E78.5 DYSLIPIDEMIA: ICD-10-CM

## 2022-10-13 DIAGNOSIS — E55.9 VITAMIN D DEFICIENCY: ICD-10-CM

## 2022-10-13 DIAGNOSIS — E11.59 HYPERTENSION ASSOCIATED WITH DIABETES: ICD-10-CM

## 2022-10-13 PROCEDURE — 3075F PR MOST RECENT SYSTOLIC BLOOD PRESS GE 130-139MM HG: ICD-10-PCS | Mod: CPTII,S$GLB,, | Performed by: NURSE PRACTITIONER

## 2022-10-13 PROCEDURE — 4010F ACE/ARB THERAPY RXD/TAKEN: CPT | Mod: CPTII,S$GLB,, | Performed by: NURSE PRACTITIONER

## 2022-10-13 PROCEDURE — 3060F PR POS MICROALBUMINURIA RESULT DOCUMENTED/REVIEW: ICD-10-PCS | Mod: CPTII,S$GLB,, | Performed by: NURSE PRACTITIONER

## 2022-10-13 PROCEDURE — 99999 PR PBB SHADOW E&M-EST. PATIENT-LVL IV: ICD-10-PCS | Mod: PBBFAC,,, | Performed by: NURSE PRACTITIONER

## 2022-10-13 PROCEDURE — 99214 PR OFFICE/OUTPT VISIT, EST, LEVL IV, 30-39 MIN: ICD-10-PCS | Mod: S$GLB,,, | Performed by: NURSE PRACTITIONER

## 2022-10-13 PROCEDURE — 3075F SYST BP GE 130 - 139MM HG: CPT | Mod: CPTII,S$GLB,, | Performed by: NURSE PRACTITIONER

## 2022-10-13 PROCEDURE — 3078F PR MOST RECENT DIASTOLIC BLOOD PRESSURE < 80 MM HG: ICD-10-PCS | Mod: CPTII,S$GLB,, | Performed by: NURSE PRACTITIONER

## 2022-10-13 PROCEDURE — 99999 PR PBB SHADOW E&M-EST. PATIENT-LVL IV: CPT | Mod: PBBFAC,,, | Performed by: NURSE PRACTITIONER

## 2022-10-13 PROCEDURE — 3066F PR DOCUMENTATION OF TREATMENT FOR NEPHROPATHY: ICD-10-PCS | Mod: CPTII,S$GLB,, | Performed by: NURSE PRACTITIONER

## 2022-10-13 PROCEDURE — 3066F NEPHROPATHY DOC TX: CPT | Mod: CPTII,S$GLB,, | Performed by: NURSE PRACTITIONER

## 2022-10-13 PROCEDURE — 3060F POS MICROALBUMINURIA REV: CPT | Mod: CPTII,S$GLB,, | Performed by: NURSE PRACTITIONER

## 2022-10-13 PROCEDURE — 1159F MED LIST DOCD IN RCRD: CPT | Mod: CPTII,S$GLB,, | Performed by: NURSE PRACTITIONER

## 2022-10-13 PROCEDURE — 3078F DIAST BP <80 MM HG: CPT | Mod: CPTII,S$GLB,, | Performed by: NURSE PRACTITIONER

## 2022-10-13 PROCEDURE — 4010F PR ACE/ARB THEARPY RXD/TAKEN: ICD-10-PCS | Mod: CPTII,S$GLB,, | Performed by: NURSE PRACTITIONER

## 2022-10-13 PROCEDURE — 3044F PR MOST RECENT HEMOGLOBIN A1C LEVEL <7.0%: ICD-10-PCS | Mod: CPTII,S$GLB,, | Performed by: NURSE PRACTITIONER

## 2022-10-13 PROCEDURE — 1159F PR MEDICATION LIST DOCUMENTED IN MEDICAL RECORD: ICD-10-PCS | Mod: CPTII,S$GLB,, | Performed by: NURSE PRACTITIONER

## 2022-10-13 PROCEDURE — 99214 OFFICE O/P EST MOD 30 MIN: CPT | Mod: S$GLB,,, | Performed by: NURSE PRACTITIONER

## 2022-10-13 PROCEDURE — 3044F HG A1C LEVEL LT 7.0%: CPT | Mod: CPTII,S$GLB,, | Performed by: NURSE PRACTITIONER

## 2022-10-13 NOTE — PROGRESS NOTES
CC: This 56 y.o. male presents for management of diabetes and chronic conditions pending review including HTN, HLP, RAFAEL, vitamin d deficiency, obesity    HPI: He was diagnosed with T2DM in 2013. Has never been hospitalized r/t DM.  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  /GYN: 0-1+ nocturia, no burning or pain.   MS/Neuro: + numbness/ tingling in BLE;  speech clear  Psych: Denies drug/ETOH abuse, no hx of depression.  Other systems: negative.    PE:  GENERAL: Well developed, well nourished.  PSYCH: AAOx3, appropriate mood and affect, pleasant expression, conversant, appears relaxed, well groomed.   EYES: Conjunctiva, corneas clear  NECK: Supple, trachea midline  FOOT EXAMINATION: 10/13/2022  No foot deformity, corns or callus formation,  nails in good condiiton and well trimmed, no interspace maceration or ulceration noted.  Decreased hair growth present over toes/feet.    Protective sensation absent bilaterally with 10 gram monofilament.  +2 dorsalis pedis and posterior pulses noted.      Lab Results   Component Value Date    MICALBCREAT 31.9 (H) 06/18/2022        Hemoglobin A1C   Date Value Ref Range Status   10/01/2022 6.6 (H) 4.0 - 5.6 % Final     Comment:     ADA Screening Guidelines:  5.7-6.4%  Consistent with prediabetes  >or=6.5%  Consistent with diabetes    High levels of fetal hemoglobin interfere with the HbA1C  assay. Heterozygous hemoglobin variants (HbS, HgC, etc)do  not significantly interfere with this assay.   However, presence of multiple variants may affect accuracy.     06/18/2022 6.6 (H) 4.0 - 5.6 % Final     Comment:     ADA Screening Guidelines:  5.7-6.4%  Consistent with prediabetes  >or=6.5%  Consistent with diabetes    High levels of fetal hemoglobin interfere with the HbA1C  assay. Heterozygous hemoglobin variants (HbS, HgC, etc)do  not significantly interfere with this assay.   However, presence of multiple variants may affect accuracy.     03/12/2022 7.9 (H) 4.0 - 5.6 % Final     Comment:     ADA Screening Guidelines:  5.7-6.4%  Consistent with prediabetes  >or=6.5%  Consistent with diabetes    High levels of fetal hemoglobin interfere with the HbA1C  assay. Heterozygous hemoglobin variants (HbS, HgC, etc)do  not significantly interfere with this assay.   However, presence of multiple variants may affect accuracy.          ASSESSMENT and PLAN:      1. T2DM controlled CKD , DM PN-   Continue  glimepiride 4 mg bid,  Actos 30 mg qam,  metformin 850 mg TID  Check bg qd, stagger time- notify me for issues  - dietary and lifestyle modifications have been impactful  Unable to have brand meds r/t large pharmacy deductible and/or large monthly co-pays  - takes  ACEi, statin    2. HTN - controlled, continue meds as previously prescribed and monitor.      3. HLP -  on statin therapy      4.  RAFAEL- wearing his cpap-    5. afib- following w cardiology     6. Vitamin d deficiency - takes D3 OTC daily      7. Obesity -  Continue exercise and weight loss  Body mass index is 35.33 kg/m².      Follow-up: in 6 months with lab prior

## 2022-10-31 ENCOUNTER — HOSPITAL ENCOUNTER (OUTPATIENT)
Dept: CARDIOLOGY | Facility: CLINIC | Age: 57
Discharge: HOME | End: 2022-10-31
Payer: COMMERCIAL

## 2022-10-31 ENCOUNTER — OFFICE VISIT (OUTPATIENT)
Dept: ELECTROPHYSIOLOGY | Facility: CLINIC | Age: 57
End: 2022-10-31
Payer: COMMERCIAL

## 2022-10-31 ENCOUNTER — HOSPITAL ENCOUNTER (OUTPATIENT)
Dept: RADIOLOGY | Facility: HOSPITAL | Age: 57
Discharge: HOME | End: 2022-10-31
Attending: STUDENT IN AN ORGANIZED HEALTH CARE EDUCATION/TRAINING PROGRAM
Payer: COMMERCIAL

## 2022-10-31 VITALS — DIASTOLIC BLOOD PRESSURE: 78 MMHG | SYSTOLIC BLOOD PRESSURE: 145 MMHG | HEART RATE: 79 BPM

## 2022-10-31 DIAGNOSIS — E11.42 DIABETIC POLYNEUROPATHY ASSOCIATED WITH TYPE 2 DIABETES MELLITUS: ICD-10-CM

## 2022-10-31 DIAGNOSIS — E78.2 MIXED HYPERLIPIDEMIA: ICD-10-CM

## 2022-10-31 DIAGNOSIS — I48.19 PERSISTENT ATRIAL FIBRILLATION: ICD-10-CM

## 2022-10-31 DIAGNOSIS — I10 PRIMARY HYPERTENSION: ICD-10-CM

## 2022-10-31 DIAGNOSIS — I49.8 OTHER SPECIFIED CARDIAC ARRHYTHMIAS: ICD-10-CM

## 2022-10-31 DIAGNOSIS — I48.19 PERSISTENT ATRIAL FIBRILLATION: Primary | ICD-10-CM

## 2022-10-31 DIAGNOSIS — E11.42 TYPE 2 DIABETES MELLITUS WITH DIABETIC POLYNEUROPATHY, WITHOUT LONG-TERM CURRENT USE OF INSULIN: ICD-10-CM

## 2022-10-31 DIAGNOSIS — G47.33 OSA ON CPAP: ICD-10-CM

## 2022-10-31 DIAGNOSIS — G89.4 CHRONIC PAIN SYNDROME: ICD-10-CM

## 2022-10-31 DIAGNOSIS — E66.9 CLASS 2 OBESITY WITH BODY MASS INDEX (BMI) OF 36.0 TO 36.9 IN ADULT, UNSPECIFIED OBESITY TYPE, UNSPECIFIED WHETHER SERIOUS COMORBIDITY PRESENT: ICD-10-CM

## 2022-10-31 DIAGNOSIS — I50.32 CHRONIC HEART FAILURE WITH PRESERVED EJECTION FRACTION: ICD-10-CM

## 2022-10-31 DIAGNOSIS — I50.20 HFREF (HEART FAILURE WITH REDUCED EJECTION FRACTION): ICD-10-CM

## 2022-10-31 DIAGNOSIS — K21.9 GASTROESOPHAGEAL REFLUX DISEASE, UNSPECIFIED WHETHER ESOPHAGITIS PRESENT: ICD-10-CM

## 2022-10-31 DIAGNOSIS — I51.3 THROMBUS OF ATRIAL APPENDAGE: ICD-10-CM

## 2022-10-31 PROCEDURE — 93010 ELECTROCARDIOGRAM REPORT: CPT | Mod: S$GLB,,, | Performed by: INTERNAL MEDICINE

## 2022-10-31 PROCEDURE — 3078F PR MOST RECENT DIASTOLIC BLOOD PRESSURE < 80 MM HG: ICD-10-PCS | Mod: CPTII,S$GLB,, | Performed by: STUDENT IN AN ORGANIZED HEALTH CARE EDUCATION/TRAINING PROGRAM

## 2022-10-31 PROCEDURE — 3077F SYST BP >= 140 MM HG: CPT | Mod: CPTII,S$GLB,, | Performed by: STUDENT IN AN ORGANIZED HEALTH CARE EDUCATION/TRAINING PROGRAM

## 2022-10-31 PROCEDURE — 3044F HG A1C LEVEL LT 7.0%: CPT | Mod: CPTII,S$GLB,, | Performed by: STUDENT IN AN ORGANIZED HEALTH CARE EDUCATION/TRAINING PROGRAM

## 2022-10-31 PROCEDURE — 1159F MED LIST DOCD IN RCRD: CPT | Mod: CPTII,S$GLB,, | Performed by: STUDENT IN AN ORGANIZED HEALTH CARE EDUCATION/TRAINING PROGRAM

## 2022-10-31 PROCEDURE — 3044F PR MOST RECENT HEMOGLOBIN A1C LEVEL <7.0%: ICD-10-PCS | Mod: CPTII,S$GLB,, | Performed by: STUDENT IN AN ORGANIZED HEALTH CARE EDUCATION/TRAINING PROGRAM

## 2022-10-31 PROCEDURE — 93005 RHYTHM STRIP: ICD-10-PCS | Mod: S$GLB,,, | Performed by: STUDENT IN AN ORGANIZED HEALTH CARE EDUCATION/TRAINING PROGRAM

## 2022-10-31 PROCEDURE — 93010 RHYTHM STRIP: ICD-10-PCS | Mod: S$GLB,,, | Performed by: INTERNAL MEDICINE

## 2022-10-31 PROCEDURE — 71046 X-RAY EXAM CHEST 2 VIEWS: CPT | Mod: 26,,, | Performed by: RADIOLOGY

## 2022-10-31 PROCEDURE — 3066F PR DOCUMENTATION OF TREATMENT FOR NEPHROPATHY: ICD-10-PCS | Mod: CPTII,S$GLB,, | Performed by: STUDENT IN AN ORGANIZED HEALTH CARE EDUCATION/TRAINING PROGRAM

## 2022-10-31 PROCEDURE — 99215 OFFICE O/P EST HI 40 MIN: CPT | Mod: S$GLB,,, | Performed by: STUDENT IN AN ORGANIZED HEALTH CARE EDUCATION/TRAINING PROGRAM

## 2022-10-31 PROCEDURE — 71046 XR CHEST PA AND LATERAL: ICD-10-PCS | Mod: 26,,, | Performed by: RADIOLOGY

## 2022-10-31 PROCEDURE — 93005 ELECTROCARDIOGRAM TRACING: CPT | Mod: S$GLB,,, | Performed by: STUDENT IN AN ORGANIZED HEALTH CARE EDUCATION/TRAINING PROGRAM

## 2022-10-31 PROCEDURE — 3060F PR POS MICROALBUMINURIA RESULT DOCUMENTED/REVIEW: ICD-10-PCS | Mod: CPTII,S$GLB,, | Performed by: STUDENT IN AN ORGANIZED HEALTH CARE EDUCATION/TRAINING PROGRAM

## 2022-10-31 PROCEDURE — 3078F DIAST BP <80 MM HG: CPT | Mod: CPTII,S$GLB,, | Performed by: STUDENT IN AN ORGANIZED HEALTH CARE EDUCATION/TRAINING PROGRAM

## 2022-10-31 PROCEDURE — 3066F NEPHROPATHY DOC TX: CPT | Mod: CPTII,S$GLB,, | Performed by: STUDENT IN AN ORGANIZED HEALTH CARE EDUCATION/TRAINING PROGRAM

## 2022-10-31 PROCEDURE — 99999 PR PBB SHADOW E&M-EST. PATIENT-LVL III: CPT | Mod: PBBFAC,,, | Performed by: STUDENT IN AN ORGANIZED HEALTH CARE EDUCATION/TRAINING PROGRAM

## 2022-10-31 PROCEDURE — 71046 X-RAY EXAM CHEST 2 VIEWS: CPT | Mod: TC,FY

## 2022-10-31 PROCEDURE — 99215 PR OFFICE/OUTPT VISIT, EST, LEVL V, 40-54 MIN: ICD-10-PCS | Mod: S$GLB,,, | Performed by: STUDENT IN AN ORGANIZED HEALTH CARE EDUCATION/TRAINING PROGRAM

## 2022-10-31 PROCEDURE — 1159F PR MEDICATION LIST DOCUMENTED IN MEDICAL RECORD: ICD-10-PCS | Mod: CPTII,S$GLB,, | Performed by: STUDENT IN AN ORGANIZED HEALTH CARE EDUCATION/TRAINING PROGRAM

## 2022-10-31 PROCEDURE — 4010F ACE/ARB THERAPY RXD/TAKEN: CPT | Mod: CPTII,S$GLB,, | Performed by: STUDENT IN AN ORGANIZED HEALTH CARE EDUCATION/TRAINING PROGRAM

## 2022-10-31 PROCEDURE — 99999 PR PBB SHADOW E&M-EST. PATIENT-LVL III: ICD-10-PCS | Mod: PBBFAC,,, | Performed by: STUDENT IN AN ORGANIZED HEALTH CARE EDUCATION/TRAINING PROGRAM

## 2022-10-31 PROCEDURE — 3077F PR MOST RECENT SYSTOLIC BLOOD PRESSURE >= 140 MM HG: ICD-10-PCS | Mod: CPTII,S$GLB,, | Performed by: STUDENT IN AN ORGANIZED HEALTH CARE EDUCATION/TRAINING PROGRAM

## 2022-10-31 PROCEDURE — 4010F PR ACE/ARB THEARPY RXD/TAKEN: ICD-10-PCS | Mod: CPTII,S$GLB,, | Performed by: STUDENT IN AN ORGANIZED HEALTH CARE EDUCATION/TRAINING PROGRAM

## 2022-10-31 PROCEDURE — 3060F POS MICROALBUMINURIA REV: CPT | Mod: CPTII,S$GLB,, | Performed by: STUDENT IN AN ORGANIZED HEALTH CARE EDUCATION/TRAINING PROGRAM

## 2022-10-31 NOTE — Clinical Note
Catalina Priest,   This patient will need a PVI. He would like to wait until December or after due to his work schedule.   Thank you!

## 2022-10-31 NOTE — PROGRESS NOTES
Electrophysiology Clinic Note    Reason for follow-up patient visit: Ongoing evaluation and recommendations regarding persistent atrial fibrillation, s/p NOEMI/cardioversion on 5/20/2022 with return to persistent atrial fibrillation.     PRESENTING HISTORY:     History of Present Illness:  Mr. Alfred Cuello is a marley 57-year-old gentleman who returns to clinic today for ongoing evaluation and recommendations regarding persistent atrial fibrillation. He has a past medical history significant for persistent atrial fibrillation, a history of HFpEF with most recent LVEF decreased to 45% in the setting of AF, hypertension, hyperlipidemia, type II diabetes mellitus, GERD, RAFAEL maintained on CPAP therapy, and obesity. He underwent a prior NOEMI/cardioversion on 5/20/2022, and feels that he returned to persistent atrial fibrillation approximately one week after undergoing this procedure.     He is followed in general cardiology clinic with SANDRA Bartholomew and Dr. Shannon. At their prior encounters, they discussed his persistent atrial fibrillation. He was initially diagnosed with asymptomatic atrial fibrillation in November of 2020, when this was incidentally noted on an ECG. This may have been present for years prior, but this was the first ECG evidence. He was seen in cardiology with Dr. Shannon and was initiated on oral anticoagulation with apixaban 5mg po BID. He was subsequently admitted from 11/22-24/2020 in the setting of acute decompensation of heart failure with atrial fibrillation with RVR. He was planned to undergo a NOEMI with cardioversion; however, a NOEMI on 11/24/2020 revealed a left atrial appendage thrombus, and cardioversion was not performed. Unfortunately, he was then lost to follow up until I saw him again in clinic on 5/2/2022. At that encounter, we discussed undergoing a repeat NOEMI/cardioversion with initiation of amiodarone. He underwent NOEMI/cardioversion on 5/20/2022. He states that he was feeling well for  "the first week, but then could tell that he returned to atrial fibrillation. He remains on amiodarone with excellent rate control, but with persistent atrial fibrillation on all subsequent ECGs.     In discussion with Mr. Cuello today, he tells me that he is feeling overall "OK". He denies any episodes of dizziness, lightheadedness, syncope/presyncope, chest pain or chest discomfort, nausea or vomiting, orthopnea, changes in his baseline lower extremity edema, or PND. He reports brief, intermittent palpitations that do not limit his functioning. He reports baseline shortness of breath and dyspnea with exertion, and feels that this has remained worse than his prior baseline since he has returned to atrial fibrillation. He reports increased exercise intolerance and easy fatigue while in atrial fibrillation. He was recently mowing his yard when he reported worsened fatigue and exercise intolerance, and had to take several breaks, which he feels is not his usual level of exercise capacity. He reports transient periods of dizziness with abrupt positional changes. He can climb more than one flight of stairs prior to needing to take a break provided he can take his time. 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 He wears compression stockings for comfort. He and his wife are trying to "get healthy", and he has intentionally lost about 20lbs with diet and exercise modification.      Review of Systems:  Review of Systems   Constitutional:  Positive for fatigue. Negative for activity change.   HENT:  Negative for nasal congestion, nosebleeds, postnasal drip, rhinorrhea, sinus pressure/congestion, sneezing and sore throat.    Respiratory:  Positive for shortness of breath. Negative for apnea, cough, chest tightness and wheezing.    Cardiovascular:  Positive for palpitations and leg swelling. Negative for chest " pain and claudication.   Gastrointestinal:  Negative for abdominal distention, abdominal pain, blood in stool, change in bowel habit, constipation, diarrhea, nausea, vomiting and change in bowel habit.   Genitourinary:  Negative for dysuria and hematuria.   Musculoskeletal:  Positive for arthralgias. Negative for gait problem.   Neurological:  Positive for dizziness. Negative for seizures, syncope, weakness, light-headedness, headaches, coordination difficulties and coordination difficulties.      PAST HISTORY:     Past Medical History:   Diagnosis Date    Atrial fibrillation status post cardioversion     2 months ago    Diabetes mellitus, type 2     GERD (gastroesophageal reflux disease)     Hyperlipidemia     Hypertension     Neuropathy    - Persistent atrial fibrillation  - History of HFpEF with most recent LVEF 65%  - RAFAEL maintained on CPAP therapy  - Obesity     Past Surgical History:   Procedure Laterality Date    ABDOMINAL SURGERY      as a child    COLONOSCOPY N/A 7/20/2018    Procedure: COLONOSCOPY;  Surgeon: Marko Koo MD;  Location: [facility] ENDO;  Service: Endoscopy;  Laterality: N/A;    TREATMENT OF CARDIAC ARRHYTHMIA N/A 5/20/2022    Procedure: CARDIOVERSION;  Surgeon: SKYLER Lee MD;  Location: [facility] EP LAB;  Service: Cardiology;  Laterality: N/A;  afib, NOEMI, DCCV, kyra MB, 3 Prep    TRIAL OF SPINAL CORD NERVE STIMULATOR N/A 6/17/2019    Procedure: Trial, Neurostimulator, LUMBAR SPINAL CORD STIMULATOR TRIAL;  Surgeon: Rahat Orantes MD;  Location: [facility] PAIN T;  Service: Pain Management;  Laterality: N/A;  PDN STUDY TRIAL, NEEDS CONSENT, NEVRO REP       Family History:  Family History   Problem Relation Age of Onset    Diabetes Mother         has a pacemaker    Coronary artery disease Father         hx of cabg    Glaucoma Neg Hx        Social History:  He  reports that he quit smoking about 11 years ago. He has never used smokeless tobacco. He reports current alcohol use. He reports  that he does not use drugs. He is .       MEDICATIONS & ALLERGIES:     Review of patient's allergies indicates:   Allergen Reactions    Oxycodone Itching and Nausea Only       Current Outpatient Medications on File Prior to Visit   Medication Sig Dispense Refill    albuterol (VENTOLIN HFA) 90 mcg/actuation inhaler Inhale 2 puffs into the lungs every 6 (six) hours as needed for Wheezing. Rescue 8.5 g 0    amiodarone (PACERONE) 200 MG Tab Take one tablet every 12 hours for two weeks then take one tablet daily indefinitely. 30 tablet 11    apixaban (ELIQUIS) 5 mg Tab Take 1 tablet (5 mg total) by mouth 2 (two) times daily. 60 tablet 3    atorvastatin (LIPITOR) 40 MG tablet Take 1 tablet (40 mg total) by mouth once daily. 90 tablet 4    ferrous sulfate 325 mg (65 mg iron) Tab tablet Take 325 mg by mouth daily with breakfast.      gabapentin (NEURONTIN) 300 MG capsule Take 4 capsules (1,200 mg total) by mouth 3 (three) times daily. 1080 capsule 1    glimepiride (AMARYL) 4 MG tablet Take 1 tablet (4 mg total) by mouth 2 (two) times a day. Take 1 tablet with breakfast and 1 tablet at dinner. 180 tablet 4    KRILL OIL ORAL Take by mouth once daily.      lisinopriL-hydrochlorothiazide (PRINZIDE,ZESTORETIC) 20-25 mg Tab Take 1 tablet by mouth once daily 90 tablet 4    loratadine (CLARITIN) 10 mg tablet Take 10 mg by mouth once daily.      metFORMIN (GLUCOPHAGE) 850 MG tablet TAKE 1 TABLET BY MOUTH THREE TIMES DAILY WITH MEALS 270 tablet 4    multivitamin (THERAGRAN) per tablet Take 1 tablet by mouth once daily.      omeprazole (PRILOSEC) 20 MG capsule Take 1 capsule (20 mg total) by mouth once daily. 90 capsule 3    pioglitazone (ACTOS) 30 MG tablet Take 1 tablet (30 mg total) by mouth once daily. 90 tablet 4    potassium chloride SA (K-DUR,KLOR-CON) 20 MEQ tablet Take 1 tablet (20 mEq total) by mouth once daily. 30 tablet 6    sars-cov-2, covid-19, (PFIZER COVID-19) 30 mcg/0.3 ml injection        Current  Facility-Administered Medications on File Prior to Visit   Medication Dose Route Frequency Provider Last Rate Last Admin    sodium chloride 0.9% bolus 1,000 mL  1,000 mL Intravenous Once Rain Pickett NP            OBJECTIVE:     Vital Signs:  BP (!) 145/78   Pulse 79     Physical Exam:  Physical Exam  Constitutional:       General: He is not in acute distress.     Appearance: Normal appearance. He is obese. He is not ill-appearing or diaphoretic.      Comments: Well-appearing man in NAD.    HENT:      Head: Normocephalic and atraumatic.      Comments: Mask worn in clinic in the setting of COVID precautions.   Eyes:      Pupils: Pupils are equal, round, and reactive to light.   Cardiovascular:      Rate and Rhythm: Normal rate. Rhythm irregular.      Pulses: Normal pulses.      Heart sounds: Normal heart sounds. No murmur heard.    No friction rub. No gallop.      Comments: Irregularly irregular rhythm on right radial artery palpation.   Pulmonary:      Effort: Pulmonary effort is normal. No respiratory distress.      Breath sounds: Normal breath sounds. No wheezing, rhonchi or rales.   Chest:      Chest wall: No tenderness.   Abdominal:      General: There is no distension.      Palpations: Abdomen is soft.      Tenderness: There is no abdominal tenderness.   Musculoskeletal:         General: No swelling or tenderness.      Cervical back: Normal range of motion.      Right lower leg: Edema present.      Left lower leg: Edema present.      Comments: Trace bilateral pedal edema.    Skin:     General: Skin is warm and dry.      Findings: No erythema, lesion or rash.   Neurological:      General: No focal deficit present.      Mental Status: He is alert and oriented to person, place, and time. Mental status is at baseline.      Motor: No weakness.      Gait: Gait normal.   Psychiatric:         Mood and Affect: Mood normal.         Behavior: Behavior normal.      Laboratory Data:  Lab Results   Component Value Date     WBC 7.63 05/14/2022    HGB 12.4 (L) 05/14/2022    HCT 37.9 (L) 05/14/2022    MCV 87 05/14/2022     05/14/2022     Lab Results   Component Value Date     (H) 05/14/2022     05/14/2022    K 4.2 05/14/2022     05/14/2022    CO2 30 (H) 05/14/2022    BUN 17 05/14/2022    CREATININE 1.1 05/14/2022    CALCIUM 9.9 05/14/2022    MG 1.7 10/09/2021     Lab Results   Component Value Date    INR 1.1 05/14/2022       Pertinent Cardiac Data:  ECG: Atrial fibrillation with rate of 78 bpm, iRBBB with  ms, QT/QTc 404/460 ms, nonspecific STT changes.     Resting 2D Transthoracic Echocardiogram - 11/23/2020:  The estimated PA systolic pressure is 29 mmHg.  There is left ventricular concentric remodeling.  The left ventricle is normal in size with normal systolic function. The estimated ejection fraction is 65%.  Normal left ventricular diastolic function.  Normal right ventricular size with normal right ventricular systolic function.  Mild left atrial enlargement.  Mild-to-moderate mitral regurgitation.  Mild tricuspid regurgitation.  Normal central venous pressure (3 mmHg).    NOEMI - 5/20/2022:  NOEMI to evaluate for LA/TAWNY thrombus prior to DCCV.  No thrombus is present in the left atrial appendage or left atrium (contrast used). Abnormal appendage velocities 0.35m/s.  The left ventricle is mildly enlarged with mildly decreased systolic function.  The estimated ejection fraction is 45%.  Mild mitral regurgitation.  Normal right ventricular size with normal right ventricular systolic function.  Moderate tricuspid regurgitation.  Biatrial enlargement.  The sinuses of Valsalva is mildly dilated.      ASSESSMENT & PLAN:   Mr. Alfred Cuello is a marley 57-year-old gentleman who returns to clinic today for ongoing evaluation and recommendations regarding persistent atrial fibrillation. He has a past medical history significant for persistent atrial fibrillation, a history of HFpEF with most recent LVEF  decreased to 45% in the setting of AF, hypertension, hyperlipidemia, type II diabetes mellitus, GERD, RAFAEL maintained on CPAP therapy, and obesity. He underwent a prior NOEMI/cardioversion on 5/20/2022, and feels that he returned to persistent atrial fibrillation approximately one week after undergoing this procedure. He remains in symptomatic persistent atrial fibrillation today.     - We discussed the pathophysiology of atrial fibrillation; specifically, we discussed persistent atrial fibrillation and the concept that the longer a patient remains in atrial fibrillation, the more challenging rhythm restoration and maintenance of sinus rhythm may become. We discussed that atrial fibrillation has an increased risk of CVA.   - As he remains symptomatic in his atrial fibrillation, we discussed a repeat attempt at NOEMI and cardioversion for rhythm restoration. This procedure was discussed in detail, in addition to potential risks, benefits, and alternative options. Mr. Cuello voices understanding, although he reports that he has now had several cardioversions and has returned to atrial fibrillation. He declines a repeat NOEMI/cardioverison at this time.   - He remains on amiodarone 200mg po daily for antiarrhythmic therapy. While he has no history of underlying coronary artery disease, he has been admitted in the past with volume overload and acute decompensation of HFpEF, with his most recent systolic function mildly reduced, LVEF 45%. Amiodarone remains the best suited antiarrhythmic agent for him and we will continue this therapy.  - He is not currently maintained on rate control and demonstrates excellent control of his rates while in atrial fibrillation without additional paul suppression.   - His [id number] is 3 (CHF, HTN, T2DM, male gender, age less than 64), portending an annual adjusted risk of CVA of 3.2%. He remains on uninterrupted apixaban therapy with no bleeding events reported.   - We discussed the  possibility of catheter ablation with pulmonary vein isolation, as he has continued to remain in persistent atrial fibrillation despite multiple attempts at rhythm restoration and AAD therapy. This procedure was discussed in detail, in addition to potential risks, benefits, and alternative options. He voices understanding and is in agreement with undergoing a PVI. He would ideally have this procedure done closer to the Holidays due to his work schedule. Informed consent was obtained in clinic today.  - Possible underlying drivers of atrial fibrillation were addressed at this appointment, including recommendations for weight loss - now a class I recommendation. Review of laboratory data revealed acceptable TSH at 0.926. He remains compliant with his CPAP machine for treatment of his underlying obstructive sleep apnea.      This patient will present to the EP laboratory to undergo a PVI with consideration for isolation of the posterior wall. He will remain on apixaban and amiodarone therapy, with plans for amiodarone to continue in the blanking period following ablation. All questions and concerns were addressed at this encounter.     Signing Physician:       ALEIDA Lee MD  Electrophysiology Attending

## 2022-11-10 ENCOUNTER — PATIENT MESSAGE (OUTPATIENT)
Dept: ELECTROPHYSIOLOGY | Facility: CLINIC | Age: 57
End: 2022-11-10
Payer: COMMERCIAL

## 2022-11-11 ENCOUNTER — PATIENT MESSAGE (OUTPATIENT)
Dept: ELECTROPHYSIOLOGY | Facility: CLINIC | Age: 57
End: 2022-11-11
Payer: COMMERCIAL

## 2022-11-11 DIAGNOSIS — I48.19 PERSISTENT ATRIAL FIBRILLATION: Primary | ICD-10-CM

## 2022-11-11 DIAGNOSIS — I49.8 OTHER SPECIFIED CARDIAC ARRHYTHMIAS: ICD-10-CM

## 2022-11-25 DIAGNOSIS — E11.42 DIABETIC POLYNEUROPATHY ASSOCIATED WITH TYPE 2 DIABETES MELLITUS: ICD-10-CM

## 2022-11-25 RX ORDER — GABAPENTIN 300 MG/1
1200 CAPSULE ORAL 3 TIMES DAILY
Qty: 1080 CAPSULE | Refills: 1 | Status: SHIPPED | OUTPATIENT
Start: 2022-11-25 | End: 2023-05-19 | Stop reason: SDUPTHER

## 2022-11-26 ENCOUNTER — LAB VISIT (OUTPATIENT)
Dept: LAB | Facility: HOSPITAL | Age: 57
End: 2022-11-26
Attending: STUDENT IN AN ORGANIZED HEALTH CARE EDUCATION/TRAINING PROGRAM
Payer: COMMERCIAL

## 2022-11-26 DIAGNOSIS — I49.8 OTHER SPECIFIED CARDIAC ARRHYTHMIAS: ICD-10-CM

## 2022-11-26 DIAGNOSIS — I48.19 PERSISTENT ATRIAL FIBRILLATION: ICD-10-CM

## 2022-11-26 LAB
ANION GAP SERPL CALC-SCNC: 12 MMOL/L (ref 8–16)
APTT BLDCRRT: 27.6 SEC (ref 21–32)
BASOPHILS # BLD AUTO: 0.07 K/UL (ref 0–0.2)
BASOPHILS NFR BLD: 1 % (ref 0–1.9)
BUN SERPL-MCNC: 18 MG/DL (ref 6–20)
CALCIUM SERPL-MCNC: 9.7 MG/DL (ref 8.7–10.5)
CHLORIDE SERPL-SCNC: 99 MMOL/L (ref 95–110)
CO2 SERPL-SCNC: 29 MMOL/L (ref 23–29)
CREAT SERPL-MCNC: 1.3 MG/DL (ref 0.5–1.4)
DIFFERENTIAL METHOD: ABNORMAL
EOSINOPHIL # BLD AUTO: 0.4 K/UL (ref 0–0.5)
EOSINOPHIL NFR BLD: 5.8 % (ref 0–8)
ERYTHROCYTE [DISTWIDTH] IN BLOOD BY AUTOMATED COUNT: 14.9 % (ref 11.5–14.5)
EST. GFR  (NO RACE VARIABLE): >60 ML/MIN/1.73 M^2
GLUCOSE SERPL-MCNC: 201 MG/DL (ref 70–110)
HCT VFR BLD AUTO: 39.9 % (ref 40–54)
HGB BLD-MCNC: 12.5 G/DL (ref 14–18)
IMM GRANULOCYTES # BLD AUTO: 0.01 K/UL (ref 0–0.04)
IMM GRANULOCYTES NFR BLD AUTO: 0.1 % (ref 0–0.5)
INR PPP: 1.1 (ref 0.8–1.2)
LYMPHOCYTES # BLD AUTO: 2 K/UL (ref 1–4.8)
LYMPHOCYTES NFR BLD: 28.5 % (ref 18–48)
MCH RBC QN AUTO: 28 PG (ref 27–31)
MCHC RBC AUTO-ENTMCNC: 31.3 G/DL (ref 32–36)
MCV RBC AUTO: 90 FL (ref 82–98)
MONOCYTES # BLD AUTO: 0.4 K/UL (ref 0.3–1)
MONOCYTES NFR BLD: 5.8 % (ref 4–15)
NEUTROPHILS # BLD AUTO: 4.1 K/UL (ref 1.8–7.7)
NEUTROPHILS NFR BLD: 58.8 % (ref 38–73)
NRBC BLD-RTO: 0 /100 WBC
PLATELET # BLD AUTO: 208 K/UL (ref 150–450)
PMV BLD AUTO: 10.6 FL (ref 9.2–12.9)
POTASSIUM SERPL-SCNC: 3.9 MMOL/L (ref 3.5–5.1)
PROTHROMBIN TIME: 11.1 SEC (ref 9–12.5)
RBC # BLD AUTO: 4.46 M/UL (ref 4.6–6.2)
SODIUM SERPL-SCNC: 140 MMOL/L (ref 136–145)
WBC # BLD AUTO: 7.05 K/UL (ref 3.9–12.7)

## 2022-11-26 PROCEDURE — 85025 COMPLETE CBC W/AUTO DIFF WBC: CPT | Performed by: STUDENT IN AN ORGANIZED HEALTH CARE EDUCATION/TRAINING PROGRAM

## 2022-11-26 PROCEDURE — 80048 BASIC METABOLIC PNL TOTAL CA: CPT | Performed by: STUDENT IN AN ORGANIZED HEALTH CARE EDUCATION/TRAINING PROGRAM

## 2022-11-26 PROCEDURE — 36415 COLL VENOUS BLD VENIPUNCTURE: CPT | Performed by: STUDENT IN AN ORGANIZED HEALTH CARE EDUCATION/TRAINING PROGRAM

## 2022-11-26 PROCEDURE — 85610 PROTHROMBIN TIME: CPT | Performed by: STUDENT IN AN ORGANIZED HEALTH CARE EDUCATION/TRAINING PROGRAM

## 2022-11-26 PROCEDURE — 85730 THROMBOPLASTIN TIME PARTIAL: CPT | Performed by: STUDENT IN AN ORGANIZED HEALTH CARE EDUCATION/TRAINING PROGRAM

## 2022-12-05 ENCOUNTER — TELEPHONE (OUTPATIENT)
Dept: ELECTROPHYSIOLOGY | Facility: CLINIC | Age: 57
End: 2022-12-05
Payer: COMMERCIAL

## 2022-12-05 ENCOUNTER — ANESTHESIA EVENT (OUTPATIENT)
Dept: MEDSURG UNIT | Facility: HOSPITAL | Age: 57
End: 2022-12-05
Payer: COMMERCIAL

## 2022-12-05 NOTE — TELEPHONE ENCOUNTER
Spoke to Ms. Juan, patient's wife.     CONFIRMED procedure arrival time of 0515     Reiterated instructions including:  -Directions to check in desk  -NPO after midnight night prior to procedure  -High importance of HOLDING Eliquis, Glimepiride, Metformin, Actos am of procedure  -Pre-procedure LABS done, no alerts  -Confirmed absence or presence of implanted device/stimulator, confirms none present  -Confirmed no fever, cough, or shortness of breath in the past 30 days  -Confirmed no redness, rash, irritation, or yeast infection to groin area.     Patient verbalized understanding of above and appreciated the call.,

## 2022-12-05 NOTE — ANESTHESIA PREPROCEDURE EVALUATION
12/05/2022  Alfred Cuello is a 57 y.o., male.  Patient Active Problem List   Diagnosis    Iron deficiency anemia    Hypertension associated with diabetes    Gastroesophageal reflux disease    H/O pyloric stenosis    Dyslipidemia    Obesity (BMI 30-39.9)    RAFAEL on CPAP    Painful diabetic neuropathy    Chronic pain syndrome    Diabetic polyneuropathy associated with type 2 diabetes mellitus    Research study patient    Type 2 diabetes mellitus with hyperglycemia, without long-term current use of insulin    Vitamin D deficiency    Persistent atrial fibrillation    Chronic diastolic congestive heart failure           Pre-op Assessment          Review of Systems      Physical Exam    Airway:  No airway management difficulties anticipated  Dental:No active dental issues noted  Chest/Lungs:  Clear to auscultation    Heart:  Rate: Normal  Rhythm: Regular Rhythm  Sounds: Normal        Anesthesia Plan  Type of Anesthesia, risks & benefits discussed:    Anesthesia Type: Gen ETT  Intra-op Monitoring Plan: Art Line  Informed Consent: Informed consent signed with the Patient and all parties understand the risks and agree with anesthesia plan.  All questions answered.   ASA Score: 3  Anesthesia Plan Notes: Chart reviewed. Patient seen and examined. Anesthesia plan discussed and questions answered. E-consent signed. Moiz Gilliland MD    Ready For [facility] From Anesthesia Perspective.     .

## 2022-12-06 ENCOUNTER — HOSPITAL ENCOUNTER (OUTPATIENT)
Dept: CARDIOLOGY | Facility: HOSPITAL | Age: 57
Discharge: HOME | End: 2022-12-06
Attending: STUDENT IN AN ORGANIZED HEALTH CARE EDUCATION/TRAINING PROGRAM | Admitting: STUDENT IN AN ORGANIZED HEALTH CARE EDUCATION/TRAINING PROGRAM
Payer: COMMERCIAL

## 2022-12-06 ENCOUNTER — ANESTHESIA (OUTPATIENT)
Dept: MEDSURG UNIT | Facility: HOSPITAL | Age: 57
End: 2022-12-06
Payer: COMMERCIAL

## 2022-12-06 ENCOUNTER — HOSPITAL ENCOUNTER (OUTPATIENT)
Facility: HOSPITAL | Age: 57
Discharge: HOME | End: 2022-12-06
Attending: STUDENT IN AN ORGANIZED HEALTH CARE EDUCATION/TRAINING PROGRAM | Admitting: STUDENT IN AN ORGANIZED HEALTH CARE EDUCATION/TRAINING PROGRAM
Payer: COMMERCIAL

## 2022-12-06 VITALS
BODY MASS INDEX: 35.29 KG/M2 | HEART RATE: 79 BPM | WEIGHT: 275 LBS | SYSTOLIC BLOOD PRESSURE: 144 MMHG | HEIGHT: 74 IN | OXYGEN SATURATION: 98 % | DIASTOLIC BLOOD PRESSURE: 72 MMHG | RESPIRATION RATE: 18 BRPM | TEMPERATURE: 98 F

## 2022-12-06 VITALS
BODY MASS INDEX: 35.29 KG/M2 | SYSTOLIC BLOOD PRESSURE: 135 MMHG | DIASTOLIC BLOOD PRESSURE: 82 MMHG | HEIGHT: 74 IN | WEIGHT: 275 LBS

## 2022-12-06 DIAGNOSIS — I49.8 OTHER SPECIFIED CARDIAC ARRHYTHMIAS: ICD-10-CM

## 2022-12-06 DIAGNOSIS — I48.91 ATRIAL FIBRILLATION: Primary | ICD-10-CM

## 2022-12-06 DIAGNOSIS — I48.19 PERSISTENT ATRIAL FIBRILLATION: ICD-10-CM

## 2022-12-06 DIAGNOSIS — I49.9 ARRHYTHMIA: ICD-10-CM

## 2022-12-06 LAB
ASCENDING AORTA: 3 CM
BSA FOR ECHO PROCEDURE: 2.55 M2
EJECTION FRACTION: 55 %
POCT GLUCOSE: 122 MG/DL (ref 70–110)
SINUS: 3 CM

## 2022-12-06 PROCEDURE — 93010 EKG 12-LEAD: ICD-10-PCS | Mod: ,,, | Performed by: INTERNAL MEDICINE

## 2022-12-06 PROCEDURE — 27201423 OPTIME MED/SURG SUP & DEVICES STERILE SUPPLY: Performed by: STUDENT IN AN ORGANIZED HEALTH CARE EDUCATION/TRAINING PROGRAM

## 2022-12-06 PROCEDURE — 93320 DOPPLER ECHO COMPLETE: CPT | Mod: 26,,, | Performed by: INTERNAL MEDICINE

## 2022-12-06 PROCEDURE — 37000008 HC ANESTHESIA 1ST 15 MINUTES: Performed by: STUDENT IN AN ORGANIZED HEALTH CARE EDUCATION/TRAINING PROGRAM

## 2022-12-06 PROCEDURE — C1753 CATH, INTRAVAS ULTRASOUND: HCPCS | Performed by: STUDENT IN AN ORGANIZED HEALTH CARE EDUCATION/TRAINING PROGRAM

## 2022-12-06 PROCEDURE — 93312 TRANSESOPHAGEAL ECHO (TEE) (CUPID ONLY): ICD-10-PCS | Mod: 26,,, | Performed by: INTERNAL MEDICINE

## 2022-12-06 PROCEDURE — 25000003 PHARM REV CODE 250: Performed by: NURSE PRACTITIONER

## 2022-12-06 PROCEDURE — 63600175 PHARM REV CODE 636 W HCPCS: Performed by: NURSE ANESTHETIST, CERTIFIED REGISTERED

## 2022-12-06 PROCEDURE — 36620 INSERTION CATHETER ARTERY: CPT | Mod: 59,,, | Performed by: ANESTHESIOLOGY

## 2022-12-06 PROCEDURE — 93312 ECHO TRANSESOPHAGEAL: CPT | Mod: 26,,, | Performed by: INTERNAL MEDICINE

## 2022-12-06 PROCEDURE — 92960 CARDIOVERSION ELECTRIC EXT: CPT | Mod: 59 | Performed by: STUDENT IN AN ORGANIZED HEALTH CARE EDUCATION/TRAINING PROGRAM

## 2022-12-06 PROCEDURE — 25000003 PHARM REV CODE 250: Performed by: STUDENT IN AN ORGANIZED HEALTH CARE EDUCATION/TRAINING PROGRAM

## 2022-12-06 PROCEDURE — 92960 PR CARDIOVERSION, ELECTIVE;EXTERN: ICD-10-PCS | Mod: 59,,, | Performed by: STUDENT IN AN ORGANIZED HEALTH CARE EDUCATION/TRAINING PROGRAM

## 2022-12-06 PROCEDURE — D9220A PRA ANESTHESIA: ICD-10-PCS | Mod: CRNA,,, | Performed by: NURSE ANESTHETIST, CERTIFIED REGISTERED

## 2022-12-06 PROCEDURE — 93325 DOPPLER ECHO COLOR FLOW MAPG: CPT | Mod: 26,,, | Performed by: INTERNAL MEDICINE

## 2022-12-06 PROCEDURE — 92960 CARDIOVERSION ELECTRIC EXT: CPT | Mod: 59,,, | Performed by: STUDENT IN AN ORGANIZED HEALTH CARE EDUCATION/TRAINING PROGRAM

## 2022-12-06 PROCEDURE — 63600175 PHARM REV CODE 636 W HCPCS: Performed by: ANESTHESIOLOGY

## 2022-12-06 PROCEDURE — 93005 ELECTROCARDIOGRAM TRACING: CPT

## 2022-12-06 PROCEDURE — D9220A PRA ANESTHESIA: Mod: CRNA,,, | Performed by: NURSE ANESTHETIST, CERTIFIED REGISTERED

## 2022-12-06 PROCEDURE — D9220A PRA ANESTHESIA: Mod: ANES,,, | Performed by: ANESTHESIOLOGY

## 2022-12-06 PROCEDURE — C1731 CATH, EP, 20 OR MORE ELEC: HCPCS | Performed by: STUDENT IN AN ORGANIZED HEALTH CARE EDUCATION/TRAINING PROGRAM

## 2022-12-06 PROCEDURE — 82962 GLUCOSE BLOOD TEST: CPT | Performed by: STUDENT IN AN ORGANIZED HEALTH CARE EDUCATION/TRAINING PROGRAM

## 2022-12-06 PROCEDURE — 36620 PR INSERT CATH,ART,PERCUT,SHORTTERM: ICD-10-PCS | Mod: 59,,, | Performed by: ANESTHESIOLOGY

## 2022-12-06 PROCEDURE — 93320 TRANSESOPHAGEAL ECHO (TEE) (CUPID ONLY): ICD-10-PCS | Mod: 26,,, | Performed by: INTERNAL MEDICINE

## 2022-12-06 PROCEDURE — 93312 ECHO TRANSESOPHAGEAL: CPT

## 2022-12-06 PROCEDURE — 93010 ELECTROCARDIOGRAM REPORT: CPT | Mod: ,,, | Performed by: INTERNAL MEDICINE

## 2022-12-06 PROCEDURE — 63600175 PHARM REV CODE 636 W HCPCS: Performed by: STUDENT IN AN ORGANIZED HEALTH CARE EDUCATION/TRAINING PROGRAM

## 2022-12-06 PROCEDURE — 93325 TRANSESOPHAGEAL ECHO (TEE) (CUPID ONLY): ICD-10-PCS | Mod: 26,,, | Performed by: INTERNAL MEDICINE

## 2022-12-06 PROCEDURE — 93656 PR ELECTROPHYS EVAL, COMPREHEN, W/ABLATION OF ATRIAL FIBR: ICD-10-PCS | Mod: ,,, | Performed by: STUDENT IN AN ORGANIZED HEALTH CARE EDUCATION/TRAINING PROGRAM

## 2022-12-06 PROCEDURE — 93656 COMPRE EP EVAL ABLTJ ATR FIB: CPT | Performed by: STUDENT IN AN ORGANIZED HEALTH CARE EDUCATION/TRAINING PROGRAM

## 2022-12-06 PROCEDURE — D9220A PRA ANESTHESIA: ICD-10-PCS | Mod: ANES,,, | Performed by: ANESTHESIOLOGY

## 2022-12-06 PROCEDURE — C1730 CATH, EP, 19 OR FEW ELECT: HCPCS | Performed by: STUDENT IN AN ORGANIZED HEALTH CARE EDUCATION/TRAINING PROGRAM

## 2022-12-06 PROCEDURE — C1732 CATH, EP, DIAG/ABL, 3D/VECT: HCPCS | Performed by: STUDENT IN AN ORGANIZED HEALTH CARE EDUCATION/TRAINING PROGRAM

## 2022-12-06 PROCEDURE — C1894 INTRO/SHEATH, NON-LASER: HCPCS | Performed by: STUDENT IN AN ORGANIZED HEALTH CARE EDUCATION/TRAINING PROGRAM

## 2022-12-06 PROCEDURE — 37000009 HC ANESTHESIA EA ADD 15 MINS: Performed by: STUDENT IN AN ORGANIZED HEALTH CARE EDUCATION/TRAINING PROGRAM

## 2022-12-06 PROCEDURE — 25000003 PHARM REV CODE 250: Performed by: NURSE ANESTHETIST, CERTIFIED REGISTERED

## 2022-12-06 PROCEDURE — 93656 COMPRE EP EVAL ABLTJ ATR FIB: CPT | Mod: ,,, | Performed by: STUDENT IN AN ORGANIZED HEALTH CARE EDUCATION/TRAINING PROGRAM

## 2022-12-06 RX ORDER — HEPARIN SODIUM 10000 [USP'U]/100ML
INJECTION, SOLUTION INTRAVENOUS CONTINUOUS PRN
Status: DISCONTINUED | OUTPATIENT
Start: 2022-12-06 | End: 2022-12-06

## 2022-12-06 RX ORDER — HEPARIN SODIUM 1000 [USP'U]/ML
INJECTION, SOLUTION INTRAVENOUS; SUBCUTANEOUS
Status: DISCONTINUED | OUTPATIENT
Start: 2022-12-06 | End: 2022-12-06

## 2022-12-06 RX ORDER — HYDROMORPHONE HYDROCHLORIDE 1 MG/ML
0.2 INJECTION, SOLUTION INTRAMUSCULAR; INTRAVENOUS; SUBCUTANEOUS EVERY 5 MIN PRN
Status: DISCONTINUED | OUTPATIENT
Start: 2022-12-06 | End: 2022-12-06 | Stop reason: HOSPADM

## 2022-12-06 RX ORDER — DIPHENHYDRAMINE HYDROCHLORIDE 50 MG/ML
25 INJECTION INTRAMUSCULAR; INTRAVENOUS EVERY 6 HOURS PRN
Status: DISCONTINUED | OUTPATIENT
Start: 2022-12-06 | End: 2022-12-06 | Stop reason: HOSPADM

## 2022-12-06 RX ORDER — PROPOFOL 10 MG/ML
VIAL (ML) INTRAVENOUS
Status: DISCONTINUED | OUTPATIENT
Start: 2022-12-06 | End: 2022-12-06

## 2022-12-06 RX ORDER — PROTAMINE SULFATE 10 MG/ML
INJECTION, SOLUTION INTRAVENOUS
Status: DISCONTINUED | OUTPATIENT
Start: 2022-12-06 | End: 2022-12-06

## 2022-12-06 RX ORDER — MIDAZOLAM HYDROCHLORIDE 1 MG/ML
INJECTION, SOLUTION INTRAMUSCULAR; INTRAVENOUS
Status: DISCONTINUED | OUTPATIENT
Start: 2022-12-06 | End: 2022-12-06

## 2022-12-06 RX ORDER — ROCURONIUM BROMIDE 10 MG/ML
INJECTION, SOLUTION INTRAVENOUS
Status: DISCONTINUED | OUTPATIENT
Start: 2022-12-06 | End: 2022-12-06

## 2022-12-06 RX ORDER — HEPARIN SOD,PORCINE/0.9 % NACL 1000/500ML
INTRAVENOUS SOLUTION INTRAVENOUS
Status: DISCONTINUED | OUTPATIENT
Start: 2022-12-06 | End: 2022-12-06 | Stop reason: HOSPADM

## 2022-12-06 RX ORDER — KETAMINE HCL IN 0.9 % NACL 50 MG/5 ML
SYRINGE (ML) INTRAVENOUS
Status: DISCONTINUED | OUTPATIENT
Start: 2022-12-06 | End: 2022-12-06

## 2022-12-06 RX ORDER — ONDANSETRON 2 MG/ML
INJECTION INTRAMUSCULAR; INTRAVENOUS
Status: DISCONTINUED | OUTPATIENT
Start: 2022-12-06 | End: 2022-12-06

## 2022-12-06 RX ORDER — FENTANYL CITRATE 50 UG/ML
INJECTION, SOLUTION INTRAMUSCULAR; INTRAVENOUS
Status: DISCONTINUED | OUTPATIENT
Start: 2022-12-06 | End: 2022-12-06

## 2022-12-06 RX ORDER — LIDOCAINE HYDROCHLORIDE 20 MG/ML
INJECTION INTRAVENOUS
Status: DISCONTINUED | OUTPATIENT
Start: 2022-12-06 | End: 2022-12-06

## 2022-12-06 RX ORDER — PHENYLEPHRINE HYDROCHLORIDE 10 MG/ML
INJECTION INTRAVENOUS
Status: DISCONTINUED | OUTPATIENT
Start: 2022-12-06 | End: 2022-12-06

## 2022-12-06 RX ORDER — ONDANSETRON 2 MG/ML
4 INJECTION INTRAMUSCULAR; INTRAVENOUS ONCE AS NEEDED
Status: DISCONTINUED | OUTPATIENT
Start: 2022-12-06 | End: 2022-12-06 | Stop reason: HOSPADM

## 2022-12-06 RX ORDER — LIDOCAINE HYDROCHLORIDE 10 MG/ML
INJECTION INFILTRATION; PERINEURAL
Status: DISCONTINUED | OUTPATIENT
Start: 2022-12-06 | End: 2022-12-06 | Stop reason: HOSPADM

## 2022-12-06 RX ORDER — SUCCINYLCHOLINE CHLORIDE 20 MG/ML
INJECTION INTRAMUSCULAR; INTRAVENOUS
Status: DISCONTINUED | OUTPATIENT
Start: 2022-12-06 | End: 2022-12-06

## 2022-12-06 RX ORDER — FENTANYL CITRATE 50 UG/ML
25 INJECTION, SOLUTION INTRAMUSCULAR; INTRAVENOUS EVERY 5 MIN PRN
Status: DISCONTINUED | OUTPATIENT
Start: 2022-12-06 | End: 2022-12-06 | Stop reason: HOSPADM

## 2022-12-06 RX ORDER — ACETAMINOPHEN 325 MG/1
650 TABLET ORAL EVERY 4 HOURS PRN
Status: DISCONTINUED | OUTPATIENT
Start: 2022-12-06 | End: 2022-12-06 | Stop reason: HOSPADM

## 2022-12-06 RX ADMIN — PROTAMINE SULFATE 115 MG: 10 INJECTION, SOLUTION INTRAVENOUS at 12:12

## 2022-12-06 RX ADMIN — SODIUM CHLORIDE: 0.9 INJECTION, SOLUTION INTRAVENOUS at 07:12

## 2022-12-06 RX ADMIN — PROPOFOL 100 MG: 10 INJECTION, EMULSION INTRAVENOUS at 07:12

## 2022-12-06 RX ADMIN — ONDANSETRON 4 MG: 2 INJECTION INTRAMUSCULAR; INTRAVENOUS at 11:12

## 2022-12-06 RX ADMIN — HEPARIN SODIUM 4000 UNITS: 1000 INJECTION, SOLUTION INTRAVENOUS; SUBCUTANEOUS at 11:12

## 2022-12-06 RX ADMIN — Medication 20 MG: at 08:12

## 2022-12-06 RX ADMIN — SODIUM CHLORIDE 0.25 MCG/KG/MIN: 9 INJECTION, SOLUTION INTRAVENOUS at 07:12

## 2022-12-06 RX ADMIN — MIDAZOLAM HYDROCHLORIDE 2 MG: 1 INJECTION, SOLUTION INTRAMUSCULAR; INTRAVENOUS at 07:12

## 2022-12-06 RX ADMIN — SUCCINYLCHOLINE CHLORIDE 140 MG: 20 INJECTION, SOLUTION INTRAMUSCULAR; INTRAVENOUS at 07:12

## 2022-12-06 RX ADMIN — Medication 30 MG: at 07:12

## 2022-12-06 RX ADMIN — PROPOFOL 50 MG: 10 INJECTION, EMULSION INTRAVENOUS at 07:12

## 2022-12-06 RX ADMIN — PHENYLEPHRINE HYDROCHLORIDE 100 MCG: 10 INJECTION INTRAVENOUS at 07:12

## 2022-12-06 RX ADMIN — HEPARIN SODIUM 25000 UNITS: 1000 INJECTION, SOLUTION INTRAVENOUS; SUBCUTANEOUS at 09:12

## 2022-12-06 RX ADMIN — HYDROMORPHONE HYDROCHLORIDE 0.2 MG: 1 INJECTION, SOLUTION INTRAMUSCULAR; INTRAVENOUS; SUBCUTANEOUS at 02:12

## 2022-12-06 RX ADMIN — HEPARIN SODIUM 4000 UNITS: 1000 INJECTION, SOLUTION INTRAVENOUS; SUBCUTANEOUS at 09:12

## 2022-12-06 RX ADMIN — PROTAMINE SULFATE 10 MG: 10 INJECTION, SOLUTION INTRAVENOUS at 12:12

## 2022-12-06 RX ADMIN — ROCURONIUM BROMIDE 5 MG: 10 INJECTION INTRAVENOUS at 07:12

## 2022-12-06 RX ADMIN — HEPARIN SODIUM 4000 UNITS: 1000 INJECTION, SOLUTION INTRAVENOUS; SUBCUTANEOUS at 10:12

## 2022-12-06 RX ADMIN — HEPARIN SODIUM 12 UNITS/KG/HR: 10000 INJECTION, SOLUTION INTRAVENOUS at 09:12

## 2022-12-06 RX ADMIN — GLYCOPYRROLATE 0.2 MG: 0.2 INJECTION, SOLUTION INTRAMUSCULAR; INTRAVENOUS at 08:12

## 2022-12-06 RX ADMIN — PROTAMINE SULFATE 30 MG: 10 INJECTION, SOLUTION INTRAVENOUS at 12:12

## 2022-12-06 RX ADMIN — LIDOCAINE HYDROCHLORIDE 100 MG: 20 INJECTION INTRAVENOUS at 07:12

## 2022-12-06 RX ADMIN — SODIUM CHLORIDE, SODIUM GLUCONATE, SODIUM ACETATE, POTASSIUM CHLORIDE, MAGNESIUM CHLORIDE, SODIUM PHOSPHATE, DIBASIC, AND POTASSIUM PHOSPHATE: .53; .5; .37; .037; .03; .012; .00082 INJECTION, SOLUTION INTRAVENOUS at 07:12

## 2022-12-06 RX ADMIN — FENTANYL CITRATE 100 MCG: 50 INJECTION, SOLUTION INTRAMUSCULAR; INTRAVENOUS at 07:12

## 2022-12-06 NOTE — ANESTHESIA PROCEDURE NOTES
Intubation    Date/Time: 12/6/2022 7:38 AM  Performed by: Minda Watson CRNA  Authorized by: Moiz Gilliland MD     Intubation:     Induction:  Intravenous    Intubated:  Postinduction    Mask Ventilation:  Moderately difficult with oral airway    Attempts:  1    Attempted By:  CRNA    Method of Intubation:  Video laryngoscopy    Blade:  Farrar 4    Laryngeal View Grade: Grade I - full view of cords      Difficult Airway Encountered?: No      Complications:  None    Airway Device:  Oral endotracheal tube    Airway Device Size:  7.5    Style/Cuff Inflation:  Cuffed (inflated to minimal occlusive pressure)    Tube secured:  25    Secured at:  The lips    Placement Verified By:  Capnometry    Complicating Factors:  Obesity    Findings Post-Intubation:  BS equal bilateral and atraumatic/condition of teeth unchanged

## 2022-12-06 NOTE — PLAN OF CARE
Patient arrived to room. PIV placed x2. Admit assessment completed. Plan of care discussed with patient. Will monitor. Call light within reach, instructed in use.   POC

## 2022-12-06 NOTE — H&P
[facility]  H&P  Electrophysiology       Alfred Cuello  YOB: 1965  Medical Record Number:  [id number]  Attending Physician:  SKYLER Lee MD   Date of Admission: 12/6/2022       Hospital Day:  0  Current Principal Problem:  <principal problem not specified>      History     Cc: atrial fibrillation     HPI  Mr. Alfred Cuello is a marley 57-year-old gentleman with past medical history significant for persistent atrial fibrillation, a history of HFpEF with most recent LVEF decreased to 45% in the setting of AF, hypertension, hyperlipidemia, type II diabetes mellitus, GERD, RAFAEL maintained on CPAP therapy, and obesity. He underwent a prior NOEMI/cardioversion on 5/20/2022, and feels that he returned to persistent atrial fibrillation approximately one week after undergoing this procedure. He last saw Dr. Lee on 10/31/22 at which time he reported worsening fatigue and palpitations he attributed to his atrial fibrillation. He was PVI for ablation of atrial fibrillation and agreed to proceed. He presents today for the aforementioned procedure. Today he states he feels well, reports some chronic fatigue, exertional dyspnea but this is at baseline. No chest pain. Compliant with his eliquis, last dose was yesterday evening. Presented EKG demonstrated rate controlled atrial fibrillation.     Medications - Outpatient  Prior to Admission medications    Medication Sig Start Date End Date Taking? Authorizing Provider   amiodarone (PACERONE) 200 MG Tab Take one tablet every 12 hours for two weeks then take one tablet daily indefinitely. 5/20/22  Yes Natali Knutson MD   apixaban (ELIQUIS) 5 mg Tab Take 1 tablet (5 mg total) by mouth 2 (two) times daily. 10/13/22  Yes Kaur Valdez NP   atorvastatin (LIPITOR) 40 MG tablet Take 1 tablet (40 mg total) by mouth once daily. 3/17/22  Yes Ariadna Light DNP, NP   ferrous sulfate 325 mg (65 mg iron) Tab tablet Take 325 mg by mouth  daily with breakfast.   Yes Historical Provider   gabapentin (NEURONTIN) 300 MG capsule Take 4 capsules (1,200 mg total) by mouth 3 (three) times daily. 11/25/22 2/26/23 Yes SKYLER Sheridan PA-C   glimepiride (AMARYL) 4 MG tablet Take 1 tablet (4 mg total) by mouth 2 (two) times a day. Take 1 tablet with breakfast and 1 tablet at dinner. 12/13/21  Yes Ariadna Light DNP, SANDRA   KRILL OIL ORAL Take by mouth once daily.   Yes Historical Provider   lisinopriL-hydrochlorothiazide (PRINZIDE,ZESTORETIC) 20-25 mg Tab Take 1 tablet by mouth once daily 1/19/22 1/19/23 Yes Ariadna Light DNP NP   loratadine (CLARITIN) 10 mg tablet Take 10 mg by mouth once daily.   Yes Historical Provider   metFORMIN (GLUCOPHAGE) 850 MG tablet TAKE 1 TABLET BY MOUTH THREE TIMES DAILY WITH MEALS 12/13/21  Yes Ariadna Light DNP, NP   multivitamin (THERAGRAN) per tablet Take 1 tablet by mouth once daily.   Yes Historical Provider   omeprazole (PRILOSEC) 20 MG capsule Take 1 capsule (20 mg total) by mouth once daily. 3/17/22  Yes Ariadna Light DNP, NP   pioglitazone (ACTOS) 30 MG tablet Take 1 tablet (30 mg total) by mouth once daily. 12/13/21  Yes Ariadna Light DNP, NP   potassium chloride SA (K-DUR,KLOR-CON) 20 MEQ tablet Take 1 tablet (20 mEq total) by mouth once daily. 3/17/22  Yes Ariadna Light DNP NP   albuterol (VENTOLIN HFA) 90 mcg/actuation inhaler Inhale 2 puffs into the lungs every 6 (six) hours as needed for Wheezing. Rescue 11/17/21   Nuria Man MD   sars-cov-2, covid-19, (PFIZER COVID-19) 30 mcg/0.3 ml injection  11/21/21   Historical Provider         Medications - Current  Scheduled Meds:   sodium chloride 0.9%  1,000 mL Intravenous Once     Continuous Infusions:  PRN Meds:.      Allergies  Review of patient's allergies indicates:   Allergen Reactions    Oxycodone Itching and Nausea Only         Past Medical History  Past Medical History:   Diagnosis Date    Atrial fibrillation status post cardioversion     2  months ago    Diabetes mellitus, type 2     GERD (gastroesophageal reflux disease)     Hyperlipidemia     Hypertension     Neuropathy          Past Surgical History  Past Surgical History:   Procedure Laterality Date    ABDOMINAL SURGERY      as a child    COLONOSCOPY N/A 2018    Procedure: COLONOSCOPY;  Surgeon: Marko Koo MD;  Location: [facility] ENDO;  Service: Endoscopy;  Laterality: N/A;    TREATMENT OF CARDIAC ARRHYTHMIA N/A 2022    Procedure: CARDIOVERSION;  Surgeon: SKYLER Lee MD;  Location: [facility] EP LAB;  Service: Cardiology;  Laterality: N/A;  afib, NOEMI, DCCV, anes, MB, 3 Prep    TRIAL OF SPINAL CORD NERVE STIMULATOR N/A 2019    Procedure: Trial, Neurostimulator, LUMBAR SPINAL CORD STIMULATOR TRIAL;  Surgeon: Rahat Orantes MD;  Location: [facility] PAIN MGT;  Service: Pain Management;  Laterality: N/A;  PDN STUDY TRIAL, NEEDS CONSENT, NEVRO REP         Social History  Social History     Socioeconomic History    Marital status:    Tobacco Use    Smoking status: Former     Types: Cigarettes     Quit date: 3/20/2011     Years since quittin.7    Smokeless tobacco: Never    Tobacco comments:     smoked regularly for 35 years prior to this   Substance and Sexual Activity    Alcohol use: Yes     Comment: occ    Drug use: No    Sexual activity: Not Currently     Partners: Female         ROS  10 point ROS performed and negative except as stated in HPI       Physical Examination         Vital Signs  Vitals  Temp: 99.1 °F (37.3 °C)  Temp src: Temporal  Pulse: 69  Heart Rate Source: Monitor  Resp: 20  SpO2: 97 %  Pulse Oximetry Type: Intermittent  O2 Device (Oxygen Therapy): room air  BP: 135/82  MAP (mmHg): 100  BP Location: Left arm  BP Method: Automatic  Patient Position: Lying          24 Hour VS Range    Temp:  [99.1 °F (37.3 °C)]   Pulse:  [69]   Resp:  [20]   BP: (130-135)/(75-82)   SpO2:  [97 %]   No intake or output data in the 24 hours ending 22 0641      Physical  Exam:   Constitutional: no acute distress  HEENT: NCAT, EOMI, no scleral icterus  Cardiovascular: Irregularly irregular rhythm   Pulmonary: Normal respiratory effort   Abdomen: nontender, non-distended   Neuro: alert and oriented, no focal deficits  Extremities: warm, no edema   MSK: no deformities  Integument: intact, no rashes       Data       No results for input(s): WBC, HGB, HCT, PLT in the last 168 hours.     No results for input(s): PROTIME, INR in the last 168 hours.     No results for input(s): NA, K, CL, CO2, BUN, CREATININE, ANIONGAP, CALCIUM in the last 168 hours.     No results for input(s): PROT, ALBUMIN, BILITOT, ALKPHOS, AST, ALT in the last 168 hours.     No results for input(s): TROPONINI in the last 168 hours.     BNP (pg/mL)   Date Value   11/22/2020 265 (H)       No results for input(s): LABBLOO in the last 168 hours.         Assessment & Plan   57-year-old gentleman with past medical history significant for persistent atrial fibrillation, a history of HFpEF with most recent LVEF decreased to 45% in the setting of AF, hypertension, hyperlipidemia, type II diabetes mellitus, GERD, RAFAEL maintained on CPAP therapy, and obesity.     Atrial fibrillation:   History of cardioversion in the past without longterm maintenance of sinus rhythm. Increasing fatigue and palpitations. Presents today for PVI.   -To EP lab for PVI  -NOEMI prior       Lex Rivera MD  [facility]   Cardiovascular Disease PGY-V

## 2022-12-06 NOTE — SUBJECTIVE & OBJECTIVE
Past Medical History:   Diagnosis Date    Atrial fibrillation status post cardioversion     2 months ago    Diabetes mellitus, type 2     GERD (gastroesophageal reflux disease)     Hyperlipidemia     Hypertension     Neuropathy        Past Surgical History:   Procedure Laterality Date    ABDOMINAL SURGERY      as a child    COLONOSCOPY N/A 7/20/2018    Procedure: COLONOSCOPY;  Surgeon: Marko Koo MD;  Location: [facility] ENDO;  Service: Endoscopy;  Laterality: N/A;    TREATMENT OF CARDIAC ARRHYTHMIA N/A 5/20/2022    Procedure: CARDIOVERSION;  Surgeon: SKYLER Lee MD;  Location: [facility] EP LAB;  Service: Cardiology;  Laterality: N/A;  afib, NOEMI, DCCV, anes, MB, 3 Prep    TRIAL OF SPINAL CORD NERVE STIMULATOR N/A 6/17/2019    Procedure: Trial, Neurostimulator, LUMBAR SPINAL CORD STIMULATOR TRIAL;  Surgeon: Rahat Orantes MD;  Location: [facility] PAIN MGT;  Service: Pain Management;  Laterality: N/A;  PDN STUDY TRIAL, NEEDS CONSENT, NEVRO REP       Review of patient's allergies indicates:   Allergen Reactions    Oxycodone Itching and Nausea Only       Current Facility-Administered Medications on File Prior to Encounter   Medication    sodium chloride 0.9% bolus 1,000 mL     Current Outpatient Medications on File Prior to Encounter   Medication Sig    amiodarone (PACERONE) 200 MG Tab Take one tablet every 12 hours for two weeks then take one tablet daily indefinitely.    apixaban (ELIQUIS) 5 mg Tab Take 1 tablet (5 mg total) by mouth 2 (two) times daily.    atorvastatin (LIPITOR) 40 MG tablet Take 1 tablet (40 mg total) by mouth once daily.    ferrous sulfate 325 mg (65 mg iron) Tab tablet Take 325 mg by mouth daily with breakfast.    glimepiride (AMARYL) 4 MG tablet Take 1 tablet (4 mg total) by mouth 2 (two) times a day. Take 1 tablet with breakfast and 1 tablet at dinner.    KRILL OIL ORAL Take by mouth once daily.    lisinopriL-hydrochlorothiazide (PRINZIDE,ZESTORETIC) 20-25 mg Tab Take 1 tablet by mouth once  daily    loratadine (CLARITIN) 10 mg tablet Take 10 mg by mouth once daily.    metFORMIN (GLUCOPHAGE) 850 MG tablet TAKE 1 TABLET BY MOUTH THREE TIMES DAILY WITH MEALS    multivitamin (THERAGRAN) per tablet Take 1 tablet by mouth once daily.    omeprazole (PRILOSEC) 20 MG capsule Take 1 capsule (20 mg total) by mouth once daily.    pioglitazone (ACTOS) 30 MG tablet Take 1 tablet (30 mg total) by mouth once daily.    potassium chloride SA (K-DUR,KLOR-CON) 20 MEQ tablet Take 1 tablet (20 mEq total) by mouth once daily.    albuterol (VENTOLIN HFA) 90 mcg/actuation inhaler Inhale 2 puffs into the lungs every 6 (six) hours as needed for Wheezing. Rescue    sars-cov-2, covid-19, (PFIZER COVID-19) 30 mcg/0.3 ml injection      Family History       Problem Relation (Age of Onset)    Coronary artery disease Father    Diabetes Mother          Tobacco Use    Smoking status: Former     Types: Cigarettes     Quit date: 3/20/2011     Years since quittin.7    Smokeless tobacco: Never    Tobacco comments:     smoked regularly for 35 years prior to this   Substance and Sexual Activity    Alcohol use: Yes     Comment: occ    Drug use: No    Sexual activity: Not Currently     Partners: Female     Review of Systems   Constitutional: Negative.   HENT: Negative.     Eyes: Negative.    Cardiovascular: Negative.    Respiratory: Negative.     Endocrine: Negative.    Skin: Negative.    Musculoskeletal: Negative.    Gastrointestinal: Negative.    Genitourinary: Negative.    Neurological: Negative.    Objective:     Vital Signs (Most Recent):  Temp: 99.1 °F (37.3 °C) (22 0545)  Pulse: 69 (22 0545)  Resp: 20 (22 0545)  BP: 135/82 (22 0558)  SpO2: 97 % (22 0545) Vital Signs (24h Range):  Temp:  [99.1 °F (37.3 °C)] 99.1 °F (37.3 °C)  Pulse:  [69] 69  Resp:  [20] 20  SpO2:  [97 %] 97 %  BP: (130-135)/(75-82) 135/82     Weight: 124.7 kg (275 lb)  Body mass index is 35.31 kg/m².    SpO2: 97 %  O2 Device (Oxygen  Therapy): room air    No intake or output data in the 24 hours ending 12/06/22 0650    Lines/Drains/Airways       Peripheral Intravenous Line  Duration                  Peripheral IV - Single Lumen 12/06/22 0610 20 G Left;Posterior Hand <1 day         Peripheral IV - Single Lumen 12/06/22 0612 20 G Posterior;Right Hand <1 day                    Physical Exam  Constitutional:       Appearance: Normal appearance.   HENT:      Head: Normocephalic.      Nose: Nose normal.   Eyes:      Extraocular Movements: Extraocular movements intact.      Pupils: Pupils are equal, round, and reactive to light.   Cardiovascular:      Rate and Rhythm: Regular rhythm.   Abdominal:      General: Abdomen is flat. Bowel sounds are normal.      Palpations: Abdomen is soft.   Musculoskeletal:         General: Normal range of motion.      Cervical back: Normal range of motion.   Skin:     General: Skin is warm.      Capillary Refill: Capillary refill takes less than 2 seconds.   Neurological:      General: No focal deficit present.      Mental Status: He is alert and oriented to person, place, and time.       Significant Labs: All pertinent lab results from the last 24 hours have been reviewed.    Significant Imaging: EKG: Atrial fib

## 2022-12-06 NOTE — DISCHARGE SUMMARY
[facility] - Cardiology  Cardiac Electrophysiology  Discharge Summary        Patient Name: Alfred Cuello   MRN: [id number]   Admission Date: 12/6/2022    Hospital Length of Stay: 0   Discharge Date: 12/06/2022   Attending Physician: Kennedi Lee MD    Discharging Provider: Lex Rivera MD      HPI:   Mr. Alfred Cuello is a marley 57-year-old gentleman with past medical history significant for persistent atrial fibrillation, a history of HFpEF with most recent LVEF decreased to 45% in the setting of AF, hypertension, hyperlipidemia, type II diabetes mellitus, GERD, RAFAEL maintained on CPAP therapy, and obesity. He underwent a prior NOEMI/cardioversion on 5/20/2022, and feels that he returned to persistent atrial fibrillation approximately one week after undergoing this procedure. He last saw Dr. Lee on 10/31/22 at which time he reported worsening fatigue and palpitations he attributed to his atrial fibrillation. He was PVI for ablation of atrial fibrillation and agreed to proceed. He presents today for the aforementioned procedure. Today he states he feels well, reports some chronic fatigue, exertional dyspnea but this is at baseline. No chest pain. Compliant with his eliquis, last dose was yesterday evening. Presented EKG demonstrated rate controlled atrial fibrillation.     Hospital Course:    The patient was brought to the EP lab in the fasting state. Prepped and draped in sterile fashion. Safety timeout was performed. Sedation administered by anesthesia staff. Ultrasound guided venous access of the bilateral femoral veins was performed. ICE and CS catheters placed via left femoral vein access. Long sheaths via right femoral venous access. Heparin bolus and continuous infusion per protocol. Single transseptal puncture performed using combination of fluoroscopic and ICE guidance. Map created. RFA to isolate all pulmonary veins. ICE confirmed no significant pericardial effusion. He completed 6 hours bed rest  and ambulated without difficulty prior to discharge. Groin without signs of bleeding or hematoma. He was discharged home in stable condition. Instructed to resume apixaban this evening, continue amiodarone until clinic follow up.     Follow up:   Follow up with Dr. Lee in 4 months     Disposition:   Home or Self Care         Medication List        CONTINUE taking these medications      albuterol 90 mcg/actuation inhaler  Commonly known as: VENTOLIN HFA  Inhale 2 puffs into the lungs every 6 (six) hours as needed for Wheezing. Rescue     amiodarone 200 MG Tab  Commonly known as: PACERONE  Take one tablet every 12 hours for two weeks then take one tablet daily indefinitely.     atorvastatin 40 MG tablet  Commonly known as: LIPITOR  Take 1 tablet (40 mg total) by mouth once daily.     ELIQUIS 5 mg Tab  Generic drug: apixaban  Take 1 tablet (5 mg total) by mouth 2 (two) times daily.     ferrous sulfate 325 mg (65 mg iron) Tab tablet  Commonly known as: FEOSOL     gabapentin 300 MG capsule  Commonly known as: NEURONTIN  Take 4 capsules (1,200 mg total) by mouth 3 (three) times daily.     glimepiride 4 MG tablet  Commonly known as: AMARYL  Take 1 tablet (4 mg total) by mouth 2 (two) times a day. Take 1 tablet with breakfast and 1 tablet at dinner.     KRILL OIL ORAL     lisinopriL-hydrochlorothiazide 20-25 mg Tab  Commonly known as: PRINZIDE,ZESTORETIC  Take 1 tablet by mouth once daily     loratadine 10 mg tablet  Commonly known as: CLARITIN     metFORMIN 850 MG tablet  Commonly known as: GLUCOPHAGE  TAKE 1 TABLET BY MOUTH THREE TIMES DAILY WITH MEALS     multivitamin per tablet  Commonly known as: THERAGRAN     omeprazole 20 MG capsule  Commonly known as: PRILOSEC  Take 1 capsule (20 mg total) by mouth once daily.     pioglitazone 30 MG tablet  Commonly known as: ACTOS  Take 1 tablet (30 mg total) by mouth once daily.     potassium chloride SA 20 MEQ tablet  Commonly known as: K-DUR,KLOR-CON  Take 1 tablet (20 mEq  total) by mouth once daily.     sars-cov-2 (covid-19) 30 mcg/0.3 ml injection  Commonly known as: Pfizer COVID-19                Lex Rivera MD  [facility]  Cardiovascular Disease, PGY-V

## 2022-12-06 NOTE — HPI
"57-year-old gentleman who returns to clinic today for ongoing evaluation and recommendations regarding persistent atrial fibrillation. He has a past medical history significant for persistent atrial fibrillation, a history of HFpEF with most recent LVEF decreased to 45% in the setting of AF, hypertension, hyperlipidemia, type II diabetes mellitus, GERD, RAFAEL maintained on CPAP therapy, and obesity. He underwent a prior NOEMI/cardioversion on 5/20/2022, and feels that he returned to persistent atrial fibrillation approximately one week after undergoing this procedure.      He is followed in general cardiology clinic with SANDRA Bartholomew and Dr. Shannon. At their prior encounters, they discussed his persistent atrial fibrillation. He was initially diagnosed with asymptomatic atrial fibrillation in November of 2020, when this was incidentally noted on an ECG. This may have been present for years prior, but this was the first ECG evidence. He was seen in cardiology with Dr. Shannon and was initiated on oral anticoagulation with apixaban 5mg po BID. He was subsequently admitted from 11/22-24/2020 in the setting of acute decompensation of heart failure with atrial fibrillation with RVR. He was planned to undergo a NOEMI with cardioversion; however, a NOEMI on 11/24/2020 revealed a left atrial appendage thrombus, and cardioversion was not performed. Unfortunately, he was then lost to follow up until I saw him again in clinic on 5/2/2022. At that encounter, we discussed undergoing a repeat NOEMI/cardioversion with initiation of amiodarone. He underwent NOEMI/cardioversion on 5/20/2022. He states that he was feeling well for the first week, but then could tell that he returned to atrial fibrillation. He remains on amiodarone with excellent rate control, but with persistent atrial fibrillation on all subsequent ECGs.      In discussion with Mr. Cuello today, he tells me that he is feeling overall "OK". He denies any episodes of dizziness, " "lightheadedness, syncope/presyncope, chest pain or chest discomfort, nausea or vomiting, orthopnea, changes in his baseline lower extremity edema, or PND. He reports brief, intermittent palpitations that do not limit his functioning. He reports baseline shortness of breath and dyspnea with exertion, and feels that this has remained worse than his prior baseline since he has returned to atrial fibrillation. He reports increased exercise intolerance and easy fatigue while in atrial fibrillation. He was recently mowing his yard when he reported worsened fatigue and exercise intolerance, and had to take several breaks, which he feels is not his usual level of exercise capacity. He reports transient periods of dizziness with abrupt positional changes. He can climb more than one flight of stairs prior to needing to take a break provided he can take his time. 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 He wears compression stockings for comfort. He and his wife are trying to "get healthy", and he has intentionally lost about 20lbs with diet and exercise modification.        Anticoagulant/antiplatelets:Eliquis   ECG:Atrial fib    Dysphagia or odynophagia:  No  Liver Disease, esophageal disease, or known varices:  No  Upper GI Bleeding: No  Snoring:  No  Sleep Apnea:  No  Prior neck surgery or radiation:  No  History of anesthetic difficulties:  No  Family history of anesthetic difficulties:  No  Last oral intake: yesterday before midnight  Able to move neck in all directions:  Yes  Platelet count: 208 k  INR:     "

## 2022-12-06 NOTE — Clinical Note
A percutaneous stick to the left femoral and right femoral Ultrasound guidance was used to obtain access.

## 2022-12-06 NOTE — PROGRESS NOTES
Stopcocks and sutures removed to bilateral groin sites. No active signs of bleeding. Gauze and tegaderm placed over both sites. Tolerated procedure well. Tolerating clear liquids. Family at bedside.

## 2022-12-06 NOTE — TRANSFER OF CARE
"Anesthesia Transfer of Care Note    Patient: Alfred Cuello    Procedure(s) Performed: Procedure(s) (LRB):  Ablation atrial fibrillation (N/A)  Transesophageal echo (NOEMI) intra-procedure log documentation (N/A)  Cardioversion or Defibrillation    Patient location: PACU    Anesthesia Type: general    Transport from OR: Transported from OR on 6-10 L/min O2 by face mask with adequate spontaneous ventilation    Post pain: adequate analgesia    Post assessment: no apparent anesthetic complications and tolerated procedure well    Post vital signs: stable    Level of consciousness: awake    Nausea/Vomiting: no nausea/vomiting    Complications: none    Transfer of care protocol was followed      Last vitals:   Visit Vitals  /82   Pulse 69   Temp 37.3 °C (99.1 °F) (Temporal)   Resp 20   Ht 6' 2" (1.88 m)   Wt 124.7 kg (275 lb)   SpO2 97%   BMI 35.31 kg/m²     "

## 2022-12-06 NOTE — NURSING TRANSFER
Nursing Transfer Note      12/6/2022     Reason patient is being transferred: postop    Transfer To: [facility] bay #6    Transfer via bed    Transfer with cardiac monitoring    Transported by RN    Medicines sent: n/a    Any special needs or follow-up needed:     Chart send with patient: Yes    Notified: spouse    Patient reassessed at: 12/6/22    Upon arrival to floor: bed in lowest position  Report given to Vikki Vogel

## 2022-12-06 NOTE — ANESTHESIA PROCEDURE NOTES
Arterial    Diagnosis: afib    Patient location during procedure: done in OR    Staffing  Authorizing Provider: Moiz Gilliland MD  Performing Provider: Moiz Gilliland MD    Anesthesiologist was present at the time of the procedure.  Arterial  Skin Prep: chlorhexidine gluconate  Location: radial    Catheter Size: 20 G  Catheter placement by Ultrasound guidance. Heme positive aspiration all ports.   Vessel Caliber: patent  Needle advanced into vessel with real time Ultrasound guidance.Insertion Attempts: 1  Assessment  Dressing: secured with tape and tegaderm

## 2022-12-06 NOTE — ASSESSMENT & PLAN NOTE
Here today for NOEMI to rule out TAWNY cloth   -No absolute contraindications of esophageal stricture, tumor, perforation, laceration,or diverticulum and/or active GI bleed  -The risks, benefits & alternatives of the procedure were explained to the patient.   -The risks of transesophageal echo include but are not limited to:  Dental trauma, esophageal trauma/perforation, bleeding, laryngospasm/brochospasm, aspiration, sore throat/hoarseness, & dislodgement of the endotracheal tube/nasogastric tube (where applicable).    -The risks of ANES monitored sedation include hypotension, respiratory depression, arrhythmias, bronchospasm, & death.    -Informed consent was obtained. The patient is agreeable to proceed with the procedure and all questions and concerns addressed.    Case discussed with an attending in echocardiography lab.    Further recommendations per attending addendum

## 2022-12-06 NOTE — CONSULTS
[facility]  Cardiology  Consult Note    Patient Name: Alfred Cuello  MRN: [id number]  Admission Date: 12/6/2022  Hospital Length of Stay: 0 days  Code Status: Prior   Attending Provider: SKYLER Lee MD   Consulting Provider: Thomas Castellon MD  Primary Care Physician: Nuria Man MD  Principal Problem:<principal problem not specified>    Patient information was obtained from patient and ER records.     Consults  Subjective:     Chief Complaint:  Palpitations      HPI:   57-year-old gentleman who returns to clinic today for ongoing evaluation and recommendations regarding persistent atrial fibrillation. He has a past medical history significant for persistent atrial fibrillation, a history of HFpEF with most recent LVEF decreased to 45% in the setting of AF, hypertension, hyperlipidemia, type II diabetes mellitus, GERD, RAFAEL maintained on CPAP therapy, and obesity. He underwent a prior NOEMI/cardioversion on 5/20/2022, and feels that he returned to persistent atrial fibrillation approximately one week after undergoing this procedure.      He is followed in general cardiology clinic with SANDRA Bartholomew and Dr. Shannon. At their prior encounters, they discussed his persistent atrial fibrillation. He was initially diagnosed with asymptomatic atrial fibrillation in November of 2020, when this was incidentally noted on an ECG. This may have been present for years prior, but this was the first ECG evidence. He was seen in cardiology with Dr. Shannon and was initiated on oral anticoagulation with apixaban 5mg po BID. He was subsequently admitted from 11/22-24/2020 in the setting of acute decompensation of heart failure with atrial fibrillation with RVR. He was planned to undergo a NOEMI with cardioversion; however, a NOEMI on 11/24/2020 revealed a left atrial appendage thrombus, and cardioversion was not performed. Unfortunately, he was then lost to follow up until I saw him again in clinic on  "5/2/2022. At that encounter, we discussed undergoing a repeat NOEMI/cardioversion with initiation of amiodarone. He underwent NOEMI/cardioversion on 5/20/2022. He states that he was feeling well for the first week, but then could tell that he returned to atrial fibrillation. He remains on amiodarone with excellent rate control, but with persistent atrial fibrillation on all subsequent ECGs.      In discussion with Mr. Cuello today, he tells me that he is feeling overall "OK". He denies any episodes of dizziness, lightheadedness, syncope/presyncope, chest pain or chest discomfort, nausea or vomiting, orthopnea, changes in his baseline lower extremity edema, or PND. He reports brief, intermittent palpitations that do not limit his functioning. He reports baseline shortness of breath and dyspnea with exertion, and feels that this has remained worse than his prior baseline since he has returned to atrial fibrillation. He reports increased exercise intolerance and easy fatigue while in atrial fibrillation. He was recently mowing his yard when he reported worsened fatigue and exercise intolerance, and had to take several breaks, which he feels is not his usual level of exercise capacity. He reports transient periods of dizziness with abrupt positional changes. He can climb more than one flight of stairs prior to needing to take a break provided he can take his time. 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 He wears compression stockings for comfort. He and his wife are trying to "get healthy", and he has intentionally lost about 20lbs with diet and exercise modification.        Anticoagulant/antiplatelets:Eliquis   ECG:Atrial fib    Dysphagia or odynophagia:  No  Liver Disease, esophageal disease, or known varices:  No  Upper GI Bleeding: No  Snoring:  No  Sleep Apnea:  No  Prior neck surgery or radiation:  " No  History of anesthetic difficulties:  No  Family history of anesthetic difficulties:  No  Last oral intake: yesterday before midnight  Able to move neck in all directions:  Yes  Platelet count: 208 k  INR:         Past Medical History:   Diagnosis Date    Atrial fibrillation status post cardioversion     2 months ago    Diabetes mellitus, type 2     GERD (gastroesophageal reflux disease)     Hyperlipidemia     Hypertension     Neuropathy        Past Surgical History:   Procedure Laterality Date    ABDOMINAL SURGERY      as a child    COLONOSCOPY N/A 7/20/2018    Procedure: COLONOSCOPY;  Surgeon: Marko Koo MD;  Location: [facility] ENDO;  Service: Endoscopy;  Laterality: N/A;    TREATMENT OF CARDIAC ARRHYTHMIA N/A 5/20/2022    Procedure: CARDIOVERSION;  Surgeon: SKYLER Lee MD;  Location: [facility] EP LAB;  Service: Cardiology;  Laterality: N/A;  afib, NOEMI, DCCV, anes, MB, 3 Prep    TRIAL OF SPINAL CORD NERVE STIMULATOR N/A 6/17/2019    Procedure: Trial, Neurostimulator, LUMBAR SPINAL CORD STIMULATOR TRIAL;  Surgeon: Rahat Orantes MD;  Location: [facility] [place] PAIN MGT;  Service: Pain Management;  Laterality: N/A;  PDN STUDY TRIAL, NEEDS CONSENT, NEVRO REP       Review of patient's allergies indicates:   Allergen Reactions    Oxycodone Itching and Nausea Only       Current Facility-Administered Medications on File Prior to Encounter   Medication    sodium chloride 0.9% bolus 1,000 mL     Current Outpatient Medications on File Prior to Encounter   Medication Sig    amiodarone (PACERONE) 200 MG Tab Take one tablet every 12 hours for two weeks then take one tablet daily indefinitely.    apixaban (ELIQUIS) 5 mg Tab Take 1 tablet (5 mg total) by mouth 2 (two) times daily.    atorvastatin (LIPITOR) 40 MG tablet Take 1 tablet (40 mg total) by mouth once daily.    ferrous sulfate 325 mg (65 mg iron) Tab tablet Take 325 mg by mouth daily with breakfast.    glimepiride (AMARYL) 4 MG tablet Take 1 tablet (4  mg total) by mouth 2 (two) times a day. Take 1 tablet with breakfast and 1 tablet at dinner.    KRILL OIL ORAL Take by mouth once daily.    lisinopriL-hydrochlorothiazide (PRINZIDE,ZESTORETIC) 20-25 mg Tab Take 1 tablet by mouth once daily    loratadine (CLARITIN) 10 mg tablet Take 10 mg by mouth once daily.    metFORMIN (GLUCOPHAGE) 850 MG tablet TAKE 1 TABLET BY MOUTH THREE TIMES DAILY WITH MEALS    multivitamin (THERAGRAN) per tablet Take 1 tablet by mouth once daily.    omeprazole (PRILOSEC) 20 MG capsule Take 1 capsule (20 mg total) by mouth once daily.    pioglitazone (ACTOS) 30 MG tablet Take 1 tablet (30 mg total) by mouth once daily.    potassium chloride SA (K-DUR,KLOR-CON) 20 MEQ tablet Take 1 tablet (20 mEq total) by mouth once daily.    albuterol (VENTOLIN HFA) 90 mcg/actuation inhaler Inhale 2 puffs into the lungs every 6 (six) hours as needed for Wheezing. Rescue    sars-cov-2, covid-19, (PFIZER COVID-19) 30 mcg/0.3 ml injection      Family History       Problem Relation (Age of Onset)    Coronary artery disease Father    Diabetes Mother          Tobacco Use    Smoking status: Former     Types: Cigarettes     Quit date: 3/20/2011     Years since quittin.7    Smokeless tobacco: Never    Tobacco comments:     smoked regularly for 35 years prior to this   Substance and Sexual Activity    Alcohol use: Yes     Comment: occ    Drug use: No    Sexual activity: Not Currently     Partners: Female     Review of Systems   Constitutional: Negative.   HENT: Negative.     Eyes: Negative.    Cardiovascular: Negative.    Respiratory: Negative.     Endocrine: Negative.    Skin: Negative.    Musculoskeletal: Negative.    Gastrointestinal: Negative.    Genitourinary: Negative.    Neurological: Negative.    Objective:     Vital Signs (Most Recent):  Temp: 99.1 °F (37.3 °C) (22)  Pulse: 69 (2245)  Resp: 20 (22)  BP: 135/82 (22 0558)  SpO2: 97 % (22)  Vital Signs (24h Range):  Temp:  [99.1 °F (37.3 °C)] 99.1 °F (37.3 °C)  Pulse:  [69] 69  Resp:  [20] 20  SpO2:  [97 %] 97 %  BP: (130-135)/(75-82) 135/82     Weight: 124.7 kg (275 lb)  Body mass index is 35.31 kg/m².    SpO2: 97 %  O2 Device (Oxygen Therapy): room air    No intake or output data in the 24 hours ending 12/06/22 0650    Lines/Drains/Airways       Peripheral Intravenous Line  Duration                  Peripheral IV - Single Lumen 12/06/22 0610 20 G Left;Posterior Hand <1 day         Peripheral IV - Single Lumen 12/06/22 0612 20 G Posterior;Right Hand <1 day                    Physical Exam  Constitutional:       Appearance: Normal appearance.   HENT:      Head: Normocephalic.      Nose: Nose normal.   Eyes:      Extraocular Movements: Extraocular movements intact.      Pupils: Pupils are equal, round, and reactive to light.   Cardiovascular:      Rate and Rhythm: Regular rhythm.   Abdominal:      General: Abdomen is flat. Bowel sounds are normal.      Palpations: Abdomen is soft.   Musculoskeletal:         General: Normal range of motion.      Cervical back: Normal range of motion.   Skin:     General: Skin is warm.      Capillary Refill: Capillary refill takes less than 2 seconds.   Neurological:      General: No focal deficit present.      Mental Status: He is alert and oriented to person, place, and time.       Significant Labs: All pertinent lab results from the last 24 hours have been reviewed.    Significant Imaging: EKG: Atrial fib    Assessment and Plan:     Persistent atrial fibrillation  Here today for NOEMI to rule out TAWNY cloth   -No absolute contraindications of esophageal stricture, tumor, perforation, laceration,or diverticulum and/or active GI bleed  -The risks, benefits & alternatives of the procedure were explained to the patient.   -The risks of transesophageal echo include but are not limited to:  Dental trauma, esophageal trauma/perforation, bleeding, laryngospasm/brochospasm,  aspiration, sore throat/hoarseness, & dislodgement of the endotracheal tube/nasogastric tube (where applicable).    -The risks of ANES monitored sedation include hypotension, respiratory depression, arrhythmias, bronchospasm, & death.    -Informed consent was obtained. The patient is agreeable to proceed with the procedure and all questions and concerns addressed.    Case discussed with an attending in echocardiography lab.    Further recommendations per attending addendum          VTE Risk Mitigation (From admission, onward)    None          Thank you for your consult. I will sign off. Please contact us if you have any additional questions.    Thomas Castellon MD  Cardiology   [facility]

## 2022-12-06 NOTE — Clinical Note
The groin was prepped. The site was prepped with ChloraPrep. The site was clipped. The patient was draped. The patient was positioned supine. The patient was secured using safety straps, with a wedge under the patient and to an armboard.

## 2022-12-06 NOTE — BRIEF OP NOTE
: Kennedi Lee MD  Date of Procedure: 12/06/2022    Post-operative Diagnosis: AF    Procedure Performed: Pulmonary vein isolation of all 4 pulmonary veins via radiofrequency ablation.     Description of Procedure: The patient was brought to the EP lab in the fasting state. Prepped and draped in sterile fashion. Safety timeout was performed. Sedation administered by anesthesia staff. Ultrasound guided venous access of the bilateral femoral veins was performed. ICE and CS catheters placed via left femoral vein access. Long sheaths via right femoral venous access. Heparin bolus and continuous infusion per protocol. Single transseptal puncture performed using combination of fluoroscopic and ICE guidance. Map created. RFA to isolate all pulmonary veins. ICE confirmed no significant pericardial effusion.     EBL: <10 mL    Specimens: none  Complications: no immediate    Plan:  Bedrest x 6 hrs -- ends at 6 PM   ECG on arrival to recovery  Patient to resume OAC this evening. If bleeding or hematoma formation, please notify EP service before holding OAC  Continue amiodarone in blanking period   PPI x 1 month  Plan for discharge following bedrest if patient tolerating PO intake, voiding, and ambulatory without evidence of complications       Lex Rivera MD  [facility]  Cardiovascular Disease, PGY-V

## 2022-12-06 NOTE — ANESTHESIA POSTPROCEDURE EVALUATION
Anesthesia Post Evaluation    [facility]    Procedure(s) Performed: Procedure(s) (LRB):  Ablation atrial fibrillation (N/A)  Transesophageal echo (NOEMI) intra-procedure log documentation (N/A)  Cardioversion or Defibrillation    Final Anesthesia Type: general      Level of consciousness: awake and alert  Post-procedure vital signs: reviewed and stable  Pain control: Pain has been treated.  Airway patency: patent    PONV status: Absent or treated.  Anesthetic complications: no      Cardiovascular status: hemodynamically stable  Respiratory status: unassisted  Hydration status: euvolemic            Vitals Value Taken Time     12/06/22 1303     12/06/22 1316   Pulse 53 12/06/22 1315   Resp 13 12/06/22 1315   SpO2 99 % 12/06/22 1315   Vitals shown include unvalidated device data.      No case tracking events are documented in the log.      Pain/Bean Score: Bean Score: 9 (12/6/2022 12:50 PM)

## 2022-12-07 ENCOUNTER — TELEPHONE (OUTPATIENT)
Dept: ELECTROPHYSIOLOGY | Facility: CLINIC | Age: 57
End: 2022-12-07
Payer: COMMERCIAL

## 2022-12-07 ENCOUNTER — PATIENT MESSAGE (OUTPATIENT)
Dept: ELECTROPHYSIOLOGY | Facility: CLINIC | Age: 57
End: 2022-12-07
Payer: COMMERCIAL

## 2022-12-07 LAB
POC ACTIVATED CLOTTING TIME K: 138 SEC (ref 74–137)
POC ACTIVATED CLOTTING TIME K: 214 SEC (ref 74–137)
POC ACTIVATED CLOTTING TIME K: 335 SEC (ref 74–137)
POC ACTIVATED CLOTTING TIME K: 335 SEC (ref 74–137)
POC ACTIVATED CLOTTING TIME K: 341 SEC (ref 74–137)
POC ACTIVATED CLOTTING TIME K: 341 SEC (ref 74–137)
POC ACTIVATED CLOTTING TIME K: 370 SEC (ref 74–137)
POC ACTIVATED CLOTTING TIME K: 376 SEC (ref 74–137)
POCT GLUCOSE: 108 MG/DL (ref 70–110)
SAMPLE: ABNORMAL

## 2022-12-07 NOTE — PLAN OF CARE
Reviewed discharge paperwork with patient and spouse. Verbalized understanding and given copy of information to take home.

## 2022-12-07 NOTE — TELEPHONE ENCOUNTER
----- Message from Cassie Carvalho MA sent at 12/7/2022  9:15 AM CST -----  Patient had a  RF-PVI yesterday. I called to schedule post op appt and patient's wife said he has really bad razor burn on his chest where he was shaved for procedure. She would like someone to call her because she wants something scripted for it.

## 2022-12-07 NOTE — TELEPHONE ENCOUNTER
Spoke with patient's wife. She states that patient has significant razor burns on his chest and groin. She has tried Cortisone and triple antibiotic ointment with no improvement. She wants a prescription. Advised that she needs to have her Primary Care MD take a look first and she can order something stronger, if needed. She verbalized understanding.

## 2022-12-07 NOTE — PROGRESS NOTES
Able to ambulate and void without complication. Dressings to bilateral groin sites remain clean, dry, and intact.

## 2022-12-09 ENCOUNTER — PATIENT MESSAGE (OUTPATIENT)
Dept: ELECTROPHYSIOLOGY | Facility: CLINIC | Age: 57
End: 2022-12-09
Payer: COMMERCIAL

## 2022-12-09 ENCOUNTER — TELEPHONE (OUTPATIENT)
Dept: ELECTROPHYSIOLOGY | Facility: CLINIC | Age: 57
End: 2022-12-09
Payer: COMMERCIAL

## 2022-12-09 NOTE — TELEPHONE ENCOUNTER
----- Message from Virgen Mazariegos sent at 12/9/2022  4:25 PM CST -----  Regarding: Post procedure concerns  Manuel [phone] pt wife and the pt f/u on a message they said was sent this morning in ref to some post procedure issues he's having. He had the Ablation on Tuesday and he's feeling pressure behind his eyes and his body felt hot. Please call them at the number listed.    Thanks

## 2022-12-09 NOTE — TELEPHONE ENCOUNTER
Spoke with patient and his wife. Advised that I responded to his message in the patient portal earlier this afternoon and it looks like it hasn't been read yet. States he was at work and did not have access to his portal. Denies any fever. The pressure behind his eyes is intermittent and is relieved by Tylenol. If no improvement they will check in with the PCP on Monday. Confirmed that he should be taking Eliquis twice a day, as instructed. His wife thought amiodarone was a blood thinner as well.   We reviewed all discharge medications. They were also concerned about the bruising to his groins. Expectations discussed and they both verbalized understanding.   All questions answered.

## 2022-12-13 ENCOUNTER — TELEPHONE (OUTPATIENT)
Dept: ELECTROPHYSIOLOGY | Facility: CLINIC | Age: 57
End: 2022-12-13
Payer: COMMERCIAL

## 2022-12-13 NOTE — TELEPHONE ENCOUNTER
Spoke to patient regarding post op care. Patient states he is doing better now.  Most issues he was having are resolved.   He does have symptoms of afib off and on.   Discussed blanking period and when to seek medical attention.    He verbalized understanding of this concept.    Wound site dry and intact without redness, swelling, hematoma or drainage. He said he has a lot of bruising going to various phases, no lumps under incision sites.   He is using burn cream on his razor burns.  Patient denies any fever or pain. He is taking his allergy medication and this has helped the pressure behind his eyes.    Patient  has resumed medications .Patient verbalizes understanding of all post op instructions.

## 2023-01-04 RX ORDER — PIOGLITAZONEHYDROCHLORIDE 30 MG/1
30 TABLET ORAL DAILY
Qty: 90 TABLET | Refills: 4 | Status: SHIPPED | OUTPATIENT
Start: 2023-01-04 | End: 2024-01-20 | Stop reason: SDUPTHER

## 2023-02-22 RX ORDER — GLIMEPIRIDE 4 MG/1
4 TABLET ORAL 2 TIMES DAILY
Qty: 180 TABLET | Refills: 3 | Status: SHIPPED | OUTPATIENT
Start: 2023-02-22

## 2023-03-12 DIAGNOSIS — E11.40 TYPE 2 DIABETES MELLITUS WITH DIABETIC NEUROPATHY, WITHOUT LONG-TERM CURRENT USE OF INSULIN: ICD-10-CM

## 2023-03-13 RX ORDER — METFORMIN HYDROCHLORIDE 850 MG/1
TABLET ORAL
Qty: 270 TABLET | Refills: 4 | Status: SHIPPED | OUTPATIENT
Start: 2023-03-13 | End: 2023-10-12

## 2023-03-28 ENCOUNTER — PATIENT MESSAGE (OUTPATIENT)
Dept: ELECTROPHYSIOLOGY | Facility: CLINIC | Age: 58
End: 2023-03-28
Payer: COMMERCIAL

## 2023-03-29 ENCOUNTER — PATIENT MESSAGE (OUTPATIENT)
Dept: ELECTROPHYSIOLOGY | Facility: CLINIC | Age: 58
End: 2023-03-29
Payer: COMMERCIAL

## 2023-04-05 ENCOUNTER — TELEPHONE (OUTPATIENT)
Dept: FAMILY MEDICINE | Facility: CLINIC | Age: 58
End: 2023-04-05
Payer: COMMERCIAL

## 2023-04-05 DIAGNOSIS — I50.32 CHRONIC DIASTOLIC CONGESTIVE HEART FAILURE: ICD-10-CM

## 2023-04-05 DIAGNOSIS — I48.19 PERSISTENT ATRIAL FIBRILLATION: Primary | ICD-10-CM

## 2023-04-05 NOTE — TELEPHONE ENCOUNTER
----- Message from Jess Nunez sent at 4/5/2023 12:57 PM CDT -----  Regarding: advice  Contact: wife  Type: Needs Medical Advice  Who Called:  Patient wife // Yessica   Symptoms (please be specific):    How long has patient had these symptoms:    Pharmacy name and phone #:    Best Call Back Number: [phone]    Additional Information: Patient wife stated that they are in need of a referral for a heart doctor. The doctor he was seeing is now moving to a different location. No, patient is having difficulties and is in need of an appointment. Please contact patient to advise. Thanks!

## 2023-04-10 ENCOUNTER — PATIENT MESSAGE (OUTPATIENT)
Dept: ELECTROPHYSIOLOGY | Facility: CLINIC | Age: 58
End: 2023-04-10
Payer: COMMERCIAL

## 2023-04-12 ENCOUNTER — TELEPHONE (OUTPATIENT)
Dept: FAMILY MEDICINE | Facility: CLINIC | Age: 58
End: 2023-04-12
Payer: COMMERCIAL

## 2023-04-12 NOTE — TELEPHONE ENCOUNTER
Spoke with pt, states he already has a Cardiologist and that the referral should be for Endocrinologist , this was his understanding.

## 2023-04-13 ENCOUNTER — TELEPHONE (OUTPATIENT)
Dept: FAMILY MEDICINE | Facility: CLINIC | Age: 58
End: 2023-04-13
Payer: COMMERCIAL

## 2023-04-14 ENCOUNTER — PATIENT MESSAGE (OUTPATIENT)
Dept: FAMILY MEDICINE | Facility: CLINIC | Age: 58
End: 2023-04-14
Payer: COMMERCIAL

## 2023-04-15 ENCOUNTER — PATIENT MESSAGE (OUTPATIENT)
Dept: FAMILY MEDICINE | Facility: CLINIC | Age: 58
End: 2023-04-15
Payer: COMMERCIAL

## 2023-04-17 RX ORDER — AMIODARONE HYDROCHLORIDE 200 MG/1
200 TABLET ORAL DAILY
Qty: 90 TABLET | Refills: 3 | Status: SHIPPED | OUTPATIENT
Start: 2023-04-17

## 2023-04-18 ENCOUNTER — OFFICE VISIT (OUTPATIENT)
Dept: FAMILY MEDICINE | Facility: CLINIC | Age: 58
End: 2023-04-18
Payer: COMMERCIAL

## 2023-04-18 VITALS
HEIGHT: 74 IN | DIASTOLIC BLOOD PRESSURE: 70 MMHG | WEIGHT: 286.63 LBS | BODY MASS INDEX: 36.78 KG/M2 | HEART RATE: 80 BPM | OXYGEN SATURATION: 97 % | SYSTOLIC BLOOD PRESSURE: 134 MMHG | TEMPERATURE: 99 F | RESPIRATION RATE: 16 BRPM

## 2023-04-18 DIAGNOSIS — Z12.11 COLON CANCER SCREENING: ICD-10-CM

## 2023-04-18 DIAGNOSIS — K21.9 GASTROESOPHAGEAL REFLUX DISEASE WITHOUT ESOPHAGITIS: ICD-10-CM

## 2023-04-18 DIAGNOSIS — E11.65 TYPE 2 DIABETES MELLITUS WITH HYPERGLYCEMIA, WITHOUT LONG-TERM CURRENT USE OF INSULIN: ICD-10-CM

## 2023-04-18 DIAGNOSIS — E11.69 HYPERLIPIDEMIA ASSOCIATED WITH TYPE 2 DIABETES MELLITUS: ICD-10-CM

## 2023-04-18 DIAGNOSIS — E11.59 HYPERTENSION ASSOCIATED WITH DIABETES: Primary | ICD-10-CM

## 2023-04-18 DIAGNOSIS — B35.4 TINEA CORPORIS: ICD-10-CM

## 2023-04-18 DIAGNOSIS — E78.5 HYPERLIPIDEMIA ASSOCIATED WITH TYPE 2 DIABETES MELLITUS: ICD-10-CM

## 2023-04-18 DIAGNOSIS — I15.2 HYPERTENSION ASSOCIATED WITH DIABETES: Primary | ICD-10-CM

## 2023-04-18 PROCEDURE — 99999 PR PBB SHADOW E&M-EST. PATIENT-LVL V: CPT | Mod: PBBFAC,,,

## 2023-04-18 PROCEDURE — 3072F PR LOW RISK FOR RETINOPATHY: ICD-10-PCS | Mod: CPTII,S$GLB,,

## 2023-04-18 PROCEDURE — 3072F LOW RISK FOR RETINOPATHY: CPT | Mod: CPTII,S$GLB,,

## 2023-04-18 PROCEDURE — 3075F PR MOST RECENT SYSTOLIC BLOOD PRESS GE 130-139MM HG: ICD-10-PCS | Mod: CPTII,S$GLB,,

## 2023-04-18 PROCEDURE — 1160F RVW MEDS BY RX/DR IN RCRD: CPT | Mod: CPTII,S$GLB,,

## 2023-04-18 PROCEDURE — 4010F PR ACE/ARB THEARPY RXD/TAKEN: ICD-10-PCS | Mod: CPTII,S$GLB,,

## 2023-04-18 PROCEDURE — 1160F PR REVIEW ALL MEDS BY PRESCRIBER/CLIN PHARMACIST DOCUMENTED: ICD-10-PCS | Mod: CPTII,S$GLB,,

## 2023-04-18 PROCEDURE — 99396 PREV VISIT EST AGE 40-64: CPT | Mod: S$GLB,,,

## 2023-04-18 PROCEDURE — 3078F DIAST BP <80 MM HG: CPT | Mod: CPTII,S$GLB,,

## 2023-04-18 PROCEDURE — 99999 PR PBB SHADOW E&M-EST. PATIENT-LVL V: ICD-10-PCS | Mod: PBBFAC,,,

## 2023-04-18 PROCEDURE — 1159F MED LIST DOCD IN RCRD: CPT | Mod: CPTII,S$GLB,,

## 2023-04-18 PROCEDURE — 3078F PR MOST RECENT DIASTOLIC BLOOD PRESSURE < 80 MM HG: ICD-10-PCS | Mod: CPTII,S$GLB,,

## 2023-04-18 PROCEDURE — 3008F PR BODY MASS INDEX (BMI) DOCUMENTED: ICD-10-PCS | Mod: CPTII,S$GLB,,

## 2023-04-18 PROCEDURE — 99396 PR PREVENTIVE VISIT,EST,40-64: ICD-10-PCS | Mod: S$GLB,,,

## 2023-04-18 PROCEDURE — 3075F SYST BP GE 130 - 139MM HG: CPT | Mod: CPTII,S$GLB,,

## 2023-04-18 PROCEDURE — 4010F ACE/ARB THERAPY RXD/TAKEN: CPT | Mod: CPTII,S$GLB,,

## 2023-04-18 PROCEDURE — 1159F PR MEDICATION LIST DOCUMENTED IN MEDICAL RECORD: ICD-10-PCS | Mod: CPTII,S$GLB,,

## 2023-04-18 PROCEDURE — 3008F BODY MASS INDEX DOCD: CPT | Mod: CPTII,S$GLB,,

## 2023-04-18 RX ORDER — OMEPRAZOLE 20 MG/1
20 CAPSULE, DELAYED RELEASE ORAL DAILY
Qty: 90 CAPSULE | Refills: 3 | Status: SHIPPED | OUTPATIENT
Start: 2023-04-18

## 2023-04-18 RX ORDER — ATORVASTATIN CALCIUM 40 MG/1
40 TABLET, FILM COATED ORAL DAILY
Qty: 90 TABLET | Refills: 3 | Status: SHIPPED | OUTPATIENT
Start: 2023-04-18 | End: 2023-06-06

## 2023-04-18 NOTE — PATIENT INSTRUCTIONS
Tashi Simon,     If you are due for any health screening(s) below please notify me so we can arrange them to be ordered and scheduled to maintain your health. Most healthy patients complete it. Don't lose out on improving your health.     Tests to Keep You Healthy    Eye Exam: SCHEDULED  Colon Cancer Screening: DUE  Last Blood Pressure <= 139/89 (4/18/2023): Yes  Last HbA1c < 8 (10/01/2022): Yes         Colon Cancer Screening    Of cancers affecting both men and women, colorectal cancer is the third leading cancer killer in the [place]. But it doesnt have to be. Screening can prevent colorectal cancer or find it at an early stage when treatment often leads to a cure.    A colonoscopy is the preferred test for detecting colon cancer. It is needed only once every 10 years if results are negative. While sedated, a flexible, lighted tube with a tiny camera is inserted into the rectum and advanced through the colon to look for cancers. An alternative screening test that is used at home and returned to the lab may also be used. It detects hidden blood in bowel movements which could indicate cancer in the colon. If results are positive, you will need a colonoscopy to determine if the blood is a sign of cancer. This type of follow up (diagnostic) colonoscopy usually requires additional copays as required by your insurance provider. Please contact your PCP if you have any questions.    Although you are still overdue for this important screening, due to the COVID-19 pandemic, we recommend scheduling it in the near future.       Diabetic Retinal Eye Exam    Diabetes is the #1 cause of blindness in the [place] - early detection before signs or symptoms develop can prevent debilitating blindness.    Once-a-year screening is recommended for all diabetic patients. This exam can prevent and treat diabetes complications in the eye before developing symptoms. This can be done with a special camera is used to take photographs of the  back of your eye without having to dilate them, or you can see an eye doctor for a full dilated exam.    Although you are still overdue for this important screening, due to the COVID-19 pandemic, we recommend scheduling it in the near future.  Diabetic A1c Testing    Your chart identifies you as having diabetes. It is recommended that all diabetes patients have an A1c test done at least once a year, but [facility] recommends twice a year for most patients. This helps your doctor to better help you manage your diabetes. This is a non-fasting lab test which can be completed at any time. Your result will be sent to your Primary Care Provider for review and that office will contact you with the results.

## 2023-04-18 NOTE — PROGRESS NOTES
Subjective:       Patient ID: Alfred Cuello is a 58 y.o. male.    Chief Complaint: Annual Exam and Medication Refill    Annual wellness exam. Requests med refills. Denies any acute health complaints at this time other than a rash that appeared on his chest recently. Band across his chest below the breasts. Circular rashes scattered. Nonpruritic or painful.     Due for routine labs.    Due for colon cancer screening. He's cool with establishing.     DM. Previously seen by Kuldeep who left practice, needs new endocrinologist. Checks feet regularly, no issues other than neuropathy helped with gabapentin.     HTN. Stable. Continue meds.       Past Medical History:   Diagnosis Date    Atrial fibrillation status post cardioversion     2 months ago    Diabetes mellitus, type 2     GERD (gastroesophageal reflux disease)     Hyperlipidemia     Hypertension     Neuropathy        Review of patient's allergies indicates:   Allergen Reactions    Oxycodone Itching and Nausea Only         Current Outpatient Medications:     albuterol (VENTOLIN HFA) 90 mcg/actuation inhaler, Inhale 2 puffs into the lungs every 6 (six) hours as needed for Wheezing. Rescue, Disp: 8.5 g, Rfl: 0    amiodarone (PACERONE) 200 MG Tab, Take 1 tablet (200 mg total) by mouth once daily., Disp: 90 tablet, Rfl: 3    ferrous sulfate 325 mg (65 mg iron) Tab tablet, Take 325 mg by mouth daily with breakfast., Disp: , Rfl:     gabapentin (NEURONTIN) 300 MG capsule, Take 4 capsules (1,200 mg total) by mouth 3 (three) times daily., Disp: 1080 capsule, Rfl: 1    glimepiride (AMARYL) 4 MG tablet, Take 1 tablet (4 mg total) by mouth 2 (two) times a day. Take 1 tablet with breakfast and 1 tablet at dinner., Disp: 180 tablet, Rfl: 3    KRILL OIL ORAL, Take by mouth once daily., Disp: , Rfl:     lisinopriL-hydrochlorothiazide (PRINZIDE,ZESTORETIC) 20-25 mg Tab, Take 1 tablet by mouth once daily, Disp: 90 tablet, Rfl: 4    loratadine (CLARITIN) 10 mg tablet, Take 10 mg  "by mouth once daily., Disp: , Rfl:     metFORMIN (GLUCOPHAGE) 850 MG tablet, TAKE 1 TABLET BY MOUTH THREE TIMES DAILY WITH MEALS, Disp: 270 tablet, Rfl: 4    multivitamin (THERAGRAN) per tablet, Take 1 tablet by mouth once daily., Disp: , Rfl:     pioglitazone (ACTOS) 30 MG tablet, Take 1 tablet (30 mg total) by mouth once daily., Disp: 90 tablet, Rfl: 4    potassium chloride SA (K-DUR,KLOR-CON) 20 MEQ tablet, Take 1 tablet (20 mEq total) by mouth once daily., Disp: 30 tablet, Rfl: 6    apixaban (ELIQUIS) 5 mg Tab, Take 1 tablet (5 mg total) by mouth 2 (two) times daily., Disp: 60 tablet, Rfl: 11    atorvastatin (LIPITOR) 40 MG tablet, Take 1 tablet (40 mg total) by mouth once daily., Disp: 90 tablet, Rfl: 3    omeprazole (PRILOSEC) 20 MG capsule, Take 1 capsule (20 mg total) by mouth once daily., Disp: 90 capsule, Rfl: 3    sars-cov-2, covid-19, (PFIZER COVID-19) 30 mcg/0.3 ml injection, , Disp: , Rfl:     Review of Systems   Skin:  Positive for rash.   Neurological:  Positive for numbness.     Objective:      /70 (BP Location: Right arm, Patient Position: Sitting, BP Method: Large (Manual))   Pulse 80   Temp 99.2 °F (37.3 °C) (Oral)   Resp 16   Ht 6' 2" (1.88 m)   Wt 130 kg (286 lb 9.6 oz)   SpO2 97%   BMI 36.80 kg/m²   Physical Exam  Vitals reviewed.   Constitutional:       General: He is not in acute distress.     Appearance: Normal appearance. He is obese. He is not ill-appearing, toxic-appearing or diaphoretic.   HENT:      Head: Normocephalic.      Right Ear: External ear normal.      Left Ear: External ear normal.      Nose: Nose normal. No congestion or rhinorrhea.      Mouth/Throat:      Mouth: Mucous membranes are moist.      Pharynx: Oropharynx is clear.   Eyes:      General: No scleral icterus.        Right eye: No discharge.         Left eye: No discharge.      Extraocular Movements: Extraocular movements intact.      Conjunctiva/sclera: Conjunctivae normal.   Cardiovascular:      Rate and " Rhythm: Normal rate and regular rhythm.      Pulses: Normal pulses.      Heart sounds: Normal heart sounds. No murmur heard.    No friction rub. No gallop.   Pulmonary:      Effort: Pulmonary effort is normal. No respiratory distress.      Breath sounds: Normal breath sounds. No wheezing, rhonchi or rales.   Chest:      Chest wall: No tenderness.   Musculoskeletal:         General: No swelling, tenderness or deformity. Normal range of motion.      Cervical back: Normal range of motion.      Right lower leg: No edema.      Left lower leg: No edema.   Skin:     General: Skin is warm and dry.      Capillary Refill: Capillary refill takes less than 2 seconds.      Coloration: Skin is not jaundiced.      Findings: Rash present. No bruising, erythema or lesion.   Neurological:      Mental Status: He is alert and oriented to person, place, and time.      Gait: Gait normal.   Psychiatric:         Mood and Affect: Mood normal.         Behavior: Behavior normal.         Thought Content: Thought content normal.         Judgment: Judgment normal.         Protective Sensation (w/ 10 gram monofilament):  Right: Decreased  Left: Decreased    Visual Inspection:  Normal -  Bilateral    Pedal Pulses:   Right: Present  Left: Present    Posterior Tibialis Pulses:   Right:Present  Left: Present  Assessment:       1. Hypertension associated with diabetes    2. Hyperlipidemia associated with type 2 diabetes mellitus    3. Gastroesophageal reflux disease without esophagitis    4. Type 2 diabetes mellitus with hyperglycemia, without long-term current use of insulin    5. Tinea corporis    6. Colon cancer screening        Plan:       Hypertension associated with diabetes  -     Comprehensive Metabolic Panel; Future; Expected date: 04/18/2023  -     CBC Auto Differential; Future; Expected date: 04/18/2023        -    Stable. Continue meds. Will continue to monitor.     Hyperlipidemia associated with type 2 diabetes mellitus  -      atorvastatin (LIPITOR) 40 MG tablet; Take 1 tablet (40 mg total) by mouth once daily.  Dispense: 90 tablet; Refill: 3  -     Lipid Panel; Future; Expected date: 04/18/2023        -    Continue meds. Will continue to monitor.     Gastroesophageal reflux disease without esophagitis  -     omeprazole (PRILOSEC) 20 MG capsule; Take 1 capsule (20 mg total) by mouth once daily.  Dispense: 90 capsule; Refill: 3    Type 2 diabetes mellitus with hyperglycemia, without long-term current use of insulin  -     Hemoglobin A1C; Future; Expected date: 04/18/2023  -     Ambulatory referral/consult to Endocrinology; Future; Expected date: 04/25/2023  -     Microalbumin/Creatinine Ratio, Urine; Future; Expected date: 04/18/2023        -    Continue meds. Will continue to monitor.     Tinea corporis        -    S[place] Blue     Colon cancer screening  -     Case Request Endoscopy: COLONOSCOPY          6 months Magda Stout PA-C  Family Medicine Physician Assistant       I spent a total of 20 minutes on the day of the visit.This includes face to face time and non-face to face time preparing to see the patient (eg, review of tests), obtaining and/or reviewing separately obtained history, documenting clinical information in the electronic or other health record, independently interpreting results and communicating results to the patient/family/caregiver, or care coordinator.      We have addressed [4] Moderate: 1 or more chronic illnesses with exacerbation, progression, or side effects of treatment / 2 or more stable chronic illnesses / 1 undiagnosed new problem with uncertain prognosis / 1 acute illness with systemic symptoms / 1 acute complicated injury  The complexity of the data reviewed and analyzed for this visit was [3] Limited (Reviewed prior external note, ordered unique testing or reviewed the results of each unique test)   The risk of complications and/or morbidity or mortality are [4] Moderate risk (I.e.  prescription drug management / decision regarding minor surgery with identified pt or procedure risk factors / decision regarding elective major surgery without identified pt or procedure risk factors / diagnosis or treatment significantly limited by social determinants of health)   The level of Medical Decision Making for this visit is [4] Moderate

## 2023-04-22 ENCOUNTER — LAB VISIT (OUTPATIENT)
Dept: LAB | Facility: HOSPITAL | Age: 58
End: 2023-04-22
Payer: COMMERCIAL

## 2023-04-22 DIAGNOSIS — E11.65 TYPE 2 DIABETES MELLITUS WITH HYPERGLYCEMIA, WITHOUT LONG-TERM CURRENT USE OF INSULIN: ICD-10-CM

## 2023-04-22 LAB
ALBUMIN/CREAT UR: 12.9 UG/MG (ref 0–30)
CREAT UR-MCNC: 132 MG/DL (ref 23–375)
MICROALBUMIN UR DL<=1MG/L-MCNC: 17 UG/ML

## 2023-04-22 PROCEDURE — 82570 ASSAY OF URINE CREATININE: CPT

## 2023-04-24 ENCOUNTER — TELEPHONE (OUTPATIENT)
Dept: GASTROENTEROLOGY | Facility: CLINIC | Age: 58
End: 2023-04-24
Payer: COMMERCIAL

## 2023-04-24 ENCOUNTER — PATIENT MESSAGE (OUTPATIENT)
Dept: GASTROENTEROLOGY | Facility: CLINIC | Age: 58
End: 2023-04-24
Payer: COMMERCIAL

## 2023-04-24 ENCOUNTER — TELEPHONE (OUTPATIENT)
Dept: ENDOCRINOLOGY | Facility: CLINIC | Age: 58
End: 2023-04-24
Payer: COMMERCIAL

## 2023-04-28 ENCOUNTER — OFFICE VISIT (OUTPATIENT)
Dept: ELECTROPHYSIOLOGY | Facility: CLINIC | Age: 58
End: 2023-04-28
Payer: COMMERCIAL

## 2023-04-28 ENCOUNTER — HOSPITAL ENCOUNTER (OUTPATIENT)
Dept: CARDIOLOGY | Facility: CLINIC | Age: 58
Discharge: HOME | End: 2023-04-28
Payer: COMMERCIAL

## 2023-04-28 VITALS — HEART RATE: 78 BPM | WEIGHT: 288.38 LBS | BODY MASS INDEX: 37.01 KG/M2 | HEIGHT: 74 IN

## 2023-04-28 DIAGNOSIS — G47.33 OSA ON CPAP: ICD-10-CM

## 2023-04-28 DIAGNOSIS — E66.01 SEVERE OBESITY (BMI 35.0-39.9) WITH COMORBIDITY: ICD-10-CM

## 2023-04-28 DIAGNOSIS — E11.42 DIABETIC POLYNEUROPATHY ASSOCIATED WITH TYPE 2 DIABETES MELLITUS: ICD-10-CM

## 2023-04-28 DIAGNOSIS — I50.20 HFREF (HEART FAILURE WITH REDUCED EJECTION FRACTION): ICD-10-CM

## 2023-04-28 DIAGNOSIS — E78.2 MIXED HYPERLIPIDEMIA: ICD-10-CM

## 2023-04-28 DIAGNOSIS — D50.9 IRON DEFICIENCY ANEMIA, UNSPECIFIED IRON DEFICIENCY ANEMIA TYPE: ICD-10-CM

## 2023-04-28 DIAGNOSIS — I10 PRIMARY HYPERTENSION: ICD-10-CM

## 2023-04-28 DIAGNOSIS — I50.32 CHRONIC HEART FAILURE WITH PRESERVED EJECTION FRACTION: ICD-10-CM

## 2023-04-28 DIAGNOSIS — E11.42 TYPE 2 DIABETES MELLITUS WITH DIABETIC POLYNEUROPATHY, WITHOUT LONG-TERM CURRENT USE OF INSULIN: ICD-10-CM

## 2023-04-28 DIAGNOSIS — E66.9 CLASS 2 OBESITY WITH BODY MASS INDEX (BMI) OF 37.0 TO 37.9 IN ADULT, UNSPECIFIED OBESITY TYPE, UNSPECIFIED WHETHER SERIOUS COMORBIDITY PRESENT: ICD-10-CM

## 2023-04-28 DIAGNOSIS — I51.3 THROMBUS OF ATRIAL APPENDAGE: ICD-10-CM

## 2023-04-28 DIAGNOSIS — I48.0 PAROXYSMAL ATRIAL FIBRILLATION: ICD-10-CM

## 2023-04-28 DIAGNOSIS — I48.19 PERSISTENT ATRIAL FIBRILLATION: Primary | ICD-10-CM

## 2023-04-28 DIAGNOSIS — K21.9 GASTROESOPHAGEAL REFLUX DISEASE, UNSPECIFIED WHETHER ESOPHAGITIS PRESENT: ICD-10-CM

## 2023-04-28 DIAGNOSIS — G89.4 CHRONIC PAIN SYNDROME: ICD-10-CM

## 2023-04-28 DIAGNOSIS — Z98.890 H/O CARDIAC RADIOFREQUENCY ABLATION: ICD-10-CM

## 2023-04-28 DIAGNOSIS — I49.8 OTHER SPECIFIED CARDIAC ARRHYTHMIAS: ICD-10-CM

## 2023-04-28 PROCEDURE — 4010F ACE/ARB THERAPY RXD/TAKEN: CPT | Mod: CPTII,S$GLB,, | Performed by: STUDENT IN AN ORGANIZED HEALTH CARE EDUCATION/TRAINING PROGRAM

## 2023-04-28 PROCEDURE — 99999 PR PBB SHADOW E&M-EST. PATIENT-LVL III: CPT | Mod: PBBFAC,,, | Performed by: STUDENT IN AN ORGANIZED HEALTH CARE EDUCATION/TRAINING PROGRAM

## 2023-04-28 PROCEDURE — 99214 PR OFFICE/OUTPT VISIT, EST, LEVL IV, 30-39 MIN: ICD-10-PCS | Mod: S$GLB,,, | Performed by: STUDENT IN AN ORGANIZED HEALTH CARE EDUCATION/TRAINING PROGRAM

## 2023-04-28 PROCEDURE — 3072F LOW RISK FOR RETINOPATHY: CPT | Mod: CPTII,S$GLB,, | Performed by: STUDENT IN AN ORGANIZED HEALTH CARE EDUCATION/TRAINING PROGRAM

## 2023-04-28 PROCEDURE — 99999 PR PBB SHADOW E&M-EST. PATIENT-LVL III: ICD-10-PCS | Mod: PBBFAC,,, | Performed by: STUDENT IN AN ORGANIZED HEALTH CARE EDUCATION/TRAINING PROGRAM

## 2023-04-28 PROCEDURE — 93005 ELECTROCARDIOGRAM TRACING: CPT | Mod: S$GLB,,, | Performed by: STUDENT IN AN ORGANIZED HEALTH CARE EDUCATION/TRAINING PROGRAM

## 2023-04-28 PROCEDURE — 3072F PR LOW RISK FOR RETINOPATHY: ICD-10-PCS | Mod: CPTII,S$GLB,, | Performed by: STUDENT IN AN ORGANIZED HEALTH CARE EDUCATION/TRAINING PROGRAM

## 2023-04-28 PROCEDURE — 1159F MED LIST DOCD IN RCRD: CPT | Mod: CPTII,S$GLB,, | Performed by: STUDENT IN AN ORGANIZED HEALTH CARE EDUCATION/TRAINING PROGRAM

## 2023-04-28 PROCEDURE — 3066F NEPHROPATHY DOC TX: CPT | Mod: CPTII,S$GLB,, | Performed by: STUDENT IN AN ORGANIZED HEALTH CARE EDUCATION/TRAINING PROGRAM

## 2023-04-28 PROCEDURE — 3044F HG A1C LEVEL LT 7.0%: CPT | Mod: CPTII,S$GLB,, | Performed by: STUDENT IN AN ORGANIZED HEALTH CARE EDUCATION/TRAINING PROGRAM

## 2023-04-28 PROCEDURE — 93005 RHYTHM STRIP: ICD-10-PCS | Mod: S$GLB,,, | Performed by: STUDENT IN AN ORGANIZED HEALTH CARE EDUCATION/TRAINING PROGRAM

## 2023-04-28 PROCEDURE — 4010F PR ACE/ARB THEARPY RXD/TAKEN: ICD-10-PCS | Mod: CPTII,S$GLB,, | Performed by: STUDENT IN AN ORGANIZED HEALTH CARE EDUCATION/TRAINING PROGRAM

## 2023-04-28 PROCEDURE — 3008F PR BODY MASS INDEX (BMI) DOCUMENTED: ICD-10-PCS | Mod: CPTII,S$GLB,, | Performed by: STUDENT IN AN ORGANIZED HEALTH CARE EDUCATION/TRAINING PROGRAM

## 2023-04-28 PROCEDURE — 93010 RHYTHM STRIP: ICD-10-PCS | Mod: S$GLB,,, | Performed by: INTERNAL MEDICINE

## 2023-04-28 PROCEDURE — 3061F PR NEG MICROALBUMINURIA RESULT DOCUMENTED/REVIEW: ICD-10-PCS | Mod: CPTII,S$GLB,, | Performed by: STUDENT IN AN ORGANIZED HEALTH CARE EDUCATION/TRAINING PROGRAM

## 2023-04-28 PROCEDURE — 3066F PR DOCUMENTATION OF TREATMENT FOR NEPHROPATHY: ICD-10-PCS | Mod: CPTII,S$GLB,, | Performed by: STUDENT IN AN ORGANIZED HEALTH CARE EDUCATION/TRAINING PROGRAM

## 2023-04-28 PROCEDURE — 3008F BODY MASS INDEX DOCD: CPT | Mod: CPTII,S$GLB,, | Performed by: STUDENT IN AN ORGANIZED HEALTH CARE EDUCATION/TRAINING PROGRAM

## 2023-04-28 PROCEDURE — 1159F PR MEDICATION LIST DOCUMENTED IN MEDICAL RECORD: ICD-10-PCS | Mod: CPTII,S$GLB,, | Performed by: STUDENT IN AN ORGANIZED HEALTH CARE EDUCATION/TRAINING PROGRAM

## 2023-04-28 PROCEDURE — 99214 OFFICE O/P EST MOD 30 MIN: CPT | Mod: S$GLB,,, | Performed by: STUDENT IN AN ORGANIZED HEALTH CARE EDUCATION/TRAINING PROGRAM

## 2023-04-28 PROCEDURE — 3061F NEG MICROALBUMINURIA REV: CPT | Mod: CPTII,S$GLB,, | Performed by: STUDENT IN AN ORGANIZED HEALTH CARE EDUCATION/TRAINING PROGRAM

## 2023-04-28 PROCEDURE — 3044F PR MOST RECENT HEMOGLOBIN A1C LEVEL <7.0%: ICD-10-PCS | Mod: CPTII,S$GLB,, | Performed by: STUDENT IN AN ORGANIZED HEALTH CARE EDUCATION/TRAINING PROGRAM

## 2023-04-28 PROCEDURE — 93010 ELECTROCARDIOGRAM REPORT: CPT | Mod: S$GLB,,, | Performed by: INTERNAL MEDICINE

## 2023-04-28 NOTE — Clinical Note
Catalina Priest,   This gentleman told Miguel that he has not been taking apixaban on the way out of clinic secondary to cost. He didn't tell me any of this, and when I talked with him, he reported that he had been taking everything as prescribed. Apparently he would be interested in transitioning to coumadin? Can we call him to discuss, and if this is correct, can we please transition him to coumadin therapy?  Thank you so much!

## 2023-04-28 NOTE — PROGRESS NOTES
Electrophysiology Clinic Note    Reason for follow-up patient visit: Ongoing evaluation and recommendations regarding persistent atrial fibrillation, s/p NOEMI/cardioversion on 5/20/2022 with return to persistent atrial fibrillation, s/p successful radiofrequency catheter ablation with PVI on 12/6/2022.      PRESENTING HISTORY:     History of Present Illness:  Mr. Alfred Cuello is a marley 58-year-old gentleman who returns to clinic today for ongoing evaluation and recommendations regarding persistent atrial fibrillation, s/p successful radiofrequency catheter ablation with PVI on 12/6/2022. He has a past medical history significant for persistent atrial fibrillation, s/p successful radiofrequency catheter ablation with PVI on 12/6/2022, a history of HFpEF with prior LVEF decreased to 45% in the setting of AF - most recently improved to LVEF 55% following restoration of sinus rhythm, hypertension, hyperlipidemia, type II diabetes mellitus, GERD, RAFAEL maintained on CPAP therapy, and obesity. He underwent a prior NOEMI/cardioversion on 5/20/2022, and returned to persistent atrial fibrillation approximately one week after undergoing this procedure. He has since undergone successful radiofrequency catheter ablation with PVI on 12/6/2022, and remains in sinus rhythm on all subsequent ECGs.    He is followed in general cardiology clinic with SANDRA Bartholomew and Dr. Shannon. At their prior encounters, they discussed his persistent atrial fibrillation. He was initially diagnosed with asymptomatic atrial fibrillation in November of 2020, when this was incidentally noted on an ECG. This may have been present for years prior, but this was the first ECG evidence. He was seen in cardiology with Dr. Shannon and was initiated on oral anticoagulation with apixaban 5mg po BID. He was subsequently admitted from 11/22-24/2020 in the setting of acute decompensation of heart failure with atrial fibrillation with RVR. He was planned to undergo  a NOEMI with cardioversion; however, a NOEMI on 11/24/2020 revealed a left atrial appendage thrombus, and cardioversion was not performed. Unfortunately, he was then lost to follow up until I saw him again in clinic on 5/2/2022. At that encounter, we discussed undergoing a repeat NOEMI/cardioversion with initiation of amiodarone. He underwent NOEMI/cardioversion on 5/20/2022. He states that he was feeling well for the first week, but then could tell that he returned to atrial fibrillation. At our previous encounters, we discussed the concept of radiofrequency catheter ablation, and he underwent successful PVI on 12/6/2022. Since undergoing ablation, he denies any subsequent events of sustained palpitations with the exception of a short event shortly after his ablation that occurred about one week immediately following his ablation, and resolved within a matter of minutes. He does report periods of fatigue and occasional shorter duration events of palpitations, and wonders if these may be recurrent events of atrial fibrillation. He was recently told that he has iron deficiency, and was told that his fatigue may be secondary to low iron levels. He has a planned colonoscopy in July 2023.  He has remained on amiodarone 200mg po daily and uninterrupted oral anticoagulation with apixaban.      In discussion with Mr. Cuello today, he tells me that he is feeling overall quite well. He denies any episodes of dizziness, lightheadedness, syncope/presyncope, chest pain or chest discomfort, current palpitations, nausea or vomiting, orthopnea, or PND. He has occasional shorter episodes of palpitations that last for a matter of seconds to minutes as above. He has been experiencing increased fatigue. He reports mild baseline shortness of breath and dyspnea with exertion that he feels has remained stable. He can climb more than one flight of stairs prior to needing to take a break and maintains a high level of physical activity. He was  "recently mowing his yard when he reported worsened fatigue and exercise intolerance, and had to take several breaks, which he feels is not his usual level of exercise capacity. He reports transient periods of dizziness with abrupt positional changes. 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 He wears compression stockings for comfort. He and his wife are trying to "get healthy", and he has intentionally lost about 20lbs with diet and exercise modification.      Review of Systems:  Review of Systems   Constitutional:  Positive for fatigue. Negative for activity change.   HENT:  Negative for nasal congestion, nosebleeds, postnasal drip, rhinorrhea, sinus pressure/congestion, sneezing and sore throat.    Respiratory:  Positive for shortness of breath. Negative for apnea, cough, chest tightness and wheezing.    Cardiovascular:  Positive for palpitations and leg swelling. Negative for chest pain and claudication.   Gastrointestinal:  Negative for abdominal distention, abdominal pain, blood in stool, change in bowel habit, constipation, diarrhea, nausea, vomiting and change in bowel habit.   Genitourinary:  Negative for dysuria and hematuria.   Musculoskeletal:  Positive for arthralgias. Negative for gait problem.   Neurological:  Positive for dizziness. Negative for seizures, syncope, weakness, light-headedness, headaches, coordination difficulties and coordination difficulties.      PAST HISTORY:     Past Medical History:   Diagnosis Date    Atrial fibrillation status post cardioversion     2 months ago    Diabetes mellitus, type 2     GERD (gastroesophageal reflux disease)     Hyperlipidemia     Hypertension     Neuropathy    - Persistent atrial fibrillation s/p PVI on 12/6/2022  - History of HFpEF with most recent LVEF 55%  - RAFAEL maintained on CPAP therapy  - Obesity     Past Surgical History: "   Procedure Laterality Date    ABDOMINAL SURGERY      as a child    ABLATION OF ARRHYTHMOGENIC FOCUS FOR ATRIAL FIBRILLATION N/A 12/6/2022    Procedure: Ablation atrial fibrillation;  Surgeon: SKYLER Lee MD;  Location: [facility] EP LAB;  Service: Cardiology;  Laterality: N/A;  AF, NOEMI, PVI, RFA, Carto, Gen, AK, 3 Prep    COLONOSCOPY N/A 7/20/2018    Procedure: COLONOSCOPY;  Surgeon: Marko Koo MD;  Location: [facility] ENDO;  Service: Endoscopy;  Laterality: N/A;    TREATMENT OF CARDIAC ARRHYTHMIA N/A 5/20/2022    Procedure: CARDIOVERSION;  Surgeon: SKYLER Lee MD;  Location: [facility] EP LAB;  Service: Cardiology;  Laterality: N/A;  afib, NOEMI, DCCV, anes, MB, 3 Prep    TREATMENT OF CARDIAC ARRHYTHMIA  12/6/2022    Procedure: Cardioversion or Defibrillation;  Surgeon: SKYLER Lee MD;  Location: [facility] EP LAB;  Service: Cardiology;;    TRIAL OF SPINAL CORD NERVE STIMULATOR N/A 6/17/2019    Procedure: Trial, Neurostimulator, LUMBAR SPINAL CORD STIMULATOR TRIAL;  Surgeon: Rahat Orantes MD;  Location: [facility] PAIN MGT;  Service: Pain Management;  Laterality: N/A;  PDN STUDY TRIAL, NEEDS CONSENT, NEVRO REP       Family History:  Family History   Problem Relation Age of Onset    Diabetes Mother         has a pacemaker    Coronary artery disease Father         hx of cabg    Glaucoma Neg Hx        Social History:  He  reports that he quit smoking about 12 years ago. His smoking use included cigarettes. He has never used smokeless tobacco. He reports current alcohol use. He reports that he does not use drugs. He is .       MEDICATIONS & ALLERGIES:     Review of patient's allergies indicates:   Allergen Reactions    Oxycodone Itching and Nausea Only       Current Outpatient Medications on File Prior to Visit   Medication Sig Dispense Refill    albuterol (VENTOLIN HFA) 90 mcg/actuation inhaler Inhale 2 puffs into the lungs every 6 (six) hours as needed for Wheezing. Rescue 8.5 g 0    amiodarone (PACERONE)  "200 MG Tab Take 1 tablet (200 mg total) by mouth once daily. 90 tablet 3    apixaban (ELIQUIS) 5 mg Tab Take 1 tablet (5 mg total) by mouth 2 (two) times daily. 60 tablet 11    atorvastatin (LIPITOR) 40 MG tablet Take 1 tablet (40 mg total) by mouth once daily. 90 tablet 3    ferrous sulfate 325 mg (65 mg iron) Tab tablet Take 325 mg by mouth daily with breakfast.      gabapentin (NEURONTIN) 300 MG capsule Take 4 capsules (1,200 mg total) by mouth 3 (three) times daily. 1080 capsule 1    glimepiride (AMARYL) 4 MG tablet Take 1 tablet (4 mg total) by mouth 2 (two) times a day. Take 1 tablet with breakfast and 1 tablet at dinner. 180 tablet 3    KRILL OIL ORAL Take by mouth once daily.      lisinopriL-hydrochlorothiazide (PRINZIDE,ZESTORETIC) 20-25 mg Tab Take 1 tablet by mouth once daily 90 tablet 4    loratadine (CLARITIN) 10 mg tablet Take 10 mg by mouth once daily.      metFORMIN (GLUCOPHAGE) 850 MG tablet TAKE 1 TABLET BY MOUTH THREE TIMES DAILY WITH MEALS 270 tablet 4    multivitamin (THERAGRAN) per tablet Take 1 tablet by mouth once daily.      omeprazole (PRILOSEC) 20 MG capsule Take 1 capsule (20 mg total) by mouth once daily. 90 capsule 3    pioglitazone (ACTOS) 30 MG tablet Take 1 tablet (30 mg total) by mouth once daily. 90 tablet 4    potassium chloride SA (K-DUR,KLOR-CON) 20 MEQ tablet Take 1 tablet (20 mEq total) by mouth once daily. 30 tablet 6    sars-cov-2, covid-19, (PFIZER COVID-19) 30 mcg/0.3 ml injection        No current facility-administered medications on file prior to visit.        OBJECTIVE:     Vital Signs:  Pulse 78   Ht 6' 2" (1.88 m)   Wt 130.8 kg (288 lb 5.8 oz)   BMI 37.02 kg/m²     Physical Exam:  Physical Exam  Constitutional:       General: He is not in acute distress.     Appearance: Normal appearance. He is obese. He is not ill-appearing or diaphoretic.      Comments: Well-appearing man in NAD.    HENT:      Head: Normocephalic and atraumatic.      Nose: Nose normal.      " Mouth/Throat:      Mouth: Mucous membranes are moist.      Pharynx: Oropharynx is clear.   Eyes:      Pupils: Pupils are equal, round, and reactive to light.   Cardiovascular:      Rate and Rhythm: Normal rate and regular rhythm.      Pulses: Normal pulses.      Heart sounds: Normal heart sounds. No murmur heard.    No friction rub. No gallop.   Pulmonary:      Effort: Pulmonary effort is normal. No respiratory distress.      Breath sounds: Normal breath sounds. No wheezing, rhonchi or rales.   Chest:      Chest wall: No tenderness.   Abdominal:      General: There is no distension.      Palpations: Abdomen is soft.      Tenderness: There is no abdominal tenderness.   Musculoskeletal:         General: No swelling or tenderness.      Cervical back: Normal range of motion.      Right lower leg: Edema present.      Left lower leg: Edema present.      Comments: Trace bilateral pedal edema.    Skin:     General: Skin is warm and dry.      Findings: No erythema, lesion or rash.   Neurological:      General: No focal deficit present.      Mental Status: He is alert and oriented to person, place, and time. Mental status is at baseline.      Motor: No weakness.      Gait: Gait normal.   Psychiatric:         Mood and Affect: Mood normal.         Behavior: Behavior normal.      Laboratory Data:  Lab Results   Component Value Date    WBC 6.29 04/22/2023    HGB 11.8 (L) 04/22/2023    HCT 36.2 (L) 04/22/2023    MCV 91 04/22/2023     04/22/2023     Lab Results   Component Value Date     (H) 04/22/2023     04/22/2023    K 4.1 04/22/2023    CL 99 04/22/2023    CO2 27 04/22/2023    BUN 20 04/22/2023    CREATININE 1.4 04/22/2023    CALCIUM 9.5 04/22/2023    MG 1.7 10/09/2021     Lab Results   Component Value Date    INR 1.1 11/26/2022    INR 1.1 05/14/2022       Pertinent Cardiac Data:  ECG: Normal sinus rhythm with rate of 78 bpm,  ms, iRBBB with  ms, QT/QTc 434/494 ms, nonspecific STT changes.      Resting 2D Transthoracic Echocardiogram - 11/23/2020:  The estimated PA systolic pressure is 29 mmHg.  There is left ventricular concentric remodeling.  The left ventricle is normal in size with normal systolic function. The estimated ejection fraction is 65%.  Normal left ventricular diastolic function.  Normal right ventricular size with normal right ventricular systolic function.  Mild left atrial enlargement.  Mild-to-moderate mitral regurgitation.  Mild tricuspid regurgitation.  Normal central venous pressure (3 mmHg).    NOEMI - 5/20/2022:  NOEMI to evaluate for LA/TAWNY thrombus prior to DCCV.  No thrombus is present in the left atrial appendage or left atrium (contrast used). Abnormal appendage velocities 0.35m/s.  The left ventricle is mildly enlarged with mildly decreased systolic function.  The estimated ejection fraction is 45%.  Mild mitral regurgitation.  Normal right ventricular size with normal right ventricular systolic function.  Moderate tricuspid regurgitation.  Biatrial enlargement.  The sinuses of Valsalva is mildly dilated.    NOEMI - 12/6/2022:  The left ventricle is normal in size with normal systolic function. The estimated ejection fraction is 55%.  Normal right ventricular size with normal right ventricular systolic function.  Biatrial enlargement.  Mild tricuspid regurgitation.  Normal appearing left atrial appendage. No thrombus is present in the appendage. Abnormal appendage velocities.    EPS with Radiofrequency Catheter PVI - 12/6/2022:    Successful radiofrequency pulmonary vein isolation (PVI) catheter ablation.    3D mapping performed with CPG Soft 3.    Cardioversion prior to ablation.    Electrophysiology study with coronary sinus pacing.    Electrophysiology study with left ventricular pacing.    His bundle recording.    Intracardiac echo.    Pulmonary vein isolation.      ASSESSMENT & PLAN:   Mr. Alfred Cuello is a marley 58-year-old gentleman who returns to clinic today for  ongoing evaluation and recommendations regarding persistent atrial fibrillation, s/p successful radiofrequency catheter ablation with PVI on 12/6/2022. He has a past medical history significant for persistent atrial fibrillation, s/p successful radiofrequency catheter ablation with PVI on 12/6/2022, a history of HFpEF with prior LVEF decreased to 45% in the setting of AF - most recently improved to LVEF 55% following restoration of sinus rhythm, hypertension, hyperlipidemia, type II diabetes mellitus, GERD, RAFAEL maintained on CPAP therapy, and obesity. He underwent a prior NOEMI/cardioversion on 5/20/2022, and returned to persistent atrial fibrillation approximately one week after undergoing this procedure. He has since undergone successful radiofrequency catheter ablation with PVI on 12/6/2022, and remains in sinus rhythm on all subsequent ECGs.    - We continue to discuss the pathophysiology of atrial fibrillation; specifically, we discussed persistent atrial fibrillation and the concept that the longer a patient remains in atrial fibrillation, the more challenging rhythm restoration and maintenance of sinus rhythm may become. We discussed that atrial fibrillation has an increased risk of CVA.   - We reviewed his prior PVI procedure. Since undergoing successful ablation, he denies any sustained events of palpitations; however, he continues to report periods of brief palpitations lasting for a matter of seconds, with increased symptoms of fatigue. He has remained in sinus rhythm on all subsequent ECGs.  - We will obtain a 14-day ambulatory patch monitor for further evaluation following his ablation. In the event this monitor does not capture any recurrent events of atrial fibrillation, we may consider cessation of his amiodarone therapy.   - He remains on amiodarone 200mg po daily for antiarrhythmic therapy. We may consider cessation of this agent as above. We may need to consider initiation of metoprolol succinate,  but will reassess his heart rates following amiodarone discontinuation.  - We will repeat a resting echocardiogram to reassess his systolic function following restoration and maintenance of sinus rhythm.   - His [id number] is 3 (CHF, HTN, T2DM, male gender, age less than 64), portending an annual adjusted risk of CVA of 3.2%. He remains on uninterrupted apixaban therapy with no bleeding events reported.   - Possible underlying drivers of atrial fibrillation were addressed at this appointment, including recommendations for weight loss - now a class I recommendation. Review of laboratory data revealed acceptable TSH at 0.926. He remains compliant with his CPAP machine for treatment of his underlying obstructive sleep apnea.      This patient will return to clinic with me in six months with a 14-day ambulatory event monitor and a resting echocardiogram in the interim. We may consider cessation of amiodarone antiarrhythmic therapy in the event he remains free of atrial fibrillation following his prior successful ablation. He will continue oral anticoagulation with apixaban. All questions and concerns were addressed at this encounter.     Signing Physician:       ALEIDA Lee MD  Electrophysiology Attending

## 2023-04-29 ENCOUNTER — PATIENT MESSAGE (OUTPATIENT)
Dept: FAMILY MEDICINE | Facility: CLINIC | Age: 58
End: 2023-04-29
Payer: COMMERCIAL

## 2023-04-29 ENCOUNTER — PATIENT MESSAGE (OUTPATIENT)
Dept: ELECTROPHYSIOLOGY | Facility: CLINIC | Age: 58
End: 2023-04-29
Payer: COMMERCIAL

## 2023-04-29 ENCOUNTER — PATIENT MESSAGE (OUTPATIENT)
Dept: GASTROENTEROLOGY | Facility: CLINIC | Age: 58
End: 2023-04-29
Payer: COMMERCIAL

## 2023-05-01 ENCOUNTER — TELEPHONE (OUTPATIENT)
Dept: ELECTROPHYSIOLOGY | Facility: CLINIC | Age: 58
End: 2023-05-01
Payer: COMMERCIAL

## 2023-05-01 NOTE — TELEPHONE ENCOUNTER
I recommend he get the colonoscopy rather than the cologuard. Given the amount of polyps found on his last one in addition to the precancerous nature, we are also over the 3 year recommendation from 2018. In my opinion the cologuard would likely be positive and then we'd just end up doing a colonoscopy anyway.

## 2023-05-01 NOTE — TELEPHONE ENCOUNTER
----- Message from Indigo Maria RN sent at 5/1/2023  8:23 AM CDT -----    ----- Message -----  From: SKYLER Lee MD  Sent: 4/28/2023   8:12 PM CDT  To: STANLEY Steele,     This gentleman told Miguel that he has not been taking apixaban on the way out of clinic secondary to cost. He didn't tell me any of this, and when I talked with him, he reported that he had been taking everything as prescribed. Apparently he would be interested in transitioning to coumadin? Can we call him to discuss, and if this is correct, can we please transition him to coumadin therapy?    Thank you so much!

## 2023-05-14 ENCOUNTER — PATIENT MESSAGE (OUTPATIENT)
Dept: GASTROENTEROLOGY | Facility: CLINIC | Age: 58
End: 2023-05-14
Payer: COMMERCIAL

## 2023-05-19 DIAGNOSIS — E11.42 DIABETIC POLYNEUROPATHY ASSOCIATED WITH TYPE 2 DIABETES MELLITUS: ICD-10-CM

## 2023-05-19 RX ORDER — GABAPENTIN 300 MG/1
1200 CAPSULE ORAL 3 TIMES DAILY
Qty: 1080 CAPSULE | Refills: 0 | Status: SHIPPED | OUTPATIENT
Start: 2023-05-19 | End: 2023-08-21 | Stop reason: SDUPTHER

## 2023-06-05 NOTE — ASSESSMENT & PLAN NOTE
-- Reviewed goals of therapy are to get the best control we can without hypoglycemia  -- Unable to have brand meds r/t large pharmacy deductible and/or large monthly co-pays  -- A1c at goal    Consider CGMS in the future    Minimal reported hypoglycemia   Medication Changes   Continue   glimepiride 4 mg twice daily  Actos 30 mg qam,   metformin 850 mg three times daily    -- Advised frequent self blood glucose monitoring.  Patient encouraged to document glucose results and bring them to every clinic visit    -- Hypoglycemia precautions discussed. Instructed on precautions before driving.    -- Call for Bg repeatedly < 90 or > 180.   -- Close adherence to lifestyle changes recommended.   -- Periodic follow ups for eye evaluations, foot care and dental care suggested.  --Encouraged exercise per ADA guidelines of 150 minutes weekly. Encouraged DM diet and low carb snacks and will give written information. Encouraged to  proteins with carbs, encouraged ¼ cup of carbs per meal and 30-45 grams of carbs per meal, eat 3 meals daily and glucerna or protein bar if skipping a meal.

## 2023-06-05 NOTE — PROGRESS NOTES
Subjective:      Patient ID: Alfred Cuello is a 58 y.o. male.    Chief Complaint:  No chief complaint on file.    History of Present Illness  Alfred Cuello presents today for follow up of diabetes and chronic conditions pending review including HTN, HLP, RAFAEL, vitamin d deficiency, obesity. Previous patient of [facility]. Last seen in 10/2022. This is his first visit with me.     This is a MyChart video visit.    The patient location is: at home   The chief complaint leading to consultation is: No chief complaint on file.    Visit type: audiovisual    Face to Face time with patient: 22 minutes   35 minutes of total time spent on the encounter, which includes face to face time and non-face to face time preparing to see the patient (eg, review of tests), Obtaining and/or reviewing separately obtained history, Documenting clinical information in the electronic or other health record, Independently interpreting results (not separately reported) and communicating results to the patient/family/caregiver, or Care coordination (not separately reported).    Each patient to whom he or she provides medical services by telemedicine is:  (1) informed of the relationship between the physician and patient and the respective role of any other health care provider with respect to management of the patient; and (2) notified that he or she may decline to receive medical services by telemedicine and may withdraw from such care at any time.    Had ablation in Dec 2022. Will have cardiac holter monitor placed.    With regards to the diabetes:    Diagnosed with Type 2 DM in 2013  Family History of Type 2 DM: mother  Known complications:  DKA/HHS: denies   RN: -; 2/2022 due and has appt scheduled  PN: +; sees podiatry, last visit in July 2022  Nephropathy: -;   Gastroparesis: denies   CAD: denies     Current regimen:  glimepiride 4 mg twice daily  Actos 30 mg qam,   metformin 850 mg three times daily    Missed doses- occ missed dose  of lunch dose   Does have alarm set to remind him to take medication    Other medications tried:  Previously controlled on jardiance and ozempic- has been off bc of deductible issues    Checking bg a couple of times a week  Typically 140s; 130-150s  Hypoglycemia when he forgets to eat; does not occur often  Diet: Eats 3 Meals a day, snacks- PB crackers, fruit, nuts, simple popcorn  Drinks: water; unsweet tea   ETOH: occ alcohol    Exercise: none but active at work- walks for work     Education - last visit: awhile ago, none in system. Politely declines.     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   HgA1C Testing and Control   Lab Results   Component Value Date    HGBA1C 6.3 (H) 04/22/2023      Annually/Less than 8% Yes   Lipid profile : 04/22/2023 Annually Yes   LDL control Lab Results   Component Value Date    LDLCALC 120.4 04/22/2023    Annually/Less than 100 mg/dl  No   Nephropathy screening Lab Results   Component Value Date    LABMICR 17.0 04/22/2023     No results found for: PROTEINUA Annually Yes   Blood pressure BP Readings from Last 1 Encounters:   04/18/23 134/70    Less than 140/90 Yes   Dilated retinal exam : 02/08/2022 Annually Yes   Foot exam   : 04/18/2023 Annually Yes     With regards to dyslipidemia,     He is on atorvastatin 40 mg daily      Latest Reference Range & Units 04/22/23 08:58   Cholesterol 120 - 199 mg/dL 197   HDL 40 - 75 mg/dL 47   HDL/Cholesterol Ratio 20.0 - 50.0 % 23.9   LDL Cholesterol External 63.0 - 159.0 mg/dL 120.4   Non-HDL Cholesterol mg/dL 150   Total Cholesterol/HDL Ratio 2.0 - 5.0  4.2   Triglycerides 30 - 150 mg/dL 148     Of note he is on amiodarone for the last  1-1.5 years.     Review of Systems   Constitutional:  Negative for fatigue and unexpected weight change.   Eyes:  Negative for visual disturbance.   Endocrine: Negative for polydipsia, polyphagia and polyuria.     Lab Review:   Lab Results   Component Value Date    HGBA1C 6.3 (H) 04/22/2023    HGBA1C 6.6 (H) 10/01/2022    HGBA1C 6.6 (H) 06/18/2022      Lab Results   Component Value Date    CHOL 197 04/22/2023    HDL 47 04/22/2023    LDLCALC 120.4 04/22/2023    TRIG 148 04/22/2023    CHOLHDL 23.9 04/22/2023     Lab Results   Component Value Date     04/22/2023    K 4.1 04/22/2023    CL 99 04/22/2023    CO2 27 04/22/2023     (H) 04/22/2023    BUN 20 04/22/2023    CREATININE 1.4 04/22/2023    CALCIUM 9.5 04/22/2023    PROT 7.2 04/22/2023    ALBUMIN 4.1 04/22/2023    BILITOT 0.5 04/22/2023    ALKPHOS 67 04/22/2023    AST 24 04/22/2023    ALT 36 04/22/2023    ANIONGAP 12 04/22/2023    ESTGFRAFRICA >60 05/14/2022    EGFRNONAA >60 05/14/2022    TSH 0.926 10/01/2022     Vit D, 25-Hydroxy   Date Value Ref Range Status   10/01/2022 66 30 - 96 ng/mL Final     Comment:     Vitamin D deficiency.........<10 ng/mL                              Vitamin D insufficiency......10-29 ng/mL       Vitamin D sufficiency........> or equal to 30 ng/mL  Vitamin D toxicity............>100 ng/mL       Assessment and Plan     1. Type 2 diabetes mellitus with hyperglycemia, without long-term current use of insulin  Ambulatory referral/consult to Endocrinology    Comprehensive Metabolic Panel    Hemoglobin A1C    TSH      2. Severe obesity (BMI 35.0-39.9) with comorbidity        3. Dyslipidemia  atorvastatin (LIPITOR) 80 MG tablet      4. Persistent atrial fibrillation        5. Hypertension associated with diabetes          Type 2 diabetes mellitus with hyperglycemia, without long-term current use of insulin  -- Reviewed goals of therapy are to get the best control we can without hypoglycemia  -- Unable to have brand meds r/t large  pharmacy deductible and/or large monthly co-pays  -- A1c at goal    Consider CGMS in the future    Minimal reported hypoglycemia   Medication Changes   Continue   glimepiride 4 mg twice daily  Actos 30 mg qam,   metformin 850 mg three times daily    -- Advised frequent self blood glucose monitoring.  Patient encouraged to document glucose results and bring them to every clinic visit    -- Hypoglycemia precautions discussed. Instructed on precautions before driving.    -- Call for Bg repeatedly < 90 or > 180.   -- Close adherence to lifestyle changes recommended.   -- Periodic follow ups for eye evaluations, foot care and dental care suggested.  --Encouraged exercise per ADA guidelines of 150 minutes weekly. Encouraged DM diet and low carb snacks and will give written information. Encouraged to  proteins with carbs, encouraged ¼ cup of carbs per meal and 30-45 grams of carbs per meal, eat 3 meals daily and glucerna or protein bar if skipping a meal.      Severe obesity (BMI 35.0-39.9) with comorbidity  Encouraged lifestyle modifications     Dyslipidemia  LDL above goal   Increase atorvastatin to 80 mg daily     Persistent atrial fibrillation  Managed by cardio    Hypertension associated with diabetes  On ACEI    Follow up in about 4 months (around 10/6/2023).

## 2023-06-06 ENCOUNTER — OFFICE VISIT (OUTPATIENT)
Dept: ENDOCRINOLOGY | Facility: CLINIC | Age: 58
End: 2023-06-06
Payer: COMMERCIAL

## 2023-06-06 DIAGNOSIS — I15.2 HYPERTENSION ASSOCIATED WITH DIABETES: ICD-10-CM

## 2023-06-06 DIAGNOSIS — E11.59 HYPERTENSION ASSOCIATED WITH DIABETES: ICD-10-CM

## 2023-06-06 DIAGNOSIS — I48.19 PERSISTENT ATRIAL FIBRILLATION: ICD-10-CM

## 2023-06-06 DIAGNOSIS — E11.65 TYPE 2 DIABETES MELLITUS WITH HYPERGLYCEMIA, WITHOUT LONG-TERM CURRENT USE OF INSULIN: ICD-10-CM

## 2023-06-06 DIAGNOSIS — E66.01 SEVERE OBESITY (BMI 35.0-39.9) WITH COMORBIDITY: ICD-10-CM

## 2023-06-06 DIAGNOSIS — E78.5 DYSLIPIDEMIA: ICD-10-CM

## 2023-06-06 PROCEDURE — 3044F HG A1C LEVEL LT 7.0%: CPT | Mod: CPTII,95,, | Performed by: NURSE PRACTITIONER

## 2023-06-06 PROCEDURE — 1159F PR MEDICATION LIST DOCUMENTED IN MEDICAL RECORD: ICD-10-PCS | Mod: CPTII,95,, | Performed by: NURSE PRACTITIONER

## 2023-06-06 PROCEDURE — 1160F PR REVIEW ALL MEDS BY PRESCRIBER/CLIN PHARMACIST DOCUMENTED: ICD-10-PCS | Mod: CPTII,95,, | Performed by: NURSE PRACTITIONER

## 2023-06-06 PROCEDURE — 3061F NEG MICROALBUMINURIA REV: CPT | Mod: CPTII,95,, | Performed by: NURSE PRACTITIONER

## 2023-06-06 PROCEDURE — 4010F PR ACE/ARB THEARPY RXD/TAKEN: ICD-10-PCS | Mod: CPTII,95,, | Performed by: NURSE PRACTITIONER

## 2023-06-06 PROCEDURE — 99214 PR OFFICE/OUTPT VISIT, EST, LEVL IV, 30-39 MIN: ICD-10-PCS | Mod: 95,,, | Performed by: NURSE PRACTITIONER

## 2023-06-06 PROCEDURE — 99214 OFFICE O/P EST MOD 30 MIN: CPT | Mod: 95,,, | Performed by: NURSE PRACTITIONER

## 2023-06-06 PROCEDURE — 3044F PR MOST RECENT HEMOGLOBIN A1C LEVEL <7.0%: ICD-10-PCS | Mod: CPTII,95,, | Performed by: NURSE PRACTITIONER

## 2023-06-06 PROCEDURE — 3066F PR DOCUMENTATION OF TREATMENT FOR NEPHROPATHY: ICD-10-PCS | Mod: CPTII,95,, | Performed by: NURSE PRACTITIONER

## 2023-06-06 PROCEDURE — 3072F PR LOW RISK FOR RETINOPATHY: ICD-10-PCS | Mod: CPTII,95,, | Performed by: NURSE PRACTITIONER

## 2023-06-06 PROCEDURE — 1160F RVW MEDS BY RX/DR IN RCRD: CPT | Mod: CPTII,95,, | Performed by: NURSE PRACTITIONER

## 2023-06-06 PROCEDURE — 3066F NEPHROPATHY DOC TX: CPT | Mod: CPTII,95,, | Performed by: NURSE PRACTITIONER

## 2023-06-06 PROCEDURE — 3072F LOW RISK FOR RETINOPATHY: CPT | Mod: CPTII,95,, | Performed by: NURSE PRACTITIONER

## 2023-06-06 PROCEDURE — 4010F ACE/ARB THERAPY RXD/TAKEN: CPT | Mod: CPTII,95,, | Performed by: NURSE PRACTITIONER

## 2023-06-06 PROCEDURE — 3061F PR NEG MICROALBUMINURIA RESULT DOCUMENTED/REVIEW: ICD-10-PCS | Mod: CPTII,95,, | Performed by: NURSE PRACTITIONER

## 2023-06-06 PROCEDURE — 1159F MED LIST DOCD IN RCRD: CPT | Mod: CPTII,95,, | Performed by: NURSE PRACTITIONER

## 2023-06-06 RX ORDER — ATORVASTATIN CALCIUM 80 MG/1
80 TABLET, FILM COATED ORAL DAILY
Qty: 90 TABLET | Refills: 3 | Status: SHIPPED | OUTPATIENT
Start: 2023-06-06 | End: 2024-06-05

## 2023-06-06 NOTE — PATIENT INSTRUCTIONS
"Cholesterol    LDL above goal   Increase atorvastatin to 80 mg daily     High cholesterol foods to avoid/limit:     C: Cheese, milk, dairy  A: Animal Fats: hot dogs and hamburgers  G: "Getting it away from home": going out to eat a lot  E: Extra Fat: cookies, candy, and sweets  F: Family History    Remember high cholesterol can clog your arteries and increase risk for heart attack or stroke.      Diabetes   A1c at goal    Consider CGMS in the future    Minimal reported hypoglycemia      Continue   glimepiride 4 mg twice daily  Actos 30 mg qam,   metformin 850 mg three times daily    Snacks can be an important part of a balanced, healthy meal plan. They allow you to eat more frequently, feeling full and satisfied throughout the day. Also, they allow you to spread carbohydrates evenly, which may stabilize blood sugars.  Plus, snacks are enjoyable!     The amount of carbohydrate needed at snacks varies. Generally, about 15-30 grams of carbohydrate per snack is recommended.  Below you will find some tasty treats.       0-5 gm carb  Crystal Light  Vitamin Water Zero  Herbal tea, unsweetened  2 tsp peanut butter on celery  1./2 cup sugar-free jell-o  1 sugar-free popsicle  ¼ cup blueberries  8oz Blue Kristy unsweetened almond milk  5 baby carrots & celery sticks, cucumbers, bell peppers dipped in ¼ cup salsa, 2Tbsp light ranch dressing or 2Tbsp plain Greek yogurt  10 Goldfish crackers  ½ oz low-fat cheese or string cheese  1 closed handful of nuts, unsalted  1 Tbsp of sunflower seeds, unsalted  1 cup Smart Pop popcorn  1 whole grain brown rice cake        15 gm carb  1 small piece of fruit or ½ banana or 1/2 cup lite canned fruit  3 millicent cracker squares  3 cups Smart Pop popcorn, top spray butter, Maya lite salt or cinnamon and Truvia  5 Vanilla Wafers  ½ cup low fat, no added sugar ice cream or frozen yogurt (Blue bell, Blue Bunny, Weight Watchers, Skinny Cow)  ½ turkey, ham, or chicken sandwich  ½ c fruit with ½ c " Cottage cheese  4-6 unsalted wheat crackers with 1 oz low fat cheese or 1 tbsp peanut butter   30-45 goldfish crackers (depending on flavor)   7-8 Samaritan mini brown rice cakes (caramel, apple cinnamon, chocolate)   12 Samaritan mini brown rice cakes (cheddar, bbq, ranch)   1/3 cup hummus dip with raw veg  1/2 whole wheat chava, 1Tbsp hummus  Mini Pizza (1/2 whole wheat English muffin, low-fat  cheese, tomato sauce)  100 calorie snack pack (Oreo, Chips Ahoy, Ritz Mix, Baked Cheetos)  4-6 oz. light or Greek Style yogurt (Chobani, Yoplait, Okios, [place])  ½ cup sugar-free pudding    6 in. wheat tortilla or chava oven toasted chips (topped with spray butter flavoring, cinnamon, Truvia OR spray butter, garlic powder, chili powder)   18 BBQ Popchips (available at [employer], [employer], [employer])       Eating the Right Number of Calories (8340-5357 Guidelines)    Calories are a measure of the energy you get from food. If you eat more calories than you use, you will gain weight. If you eat fewer calories than you use, you will lose weight. Below are tables that give the number of calories needed each day. Look for your gender, age, and activity level. If you stick to this number, you should neither gain nor lose weight. Note that this is an estimated number of calories.* Your exact number may differ.    Women  Age in years Low activity level (calories/day) Moderate activity level (calories/day) High activity level (calories/day)   19 to 30 1,800-2,000 2,000-2,200 2,400   31 to 50 1,800 2,000 2,200   51 and older 1,600 1,800 2,000-2,200      Men  Age in years Low activity level  (calories/day) Moderate activity level (calories/day) High activity level (calories/day)   19 to 30 2,400-2,600 2,600-2,800 3,000   31 to 50 2,200-2,400 2,400-2,600 2,800-3,000   51 and older 2,000-2,200 2,200-2,400 2,400-2,800     Activity levels defined  Low. Only light physical activity such as that done during typical daily life.  Moderate.  Light physical activity done during typical daily life AND physical activity equal to walking about 1.5 to 3 miles a day at 3 to 4 miles per hour.  High. Light physical activity done during typical daily life AND physical activity equal to walking more than 3 miles a day at 3 to 4 miles per hour.  *From Dietary Guidelines for Americans, 9263-9651, [place] Department of Health and Human Services.    Eat less fat  A gram of fat has almost 2.5 times the calories of a gram of protein or carbohydrates. Try to balance your food choices so that only 20% to 35% of your calories comes from total fat. This means an average of 2½ to 3½ grams of fat for each 100 calories you eat.    Eat more fiber  High-fiber foods are digested more slowly than low-fiber foods, so you feel full longer. Try to get at least 25 grams of fiber each day for a 2000 calorie diet. Foods high in fiber include:  Vegetables and fruits  Whole-grain or bran breads, pastas, and cereals  Legumes (beans) and peas  As you begin to eat more fiber, be sure to drink plenty of water to keep your digestive system working smoothly.    Tips  Do's and don'ts include:   Dont skip meals. This often leads to overeating later on. Its best to spread your eating throughout the day.  Eat a variety of foods, not just a few favorites.  If you find yourself eating when youre not hungry, ask yourself why. Many of us eat when were bored, stressed, or just to be polite. Listen to your body. If youre not hungry, get busy doing something else instead of eating.  Eat slower, shooting for 20 to 30 minutes for each meal. It takes 20 minutes for your stomach to tell your brain that its full. Slow eaters tend to eat less and are still satisfied, while fast eaters may tend to be overeaters.   Pay attention to what you eat. Dont read or watch TV during your meal.      ---------------------------------------------------------------------------------------------------------------------------------------------------    Losing Weight for Heart Health  Excess weight is a major risk factor for heart disease. Losing weight has many benefits including lowering your blood pressure, improving your cholesterol level, and decreasing your risk for diseases such as diabetes and heart disease. It may help keep your arteries open so that your heart can get the oxygen-rich blood it needs. All in all, losing weight makes you healthier.       Exercise with a friend. When activity is fun, you're more likely to stick with it.     Calories and weight loss  Calories are the fuel your body burns for energy. You get the calories you need from the food you eat. For healthy weight loss, women should eat at least 1,200 calories a day, men at least 1,500.  When you eat more calories than you need, your body stores the extra calories as fat. One pound of fat equals 3,500 calories.  To lose weight, try to reduce your total calorie intake by 500 calories. To do this, eat 250 calories less each day. Add activity to burn the other 250 calories. Walking 2.5 miles burns about 250 calories. Other more intense activities can burn more calories in the time you spend doing them, such as swimming and running. It is important to understand that reducing calorie intake is much more effective at weight loss than is exercise.  Eat a variety of healthy foods to get the nutrients you need.    Tips for losing weight  Drink 8 to 10 glasses of water a day.  Dont skip meals. Instead, eat smaller portions.  Eat your meals earlier in the day.  Cut out sugary drinks such as soda and fruit juices.  Make your later meals lighter than your earlier meals.     What can exercise help?  Blood sugar. Regular exercise improves blood sugar control by helping your body use insulin.  Mental and emotional health. Physical activity relieves  stress and helps you sleep better.  Heart health. With regular exercise, you can reduce your risk of heart disease and high blood pressure. You can also improve your cholesterol and triglyceride levels.  Weight. Exercise helps you lose fat, gain muscle, and control your weight.  Health of blood vessels and nerves. Activity helps lower blood sugar. This helps prevent damage to blood vessels and nerves that can cause problems with your brain, eyes, feet, and legs.  Finances. If you manage your blood sugar, you may spend less on medical care.    2 types of exercise  Two types of exercise help your body use blood sugar. Experts advise both types of exercise for people with diabetes:  Aerobic exercise. This is a rhythmic, repeated, continued movement of large muscle groups for at least 10 minutes at a time. You should do this about 30 minutes a day on most days of the week. Examples include walking, bicycling, jogging, swimming, water aerobics, and many sports.  Resistance exercise (strength training). This type of exercise uses muscles to move weight or work against resistance. You can do it with free weights, machines, resistance tubing, or your own body weight. Adults with diabetes should aim for 2 to 3 sessions of resistance exercise each week. Its best to skip a day in between.    A goal to shoot for  Your main goal is to become more active. Even a little bit helps. Choose an activity that you like. Walking is one great form of exercise that everyone can do. Talk to your healthcare provider about any limits you may have before starting with an exercise program. Then aim for 150 minutes a week of physical activity. Dont let more than 2 days go by without exercise. When you are sitting for long periods of time, get up for short sessions of light activity every 30 minutes.    Getting activity into your day  Being more active doesnt have to be hard work. Try these to get more activity into your day:  Take the stairs  instead of the elevator  Garden, do housework, and yard work  Choose a parking space farther from the store  Walk to talk to a co-worker instead of calling  Take a 10-minute walk around the block at lunch  Walk to a bus stop a little farther from your home or office  Walk the dog after dinner     ---------------------------------------------------------------------------------------------------------------------------------------------------    Reading Food Labels  Look for the Nutrition Facts label on packaged foods. Reading labels is a big step toward eating healthier. The tips below help you know what to look for.    Serving size. Read this closely because the package, jar, or can may contain more than 1 serving. This is how to measure 1 serving of the food in the package. If you eat more than 1 serving, you get more of everything on the label -- including fat, cholesterol, and calories.  Total fat. This tells you how many grams (g) of fat are in 1 serving. Fat is high in calories. A healthy goal is to have less than 25% of your daily calories come from fat.  Saturated fat. This tells you how much saturated fat is in 1 serving. Saturated fat raises your cholesterol the most. Look for foods that have little or no saturated fat.  Trans fat. This tells you how much trans fat is in 1 serving. Even a small amount of trans fat can harm your health. Choose foods that have no trans fat.  Cholesterol. This tells you how much cholesterol is in 1 serving. For many years, it had been recommended to eat less than 300 milligrams (mg) of cholesterol a day. New guidelines have removed this limitation as cholesterol has been recently shown to not raise blood cholesterol levels as significantly as previously thought. However, many foods high in cholesterol are also high in saturated fat. It is recommended to limit saturated fat in your diet.  Calories from fat. This number tells you how many calories from fat are in 1 serving  (there are 9 calories per gram of fat). Look for foods with few calories from fat.  % Daily value. The higher the number, the more 1 serving has of that nutrient. Look for foods that have low numbers for total fat, saturated fat, cholesterol, and sodium.  Sodium. This tells you how much sodium (salt) is in 1 serving. Choose foods with low numbers for sodium.  Dietary fiber. This number tells you how much fiber is in 1 serving. Foods that are high in fiber can help you feel full. They can also be good for your heart and digestion. The recommended daily amount of fiber is 25 grams for women and 38 grams for men. After age 50, your daily fiber needs drop to 21 grams for women and 30 grams for men.       ---------------------------------------------------------------------------------------------------------------------------------------------------    Understanding Carbohydrates, Fats, and Protein  Food is a source of fuel and nourishment for your body. Its also a source of pleasure. Having diabetes doesnt mean you have to eat special foods or give up desserts. Instead, your dietitian can show you how to plan meals to suit your body. To start, learn how different foods affect blood sugar.    Carbohydrates  Carbohydrates are the main source of fuel for the body. Carbohydrates raise blood sugar. Many people think carbohydrates are only found in pasta or bread. But carbohydrates are actually in many kinds of foods:  Sugars occur naturally in foods such as fruit, milk, honey, and molasses. Sugars can also be added to many foods, from cereals and yogurt to candy and desserts. Sugars raise blood sugar.  Starches are found in bread, cereals, pasta, and dried beans. Theyre also found in corn, peas, potatoes, yam, acorn squash, and butternut squash. Starches also raise blood sugar.   Fiber is found in foods such as vegetables, fruits, beans, and whole grains. Unlike other carbs, fiber isnt digested or absorbed. So it  doesnt raise blood sugar. In fact, fiber can help keep blood sugar from rising too fast. It also helps keep blood cholesterol at a healthy level.  Did you know?  Even though carbohydrates raise blood sugar, its best to have some in every meal. They are an important part of a healthy diet.     Fat  Fat is an energy source that can be stored until needed. Fat does not raise blood sugar. However, it can raise blood cholesterol, increasing the risk of heart disease. Fat is also high in calories, which can cause weight gain. Not all types of fat are the same.    More Healthy:  Monounsaturated fats are mostly found in vegetable oils, such as olive, canola, and peanut oils. They are also found in avocados and some nuts. Monounsaturated fats are healthy for your heart. Thats because they lower LDL (unhealthy) cholesterol.  Polyunsaturated fats are mostly found in vegetable oils, such as corn, safflower, and soybean oils. They are also found in some seeds, nuts, and fish. Polyunsaturated fats lower LDL (unhealthy) cholesterol. So, choosing them instead of saturated fats is healthy for your heart. Certain unsaturated fats can help lower triglycerides.     Less Healthy:  Saturated fats are found in animal products, such as meat, poultry, whole milk, lard, and butter. Saturated fats raise LDL cholesterol and are not healthy for your heart.  Hydrogenated oils and trans fats are formed when vegetable oils are processed into solid fats. They are found in many processed foods. Hydrogenated oils and trans fats raise LDL cholesterol and lower HDL (healthy) cholesterol. They are not healthy for your heart.    Protein  Protein helps the body build and repair muscle and other tissue. Protein has little or no effect on blood sugar. However, many foods that contain protein also contain saturated fat. By choosing low-fat protein sources, you can get the benefits of protein without the extra fat:  Plant protein is found in dry beans and  peas, nuts, and soy products, such as tofu and soymilk. These sources tend to be cholesterol-free and low in saturated fat.  Animal protein is found in fish, poultry, meat, cheese, milk, and eggs. These contain cholesterol and can be high in saturated fat. Aim for lean, lower-fat choices.    ---------------------------------------------------------------------------------------------------------------------------------------------------    Understanding Carbohydrates    A car needs the right type of fuel to run. And you need the right kind of food to function. To keep your energy level up, your body needs food that has carbohydrates. But carbohydrates raise blood sugar levels higher and faster than other kinds of food. Your dietitian will work with you to figure out the amount of carbohydrates you need.    Starches  Starches are found in grains, some vegetables, and beans. Grain products include bread, pasta, cereal, and tortillas. Starchy vegetables include potatoes, peas, corn, lima beans, yams, and squash. Kidney beans, kaur beans, black beans, garbanzo beans, and lentils also contain starches.    Sugars  Sugars are found naturally in many foods. Or sugar can be added. Foods that contain natural sugar include fruits and fruit juices, dairy products, honey, and molasses. Added sugars are found in most desserts, processed foods, candy, regular soda, and fruit drinks. These are very helpful for treating low blood sugar, or hypoglycemia. They provide sugar quickly. Try to keep at least 15 to 20 grams of these simple sugars with you at all times. Eat this if you begin to have low blood sugar symptoms.    Fiber  Fiber comes from plant foods. Most fiber isnt digested by the body. Instead of raising blood sugar levels like other carbohydrates, it actually stops blood sugar from rising too fast. Fiber is found in fruits, vegetables, whole grains, beans, peas, and many nuts.    Carb counting  Keep track of the amount of  carbohydrates you eat. This can help you keep the right balance of physical activity and medicine. The amount of carbohydrates needed will vary for each person. It depends on many things such as your health, the medicines you take, and how active you are. Your healthcare team will help you figure out the right amount of carbohydrates for you. You may start with around 45 to 60 grams of carbohydrate per meal, depending on your situation. Carb counting is a system that helps you keep track of the carbohydrates you eat at each meal.  Carbohydrates come from a variety of foods. These include grains, starchy vegetables, fruit, milk, beans, and snack foods. You can either count carbohydrate grams or carbohydrate servings. When you count carbohydrate servings, 1 carbohydrate serving = 15 grams of carbohydrates.  Here are some examples of foods containing about 15 grams of carbohydrates (1 serving of carbohydrates):  1/2 cup of canned or frozen fruit  A small piece of fresh fruit (4 ounces)  1 slice of bread  1/2 cup of oatmeal  1/3 cup of rice  4 to 6 crackers  1/2 English muffin  1/2 cup of black beans  1/4 of a large baked potato (3 ounces)  2/3 cup of plain fat-free yogurt  1 cup of soup  1/2 cup of casserole  6 chicken nuggets  2-inch-square brownie or cake without frosting  2 small cookies  1/2 cup of ice cream or sherbet    Carb counting is easier when food labels are available. Look at the label to see how many grams of total carbohydrates the food contains. Then you can figure out how much you should eat.  Two very important lines to look at on the label are the serving size and the total carbohydrate amount. Here are some tips for using food labels to count your carbohydrate intake:  Check the serving size. The information on the label is based on that serving size. If you eat more than the listed serving size, you may have to double or triple the other information on the label.   Check the total grams of  carbohydrates. Total carbohydrate from the label includes sugar, starch, and fiber. Be sure to use the total carbohydrate number and not sugar alone.  Know how many grams of carbohydrates you can have.  Be familiar with the matching portion sizes.  Compare labels. Compare the labels of different products, looking at serving sizes and total carbohydrates to find the products that work best for you.   Don't forget protein and fat. With all the focus on carb counting, it might be easy to forget protein and fat in your meals. Don't forget to include sources of protein and healthy fat to balance your meals.  Its also important to be consistent with the amount and time you eat when taking a fixed dose of diabetes medicine. Work with your healthcare provider or dietitian if you need additional help. He or she can help you keep track of your carbohydrate intake. He or she can also help you figure out how many grams of carbohydrates you should have.      ---------------------------------------------------------------------------------------------------------------------------------------------------    Diabetes: Learning About Serving and Portion Sizes     A good rule of thumb: Devote half your plate to vegetables and green salad. Split the other half between protein and starchy carbohydrates. Fruit makes a good dessert.     Servings and portions. Whats the difference? These terms can be very confusing. But learning to measure serving sizes can help you figure out how many carbohydrates (carbs) and other foods you eat each day. They are also powerful tools for managing your weight.    Servings and portions  Many different words are used to describe amounts of food. If your health care provider uses a term youre not sure of, dont be afraid to ask. It helps to know the difference between servings and portions:  A serving size is a fixed size. Food producers use this term to describe their products. For example, the label  on a cereal box could say that 1 cup of dry cereal = 1 serving.  A portion (also called a helping) is how much you eat or how much you put on your plate at a meal. For example, you might eat 2 cups of cereal at breakfast.    Using serving information  The portion you choose to eat (such as 2 cups of cereal) may be more than one serving as listed on the food label (such as 1 cup of cereal). Thats why it helps to measure or weigh the food you eat. Because the food label values are based on servings, youll need to know how many servings you eat at one sitting.     Ounces: 2 to 3 ounces is about the size of your palm.       1 Cup: 1 cup (or a medium-sized piece) is about the size of your fist.       1/2 Cup: 1/2 cup is about the size of your cupped hand.      One tablespoon is about the size of your thumb.  One teaspoon is about the size of the tip of your thumb.    Keeping track of serving sizes  When youre planning for a snack or a meal, keep servings in mind. If you dont have measuring cups or a scale handy, there are ways to eyeball serving sizes, such as comparing your food to the size of your hand (see pictures above).    Managing portion sizes  If your weight is a concern, reducing your portions can help. You can eat more than one serving of a food at once. But to keep from eating too much at one meal, learn how to manage your portions. A portion is the amount of each type of food on your plate. See the plate diagram for an example of balanced portions.    ---------------------------------------------------------------------------------------------------------------------------------------------------    Diabetes: Shopping for and Preparing Meals    Having diabetes doesnt mean you have to shop in a special aisle or look for special foods. But you will need to make choices. By comparing items and reading food labels, you can find the healthiest foods for you and your family.  Comparing items  When you  "shop, compare items to find the best ones for your needs. Keep these facts in mind:  No sugar added does not mean a product is sugar-free.  "Sugar-free" means less than 1/2 gram (g) of sugar per serving.  Fat free means less than 1/2 g of fat per serving. This does not necessarily mean the product is low in calories.  Low fat means 3 g fat or less per serving. Reduced fat or less fat means 25% less fat than the regular version. Some of this fat may be saturated or trans fat. And calories per serving may be similar to the regular version.    Making small changes  Dont try to change all of your eating habits at once. Here are some ideas to start with:  Try fat-free or low-fat cheese, milk, and yogurt. Also try leaner cuts of meat. This will help you cut down on saturated fat.  Try whole-grain breads, brown rice, and whole-wheat pasta.  Load up on fresh or frozen vegetables. If you buy canned, choose low-sodium vegetables.  Avoid processed foods as much as possible. They tend to be low in fiber and high in trans fats and sodium.  Try tofu, soymilk, or meat substitutes.?They can help you cut cholesterol and saturated fat out of your diet.    Learning to read food labels  To find healthy foods that help you control blood sugar, learn how to read food labels. Look for the Nutrition Facts label on packaged foods. It will tell you how much carbohydrate, sugar, fat, and fiber is in each serving. Then, you can decide whether or not the food fits into your meal plan.    Using the food label  So, once you have the food label, what do you do with it? The food label helps in many ways. Use it to:  Compare items and decide which is the best for your health needs.  Track the number of carbohydrates in your portions.  Figure out how many servings of a food you can have and still stay within the number of carbohydrates for that meal.    Planning meals  For good blood sugar control, plan what and when youll eat. Start by " creating a meal plan that includes all the food groups. Then, time your meals to help keep your blood sugar level steady. You may need to adjust your plan for special situations.    Eat from all the food groups  The basis of a healthy meal plan is variety (eating many different types of foods). Look for lean meats, fresh fruits and vegetables, whole grains, and low-fat or non-fat dairy products. Eating a wide variety of foods provides the nutrients your body needs. It can also keep you from getting bored with your meal plan.    Reduce liquid sugars  Extra calories from sodas, sports drinks, and fruit drinks make it hard to keep blood sugar in range. Cut as many liquid sugars from your meal plan as you can. This includes most fruit juices, which are often high in natural or added sugar. Instead, drink plenty of water and other sugar-free beverages.    Eat less fat  If you need to lose some weight, try to reduce the amount of fat in your diet. This can also help lower your cholesterol level to keep blood vessels healthier. Cut fat by using only small amounts of liquid oil for cooking. Read food labels carefully to avoid foods with unhealthy trans fats.    Timing your meals  When it comes to blood sugar control, when you eat is as important as what you eat. You may need to eat several small meals spaced evenly throughout the day to stay in your target range. So dont skip breakfast or wait until late in the day to get most of your calories. Doing so can cause your blood sugar to rise too high or fall too low.    Cooking wisely  Broil, steam, bake, or grill meats and vegetables, instead of frying.  Instead of cream-based sauces or sugary glazes, flavor foods with vegetable purée, lemon or lime juice, or herb seasonings.  Remove skin from chicken and turkey before serving.  Look in cookbooks for easy, low-fat, low-sugar recipes. When making your usual recipes, cut sugar by 1/2 and fat by  1/3.      ---------------------------------------------------------------------------------------------------------------------------------------------------    Healthy Meals for Diabetes    Ask your healthcare team to help you make a meal plan that fits your needs. Your meal plan tells you when to eat your meals and snacks, what kinds of foods to eat, and how much of each food to eat. You dont have to give up all the foods you like. But you do need to follow some guidelines.  Choose healthy carbohydrates  Starches, sugars, and fiber are all types of carbohydrates. Fiber can help lower your cholesterol and triglycerides. Fiber is also healthy for your heart. You should have 20 to 35 grams of total fiber each day. Fiber-rich foods include:  Whole-grain breads and cereals  Bulgur wheat  Brown rice     Whole-wheat pasta  Fruits and vegetables  Dry beans, and peas   Keep track of the amount of carbohydrates you eat. This can help you keep the right balance of physical activity and medicine. The amount of carbohydrates needed will vary for each person. It depends on many things such as your health, the medicines you take, and how active you are. Your healthcare team will help you figure out the right amount of carbohydrates for you. You may start with around 45 to 60 grams of carbohydrates per meal, depending on your situation.   Here are some examples of foods containing about 15 grams of carbohydrates (1 serving of carbohydrates):  1/2 cup of canned or frozen fruit  A small piece of fresh fruit (4 ounces)  1 slice of bread  1/2 cup of oatmeal  1/3 cup of rice  4 to 6 crackers  1/2 English muffin  1/2 cup of black beans  1/4 of a large baked potato (3 ounces)  2/3 cup of plain fat-free yogurt  1 cup of soup  1/2 cup of casserole  6 chicken nuggets  2-inch-square brownie or cake without frosting  2 small cookies  1/2 cup of ice cream or sherbet    Choose healthy protein foods  Eating protein that is low in fat can help  you control your weight. It also helps keep your heart healthy. Low-fat protein foods include:  Fish  Plant proteins, such as dry beans and peas, nuts, and soy products like tofu and soymilk  Lean meat with all visible fat removed  Poultry with the skin removed  Low-fat or nonfat milk, cheese, and yogurt    Limit unhealthy fats and sugar  Saturated and trans fats are unhealthy for your heart. They raise LDL (bad) cholesterol. Fat is also high in calories, so it can make you gain weight. To cut down on unhealthy fats and sugar, limit these foods:  Butter or margarine  Palm and palm kernel oils and coconut oil  Cream  Cheese  Guerra  Lunch meats     Ice cream  Sweet bakery goods such as pies, muffins, and donuts  Jams and jellies  Candy bars  Regular sodas     How much to eat  The amount of food you eat affects your blood sugar. It also affects your weight. Your healthcare team will tell you how much of each type of food you should eat.  Use measuring cups and spoons and a food scale to measure serving sizes.  Learn what a correct serving size looks like on your plate. This will help when you are away from home and cant measure your servings.  Eat only the number of servings given on your meal plan for each food. Dont take seconds.  Learn to read food labels. Be sure to look at serving size, total carbohydrates, fiber, calories, sugar, and saturated and trans fats. Look for healthier alternatives to foods that have added sugar.  Plan ahead for parties so you can still have a good time without going overboard with unhealthy food choices. Set a good example yourself by bringing a healthy dish to pot lucks.     Choose healthy snacks  When it comes to snacks, we usually think about foods with added sugar and fats. But there are many other options for healthier snack choices. Here are a few snack ideas to choose from:  Snacks with less than 5 grams of carbohydrates  1 piece of string cheese  3 celery sticks plus 1  tablespoon of peanut butter  5 cherry tomatoes plus 1 tablespoon of ranch dressing  1 hard-boiled egg  1/4 cup of fresh blueberries   5 baby carrots  1 cup of light popcorn  1/2 cup of sugar-free gelatin  15 almonds  Snacks with about 10 to 20 grams of carbohydrates  1/3 cup of hummus plus 1 cup of fresh cut nonstarchy vegetables (carrots, green peppers, broccoli, celery, or a combination)  1/2 cup of fresh or canned fruit plus 1/4 cup of cottage cheese  1/2 cup of tuna salad with 4 crackers  2 rice cakes and a tablespoon of peanut butter  1 small apple or orange  3 cups light popcorn  1/2 of a turkey sandwich (1 slice of whole-wheat bread, 2 ounces of turkey, and mustard)  Portion sizes are important to controlling your blood sugar and staying at a healthy weight. Stock up on healthy snack items so you always have them on hand.    When to eat  Your meal plan will likely include breakfast, lunch, dinner, and some snacks.  Try to eat your meals and snacks at about the same times each day.  Eat all your meals and snacks. Skipping a meal or snack can make your blood sugar drop too low. It can also cause you to eat too much at the next meal or snack. Then your blood sugar could get too high.    ---------------------------------------------------------------------------------------------------------------------------------------------------    Eating Out When You Have Diabetes  Eating right is an important part of keeping your blood sugar in your target range. You just need to make healthy choices.    Be creative when eating out. Many places dont make you stick strictly to the menu. Instead of ordering a large entree, choose an appetizer or two and a bowl of soup. A mix of side orders can also make a good meal. Read the descriptions of other entrees and specials. If another entree comes with baby carrots, ask for a side order of them even if they dont come with your entree. Ask how food is prepared or if it can be made  differently.  Tips for making the most of your meal out  Ask to have high-fat, high-calorie extras, such as French fries and potato chips, left off your plate so you wont be tempted.   Ask what substitutions are available. Instead of French fries, you may be able to have a side of salad with low-calorie dressing.  Order low-fat milk instead of cream for your coffee.  Ask for vegetables and main courses to be served without sauces, butter, margarine, or oil.  Be aware of portion size. You dont have to clean your plate. Take half your meal home in a doggie bag to eat the next day.  Look for heart-healthy or low-fat entrees that include whole grains, vegetables, or fruits.  Choose foods that are grilled, broiled, or steamed.  Avoid dishes that are described on the menu as fried, breaded, smothered, rich, or creamy.    Tips for restaurant meals  When you eat away from home try these tips:  Try to schedule your dining-out meal at your normal meal time. Make a reservation if possible, so you don't have to wait to eat. If you can't make a reservation, try to arrive at the restaurant at a less-busy time to cut down your wait time. Eat a small fruit or starch snack at your regular mealtime if your restaurant meal is going to be later than usual.   Call ahead to see if the restaurant can meet your dietary needs if you've never been there before. Or you can go online to see the menu ahead of time.  Carry some crackers with you in case the restaurant needs you to wait until you can be served.  Ask how foods are prepared before you order.  Instead of fried, sautéed, or breaded foods, choose ones that are broiled, steamed, grilled, or baked.  Ask for sauces, gravies, and dressings on the side.  Only eat an amount that fits your meal plan. Remember: You can take home the leftovers.  Save dessert for special occasions. Then choose a small dessert or share one with a friend or family member.    Make healthy choices  Fast  food  Garden salad with light dressing on the side  Baked potato with vegetables or herbs  Broiled, roasted, or grilled chicken sandwich  Sliced turkey or lean roast beef sandwich    Mexican  Chicken enchilada, without cheese or sour cream   Small burrito with whole beans and chicken  Whole beans (not refried) and rice  Chicken or fish fajitas    Steakhouse  Grilled or broiled lean cuts of beef  Baked potato with vegetables or herbs  Broiled or baked chicken. Dont eat the skin.  Steamed vegetables    Asian  Steamed dumplings or potstickers  Broiled, boiled, or steamed meats or fish  Sushi or sashimi  Steamed rice or boiled noodles. One serving is equal to 1/3 cup.      © 1275-2337 [employer]. [address] [place] [address], [place] [address]. All rights reserved. This information is not intended as a substitute for professional medical care. Always follow your healthcare professional's instructions.

## 2023-06-07 ENCOUNTER — PATIENT MESSAGE (OUTPATIENT)
Dept: FAMILY MEDICINE | Facility: CLINIC | Age: 58
End: 2023-06-07
Payer: COMMERCIAL

## 2023-06-08 DIAGNOSIS — E87.6 HYPOKALEMIA: ICD-10-CM

## 2023-06-08 RX ORDER — POTASSIUM CHLORIDE 20 MEQ/1
TABLET, EXTENDED RELEASE ORAL
Qty: 30 TABLET | Refills: 0 | Status: SHIPPED | OUTPATIENT
Start: 2023-06-08

## 2023-06-20 ENCOUNTER — NURSE TRIAGE (OUTPATIENT)
Dept: ADMINISTRATIVE | Facility: CLINIC | Age: 58
End: 2023-06-20
Payer: COMMERCIAL

## 2023-06-20 ENCOUNTER — PATIENT MESSAGE (OUTPATIENT)
Dept: PODIATRY | Facility: CLINIC | Age: 58
End: 2023-06-20
Payer: COMMERCIAL

## 2023-06-20 DIAGNOSIS — L97.509 TYPE 2 DIABETES MELLITUS WITH FOOT ULCER, UNSPECIFIED WHETHER LONG TERM INSULIN USE: Primary | ICD-10-CM

## 2023-06-20 DIAGNOSIS — E11.621 TYPE 2 DIABETES MELLITUS WITH FOOT ULCER, UNSPECIFIED WHETHER LONG TERM INSULIN USE: Primary | ICD-10-CM

## 2023-06-21 ENCOUNTER — TELEPHONE (OUTPATIENT)
Dept: PODIATRY | Facility: CLINIC | Age: 58
End: 2023-06-21
Payer: COMMERCIAL

## 2023-06-21 ENCOUNTER — PATIENT MESSAGE (OUTPATIENT)
Dept: FAMILY MEDICINE | Facility: CLINIC | Age: 58
End: 2023-06-21

## 2023-06-21 ENCOUNTER — OFFICE VISIT (OUTPATIENT)
Dept: FAMILY MEDICINE | Facility: CLINIC | Age: 58
End: 2023-06-21
Payer: COMMERCIAL

## 2023-06-21 VITALS
DIASTOLIC BLOOD PRESSURE: 70 MMHG | RESPIRATION RATE: 16 BRPM | HEIGHT: 74 IN | HEART RATE: 69 BPM | BODY MASS INDEX: 36.7 KG/M2 | SYSTOLIC BLOOD PRESSURE: 140 MMHG | WEIGHT: 285.94 LBS | TEMPERATURE: 98 F

## 2023-06-21 DIAGNOSIS — A46 ERYSIPELAS OF RIGHT LOWER EXTREMITY: Primary | ICD-10-CM

## 2023-06-21 DIAGNOSIS — E11.65 TYPE 2 DIABETES MELLITUS WITH HYPERGLYCEMIA, WITHOUT LONG-TERM CURRENT USE OF INSULIN: ICD-10-CM

## 2023-06-21 DIAGNOSIS — S91.331A PENETRATING FOOT WOUND, RIGHT, INITIAL ENCOUNTER: ICD-10-CM

## 2023-06-21 PROCEDURE — 3008F PR BODY MASS INDEX (BMI) DOCUMENTED: ICD-10-PCS | Mod: CPTII,S$GLB,, | Performed by: FAMILY MEDICINE

## 2023-06-21 PROCEDURE — 1159F MED LIST DOCD IN RCRD: CPT | Mod: CPTII,S$GLB,, | Performed by: FAMILY MEDICINE

## 2023-06-21 PROCEDURE — 99213 OFFICE O/P EST LOW 20 MIN: CPT | Mod: S$GLB,,, | Performed by: FAMILY MEDICINE

## 2023-06-21 PROCEDURE — 4010F ACE/ARB THERAPY RXD/TAKEN: CPT | Mod: CPTII,S$GLB,, | Performed by: FAMILY MEDICINE

## 2023-06-21 PROCEDURE — 3044F HG A1C LEVEL LT 7.0%: CPT | Mod: CPTII,S$GLB,, | Performed by: FAMILY MEDICINE

## 2023-06-21 PROCEDURE — 3066F PR DOCUMENTATION OF TREATMENT FOR NEPHROPATHY: ICD-10-PCS | Mod: CPTII,S$GLB,, | Performed by: FAMILY MEDICINE

## 2023-06-21 PROCEDURE — 3066F NEPHROPATHY DOC TX: CPT | Mod: CPTII,S$GLB,, | Performed by: FAMILY MEDICINE

## 2023-06-21 PROCEDURE — 3072F PR LOW RISK FOR RETINOPATHY: ICD-10-PCS | Mod: CPTII,S$GLB,, | Performed by: FAMILY MEDICINE

## 2023-06-21 PROCEDURE — 1160F PR REVIEW ALL MEDS BY PRESCRIBER/CLIN PHARMACIST DOCUMENTED: ICD-10-PCS | Mod: CPTII,S$GLB,, | Performed by: FAMILY MEDICINE

## 2023-06-21 PROCEDURE — 99213 PR OFFICE/OUTPT VISIT, EST, LEVL III, 20-29 MIN: ICD-10-PCS | Mod: S$GLB,,, | Performed by: FAMILY MEDICINE

## 2023-06-21 PROCEDURE — 3078F DIAST BP <80 MM HG: CPT | Mod: CPTII,S$GLB,, | Performed by: FAMILY MEDICINE

## 2023-06-21 PROCEDURE — 4010F PR ACE/ARB THEARPY RXD/TAKEN: ICD-10-PCS | Mod: CPTII,S$GLB,, | Performed by: FAMILY MEDICINE

## 2023-06-21 PROCEDURE — 3008F BODY MASS INDEX DOCD: CPT | Mod: CPTII,S$GLB,, | Performed by: FAMILY MEDICINE

## 2023-06-21 PROCEDURE — 3061F NEG MICROALBUMINURIA REV: CPT | Mod: CPTII,S$GLB,, | Performed by: FAMILY MEDICINE

## 2023-06-21 PROCEDURE — 1160F RVW MEDS BY RX/DR IN RCRD: CPT | Mod: CPTII,S$GLB,, | Performed by: FAMILY MEDICINE

## 2023-06-21 PROCEDURE — 1159F PR MEDICATION LIST DOCUMENTED IN MEDICAL RECORD: ICD-10-PCS | Mod: CPTII,S$GLB,, | Performed by: FAMILY MEDICINE

## 2023-06-21 PROCEDURE — 3077F SYST BP >= 140 MM HG: CPT | Mod: CPTII,S$GLB,, | Performed by: FAMILY MEDICINE

## 2023-06-21 PROCEDURE — 3077F PR MOST RECENT SYSTOLIC BLOOD PRESSURE >= 140 MM HG: ICD-10-PCS | Mod: CPTII,S$GLB,, | Performed by: FAMILY MEDICINE

## 2023-06-21 PROCEDURE — 3078F PR MOST RECENT DIASTOLIC BLOOD PRESSURE < 80 MM HG: ICD-10-PCS | Mod: CPTII,S$GLB,, | Performed by: FAMILY MEDICINE

## 2023-06-21 PROCEDURE — 3072F LOW RISK FOR RETINOPATHY: CPT | Mod: CPTII,S$GLB,, | Performed by: FAMILY MEDICINE

## 2023-06-21 PROCEDURE — 3061F PR NEG MICROALBUMINURIA RESULT DOCUMENTED/REVIEW: ICD-10-PCS | Mod: CPTII,S$GLB,, | Performed by: FAMILY MEDICINE

## 2023-06-21 PROCEDURE — 3044F PR MOST RECENT HEMOGLOBIN A1C LEVEL <7.0%: ICD-10-PCS | Mod: CPTII,S$GLB,, | Performed by: FAMILY MEDICINE

## 2023-06-21 PROCEDURE — 99999 PR PBB SHADOW E&M-EST. PATIENT-LVL III: CPT | Mod: PBBFAC,,, | Performed by: FAMILY MEDICINE

## 2023-06-21 PROCEDURE — 99999 PR PBB SHADOW E&M-EST. PATIENT-LVL III: ICD-10-PCS | Mod: PBBFAC,,, | Performed by: FAMILY MEDICINE

## 2023-06-21 RX ORDER — MUPIROCIN 20 MG/G
OINTMENT TOPICAL 3 TIMES DAILY
Qty: 15 G | Refills: 1 | Status: SHIPPED | OUTPATIENT
Start: 2023-06-21

## 2023-06-21 RX ORDER — DOXYCYCLINE 100 MG/1
100 CAPSULE ORAL 2 TIMES DAILY
Qty: 20 CAPSULE | Refills: 0 | Status: SHIPPED | OUTPATIENT
Start: 2023-06-21 | End: 2023-08-15

## 2023-06-21 NOTE — PROGRESS NOTES
Subjective:       Patient ID: Alfred Cuello is a 58 y.o. male.    Chief Complaint: No chief complaint on file.    New to me patient here for UC visit.  Right lower leg - redness, swelling and heat noted x few days; less so today.  No systemic symptoms.  Has T2DM.  Does have 3 wounds on right foot - none appear infected and he is doing local care  Review of Systems   Constitutional:  Negative for fever.   Respiratory:  Negative for shortness of breath.    Cardiovascular:  Negative for chest pain.   Gastrointestinal:  Negative for abdominal pain and nausea.   Skin:  Positive for rash and wound.   Neurological:  Negative for numbness.   All other systems reviewed and are negative.    Objective:      Physical Exam  Vitals reviewed.   Constitutional:       Appearance: He is obese. He is not ill-appearing.   Cardiovascular:      Rate and Rhythm: Normal rate.   Musculoskeletal:      Right lower leg: Edema (trace) present.        Feet:       Comments: Negative Homans on right   Feet:      Comments: Sub-acute wounds, linear, none have erythema or discharge  Skin:     General: Skin is warm and dry.             Comments: Patchy, superficial erythema, mild TTP, midl heat as per diagram   Neurological:      Mental Status: He is alert.       Assessment:       1. Erysipelas of right lower extremity    2. Penetrating foot wound, right, initial encounter    3. Type 2 diabetes mellitus with hyperglycemia, without long-term current use of insulin        Plan:       Erysipelas of right lower extremity  -     doxycycline (MONODOX) 100 MG capsule; Take 1 capsule (100 mg total) by mouth 2 (two) times daily.  Dispense: 20 capsule; Refill: 0  -     mupirocin (BACTROBAN) 2 % ointment; Apply topically 3 (three) times daily.  Dispense: 15 g; Refill: 1    Penetrating foot wound, right, initial encounter    Type 2 diabetes mellitus with hyperglycemia, without long-term current use of insulin      Patient Instructions   Contact me or your PCP  if any worsening or for any new concerns as we discussed.

## 2023-06-21 NOTE — TELEPHONE ENCOUNTER
----- Message from Leslie Leal sent at 6/21/2023  7:07 AM CDT -----  Regarding: Leg appears affected open sores in foot/ Diabetic  Type:  Same Day Appointment Request    Caller is requesting a same day appointment.  Caller declined first available appointment listed below.      Name of Caller:Pt's Wife Yessica    When is the first available appointment?6/23    Symptoms:Leg appears affected open sores in foot/ Diabetic    Best Call Back Number:[phone]    Additional Information: Sts they talked to after hours and were directed to be seen today.  Please advise -- Thank you

## 2023-06-21 NOTE — TELEPHONE ENCOUNTER
Pt's wife reports pt is a diabetic and since Saturday is having Rt leg swelling and redness from below the knee to above his ankle, feels hot to the touch. Pt has an appointment tomorrow, but wanting to know what he can put on it for tonight, has tried hydrocortisone cream. Pt advised to see a MD within 4 hours via per protocol UC/ED/OCAC arturo, Wife states she will try the arturo tonight. Wife/Pt encouraged to call back with any worsening symptoms or questions and verbalized understanding.    Reason for Disposition   [1] Red area or streak [2] large (> 2 in. or 5 cm)    Additional Information   Negative: SEVERE difficulty breathing (e.g., struggling for each breath, speaks in single words)   Negative: Looks like a broken bone or dislocated joint (e.g., crooked or deformed)   Negative: Sounds like a life-threatening emergency to the triager   Negative: Difficulty breathing at rest   Negative: Entire foot is cool or blue in comparison to other side   Negative: [1] Can't walk or can barely walk AND [2] new-onset   Negative: [1] Difficulty breathing with exertion (e.g., walking) AND [2] new-onset or worsening   Negative: [1] Red area or streak AND [2] fever   Negative: [1] Swelling is painful to touch AND [2] fever   Negative: [1] Cast on leg or ankle AND [2] now increased pain   Negative: Patient sounds very sick or weak to the triager   Negative: SEVERE leg swelling (e.g., swelling extends above knee, entire leg is swollen, weeping fluid)    Protocols used: Leg Swelling and Edema-A-AH

## 2023-06-21 NOTE — LETTER
June 21, 2023    Alfred Cuello  [address] [facility] [address]             [facility]  Family Medicine  [address]  [facility] [address]  Phone: [phone]  Fax: [phone]   June 21, 2023     Patient: Alfred Cuello   YOB: 1965   Date of Visit: 6/21/2023       To Whom it May Concern:    Alfred Cuello was seen in my clinic on 6/21/2023. He may return to work on 6/21/2023.    Please excuse him from any work missed.    If you have any questions or concerns, please don't hesitate to call.    Sincerely,         Ashok Baird MD

## 2023-06-23 ENCOUNTER — PATIENT MESSAGE (OUTPATIENT)
Dept: PODIATRY | Facility: CLINIC | Age: 58
End: 2023-06-23
Payer: COMMERCIAL

## 2023-07-10 ENCOUNTER — PATIENT MESSAGE (OUTPATIENT)
Dept: ENDOSCOPY | Facility: HOSPITAL | Age: 58
End: 2023-07-10

## 2023-07-12 ENCOUNTER — TELEPHONE (OUTPATIENT)
Dept: GASTROENTEROLOGY | Facility: CLINIC | Age: 58
End: 2023-07-12
Payer: COMMERCIAL

## 2023-07-12 NOTE — TELEPHONE ENCOUNTER
----- Message from Hemalatha Russell sent at 7/12/2023  3:14 PM CDT -----  Contact: Patient's wife  Type: Needs Medical Advice    Who Called:  Patient's wife  What is this regarding?:  Pt is looking to schedule his colonoscopy.  Best Call Back Number:  [phone], Mrs. Yessica Cuello  Additional Information:  Please call the patient's wife back at the phone number listed above to advise. Thank you!

## 2023-08-15 ENCOUNTER — OFFICE VISIT (OUTPATIENT)
Dept: CARDIOLOGY | Facility: CLINIC | Age: 58
End: 2023-08-15
Payer: COMMERCIAL

## 2023-08-15 VITALS
HEART RATE: 61 BPM | HEIGHT: 74 IN | DIASTOLIC BLOOD PRESSURE: 65 MMHG | WEIGHT: 292.13 LBS | SYSTOLIC BLOOD PRESSURE: 139 MMHG | BODY MASS INDEX: 37.49 KG/M2 | OXYGEN SATURATION: 96 %

## 2023-08-15 DIAGNOSIS — E11.59 HYPERTENSION ASSOCIATED WITH DIABETES: ICD-10-CM

## 2023-08-15 DIAGNOSIS — I15.2 HYPERTENSION ASSOCIATED WITH DIABETES: ICD-10-CM

## 2023-08-15 DIAGNOSIS — Z98.890 H/O CARDIAC RADIOFREQUENCY ABLATION: ICD-10-CM

## 2023-08-15 DIAGNOSIS — E11.65 TYPE 2 DIABETES MELLITUS WITH HYPERGLYCEMIA, WITHOUT LONG-TERM CURRENT USE OF INSULIN: ICD-10-CM

## 2023-08-15 DIAGNOSIS — I50.32 CHRONIC DIASTOLIC CONGESTIVE HEART FAILURE: ICD-10-CM

## 2023-08-15 DIAGNOSIS — E78.5 DYSLIPIDEMIA: ICD-10-CM

## 2023-08-15 DIAGNOSIS — I48.19 PERSISTENT ATRIAL FIBRILLATION: ICD-10-CM

## 2023-08-15 DIAGNOSIS — E66.01 SEVERE OBESITY (BMI 35.0-39.9) WITH COMORBIDITY: ICD-10-CM

## 2023-08-15 DIAGNOSIS — R07.89 CHEST DISCOMFORT: Primary | ICD-10-CM

## 2023-08-15 DIAGNOSIS — G47.33 OSA ON CPAP: ICD-10-CM

## 2023-08-15 PROCEDURE — 99214 PR OFFICE/OUTPT VISIT, EST, LEVL IV, 30-39 MIN: ICD-10-PCS | Mod: S$GLB,,, | Performed by: PHYSICIAN ASSISTANT

## 2023-08-15 PROCEDURE — 3066F NEPHROPATHY DOC TX: CPT | Mod: CPTII,S$GLB,, | Performed by: PHYSICIAN ASSISTANT

## 2023-08-15 PROCEDURE — 3078F PR MOST RECENT DIASTOLIC BLOOD PRESSURE < 80 MM HG: ICD-10-PCS | Mod: CPTII,S$GLB,, | Performed by: PHYSICIAN ASSISTANT

## 2023-08-15 PROCEDURE — 3066F PR DOCUMENTATION OF TREATMENT FOR NEPHROPATHY: ICD-10-PCS | Mod: CPTII,S$GLB,, | Performed by: PHYSICIAN ASSISTANT

## 2023-08-15 PROCEDURE — 4010F PR ACE/ARB THEARPY RXD/TAKEN: ICD-10-PCS | Mod: CPTII,S$GLB,, | Performed by: PHYSICIAN ASSISTANT

## 2023-08-15 PROCEDURE — 99999 PR PBB SHADOW E&M-EST. PATIENT-LVL V: CPT | Mod: PBBFAC,,, | Performed by: PHYSICIAN ASSISTANT

## 2023-08-15 PROCEDURE — 3075F SYST BP GE 130 - 139MM HG: CPT | Mod: CPTII,S$GLB,, | Performed by: PHYSICIAN ASSISTANT

## 2023-08-15 PROCEDURE — 1160F PR REVIEW ALL MEDS BY PRESCRIBER/CLIN PHARMACIST DOCUMENTED: ICD-10-PCS | Mod: CPTII,S$GLB,, | Performed by: PHYSICIAN ASSISTANT

## 2023-08-15 PROCEDURE — 3075F PR MOST RECENT SYSTOLIC BLOOD PRESS GE 130-139MM HG: ICD-10-PCS | Mod: CPTII,S$GLB,, | Performed by: PHYSICIAN ASSISTANT

## 2023-08-15 PROCEDURE — 1160F RVW MEDS BY RX/DR IN RCRD: CPT | Mod: CPTII,S$GLB,, | Performed by: PHYSICIAN ASSISTANT

## 2023-08-15 PROCEDURE — 3008F BODY MASS INDEX DOCD: CPT | Mod: CPTII,S$GLB,, | Performed by: PHYSICIAN ASSISTANT

## 2023-08-15 PROCEDURE — 1159F PR MEDICATION LIST DOCUMENTED IN MEDICAL RECORD: ICD-10-PCS | Mod: CPTII,S$GLB,, | Performed by: PHYSICIAN ASSISTANT

## 2023-08-15 PROCEDURE — 4010F ACE/ARB THERAPY RXD/TAKEN: CPT | Mod: CPTII,S$GLB,, | Performed by: PHYSICIAN ASSISTANT

## 2023-08-15 PROCEDURE — 3008F PR BODY MASS INDEX (BMI) DOCUMENTED: ICD-10-PCS | Mod: CPTII,S$GLB,, | Performed by: PHYSICIAN ASSISTANT

## 2023-08-15 PROCEDURE — 3078F DIAST BP <80 MM HG: CPT | Mod: CPTII,S$GLB,, | Performed by: PHYSICIAN ASSISTANT

## 2023-08-15 PROCEDURE — 3044F HG A1C LEVEL LT 7.0%: CPT | Mod: CPTII,S$GLB,, | Performed by: PHYSICIAN ASSISTANT

## 2023-08-15 PROCEDURE — 3072F LOW RISK FOR RETINOPATHY: CPT | Mod: CPTII,S$GLB,, | Performed by: PHYSICIAN ASSISTANT

## 2023-08-15 PROCEDURE — 3061F PR NEG MICROALBUMINURIA RESULT DOCUMENTED/REVIEW: ICD-10-PCS | Mod: CPTII,S$GLB,, | Performed by: PHYSICIAN ASSISTANT

## 2023-08-15 PROCEDURE — 3061F NEG MICROALBUMINURIA REV: CPT | Mod: CPTII,S$GLB,, | Performed by: PHYSICIAN ASSISTANT

## 2023-08-15 PROCEDURE — 3044F PR MOST RECENT HEMOGLOBIN A1C LEVEL <7.0%: ICD-10-PCS | Mod: CPTII,S$GLB,, | Performed by: PHYSICIAN ASSISTANT

## 2023-08-15 PROCEDURE — 99214 OFFICE O/P EST MOD 30 MIN: CPT | Mod: S$GLB,,, | Performed by: PHYSICIAN ASSISTANT

## 2023-08-15 PROCEDURE — 3072F PR LOW RISK FOR RETINOPATHY: ICD-10-PCS | Mod: CPTII,S$GLB,, | Performed by: PHYSICIAN ASSISTANT

## 2023-08-15 PROCEDURE — 99999 PR PBB SHADOW E&M-EST. PATIENT-LVL V: ICD-10-PCS | Mod: PBBFAC,,, | Performed by: PHYSICIAN ASSISTANT

## 2023-08-15 PROCEDURE — 1159F MED LIST DOCD IN RCRD: CPT | Mod: CPTII,S$GLB,, | Performed by: PHYSICIAN ASSISTANT

## 2023-08-15 RX ORDER — EZETIMIBE 10 MG/1
10 TABLET ORAL DAILY
Qty: 90 TABLET | Refills: 3 | Status: SHIPPED | OUTPATIENT
Start: 2023-08-15 | End: 2024-08-14

## 2023-08-15 RX ORDER — FUROSEMIDE 20 MG/1
20 TABLET ORAL 2 TIMES DAILY
Qty: 60 TABLET | Refills: 11 | Status: SHIPPED | OUTPATIENT
Start: 2023-08-15 | End: 2023-11-03

## 2023-08-15 NOTE — PROGRESS NOTES
"    General Cardiology Clinic Note  Reason for Visit: Follow up   Last Clinic Visit: 4/12/2022    HPI:   Alfred Cuello is a 58 y.o. male who presents to follow up.     Problems:  Atrial fibrillation s/p PVI 12/6/2022  HFpEF  Hypertension  Dyslipidemia  DM2  RAFAEL on CPAP   Obesity     HPI  Patient presents for follow up. He reports mild discomfort in chest with exertion. He has a hard time describing it, but states it's not normal. This is not new. He reports BYRNE with climbing 4 flights of stairs. He admits to modifying his activity to avoid symptoms. Overall feels better since the ablation. He does reports a significant worsening of his pedal edema over the past 6 months and has been gaining weight. He denies orthopnea, PND, syncope, near syncope, sustained palpitations. He is using his CPAP. He does not exercise. BP at home is 130s/70s.      4/12/2022 HPI   Pt was incidentally found to be in Afib by PCP on 11/2020 and was referred to Cardiology. He was started on Eliquis. Later that month, he ended up admitted with acute diastolic CHF. He was scheduled for DCCV; however, NOEMI showed a TAWNY thrombus so cardioversion was aborted. It seems he was lost to follow up after this.     He presents today for follow up. He notes he "fatigues more easily" than he used to. Also reports a rare mild, left sided chest discomfort. Seems to be unrelated to physical activity. Lasts no more than a couple minutes. Has occurred ~ 6 times over the past year. He reports mild BYRNE which he has attributed to obesity and deconditioning. Occasional orthostatic symptoms. He has chronic pedal edema which is improved with compression stockings. Denies syncope, palpitations, TIA, bleeding issues. He does not do any aerobic exercise. He recently started dieting and has lost about 20 lbs. He is compliant with CPAP. BP at home 130s/70s.      ROS:      Review of Systems   Constitutional: Positive for malaise/fatigue and weight gain. Negative for " diaphoresis and weight loss.   HENT:  Negative for nosebleeds.    Eyes:  Negative for vision loss in left eye, vision loss in right eye and visual disturbance.   Cardiovascular:  Positive for chest pain, dyspnea on exertion and leg swelling. Negative for claudication, irregular heartbeat, near-syncope, orthopnea, palpitations, paroxysmal nocturnal dyspnea and syncope.   Respiratory:  Positive for shortness of breath and snoring. Negative for cough, sleep disturbances due to breathing and wheezing.    Hematologic/Lymphatic: Negative for bleeding problem. Does not bruise/bleed easily.   Skin:  Negative for poor wound healing and rash.   Musculoskeletal:  Negative for muscle cramps and myalgias.   Gastrointestinal:  Negative for bloating, abdominal pain, diarrhea, heartburn, melena, nausea and vomiting.   Genitourinary:  Negative for hematuria and nocturia.   Neurological:  Positive for light-headedness. Negative for brief paralysis, dizziness, headaches, numbness and weakness.   Psychiatric/Behavioral:  Negative for depression.    Allergic/Immunologic: Negative for hives.       PMH:     Past Medical History:   Diagnosis Date    Atrial fibrillation status post cardioversion     2 months ago    Diabetes mellitus, type 2     GERD (gastroesophageal reflux disease)     Hyperlipidemia     Hypertension     Neuropathy      Past Surgical History:   Procedure Laterality Date    ABDOMINAL SURGERY      as a child    ABLATION OF ARRHYTHMOGENIC FOCUS FOR ATRIAL FIBRILLATION N/A 12/6/2022    Procedure: Ablation atrial fibrillation;  Surgeon: SKYLER Lee MD;  Location: [facility] EP LAB;  Service: Cardiology;  Laterality: N/A;  AF, NOEMI, PVI, RFA, Carto, Gen, CA, 3 Prep    COLONOSCOPY N/A 7/20/2018    Procedure: COLONOSCOPY;  Surgeon: Marko Koo MD;  Location: [facility];  Service: Endoscopy;  Laterality: N/A;    TREATMENT OF CARDIAC ARRHYTHMIA N/A 5/20/2022    Procedure: CARDIOVERSION;  Surgeon: SKYLER Lee MD;   Location: [facility] EP LAB;  Service: Cardiology;  Laterality: N/A;  afib, NOEMI, DCCV, anes, MB, 3 Prep    TREATMENT OF CARDIAC ARRHYTHMIA  12/6/2022    Procedure: Cardioversion or Defibrillation;  Surgeon: SKLYER Lee MD;  Location: [facility] EP LAB;  Service: Cardiology;;    TRIAL OF SPINAL CORD NERVE STIMULATOR N/A 6/17/2019    Procedure: Trial, Neurostimulator, LUMBAR SPINAL CORD STIMULATOR TRIAL;  Surgeon: Rahat Orantes MD;  Location: [facility] PAIN MGT;  Service: Pain Management;  Laterality: N/A;  PDN STUDY TRIAL, NEEDS CONSENT, NEVRO REP     Allergies:     Review of patient's allergies indicates:   Allergen Reactions    Oxycodone Itching and Nausea Only     Medications:     Current Outpatient Medications on File Prior to Visit   Medication Sig Dispense Refill    albuterol (VENTOLIN HFA) 90 mcg/actuation inhaler Inhale 2 puffs into the lungs every 6 (six) hours as needed for Wheezing. Rescue (Patient not taking: Reported on 6/21/2023) 8.5 g 0    amiodarone (PACERONE) 200 MG Tab Take 1 tablet (200 mg total) by mouth once daily. 90 tablet 3    apixaban (ELIQUIS) 5 mg Tab Take 1 tablet (5 mg total) by mouth 2 (two) times daily. 60 tablet 11    atorvastatin (LIPITOR) 80 MG tablet Take 1 tablet (80 mg total) by mouth once daily. 90 tablet 3    doxycycline (MONODOX) 100 MG capsule Take 1 capsule (100 mg total) by mouth 2 (two) times daily. 20 capsule 0    ferrous sulfate 325 mg (65 mg iron) Tab tablet Take 325 mg by mouth daily with breakfast.      gabapentin (NEURONTIN) 300 MG capsule Take 4 capsules (1,200 mg total) by mouth 3 (three) times daily. 1080 capsule 0    glimepiride (AMARYL) 4 MG tablet Take 1 tablet (4 mg total) by mouth 2 (two) times a day. Take 1 tablet with breakfast and 1 tablet at dinner. 180 tablet 3    KRILL OIL ORAL Take by mouth once daily.      lisinopriL-hydrochlorothiazide (PRINZIDE,ZESTORETIC) 20-25 mg Tab Take 1 tablet by mouth once daily 90 tablet 4    loratadine (CLARITIN) 10 mg tablet  "Take 10 mg by mouth once daily.      metFORMIN (GLUCOPHAGE) 850 MG tablet TAKE 1 TABLET BY MOUTH THREE TIMES DAILY WITH MEALS 270 tablet 4    multivitamin (THERAGRAN) per tablet Take 1 tablet by mouth once daily.      mupirocin (BACTROBAN) 2 % ointment Apply topically 3 (three) times daily. 15 g 1    omeprazole (PRILOSEC) 20 MG capsule Take 1 capsule (20 mg total) by mouth once daily. 90 capsule 3    pioglitazone (ACTOS) 30 MG tablet Take 1 tablet (30 mg total) by mouth once daily. 90 tablet 4    potassium chloride SA (K-DUR,KLOR-CON) 20 MEQ tablet Take 1 tablet by mouth once daily 30 tablet 0    sars-cov-2, covid-19, (PFIZER COVID-19) 30 mcg/0.3 ml injection        No current facility-administered medications on file prior to visit.     Social History:     Social History     Tobacco Use    Smoking status: Former     Current packs/day: 0.00     Types: Cigarettes     Quit date: 3/20/2011     Years since quittin.4    Smokeless tobacco: Never    Tobacco comments:     smoked regularly for 35 years prior to this   Substance Use Topics    Alcohol use: Yes     Comment: occ     Family History:     Family History   Problem Relation Age of Onset    Diabetes Mother         has a pacemaker    Coronary artery disease Father         hx of cabg    Glaucoma Neg Hx      Physical Exam:   /65   Pulse 61   Ht 6' 2" (1.88 m)   Wt 132.5 kg (292 lb 1.8 oz)   SpO2 96%   BMI 37.50 kg/m²        Physical Exam  Vitals and nursing note reviewed.   Constitutional:       General: He is not in acute distress.     Appearance: Normal appearance. He is obese.   HENT:      Head: Normocephalic and atraumatic.      Nose: Nose normal.   Eyes:      General: No scleral icterus.     Extraocular Movements: Extraocular movements intact.      Conjunctiva/sclera: Conjunctivae normal.   Neck:      Thyroid: No thyromegaly.      Vascular: Hepatojugular reflux present. No carotid bruit or JVD.   Cardiovascular:      Rate and Rhythm: Normal rate. " Rhythm irregularly irregular.      Pulses: Normal pulses.      Heart sounds: Normal heart sounds. No murmur heard.     No friction rub. No gallop.   Pulmonary:      Effort: Pulmonary effort is normal.      Breath sounds: Normal breath sounds. No wheezing, rhonchi or rales.   Chest:      Chest wall: No tenderness.   Abdominal:      General: Bowel sounds are normal. There is no distension.      Palpations: Abdomen is soft.      Tenderness: There is no abdominal tenderness.   Musculoskeletal:      Cervical back: Neck supple.      Right lower le+ Pitting Edema (to knees bilaterally) present.      Left lower le+ Pitting Edema present.   Skin:     General: Skin is warm and dry.      Coloration: Skin is not pale.      Findings: No erythema or rash.      Nails: There is no clubbing.   Neurological:      Mental Status: He is alert and oriented to person, place, and time.   Psychiatric:         Mood and Affect: Mood and affect normal.         Behavior: Behavior normal.          Labs:     Lab Results   Component Value Date     2023    K 4.1 2023    CL 99 2023    CO2 27 2023    BUN 20 2023    CREATININE 1.4 2023    ANIONGAP 12 2023     Lab Results   Component Value Date    HGBA1C 6.3 (H) 2023     Lab Results   Component Value Date     (H) 2020    Lab Results   Component Value Date    WBC 6.29 2023    HGB 11.8 (L) 2023    HCT 36.2 (L) 2023     2023    GRAN 3.9 2023    GRAN 61.2 2023     Lab Results   Component Value Date    CHOL 197 2023    HDL 47 2023    LDLCALC 120.4 2023    TRIG 148 2023          Imaging:   Echocardiograms:   NOEMI 2022  The left ventricle is normal in size with normal systolic function. The estimated ejection fraction is 55%.  Normal right ventricular size with normal right ventricular systolic function.  Biatrial enlargement.  Mild tricuspid regurgitation.  Normal  appearing left atrial appendage. No thrombus is present in the appendage. Abnormal appendage velocities.    NOEMI 5/20/2022  NOEMI to evaluate for LA/TAWNY thrombus prior to DCCV.  No thrombus is present in the left atrial appendage or left atrium (contrast used). Abnormal appendage velocities 0.35m/s.  The left ventricle is mildly enlarged with mildly decreased systolic function.  The estimated ejection fraction is 45%.  Mild mitral regurgitation.  Normal right ventricular size with normal right ventricular systolic function.  Moderate tricuspid regurgitation.  Biatrial enlargement.  The sinuses of Valsalva is mildly dilated.    NOEMI 11/24/2020  With normal systolic function. The estimated ejection fraction is 55%.  Normal left ventricular diastolic function.  Normal right ventricular size with normal right ventricular systolic function.  Moderate left atrial enlargement.  Moderate right atrial enlargement.  Moderate mitral regurgitation.  Moderate to severe tricuspid regurgitation.    TTE 11/23/2020  The estimated PA systolic pressure is 29 mmHg.  There is left ventricular concentric remodeling.  The left ventricle is normal in size with normal systolic function. The estimated ejection fraction is 65%.  Normal left ventricular diastolic function.  Normal right ventricular size with normal right ventricular systolic function.  Mild left atrial enlargement.  Mild-to-moderate mitral regurgitation.  Mild tricuspid regurgitation.  Normal central venous pressure (3 mmHg).     Stress Tests:   None    Caths:   None      EKG 11/23/2020: Afib  EKG 4/12/2022: Afib     Assessment:     1. Persistent atrial fibrillation    2. H/O cardiac radiofrequency ablation    3. Chronic diastolic congestive heart failure    4. Hypertension associated with diabetes    5. Dyslipidemia    6. Type 2 diabetes mellitus with hyperglycemia, without long-term current use of insulin    7. Severe obesity (BMI 35.0-39.9) with comorbidity    8. RAFAEL on CPAP       Plan:     Chest discomfort  Obtain exercise stress echo     HFpEF  Mildly volume overloaded.   Start Jardiance 10 mg daily. Referral to pharmacy assistance placed.   Start Lasix 20 mg bid  BMP and BNP in 2 weeks   Discussed the importance of a low sodium diet    Persistent atrial fibrillation s/p PVI  Maintaining NSR on Amio   CHADSVASC 3. Continue Eliquis for CVA prophylaxis    Hypertension  Mildly elevated.   Start Lasix, as above.   Continue Lisinopril-HCTZ 20-25 mg daily     Dyslipidemia  Above goal   Start Zetia 10 mg daily   Continue Lipitor 80 mg   Mediterranean diet     Type 2 DM  A1c at goal.     Obesity   Increase aerobic exercise with a goal of at least 30 minutes, 5 days a week.    RAFAEL on CPAP   Continue CPAP     Follow up in 2 months.     Signed:  Steffi Bartholomew PA-C  Cardiology   [phone] - General  8/15/2023 12:29 PM

## 2023-08-16 ENCOUNTER — TELEPHONE (OUTPATIENT)
Dept: PHARMACY | Facility: CLINIC | Age: 58
End: 2023-08-16
Payer: COMMERCIAL

## 2023-08-16 NOTE — LETTER
August 16, 2023    Alfred Cuello  [address] [address]             [facility] - Pharmacy Assistance  [address]  [place] [address]  Phone: [phone]  Fax: [phone]  Email: [email or web address]   Dear Mr. Cuello,    My Name is María Sellers. I am a Pharmacy Technician reaching out on behalf of [facility]s Pharmacy Patient Assistance Team after receiving a referral from your provider inquiring about assistance with your medications. I tried to call you on 8/16/2023 but was unable to reach you. Our goal is to assist qualified patients with financial assistance for their medications to better help you achieve your health goals!    Please note that enrollment and eligibility into available support may require the following documents:    Proof of household Income( such as social security statement, 1099 form, pension statement or 3 consecutive pay stubs  Copy of all insurance cards (front and back)  Print out from your insurance or pharmacy showing how much you have spent on prescriptions this year  Signed and dated HIPAA /Patient Information Forms   (These forms will be sent to you once you contact us)     Please reach out to my phone number below if you are still in need of assistance with your medications. We will attempt to reach out to you through [employer] or via phone call again in 5 business days. We look forward to hearing from you soon!    [facility] you [facility] needs    Sincerely  María Sellers  Phone# [phone]  Fax# [phone]  Email: [email or web address]

## 2023-08-16 NOTE — TELEPHONE ENCOUNTER
I have reached out to Alfred Cuello to inform him of the [employer] application process for Jardiance and whats required to apply.  Alfred Cuello did not answer. I left a voicemail and mailed a letter introducing him to the pharmacy patient assistance program. I will follow up in 5 business days.

## 2023-08-21 DIAGNOSIS — E11.42 DIABETIC POLYNEUROPATHY ASSOCIATED WITH TYPE 2 DIABETES MELLITUS: ICD-10-CM

## 2023-08-21 RX ORDER — GABAPENTIN 300 MG/1
1200 CAPSULE ORAL 3 TIMES DAILY
Qty: 1080 CAPSULE | Refills: 0 | Status: SHIPPED | OUTPATIENT
Start: 2023-08-21 | End: 2023-11-23 | Stop reason: SDUPTHER

## 2023-09-02 ENCOUNTER — LAB VISIT (OUTPATIENT)
Dept: LAB | Facility: HOSPITAL | Age: 58
End: 2023-09-02
Attending: PHYSICIAN ASSISTANT
Payer: COMMERCIAL

## 2023-09-02 DIAGNOSIS — I50.32 CHRONIC DIASTOLIC CONGESTIVE HEART FAILURE: ICD-10-CM

## 2023-09-02 LAB
ANION GAP SERPL CALC-SCNC: 12 MMOL/L (ref 8–16)
BNP SERPL-MCNC: 80 PG/ML (ref 0–99)
BUN SERPL-MCNC: 26 MG/DL (ref 6–20)
CALCIUM SERPL-MCNC: 9.3 MG/DL (ref 8.7–10.5)
CHLORIDE SERPL-SCNC: 100 MMOL/L (ref 95–110)
CO2 SERPL-SCNC: 30 MMOL/L (ref 23–29)
CREAT SERPL-MCNC: 1.4 MG/DL (ref 0.5–1.4)
EST. GFR  (NO RACE VARIABLE): 58 ML/MIN/1.73 M^2
GLUCOSE SERPL-MCNC: 113 MG/DL (ref 70–110)
POTASSIUM SERPL-SCNC: 3.7 MMOL/L (ref 3.5–5.1)
SODIUM SERPL-SCNC: 142 MMOL/L (ref 136–145)

## 2023-09-02 PROCEDURE — 83880 ASSAY OF NATRIURETIC PEPTIDE: CPT | Performed by: PHYSICIAN ASSISTANT

## 2023-09-02 PROCEDURE — 36415 COLL VENOUS BLD VENIPUNCTURE: CPT | Performed by: PHYSICIAN ASSISTANT

## 2023-09-02 PROCEDURE — 80048 BASIC METABOLIC PNL TOTAL CA: CPT | Performed by: PHYSICIAN ASSISTANT

## 2023-09-08 DIAGNOSIS — I50.32 CHRONIC DIASTOLIC CONGESTIVE HEART FAILURE: Primary | ICD-10-CM

## 2023-09-14 ENCOUNTER — TELEPHONE (OUTPATIENT)
Dept: FAMILY MEDICINE | Facility: CLINIC | Age: 58
End: 2023-09-14
Payer: COMMERCIAL

## 2023-09-14 NOTE — TELEPHONE ENCOUNTER
----- Message from Henna Diggs sent at 9/14/2023  2:51 PM CDT -----  Name of Who is Calling:Patient           What is the request in detail: Patient is requesting refill as he will be leaving for out of town tomorrow.     mupirocin ointment 2%    Clotrimazole cream usp 1% .5 of oz not enough    [id number] - [place] - [address]  [address]  [place] [address]  Phone: [phone] Fax: [phone]          Can the clinic reply by [facility]:no           What Number to Call Back [facility]: [phone]

## 2023-09-29 ENCOUNTER — HOSPITAL ENCOUNTER (OUTPATIENT)
Dept: CARDIOLOGY | Facility: HOSPITAL | Age: 58
Discharge: HOME | End: 2023-09-29
Attending: PHYSICIAN ASSISTANT
Payer: COMMERCIAL

## 2023-09-29 VITALS — HEIGHT: 74 IN | BODY MASS INDEX: 37.47 KG/M2 | WEIGHT: 292 LBS

## 2023-09-29 DIAGNOSIS — R94.39 POSITIVE CARDIAC STRESS TEST: ICD-10-CM

## 2023-09-29 DIAGNOSIS — E78.5 DYSLIPIDEMIA: Primary | ICD-10-CM

## 2023-09-29 DIAGNOSIS — I50.32 CHRONIC DIASTOLIC CONGESTIVE HEART FAILURE: ICD-10-CM

## 2023-09-29 LAB
ASCENDING AORTA: 3.33 CM
BSA FOR ECHO PROCEDURE: 2.63 M2
CV ECHO LV RWT: 0.49 CM
CV STRESS BASE HR: 63 BPM
DIASTOLIC BLOOD PRESSURE: 45 MMHG
DOP CALC LVOT AREA: 6.1 CM2
DOP CALC LVOT DIAMETER: 2.79 CM
DOP CALC LVOT PEAK VEL: 1.29 M/S
DOP CALC LVOT STROKE VOLUME: 181.42 CM3
DOP CALCLVOT PEAK VEL VTI: 29.69 CM
E WAVE DECELERATION TIME: 217.33 MSEC
E/A RATIO: 1.16
E/E' RATIO: 10.19 M/S
ECHO LV POSTERIOR WALL: 1.05 CM (ref 0.6–1.1)
EJECTION FRACTION: 63 %
FRACTIONAL SHORTENING: 30 % (ref 28–44)
INTERVENTRICULAR SEPTUM: 1.11 CM (ref 0.6–1.1)
IVRT: 71.36 MSEC
LA MAJOR: 6.87 CM
LA MINOR: 6.49 CM
LA WIDTH: 4.31 CM
LEFT ATRIUM SIZE: 5.05 CM
LEFT ATRIUM VOLUME INDEX: 48.4 ML/M2
LEFT ATRIUM VOLUME: 123.48 CM3
LEFT INTERNAL DIMENSION IN SYSTOLE: 3.02 CM (ref 2.1–4)
LEFT VENTRICLE DIASTOLIC VOLUME INDEX: 32.41 ML/M2
LEFT VENTRICLE DIASTOLIC VOLUME: 82.65 ML
LEFT VENTRICLE MASS INDEX: 62 G/M2
LEFT VENTRICLE SYSTOLIC VOLUME INDEX: 13.9 ML/M2
LEFT VENTRICLE SYSTOLIC VOLUME: 35.46 ML
LEFT VENTRICULAR INTERNAL DIMENSION IN DIASTOLE: 4.29 CM (ref 3.5–6)
LEFT VENTRICULAR MASS: 158.17 G
LV LATERAL E/E' RATIO: 10.7 M/S
LV SEPTAL E/E' RATIO: 9.73 M/S
MV A" WAVE DURATION": 9.71 MSEC
MV PEAK A VEL: 0.92 M/S
MV PEAK E VEL: 1.07 M/S
MV STENOSIS PRESSURE HALF TIME: 63.03 MS
MV VALVE AREA P 1/2 METHOD: 3.49 CM2
OHS CV CPX 1 MINUTE RECOVERY HEART RATE: 102 BPM
OHS CV CPX 85 PERCENT MAX PREDICTED HEART RATE MALE: 136
OHS CV CPX ESTIMATED METS: 9
OHS CV CPX MAX PREDICTED HEART RATE: 160
OHS CV CPX PATIENT IS FEMALE: 0
OHS CV CPX PATIENT IS MALE: 1
OHS CV CPX PEAK DIASTOLIC BLOOD PRESSURE: 63 MMHG
OHS CV CPX PEAK HEAR RATE: 120 BPM
OHS CV CPX PEAK RATE PRESSURE PRODUCT: ABNORMAL
OHS CV CPX PEAK SYSTOLIC BLOOD PRESSURE: 164 MMHG
OHS CV CPX PERCENT MAX PREDICTED HEART RATE ACHIEVED: 75
OHS CV CPX RATE PRESSURE PRODUCT PRESENTING: 7434
PISA TR MAX VEL: 2.24 M/S
POST STRESS EJECTION FRACTION: 65 %
PULM VEIN S/D RATIO: 0.62
PV PEAK D VEL: 0.52 M/S
PV PEAK S VEL: 0.32 M/S
RA MAJOR: 5.8 CM
RA PRESSURE ESTIMATED: 3 MMHG
RA WIDTH: 4.6 CM
RIGHT VENTRICULAR END-DIASTOLIC DIMENSION: 3.82 CM
RV TB RVSP: 5 MMHG
SINUS: 3.64 CM
STJ: 3.25 CM
STRESS ECHO POST EXERCISE DUR MIN: 5 MINUTES
STRESS ECHO POST EXERCISE DUR SEC: 30 SECONDS
SYSTOLIC BLOOD PRESSURE: 118 MMHG
TDI LATERAL: 0.1 M/S
TDI SEPTAL: 0.11 M/S
TDI: 0.11 M/S
TR MAX PG: 20 MMHG
TRICUSPID ANNULAR PLANE SYSTOLIC EXCURSION: 2.05 CM
TV REST PULMONARY ARTERY PRESSURE: 23 MMHG
Z-SCORE OF LEFT VENTRICULAR DIMENSION IN END DIASTOLE: -13.13
Z-SCORE OF LEFT VENTRICULAR DIMENSION IN END SYSTOLE: -8.99

## 2023-09-29 PROCEDURE — 93351 STRESS TTE COMPLETE: CPT

## 2023-09-29 PROCEDURE — 93351 STRESS ECHO (CUPID ONLY): ICD-10-PCS | Mod: 26,,, | Performed by: INTERNAL MEDICINE

## 2023-09-29 PROCEDURE — 93351 STRESS TTE COMPLETE: CPT | Mod: 26,,, | Performed by: INTERNAL MEDICINE

## 2023-09-29 RX ORDER — CLOPIDOGREL BISULFATE 75 MG/1
75 TABLET ORAL DAILY
Qty: 30 TABLET | Refills: 11 | Status: SHIPPED | OUTPATIENT
Start: 2023-09-29 | End: 2024-09-28

## 2023-09-29 RX ORDER — NITROGLYCERIN 0.4 MG/1
0.4 TABLET SUBLINGUAL EVERY 5 MIN PRN
Qty: 30 TABLET | Refills: 2 | Status: SHIPPED | OUTPATIENT
Start: 2023-09-29

## 2023-09-29 NOTE — PROGRESS NOTES
Patient with stress ended early due to anginal pain. This is similar to his baseline angina which he has been having for months (at least since prior to his ablation over 6 months ago). His chest pain completely resolved at rest within about 10 minutes after getting off the treadmill and he returned to his baseline without interventions.     Discussed with staff and the patient. Will start him on Plavix and get him set up with interventional clinic on Monday for sometime in the coming weeks. He states with work he is extremely restricted on time and being able to schedule appts. I emphasized the importance of getting in relatively soon, but this seems like stable angina.     Stress echo with mild WMAs. EKG portion negative although terminated early due to chest pain.    #Stable angina  #Positive stress echo    Plan:  - Plavix, eliquis  - Repeat lipid panel with labs scheduled 10/6 already (recent increase in atorvastatin this June), LDL not at goal  - Schedule with interventional clinic for further ischemic eval, likely angiogram  - Will cc general cardiology      Connor Gillies, DO, PGY-IV  [facility]     Statement Selected

## 2023-10-02 ENCOUNTER — DOCUMENTATION ONLY (OUTPATIENT)
Dept: CARDIOLOGY | Facility: HOSPITAL | Age: 58
End: 2023-10-02
Payer: COMMERCIAL

## 2023-10-02 ENCOUNTER — PATIENT MESSAGE (OUTPATIENT)
Dept: CARDIOLOGY | Facility: HOSPITAL | Age: 58
End: 2023-10-02
Payer: COMMERCIAL

## 2023-10-07 ENCOUNTER — LAB VISIT (OUTPATIENT)
Dept: LAB | Facility: HOSPITAL | Age: 58
End: 2023-10-07
Attending: NURSE PRACTITIONER
Payer: COMMERCIAL

## 2023-10-07 DIAGNOSIS — I50.32 CHRONIC DIASTOLIC CONGESTIVE HEART FAILURE: ICD-10-CM

## 2023-10-07 DIAGNOSIS — E11.65 TYPE 2 DIABETES MELLITUS WITH HYPERGLYCEMIA, WITHOUT LONG-TERM CURRENT USE OF INSULIN: ICD-10-CM

## 2023-10-07 DIAGNOSIS — E78.5 DYSLIPIDEMIA: ICD-10-CM

## 2023-10-07 DIAGNOSIS — R94.39 POSITIVE CARDIAC STRESS TEST: ICD-10-CM

## 2023-10-07 LAB
ALBUMIN SERPL BCP-MCNC: 4 G/DL (ref 3.5–5.2)
ALP SERPL-CCNC: 69 U/L (ref 55–135)
ALT SERPL W/O P-5'-P-CCNC: 29 U/L (ref 10–44)
ANION GAP SERPL CALC-SCNC: 8 MMOL/L (ref 8–16)
ANION GAP SERPL CALC-SCNC: 8 MMOL/L (ref 8–16)
AST SERPL-CCNC: 21 U/L (ref 10–40)
BILIRUB SERPL-MCNC: 0.5 MG/DL (ref 0.1–1)
BUN SERPL-MCNC: 23 MG/DL (ref 6–20)
BUN SERPL-MCNC: 23 MG/DL (ref 6–20)
CALCIUM SERPL-MCNC: 9.5 MG/DL (ref 8.7–10.5)
CALCIUM SERPL-MCNC: 9.5 MG/DL (ref 8.7–10.5)
CHLORIDE SERPL-SCNC: 101 MMOL/L (ref 95–110)
CHLORIDE SERPL-SCNC: 101 MMOL/L (ref 95–110)
CHOLEST SERPL-MCNC: 134 MG/DL (ref 120–199)
CHOLEST/HDLC SERPL: 3.1 {RATIO} (ref 2–5)
CO2 SERPL-SCNC: 30 MMOL/L (ref 23–29)
CO2 SERPL-SCNC: 30 MMOL/L (ref 23–29)
CREAT SERPL-MCNC: 1.5 MG/DL (ref 0.5–1.4)
CREAT SERPL-MCNC: 1.5 MG/DL (ref 0.5–1.4)
EST. GFR  (NO RACE VARIABLE): 53 ML/MIN/1.73 M^2
EST. GFR  (NO RACE VARIABLE): 53 ML/MIN/1.73 M^2
ESTIMATED AVG GLUCOSE: 134 MG/DL (ref 68–131)
GLUCOSE SERPL-MCNC: 102 MG/DL (ref 70–110)
GLUCOSE SERPL-MCNC: 102 MG/DL (ref 70–110)
HBA1C MFR BLD: 6.3 % (ref 4–5.6)
HDLC SERPL-MCNC: 43 MG/DL (ref 40–75)
HDLC SERPL: 32.1 % (ref 20–50)
LDLC SERPL CALC-MCNC: 68.8 MG/DL (ref 63–159)
NONHDLC SERPL-MCNC: 91 MG/DL
POTASSIUM SERPL-SCNC: 4.3 MMOL/L (ref 3.5–5.1)
POTASSIUM SERPL-SCNC: 4.3 MMOL/L (ref 3.5–5.1)
PROT SERPL-MCNC: 6.9 G/DL (ref 6–8.4)
SODIUM SERPL-SCNC: 139 MMOL/L (ref 136–145)
SODIUM SERPL-SCNC: 139 MMOL/L (ref 136–145)
TRIGL SERPL-MCNC: 111 MG/DL (ref 30–150)
TSH SERPL DL<=0.005 MIU/L-ACNC: 0.96 UIU/ML (ref 0.4–4)

## 2023-10-07 PROCEDURE — 80053 COMPREHEN METABOLIC PANEL: CPT | Performed by: NURSE PRACTITIONER

## 2023-10-07 PROCEDURE — 83036 HEMOGLOBIN GLYCOSYLATED A1C: CPT | Performed by: NURSE PRACTITIONER

## 2023-10-07 PROCEDURE — 84443 ASSAY THYROID STIM HORMONE: CPT | Performed by: NURSE PRACTITIONER

## 2023-10-07 PROCEDURE — 80061 LIPID PANEL: CPT | Performed by: STUDENT IN AN ORGANIZED HEALTH CARE EDUCATION/TRAINING PROGRAM

## 2023-10-07 PROCEDURE — 36415 COLL VENOUS BLD VENIPUNCTURE: CPT | Mod: PO | Performed by: NURSE PRACTITIONER

## 2023-10-11 NOTE — ASSESSMENT & PLAN NOTE
Encouraged lifestyle modifications    Otezla Pregnancy And Lactation Text: This medication is Pregnancy Category C and it isn't known if it is safe during pregnancy. It is unknown if it is excreted in breast milk.

## 2023-10-11 NOTE — ASSESSMENT & PLAN NOTE
-- Reviewed goals of therapy are to get the best control we can without hypoglycemia  -- Unable to have brand meds r/t large pharmacy deductible and/or large monthly co-pays      A1c at goal               Consider CGMS in the future               Minimal reported hypoglycemia     Discussed patient care assistance but he would like to avoid at times as he does think he would meet criteria. Discussed Cr and GFR have worsened, will decrease metformin to 1000 mg twice daily and recheck RFP in one month. He will have colonoscopy this month, will ask him to hold pm dose of glimepiride and hold both glimepiride and actos.     Medication Changes   Continue   glimepiride 4 mg twice daily  Actos 30 mg qam,   Metformin 1000 mg twice daily   -- Advised frequent self blood glucose monitoring.  Patient encouraged to document glucose results and bring them to every clinic visit    -- Hypoglycemia precautions discussed. Instructed on precautions before driving.    -- Call for Bg repeatedly < 90 or > 180.   -- Close adherence to lifestyle changes recommended.   -- Periodic follow ups for eye evaluations, foot care and dental care suggested

## 2023-10-11 NOTE — PROGRESS NOTES
Subjective:      Patient ID: Alfred Cuello is a 58 y.o. male.    Chief Complaint:  diabetes    History of Present Illness  Alfred Cuello presents today for follow up of diabetes and chronic conditions pending review including HTN, HLP, RAFAEL, vitamin d deficiency, obesity. Previous patient of FABRICIO Light and myself. Last seen in June 2023    This is a MyChart video visit.    The patient location is: at home   The chief complaint leading to consultation is: diabetes  Visit type: audiovisual    Face to Face time with patient: 21 minutes   35 minutes of total time spent on the encounter, which includes face to face time and non-face to face time preparing to see the patient (eg, review of tests), Obtaining and/or reviewing separately obtained history, Documenting clinical information in the electronic or other health record, Independently interpreting results (not separately reported) and communicating results to the patient/family/caregiver, or Care coordination (not separately reported).    Each patient to whom he or she provides medical services by telemedicine is:  (1) informed of the relationship between the physician and patient and the respective role of any other health care provider with respect to management of the patient; and (2) notified that he or she may decline to receive medical services by telemedicine and may withdraw from such care at any time.    Had ablation in Dec 2022.   Since his last visit, he had stress test that was positive for ischemia -will be seeing cardiology. He is also scheduled for colonoscopy.     With regards to the diabetes:    Diagnosed with Type 2 DM in 2013  Family History of Type 2 DM: mother  Known complications:  DKA/HHS: denies   RN: -; 2/2022 due and will make appt  PN: +; sees podiatry, last visit in July 2022  Nephropathy: -;   Gastroparesis: denies   CAD: denies     Current regimen:  glimepiride 4 mg twice daily  Actos 30 mg qam,   metformin 850 mg three times  "daily    Missed doses- occ missed dose of lunch dose   Does have alarm set to remind him to take medication    Other medications tried:  Previously controlled on jardiance and ozempic- has been off bc of deductible issues    Checking bg a couple of times a week  Typically 140s; 130-150s; rare 150's    Hypoglycemia when he forgets to eat; does not occur often  Diet: Eats 3 Meals a day, snacks- PB crackers, fruit, nuts, simple popcorn  Drinks: water; unsweet tea   ETOH: occ alcohol    Exercise: none but active at work- walks for work     Education - last visit: awhile ago, none in system. Politely declines.     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   HgA1C Testing and Control   Lab Results   Component Value Date    HGBA1C 6.3 (H) 10/07/2023      Annually/Less than 8% Yes   Lipid profile : 10/07/2023 Annually Yes   LDL control Lab Results   Component Value Date    LDLCALC 68.8 10/07/2023    Annually/Less than 100 mg/dl  No   Nephropathy screening Lab Results   Component Value Date    LABMICR 17.0 04/22/2023     No results found for: "PROTEINUA" Annually Yes   Blood pressure BP Readings from Last 1 Encounters:   08/15/23 139/65    Less than 140/90 Yes   Dilated retinal exam : 02/08/2022 Annually Yes   Foot exam   : 04/18/2023 Annually Yes     With regards to dyslipidemia,   He is on atorvastatin 80 mg daily (recently increased)      Latest Reference Range & Units 04/22/23 08:58 10/07/23 09:13   Cholesterol Total 120 - 199 mg/dL 197 134   HDL 40 - 75 mg/dL 47 43   HDL/Cholesterol Ratio 20.0 - 50.0 % 23.9 32.1   LDL Cholesterol External 63.0 - 159.0 mg/dL 120.4 68.8   Non-HDL Cholesterol mg/dL 150 91   Total " Cholesterol/HDL Ratio 2.0 - 5.0  4.2 3.1   Triglycerides 30 - 150 mg/dL 148 111     Of note he is on amiodarone for the last 1-1.5 years.   Lab Results   Component Value Date    TSH 0.956 10/07/2023    G8PWMHV 96 07/29/2019    FREET4 0.97 10/01/2022       Review of Systems   Constitutional:  Negative for fatigue and unexpected weight change.   Eyes:  Negative for visual disturbance.   Endocrine: Negative for polydipsia, polyphagia and polyuria.       Lab Review:   Lab Results   Component Value Date    HGBA1C 6.3 (H) 10/07/2023    HGBA1C 6.3 (H) 04/22/2023    HGBA1C 6.6 (H) 10/01/2022      Lab Results   Component Value Date    CHOL 134 10/07/2023    HDL 43 10/07/2023    LDLCALC 68.8 10/07/2023    TRIG 111 10/07/2023    CHOLHDL 32.1 10/07/2023     Lab Results   Component Value Date     10/07/2023     10/07/2023    K 4.3 10/07/2023    K 4.3 10/07/2023     10/07/2023     10/07/2023    CO2 30 (H) 10/07/2023    CO2 30 (H) 10/07/2023     10/07/2023     10/07/2023    BUN 23 (H) 10/07/2023    BUN 23 (H) 10/07/2023    CREATININE 1.5 (H) 10/07/2023    CREATININE 1.5 (H) 10/07/2023    CALCIUM 9.5 10/07/2023    CALCIUM 9.5 10/07/2023    PROT 6.9 10/07/2023    ALBUMIN 4.0 10/07/2023    BILITOT 0.5 10/07/2023    ALKPHOS 69 10/07/2023    AST 21 10/07/2023    ALT 29 10/07/2023    ANIONGAP 8 10/07/2023    ANIONGAP 8 10/07/2023    ESTGFRAFRICA >60 05/14/2022    EGFRNONAA >60 05/14/2022    TSH 0.956 10/07/2023     Vit D, 25-Hydroxy   Date Value Ref Range Status   10/01/2022 66 30 - 96 ng/mL Final     Comment:     Vitamin D deficiency.........<10 ng/mL                              Vitamin D insufficiency......10-29 ng/mL       Vitamin D sufficiency........> or equal to 30 ng/mL  Vitamin D toxicity............>100 ng/mL       Assessment and Plan     1. Type 2 diabetes mellitus with hyperglycemia, without long-term current use of insulin  Comprehensive Metabolic Panel    Hemoglobin A1C    Renal  Function Panel    metFORMIN (GLUCOPHAGE-XR) 500 MG ER 24hr tablet      2. Severe obesity (BMI 35.0-39.9) with comorbidity        3. Dyslipidemia        4. Hypertension associated with diabetes        5. Persistent atrial fibrillation            Type 2 diabetes mellitus with hyperglycemia, without long-term current use of insulin  -- Reviewed goals of therapy are to get the best control we can without hypoglycemia  -- Unable to have brand meds r/t large pharmacy deductible and/or large monthly co-pays      A1c at goal               Consider CGMS in the future               Minimal reported hypoglycemia     Discussed patient care assistance but he would like to avoid at times as he does think he would meet criteria. Discussed Cr and GFR have worsened, will decrease metformin to 1000 mg twice daily and recheck RFP in one month. He will have colonoscopy this month, will ask him to hold pm dose of glimepiride and hold both glimepiride and actos.     Medication Changes   Continue   glimepiride 4 mg twice daily  Actos 30 mg qam,   Metformin 1000 mg twice daily   -- Advised frequent self blood glucose monitoring.  Patient encouraged to document glucose results and bring them to every clinic visit    -- Hypoglycemia precautions discussed. Instructed on precautions before driving.    -- Call for Bg repeatedly < 90 or > 180.   -- Close adherence to lifestyle changes recommended.   -- Periodic follow ups for eye evaluations, foot care and dental care suggested      Severe obesity (BMI 35.0-39.9) with comorbidity  Encouraged lifestyle modifications     Dyslipidemia  LDL at goal   Continue atorvastatin 80 mg daily     Hypertension associated with diabetes  On ACEI    Persistent atrial fibrillation  Managed by cardio      Follow up in about 6 months (around 4/12/2024).

## 2023-10-12 ENCOUNTER — PATIENT MESSAGE (OUTPATIENT)
Dept: ENDOCRINOLOGY | Facility: CLINIC | Age: 58
End: 2023-10-12

## 2023-10-12 ENCOUNTER — OFFICE VISIT (OUTPATIENT)
Dept: ENDOCRINOLOGY | Facility: CLINIC | Age: 58
End: 2023-10-12
Payer: COMMERCIAL

## 2023-10-12 DIAGNOSIS — I48.19 PERSISTENT ATRIAL FIBRILLATION: ICD-10-CM

## 2023-10-12 DIAGNOSIS — I15.2 HYPERTENSION ASSOCIATED WITH DIABETES: ICD-10-CM

## 2023-10-12 DIAGNOSIS — E11.65 TYPE 2 DIABETES MELLITUS WITH HYPERGLYCEMIA, WITHOUT LONG-TERM CURRENT USE OF INSULIN: ICD-10-CM

## 2023-10-12 DIAGNOSIS — E78.5 DYSLIPIDEMIA: ICD-10-CM

## 2023-10-12 DIAGNOSIS — E11.59 HYPERTENSION ASSOCIATED WITH DIABETES: ICD-10-CM

## 2023-10-12 DIAGNOSIS — E66.01 SEVERE OBESITY (BMI 35.0-39.9) WITH COMORBIDITY: ICD-10-CM

## 2023-10-12 PROCEDURE — 3044F HG A1C LEVEL LT 7.0%: CPT | Mod: CPTII,95,, | Performed by: NURSE PRACTITIONER

## 2023-10-12 PROCEDURE — 1159F MED LIST DOCD IN RCRD: CPT | Mod: CPTII,95,, | Performed by: NURSE PRACTITIONER

## 2023-10-12 PROCEDURE — 1160F RVW MEDS BY RX/DR IN RCRD: CPT | Mod: CPTII,95,, | Performed by: NURSE PRACTITIONER

## 2023-10-12 PROCEDURE — 3072F PR LOW RISK FOR RETINOPATHY: ICD-10-PCS | Mod: CPTII,95,, | Performed by: NURSE PRACTITIONER

## 2023-10-12 PROCEDURE — 3072F LOW RISK FOR RETINOPATHY: CPT | Mod: CPTII,95,, | Performed by: NURSE PRACTITIONER

## 2023-10-12 PROCEDURE — 99214 PR OFFICE/OUTPT VISIT, EST, LEVL IV, 30-39 MIN: ICD-10-PCS | Mod: 95,,, | Performed by: NURSE PRACTITIONER

## 2023-10-12 PROCEDURE — 4010F ACE/ARB THERAPY RXD/TAKEN: CPT | Mod: CPTII,95,, | Performed by: NURSE PRACTITIONER

## 2023-10-12 PROCEDURE — 99214 OFFICE O/P EST MOD 30 MIN: CPT | Mod: 95,,, | Performed by: NURSE PRACTITIONER

## 2023-10-12 PROCEDURE — 3061F NEG MICROALBUMINURIA REV: CPT | Mod: CPTII,95,, | Performed by: NURSE PRACTITIONER

## 2023-10-12 PROCEDURE — 3044F PR MOST RECENT HEMOGLOBIN A1C LEVEL <7.0%: ICD-10-PCS | Mod: CPTII,95,, | Performed by: NURSE PRACTITIONER

## 2023-10-12 PROCEDURE — 4010F PR ACE/ARB THEARPY RXD/TAKEN: ICD-10-PCS | Mod: CPTII,95,, | Performed by: NURSE PRACTITIONER

## 2023-10-12 PROCEDURE — 3066F NEPHROPATHY DOC TX: CPT | Mod: CPTII,95,, | Performed by: NURSE PRACTITIONER

## 2023-10-12 PROCEDURE — 3066F PR DOCUMENTATION OF TREATMENT FOR NEPHROPATHY: ICD-10-PCS | Mod: CPTII,95,, | Performed by: NURSE PRACTITIONER

## 2023-10-12 PROCEDURE — 1159F PR MEDICATION LIST DOCUMENTED IN MEDICAL RECORD: ICD-10-PCS | Mod: CPTII,95,, | Performed by: NURSE PRACTITIONER

## 2023-10-12 PROCEDURE — 3061F PR NEG MICROALBUMINURIA RESULT DOCUMENTED/REVIEW: ICD-10-PCS | Mod: CPTII,95,, | Performed by: NURSE PRACTITIONER

## 2023-10-12 PROCEDURE — 1160F PR REVIEW ALL MEDS BY PRESCRIBER/CLIN PHARMACIST DOCUMENTED: ICD-10-PCS | Mod: CPTII,95,, | Performed by: NURSE PRACTITIONER

## 2023-10-12 RX ORDER — METFORMIN HYDROCHLORIDE 500 MG/1
1000 TABLET, EXTENDED RELEASE ORAL 2 TIMES DAILY WITH MEALS
Qty: 360 TABLET | Refills: 3 | Status: SHIPPED | OUTPATIENT
Start: 2023-10-12 | End: 2024-10-11

## 2023-10-12 NOTE — PATIENT INSTRUCTIONS
"Cholesterol               LDL at goal  Continue atorvastatin 80 mg daily      High cholesterol foods to avoid/limit:      C: Cheese, milk, dairy  A: Animal Fats: hot dogs and hamburgers  G: "Getting it away from home": going out to eat a lot  E: Extra Fat: cookies, candy, and sweets  F: Family History     Remember high cholesterol can clog your arteries and increase risk for heart attack or stroke.                 Diabetes              A1c at goal               Consider CGMS in the future               Minimal reported hypoglycemia     Discussed patient care assistance but he would like to avoid at times as he does think he would meet criteria. Discussed Cr and GFR have worsened, will decrease metformin to 1000 mg twice daily and recheck RFP in one month. He will have colonoscopy this month, will ask him to hold pm dose of glimepiride and hold both glimepiride and actos.     Medication Changes   Continue   glimepiride 4 mg twice daily  Actos 30 mg qam,   Metformin 1000 mg twice daily     Hypoglycemia - Low Blood Sugar    What can you do?  Rule of 15:   Test your blood sugar  If glucose is between 50-70 mg/dL then ingest 15 grams of fast-acting carbs  If glucose is less than 50 mg/dL then ingest 30 grams of fast-acting carbs  Ingest 15 grams of fast-acting carbohydrate - such as:   3-4 glucose tablets  4 oz juice  ½ can regular soda pop  15 skittles or mini jelly beans   Re-check your blood sugar in 15 minutes. If its less than 70mg/dl, repeat steps 2 and 3.  If your next meal is more than 1 hour away, eat an additional 15 grams of carbohydrate and 1 ounce of protein (examples include crackers with cheese or one-half of a sandwich with peanut butter). It is important not to eat too much because this can raise your blood sugar above the target level.      After your blood sugar has normalized, think about why you went low. If you notice a pattern of low blood sugars, contact your Diabetes Team. We may need to adjust " your medication.

## 2023-10-13 ENCOUNTER — TELEPHONE (OUTPATIENT)
Dept: CARDIOLOGY | Facility: CLINIC | Age: 58
End: 2023-10-13
Payer: COMMERCIAL

## 2023-10-13 DIAGNOSIS — R94.39 POSITIVE CARDIAC STRESS TEST: Primary | ICD-10-CM

## 2023-10-13 NOTE — TELEPHONE ENCOUNTER
----- Message from Molly Bowie RN sent at 10/13/2023  5:37 PM CDT -----    ----- Message -----  From: Valentina Maldonado PA-C  Sent: 10/13/2023  10:28 AM CDT  To: [place] [facility] Staff    I wasn't able to speak with the patient regarding results, but I left a brief voicemail and will try to let him know through [employer]. Please help him schedule a consult with interventional cards. Thanks

## 2023-10-13 NOTE — TELEPHONE ENCOUNTER
"Spoke with pt and gave him stress test results as per Valentina Maldonado' note below. I tried to schedule him with Interventional Cardiology but pt stated that " he knows this may be serious but does not want to schedule any more doctor appts right now because he has to work - he has had a lot of appts lately and missed a lot of work". He said he will call at a later date to schedule the interventional appt.tried to convince him to let me schedule him but he refused.       Beny Cuello,      My name is Valentina Maldonado, and I am a physician assistant with [facility]. I'm currently covering for my colleague Steffi Bartholomew while she is out of the office. I called your cell this AM and left a voicemail. I received the results of your recent stress test and also reviewed them with a staff physician. The results are abnormal, and we are recommending that you have a consultation with Interventional Cardiology to discuss a possible angiogram procedure. I have asked a staff member to reach out to you to schedule that consult at your earliest convenience. Please feel free to reach out with any questions or concerns.      Thank you,   Valentina Maldonado PA-C  General Cardiology   Written by Valentina Maldonado PA-C on 10/13/2023 10:28 AM CDT  "

## 2023-10-13 NOTE — PROGRESS NOTES
"Results of recent stress test given to patient by phone and through [facility]. Pt verbalized understanding but refuses Interventional appt for now " because he he has to work and has missed a lot due to appts ".  He will call back at a later date when ready to schedule.  "

## 2023-10-16 ENCOUNTER — TELEPHONE (OUTPATIENT)
Dept: GASTROENTEROLOGY | Facility: CLINIC | Age: 58
End: 2023-10-16
Payer: COMMERCIAL

## 2023-10-16 NOTE — TELEPHONE ENCOUNTER
Spoke with Ms. Cuello in regards to Mr. Cuello's colonsocopy scheduled for 10/2. I asked ig he was recently started on Plavix. She stated he was and asked why. I advised ehr that once started on a blood thinner we have to wait 6 months - 1 year before stopping it for procedures. She verbalized understanding. She stated that she was calling this morning to cancel his colonoscopy because insurance said it was not medically necessary and they were not going to pay for it. I advised her we will have to post pone for now, and get clearance from his cardiologist prior to rescheduling. She stated he is not having any GI issues at this time, so it is not emergent he gets this. No further issues noted.

## 2023-10-19 ENCOUNTER — OFFICE VISIT (OUTPATIENT)
Dept: FAMILY MEDICINE | Facility: CLINIC | Age: 58
End: 2023-10-19
Payer: COMMERCIAL

## 2023-10-19 VITALS
OXYGEN SATURATION: 96 % | TEMPERATURE: 99 F | SYSTOLIC BLOOD PRESSURE: 136 MMHG | HEART RATE: 87 BPM | DIASTOLIC BLOOD PRESSURE: 60 MMHG | WEIGHT: 279.75 LBS | BODY MASS INDEX: 35.9 KG/M2 | HEIGHT: 74 IN | RESPIRATION RATE: 18 BRPM

## 2023-10-19 DIAGNOSIS — Z01.00 DIABETIC EYE EXAM: ICD-10-CM

## 2023-10-19 DIAGNOSIS — K21.9 GASTROESOPHAGEAL REFLUX DISEASE WITHOUT ESOPHAGITIS: ICD-10-CM

## 2023-10-19 DIAGNOSIS — I15.2 HYPERTENSION ASSOCIATED WITH DIABETES: Primary | ICD-10-CM

## 2023-10-19 DIAGNOSIS — D50.9 IRON DEFICIENCY ANEMIA, UNSPECIFIED IRON DEFICIENCY ANEMIA TYPE: ICD-10-CM

## 2023-10-19 DIAGNOSIS — E78.5 DYSLIPIDEMIA: ICD-10-CM

## 2023-10-19 DIAGNOSIS — E11.65 TYPE 2 DIABETES MELLITUS WITH HYPERGLYCEMIA, WITHOUT LONG-TERM CURRENT USE OF INSULIN: ICD-10-CM

## 2023-10-19 DIAGNOSIS — I48.19 PERSISTENT ATRIAL FIBRILLATION: ICD-10-CM

## 2023-10-19 DIAGNOSIS — E11.59 HYPERTENSION ASSOCIATED WITH DIABETES: Primary | ICD-10-CM

## 2023-10-19 DIAGNOSIS — E11.9 DIABETIC EYE EXAM: ICD-10-CM

## 2023-10-19 DIAGNOSIS — E55.9 VITAMIN D DEFICIENCY: ICD-10-CM

## 2023-10-19 DIAGNOSIS — Z23 FLU VACCINE NEED: ICD-10-CM

## 2023-10-19 DIAGNOSIS — E66.01 SEVERE OBESITY (BMI 35.0-39.9) WITH COMORBIDITY: ICD-10-CM

## 2023-10-19 PROBLEM — E66.9 OBESITY (BMI 30-39.9): Status: RESOLVED | Noted: 2018-04-17 | Resolved: 2023-10-19

## 2023-10-19 PROCEDURE — 3078F DIAST BP <80 MM HG: CPT | Mod: CPTII,S$GLB,, | Performed by: FAMILY MEDICINE

## 2023-10-19 PROCEDURE — 3061F PR NEG MICROALBUMINURIA RESULT DOCUMENTED/REVIEW: ICD-10-PCS | Mod: CPTII,S$GLB,, | Performed by: FAMILY MEDICINE

## 2023-10-19 PROCEDURE — 3061F NEG MICROALBUMINURIA REV: CPT | Mod: CPTII,S$GLB,, | Performed by: FAMILY MEDICINE

## 2023-10-19 PROCEDURE — 1160F PR REVIEW ALL MEDS BY PRESCRIBER/CLIN PHARMACIST DOCUMENTED: ICD-10-PCS | Mod: CPTII,S$GLB,, | Performed by: FAMILY MEDICINE

## 2023-10-19 PROCEDURE — 1160F RVW MEDS BY RX/DR IN RCRD: CPT | Mod: CPTII,S$GLB,, | Performed by: FAMILY MEDICINE

## 2023-10-19 PROCEDURE — 3008F BODY MASS INDEX DOCD: CPT | Mod: CPTII,S$GLB,, | Performed by: FAMILY MEDICINE

## 2023-10-19 PROCEDURE — 3075F PR MOST RECENT SYSTOLIC BLOOD PRESS GE 130-139MM HG: ICD-10-PCS | Mod: CPTII,S$GLB,, | Performed by: FAMILY MEDICINE

## 2023-10-19 PROCEDURE — 99214 OFFICE O/P EST MOD 30 MIN: CPT | Mod: S$GLB,,, | Performed by: FAMILY MEDICINE

## 2023-10-19 PROCEDURE — 3075F SYST BP GE 130 - 139MM HG: CPT | Mod: CPTII,S$GLB,, | Performed by: FAMILY MEDICINE

## 2023-10-19 PROCEDURE — 4010F PR ACE/ARB THEARPY RXD/TAKEN: ICD-10-PCS | Mod: CPTII,S$GLB,, | Performed by: FAMILY MEDICINE

## 2023-10-19 PROCEDURE — 3066F NEPHROPATHY DOC TX: CPT | Mod: CPTII,S$GLB,, | Performed by: FAMILY MEDICINE

## 2023-10-19 PROCEDURE — 99214 PR OFFICE/OUTPT VISIT, EST, LEVL IV, 30-39 MIN: ICD-10-PCS | Mod: S$GLB,,, | Performed by: FAMILY MEDICINE

## 2023-10-19 PROCEDURE — 3044F PR MOST RECENT HEMOGLOBIN A1C LEVEL <7.0%: ICD-10-PCS | Mod: CPTII,S$GLB,, | Performed by: FAMILY MEDICINE

## 2023-10-19 PROCEDURE — 3072F PR LOW RISK FOR RETINOPATHY: ICD-10-PCS | Mod: CPTII,S$GLB,, | Performed by: FAMILY MEDICINE

## 2023-10-19 PROCEDURE — 3044F HG A1C LEVEL LT 7.0%: CPT | Mod: CPTII,S$GLB,, | Performed by: FAMILY MEDICINE

## 2023-10-19 PROCEDURE — 1159F MED LIST DOCD IN RCRD: CPT | Mod: CPTII,S$GLB,, | Performed by: FAMILY MEDICINE

## 2023-10-19 PROCEDURE — 99999 PR PBB SHADOW E&M-EST. PATIENT-LVL IV: ICD-10-PCS | Mod: PBBFAC,,, | Performed by: FAMILY MEDICINE

## 2023-10-19 PROCEDURE — 99999 PR PBB SHADOW E&M-EST. PATIENT-LVL IV: CPT | Mod: PBBFAC,,, | Performed by: FAMILY MEDICINE

## 2023-10-19 PROCEDURE — 3072F LOW RISK FOR RETINOPATHY: CPT | Mod: CPTII,S$GLB,, | Performed by: FAMILY MEDICINE

## 2023-10-19 PROCEDURE — 4010F ACE/ARB THERAPY RXD/TAKEN: CPT | Mod: CPTII,S$GLB,, | Performed by: FAMILY MEDICINE

## 2023-10-19 PROCEDURE — 3008F PR BODY MASS INDEX (BMI) DOCUMENTED: ICD-10-PCS | Mod: CPTII,S$GLB,, | Performed by: FAMILY MEDICINE

## 2023-10-19 PROCEDURE — 3066F PR DOCUMENTATION OF TREATMENT FOR NEPHROPATHY: ICD-10-PCS | Mod: CPTII,S$GLB,, | Performed by: FAMILY MEDICINE

## 2023-10-19 PROCEDURE — 3078F PR MOST RECENT DIASTOLIC BLOOD PRESSURE < 80 MM HG: ICD-10-PCS | Mod: CPTII,S$GLB,, | Performed by: FAMILY MEDICINE

## 2023-10-19 PROCEDURE — 1159F PR MEDICATION LIST DOCUMENTED IN MEDICAL RECORD: ICD-10-PCS | Mod: CPTII,S$GLB,, | Performed by: FAMILY MEDICINE

## 2023-10-19 NOTE — PROGRESS NOTES
Subjective:       Patient ID: Alfred Cuello is a 58 y.o. male.    Chief Complaint: Follow-up (6mth f/u)    HPI  Review of Systems   Constitutional:  Negative for fatigue and unexpected weight change.   Respiratory:  Negative for chest tightness and shortness of breath.    Cardiovascular:  Negative for chest pain, palpitations and leg swelling.   Gastrointestinal:  Negative for abdominal pain.   Musculoskeletal:  Negative for arthralgias.   Neurological:  Negative for dizziness, syncope, light-headedness and headaches.       Patient Active Problem List   Diagnosis    Iron deficiency anemia    Hypertension associated with diabetes    Gastroesophageal reflux disease    H/O pyloric stenosis    Dyslipidemia    RAFAEL on CPAP    Painful diabetic neuropathy    Chronic pain syndrome    Diabetic polyneuropathy associated with type 2 diabetes mellitus    Research study patient    Type 2 diabetes mellitus with hyperglycemia, without long-term current use of insulin    Vitamin D deficiency    Severe obesity (BMI 35.0-39.9) with comorbidity    Persistent atrial fibrillation    Chronic diastolic congestive heart failure    H/O cardiac radiofrequency ablation     Patient is here for a chronic conditions follow up.    Reviewed labs 10/2023    GI Dr. Koo- working colonoscopy scheduling    Card JEAN-PAUL RIOS, s/p ablation, chronic diastolic heart failure on eliquis, plavix.  Did stress echo 9/2023The study is consistent with ischemia probably in the distribution of the RCA with significant symptoms not at an optimal level of stress.  Has f/u 11/3/23       EP Dr. Lee s/p successful RF cath ablation 12/6/22    Endocrine Dr. Beach/Dr. Muniz- type 2 DM A1c 6.3. ckd stage 3. On ACE I, lipitor  has advancing neuropathy pain uncontrolled with both gabapentin 300mg 3 po qid and lyrica 50mg tid.  Did well with metanx but too expensive    Podiatry Dr. Glez diab polyneuropathy     Eye Dr. Castaneda cataract  Objective:      Physical  Exam  Vitals and nursing note reviewed.   Constitutional:       Appearance: He is well-developed.   Cardiovascular:      Rate and Rhythm: Normal rate and regular rhythm.      Heart sounds: Normal heart sounds.   Pulmonary:      Effort: Pulmonary effort is normal.      Breath sounds: Normal breath sounds.   Skin:     General: Skin is warm and dry.   Neurological:      Mental Status: He is alert and oriented to person, place, and time.         Assessment:       1. Hypertension associated with diabetes    2. Dyslipidemia    3. Persistent atrial fibrillation    4. Iron deficiency anemia, unspecified iron deficiency anemia type    5. Type 2 diabetes mellitus with hyperglycemia, without long-term current use of insulin    6. Severe obesity (BMI 35.0-39.9) with comorbidity    7. Vitamin D deficiency    8. Gastroesophageal reflux disease without esophagitis    9. Flu vaccine need    10. Diabetic eye exam        Plan:         1. Hypertension associated with diabetes  Controlled on current medications.  Continue current medications.      2. Dyslipidemia  Controlled on current medications.  Continue current medications.    - Comprehensive Metabolic Panel; Future  - Lipid Panel; Future    3. Persistent atrial fibrillation  Cont card mgmt and current regimen    4. Iron deficiency anemia, unspecified iron deficiency anemia type  Screen and treat as indicated:    - CBC Auto Differential; Future  - Ferritin; Future  - Iron and TIBC; Future    5. Type 2 diabetes mellitus with hyperglycemia, without long-term current use of insulin  Controlled on current medications.  Continue current medications.    - Hemoglobin A1C; Future  - Microalbumin/Creatinine Ratio, Urine; Future    6. Severe obesity (BMI 35.0-39.9) with comorbidity  Counseled patient on his ideal body weight, health consequences of being obese and current recommendations including weekly exercise and a heart healthy diet.  Current BMI is:Estimated body mass index is 35.92  "kg/m² as calculated from the following:    Height as of this encounter: 6' 2" (1.88 m).    Weight as of this encounter: 126.9 kg (279 lb 12.2 oz)..  Patient is aware that ideal BMI < 25 or Weight in (lb) to have BMI = 25: 194.3.      7. Vitamin D deficiency  normal  - Vitamin D; Future    8. Gastroesophageal reflux disease without esophagitis  Cont current mgmt    9. Flu vaccine need  declined    10. Diabetic eye exam  Refer for diabetic exam  - Ambulatory referral/consult to Optometry; Future        Time spent with patient: 20 minutes    Patient with be reevaluated in 6 months or sooner prn    Greater than 50% of this visit was spent counseling as described in above documentation:Yes  "

## 2023-10-20 ENCOUNTER — PATIENT MESSAGE (OUTPATIENT)
Dept: FAMILY MEDICINE | Facility: CLINIC | Age: 58
End: 2023-10-20
Payer: COMMERCIAL

## 2023-10-20 ENCOUNTER — TELEPHONE (OUTPATIENT)
Dept: FAMILY MEDICINE | Facility: CLINIC | Age: 58
End: 2023-10-20
Payer: COMMERCIAL

## 2023-11-03 ENCOUNTER — OFFICE VISIT (OUTPATIENT)
Dept: CARDIOLOGY | Facility: CLINIC | Age: 58
End: 2023-11-03
Payer: COMMERCIAL

## 2023-11-03 VITALS
DIASTOLIC BLOOD PRESSURE: 64 MMHG | OXYGEN SATURATION: 96 % | BODY MASS INDEX: 38.34 KG/M2 | WEIGHT: 298.75 LBS | HEART RATE: 69 BPM | SYSTOLIC BLOOD PRESSURE: 130 MMHG | HEIGHT: 74 IN

## 2023-11-03 DIAGNOSIS — E11.59 HYPERTENSION ASSOCIATED WITH DIABETES: ICD-10-CM

## 2023-11-03 DIAGNOSIS — I50.32 CHRONIC DIASTOLIC CONGESTIVE HEART FAILURE: ICD-10-CM

## 2023-11-03 DIAGNOSIS — R94.39 POSITIVE CARDIAC STRESS TEST: ICD-10-CM

## 2023-11-03 DIAGNOSIS — E78.5 DYSLIPIDEMIA: ICD-10-CM

## 2023-11-03 DIAGNOSIS — I48.0 PAROXYSMAL ATRIAL FIBRILLATION: ICD-10-CM

## 2023-11-03 DIAGNOSIS — I15.2 HYPERTENSION ASSOCIATED WITH DIABETES: ICD-10-CM

## 2023-11-03 DIAGNOSIS — R06.09 DYSPNEA ON EXERTION: Primary | ICD-10-CM

## 2023-11-03 PROCEDURE — 99999 PR PBB SHADOW E&M-EST. PATIENT-LVL IV: ICD-10-PCS | Mod: PBBFAC,,, | Performed by: STUDENT IN AN ORGANIZED HEALTH CARE EDUCATION/TRAINING PROGRAM

## 2023-11-03 PROCEDURE — 3066F PR DOCUMENTATION OF TREATMENT FOR NEPHROPATHY: ICD-10-PCS | Mod: CPTII,S$GLB,, | Performed by: STUDENT IN AN ORGANIZED HEALTH CARE EDUCATION/TRAINING PROGRAM

## 2023-11-03 PROCEDURE — 3044F PR MOST RECENT HEMOGLOBIN A1C LEVEL <7.0%: ICD-10-PCS | Mod: CPTII,S$GLB,, | Performed by: STUDENT IN AN ORGANIZED HEALTH CARE EDUCATION/TRAINING PROGRAM

## 2023-11-03 PROCEDURE — 3008F PR BODY MASS INDEX (BMI) DOCUMENTED: ICD-10-PCS | Mod: CPTII,S$GLB,, | Performed by: STUDENT IN AN ORGANIZED HEALTH CARE EDUCATION/TRAINING PROGRAM

## 2023-11-03 PROCEDURE — 4010F PR ACE/ARB THEARPY RXD/TAKEN: ICD-10-PCS | Mod: CPTII,S$GLB,, | Performed by: STUDENT IN AN ORGANIZED HEALTH CARE EDUCATION/TRAINING PROGRAM

## 2023-11-03 PROCEDURE — 99215 PR OFFICE/OUTPT VISIT, EST, LEVL V, 40-54 MIN: ICD-10-PCS | Mod: S$GLB,,, | Performed by: STUDENT IN AN ORGANIZED HEALTH CARE EDUCATION/TRAINING PROGRAM

## 2023-11-03 PROCEDURE — 3078F DIAST BP <80 MM HG: CPT | Mod: CPTII,S$GLB,, | Performed by: STUDENT IN AN ORGANIZED HEALTH CARE EDUCATION/TRAINING PROGRAM

## 2023-11-03 PROCEDURE — 3008F BODY MASS INDEX DOCD: CPT | Mod: CPTII,S$GLB,, | Performed by: STUDENT IN AN ORGANIZED HEALTH CARE EDUCATION/TRAINING PROGRAM

## 2023-11-03 PROCEDURE — 3044F HG A1C LEVEL LT 7.0%: CPT | Mod: CPTII,S$GLB,, | Performed by: STUDENT IN AN ORGANIZED HEALTH CARE EDUCATION/TRAINING PROGRAM

## 2023-11-03 PROCEDURE — 4010F ACE/ARB THERAPY RXD/TAKEN: CPT | Mod: CPTII,S$GLB,, | Performed by: STUDENT IN AN ORGANIZED HEALTH CARE EDUCATION/TRAINING PROGRAM

## 2023-11-03 PROCEDURE — 99999 PR PBB SHADOW E&M-EST. PATIENT-LVL IV: CPT | Mod: PBBFAC,,, | Performed by: STUDENT IN AN ORGANIZED HEALTH CARE EDUCATION/TRAINING PROGRAM

## 2023-11-03 PROCEDURE — 1159F MED LIST DOCD IN RCRD: CPT | Mod: CPTII,S$GLB,, | Performed by: STUDENT IN AN ORGANIZED HEALTH CARE EDUCATION/TRAINING PROGRAM

## 2023-11-03 PROCEDURE — 3075F PR MOST RECENT SYSTOLIC BLOOD PRESS GE 130-139MM HG: ICD-10-PCS | Mod: CPTII,S$GLB,, | Performed by: STUDENT IN AN ORGANIZED HEALTH CARE EDUCATION/TRAINING PROGRAM

## 2023-11-03 PROCEDURE — 3075F SYST BP GE 130 - 139MM HG: CPT | Mod: CPTII,S$GLB,, | Performed by: STUDENT IN AN ORGANIZED HEALTH CARE EDUCATION/TRAINING PROGRAM

## 2023-11-03 PROCEDURE — 1159F PR MEDICATION LIST DOCUMENTED IN MEDICAL RECORD: ICD-10-PCS | Mod: CPTII,S$GLB,, | Performed by: STUDENT IN AN ORGANIZED HEALTH CARE EDUCATION/TRAINING PROGRAM

## 2023-11-03 PROCEDURE — 3072F LOW RISK FOR RETINOPATHY: CPT | Mod: CPTII,S$GLB,, | Performed by: STUDENT IN AN ORGANIZED HEALTH CARE EDUCATION/TRAINING PROGRAM

## 2023-11-03 PROCEDURE — 3072F PR LOW RISK FOR RETINOPATHY: ICD-10-PCS | Mod: CPTII,S$GLB,, | Performed by: STUDENT IN AN ORGANIZED HEALTH CARE EDUCATION/TRAINING PROGRAM

## 2023-11-03 PROCEDURE — 3061F NEG MICROALBUMINURIA REV: CPT | Mod: CPTII,S$GLB,, | Performed by: STUDENT IN AN ORGANIZED HEALTH CARE EDUCATION/TRAINING PROGRAM

## 2023-11-03 PROCEDURE — 3061F PR NEG MICROALBUMINURIA RESULT DOCUMENTED/REVIEW: ICD-10-PCS | Mod: CPTII,S$GLB,, | Performed by: STUDENT IN AN ORGANIZED HEALTH CARE EDUCATION/TRAINING PROGRAM

## 2023-11-03 PROCEDURE — 99215 OFFICE O/P EST HI 40 MIN: CPT | Mod: S$GLB,,, | Performed by: STUDENT IN AN ORGANIZED HEALTH CARE EDUCATION/TRAINING PROGRAM

## 2023-11-03 PROCEDURE — 3066F NEPHROPATHY DOC TX: CPT | Mod: CPTII,S$GLB,, | Performed by: STUDENT IN AN ORGANIZED HEALTH CARE EDUCATION/TRAINING PROGRAM

## 2023-11-03 PROCEDURE — 3078F PR MOST RECENT DIASTOLIC BLOOD PRESSURE < 80 MM HG: ICD-10-PCS | Mod: CPTII,S$GLB,, | Performed by: STUDENT IN AN ORGANIZED HEALTH CARE EDUCATION/TRAINING PROGRAM

## 2023-11-03 RX ORDER — FUROSEMIDE 20 MG/1
20 TABLET ORAL 2 TIMES DAILY
Qty: 180 TABLET | Refills: 3 | Status: SHIPPED | OUTPATIENT
Start: 2023-11-03 | End: 2024-11-02

## 2023-11-03 NOTE — PROGRESS NOTES
Cardiology Clinic Note  Reason for Visit: BYRNE and stress test follow up    HPI:   58M pm hx HFpEF, pAfib, HLD, HTN 2 month follow up for worsening BYRNE on exertion. Seen in August and at that time started on lasix 20mg BID. Patient reports BYRNE and leg swelling has improved, but still present up to his shins. Wears compression socks and these make it tolerable. Otherwise minimal cp, sob, byrne, lightheadedness, palpitations, orthopnea. Reports he will get SOB if he does intense exercise, and the only time in the past 2 months was during his dobutamine exercise test. Exercised 5m30s and reached METS 9, but had to terminate study early due to cp which resided in 10 minutes. EKG without ischemic signs, but basal-mid inferior hypokinesis was present, so was started on plavix with discussion about interventional cardiology visit. Patient has been otherwise well.    ROS:    Constitution: Negative for fever, chills, weight loss or gain.   HENT: Negative for sore throat, rhinorrhea, or headache.  Eyes: Negative for blurred or double vision.   Cardiovascular: See above  Pulmonary: Negative for SOB   Gastrointestinal: Negative for abdominal pain, nausea, vomiting, or diarrhea.   : Negative for dysuria.   Neurological: Negative for focal weakness or sensory changes.  PMH:     Past Medical History:   Diagnosis Date    Atrial fibrillation status post cardioversion     2 months ago    Diabetes mellitus, type 2     GERD (gastroesophageal reflux disease)     Hyperlipidemia     Hypertension     Neuropathy      Past Surgical History:   Procedure Laterality Date    ABDOMINAL SURGERY      as a child    ABLATION OF ARRHYTHMOGENIC FOCUS FOR ATRIAL FIBRILLATION N/A 12/6/2022    Procedure: Ablation atrial fibrillation;  Surgeon: SKYLER Lee MD;  Location: [facility] EP LAB;  Service: Cardiology;  Laterality: N/A;  AF, NOEMI, PVI, RFA, Carto, Gen, ID, 3 Prep    COLONOSCOPY N/A 7/20/2018    Procedure: COLONOSCOPY;  Surgeon: Marko REBOLLEDO  MD Hanane;  Location: [facility] ENDO;  Service: Endoscopy;  Laterality: N/A;    TREATMENT OF CARDIAC ARRHYTHMIA N/A 5/20/2022    Procedure: CARDIOVERSION;  Surgeon: SKYLER Lee MD;  Location: [facility] EP LAB;  Service: Cardiology;  Laterality: N/A;  afib, NOEMI, DCCV, anes, MB, 3 Prep    TREATMENT OF CARDIAC ARRHYTHMIA  12/6/2022    Procedure: Cardioversion or Defibrillation;  Surgeon: SKYLER Lee MD;  Location: [facility] EP LAB;  Service: Cardiology;;    TRIAL OF SPINAL CORD NERVE STIMULATOR N/A 6/17/2019    Procedure: Trial, Neurostimulator, LUMBAR SPINAL CORD STIMULATOR TRIAL;  Surgeon: Rahat Orantes MD;  Location: [facility] PAIN MGT;  Service: Pain Management;  Laterality: N/A;  PDN STUDY TRIAL, NEEDS CONSENT, NEVRO REP     Allergies:     Review of patient's allergies indicates:   Allergen Reactions    Oxycodone Itching and Nausea Only     Medications:     Current Outpatient Medications on File Prior to Visit   Medication Sig Dispense Refill    amiodarone (PACERONE) 200 MG Tab Take 1 tablet (200 mg total) by mouth once daily. 90 tablet 3    apixaban (ELIQUIS) 5 mg Tab Take 1 tablet (5 mg total) by mouth 2 (two) times daily. 60 tablet 11    atorvastatin (LIPITOR) 80 MG tablet Take 1 tablet (80 mg total) by mouth once daily. 90 tablet 3    clopidogreL (PLAVIX) 75 mg tablet Take 1 tablet (75 mg total) by mouth once daily. 30 tablet 11    ezetimibe (ZETIA) 10 mg tablet Take 1 tablet (10 mg total) by mouth once daily. 90 tablet 3    ferrous sulfate 325 mg (65 mg iron) Tab tablet Take 325 mg by mouth daily with breakfast.      gabapentin (NEURONTIN) 300 MG capsule Take 4 capsules (1,200 mg total) by mouth 3 (three) times daily. 1080 capsule 0    glimepiride (AMARYL) 4 MG tablet Take 1 tablet (4 mg total) by mouth 2 (two) times a day. Take 1 tablet with breakfast and 1 tablet at dinner. 180 tablet 3    KRILL OIL ORAL Take by mouth once daily.      lisinopriL-hydrochlorothiazide (PRINZIDE,ZESTORETIC) 20-25 mg Tab Take 1  "tablet by mouth once daily 90 tablet 4    loratadine (CLARITIN) 10 mg tablet Take 10 mg by mouth once daily.      metFORMIN (GLUCOPHAGE-XR) 500 MG ER 24hr tablet Take 2 tablets (1,000 mg total) by mouth 2 (two) times daily with meals. 360 tablet 3    multivitamin (THERAGRAN) per tablet Take 1 tablet by mouth once daily.      mupirocin (BACTROBAN) 2 % ointment Apply topically 3 (three) times daily. 15 g 1    nitroGLYCERIN (NITROSTAT) 0.4 MG SL tablet Place 1 tablet (0.4 mg total) under the tongue every 5 (five) minutes as needed for Chest pain. 30 tablet 2    omeprazole (PRILOSEC) 20 MG capsule Take 1 capsule (20 mg total) by mouth once daily. 90 capsule 3    pioglitazone (ACTOS) 30 MG tablet Take 1 tablet (30 mg total) by mouth once daily. 90 tablet 4    potassium chloride SA (K-DUR,KLOR-CON) 20 MEQ tablet Take 1 tablet by mouth once daily 30 tablet 0    sars-cov-2, covid-19, (PFIZER COVID-19) 30 mcg/0.3 ml injection       [DISCONTINUED] furosemide (LASIX) 20 MG tablet Take 1 tablet (20 mg total) by mouth 2 (two) times daily. 60 tablet 11     No current facility-administered medications on file prior to visit.     Social History:     Social History     Tobacco Use    Smoking status: Former     Current packs/day: 0.00     Types: Cigarettes     Quit date: 3/20/2011     Years since quittin.6    Smokeless tobacco: Never    Tobacco comments:     smoked regularly for 35 years prior to this   Substance Use Topics    Alcohol use: Yes     Comment: occ     Family History:     Family History   Problem Relation Age of Onset    Diabetes Mother         has a pacemaker    Coronary artery disease Father         hx of cabg    Glaucoma Neg Hx      Physical Exam:   /64   Pulse 69   Ht 6' 2" (1.88 m)   Wt 135.5 kg (298 lb 11.6 oz)   SpO2 96%   BMI 38.35 kg/m²    Wt Readings from Last 4 Encounters:   23 135.5 kg (298 lb 11.6 oz)   10/19/23 126.9 kg (279 lb 12.2 oz)   23 132.5 kg (292 lb)   08/15/23 132.5 kg " (292 lb 1.8 oz)         Constitutional: No distress, conversant  HEENT: Sclera anicteric, PERRLA, EOMI  Neck: No JVD, no masses, good movement  CV: RRR, S1 and S2 normal, no additional heart sounds or murmurs. Pulses 2+ and equal bilaterally in radial arteries, Distal pulses are 2+ and equal in the femoral, DP and PT areas bilaterally  Pulm: Clear to auscultation bilaterally with symmetrical expansion.   GI: Abdomen soft, non-tender, good bowel sounds  Extremities: Both extremities intact and grossly normal, skin is warm. 1-2+ pitting edema to mid shins.  Skin: No ecchymosis, erythema, or ulcers  Psych: AOx3, appropriate affect  Neuro: CNII-XII intact, no focal deficits      Labs:     Lab Results   Component Value Date     10/07/2023     10/07/2023    K 4.3 10/07/2023    K 4.3 10/07/2023     10/07/2023     10/07/2023    CO2 30 (H) 10/07/2023    CO2 30 (H) 10/07/2023    BUN 23 (H) 10/07/2023    BUN 23 (H) 10/07/2023    CREATININE 1.5 (H) 10/07/2023    CREATININE 1.5 (H) 10/07/2023    ANIONGAP 8 10/07/2023    ANIONGAP 8 10/07/2023     Lab Results   Component Value Date    HGBA1C 6.3 (H) 10/07/2023     Lab Results   Component Value Date    BNP 80 09/02/2023     (H) 11/22/2020    Lab Results   Component Value Date    WBC 6.29 04/22/2023    HGB 11.8 (L) 04/22/2023    HCT 36.2 (L) 04/22/2023     04/22/2023    GRAN 3.9 04/22/2023    GRAN 61.2 04/22/2023     Lab Results   Component Value Date    CHOL 134 10/07/2023    HDL 43 10/07/2023    LDLCALC 68.8 10/07/2023    TRIG 111 10/07/2023          Imaging:         EF   Date Value Ref Range Status   09/29/2023 63 % Final   12/06/2022 55 % Final   05/20/2022 45 % Final         Assessment:    58M pm hx HFpEF, pAfib, HLD, HTN 2 month follow up for worsening BYRNE on exertion.      Plan:     1. Dyspnea on exertion    2. Positive cardiac stress test    3. Chronic diastolic congestive heart failure    4. Hypertension associated with diabetes    5.  Dyslipidemia    6. Paroxysmal atrial fibrillation      BYRNE  Positive dobutamine stress  -Exercised 5m30s and reached METS 9, but had to terminate study early due to cp which resided in 10 minutes. EKG without ischemic signs, but basal-mid inferior hypokinesis was present, so was started on plavix.  -Due to equivocal study with minimal anginal symptoms, proceed with PET Stress.  -Continue plavix for now, dc if pet negative.    HFpEF  -Patient BYRNE and extremity swelling improved with lasix BID. Has residual leg swelling which is manageable with compression stockings.   -Continue current dose of lasix 20mg BID  -Patient to be enrolled in SPIRRIT Trial. Return in 1 week.    HLD  -->68 after initiation of zetia. Continue zetia and atorva    HTN  -Blood pressure well controlled in clinic  -Continue lisinopril-hctz    Paroxysmal Atrial fibrillation  -In NSR in clinic. Follows Dr. Lee  -Continue amiodarone 200mg, eliquis bid       Signed:  Stu Rangel M.D.  Cardiology Fellow  [facility]  11/3/2023 4:49 PM

## 2023-11-06 NOTE — PROGRESS NOTES
I have reviewed the patient's chart and the fellow's clinic note, as well as discussed the case with the fellow. I have personally spoken with and examined the patient in the clinic. I agree with the findings, assessment, and plan.      Iglesia Mazariegos MD  Consultative Cardiology - [facility]  .

## 2023-11-10 ENCOUNTER — TELEPHONE (OUTPATIENT)
Dept: OPHTHALMOLOGY | Facility: CLINIC | Age: 58
End: 2023-11-10
Payer: COMMERCIAL

## 2023-11-10 NOTE — TELEPHONE ENCOUNTER
----- Message from Karely Schuster sent at 11/10/2023  1:16 PM CST -----  Regarding: APPT  Patient's wife stopped in she would like to make an appt for her  and she states he already has a referral I in the system/chart.      Thanks    pls call [phone]

## 2023-11-11 ENCOUNTER — LAB VISIT (OUTPATIENT)
Dept: LAB | Facility: HOSPITAL | Age: 58
End: 2023-11-11
Attending: NURSE PRACTITIONER
Payer: COMMERCIAL

## 2023-11-11 DIAGNOSIS — E11.65 TYPE 2 DIABETES MELLITUS WITH HYPERGLYCEMIA, WITHOUT LONG-TERM CURRENT USE OF INSULIN: ICD-10-CM

## 2023-11-11 LAB
ALBUMIN SERPL BCP-MCNC: 3.9 G/DL (ref 3.5–5.2)
ANION GAP SERPL CALC-SCNC: 12 MMOL/L (ref 8–16)
BUN SERPL-MCNC: 20 MG/DL (ref 6–20)
CALCIUM SERPL-MCNC: 9.4 MG/DL (ref 8.7–10.5)
CHLORIDE SERPL-SCNC: 99 MMOL/L (ref 95–110)
CO2 SERPL-SCNC: 30 MMOL/L (ref 23–29)
CREAT SERPL-MCNC: 1.5 MG/DL (ref 0.5–1.4)
EST. GFR  (NO RACE VARIABLE): 53 ML/MIN/1.73 M^2
GLUCOSE SERPL-MCNC: 143 MG/DL (ref 70–110)
PHOSPHATE SERPL-MCNC: 3.4 MG/DL (ref 2.7–4.5)
POTASSIUM SERPL-SCNC: 3.9 MMOL/L (ref 3.5–5.1)
SODIUM SERPL-SCNC: 141 MMOL/L (ref 136–145)

## 2023-11-11 PROCEDURE — 36415 COLL VENOUS BLD VENIPUNCTURE: CPT | Mod: PO | Performed by: NURSE PRACTITIONER

## 2023-11-11 PROCEDURE — 80069 RENAL FUNCTION PANEL: CPT | Performed by: NURSE PRACTITIONER

## 2023-11-23 DIAGNOSIS — E11.42 DIABETIC POLYNEUROPATHY ASSOCIATED WITH TYPE 2 DIABETES MELLITUS: ICD-10-CM

## 2023-11-24 RX ORDER — GABAPENTIN 300 MG/1
1200 CAPSULE ORAL 3 TIMES DAILY
Qty: 1080 CAPSULE | Refills: 0 | Status: SHIPPED | OUTPATIENT
Start: 2023-11-24 | End: 2024-02-21 | Stop reason: SDUPTHER

## 2023-12-20 ENCOUNTER — TELEPHONE (OUTPATIENT)
Dept: CARDIAC CATH/INVASIVE PROCEDURES | Facility: HOSPITAL | Age: 58
End: 2023-12-20
Payer: COMMERCIAL

## 2023-12-20 NOTE — TELEPHONE ENCOUNTER
Brief Telephone Call  Informed patient of his upcoming PET Stress Test and that it has been approved by insurance. Given that his stress echo was inconclusive, also let him know that he can stop his plavix. Is currently on Eliquis(pAfib) only for blood thinner. Patient without worsening symptoms at this time. Answered all questions.    Sean Rangel MD  Cardiovascular Medicine; PGY4  [facility]

## 2024-01-19 ENCOUNTER — TELEPHONE (OUTPATIENT)
Dept: OPTOMETRY | Facility: CLINIC | Age: 59
End: 2024-01-19
Payer: COMMERCIAL

## 2024-01-19 NOTE — TELEPHONE ENCOUNTER
----- Message from Zoey Moreno sent at 1/18/2024  6:39 PM CST -----  Type:  Sooner Apoointment Request    Caller is requesting a sooner appointment.  Caller declined first available appointment listed below.  Caller will not accept being placed on the waitlist and is requesting a message be sent to doctor.    Name of Caller:pt  Would the patient rather a call back or a response via [facility]? call  Best Call Back Number:[phone]

## 2024-01-22 RX ORDER — PIOGLITAZONEHYDROCHLORIDE 30 MG/1
30 TABLET ORAL DAILY
Qty: 90 TABLET | Refills: 4 | Status: SHIPPED | OUTPATIENT
Start: 2024-01-22

## 2024-02-21 DIAGNOSIS — E11.42 DIABETIC POLYNEUROPATHY ASSOCIATED WITH TYPE 2 DIABETES MELLITUS: ICD-10-CM

## 2024-02-21 RX ORDER — GABAPENTIN 300 MG/1
1200 CAPSULE ORAL 3 TIMES DAILY
Qty: 1080 CAPSULE | Refills: 0 | Status: SHIPPED | OUTPATIENT
Start: 2024-02-21 | End: 2024-05-21 | Stop reason: SDUPTHER

## 2024-03-11 ENCOUNTER — PATIENT MESSAGE (OUTPATIENT)
Dept: ADMINISTRATIVE | Facility: HOSPITAL | Age: 59
End: 2024-03-11
Payer: COMMERCIAL

## 2024-03-29 ENCOUNTER — PATIENT MESSAGE (OUTPATIENT)
Dept: ENDOCRINOLOGY | Facility: CLINIC | Age: 59
End: 2024-03-29
Payer: COMMERCIAL

## 2024-03-29 ENCOUNTER — PATIENT MESSAGE (OUTPATIENT)
Dept: CARDIOLOGY | Facility: CLINIC | Age: 59
End: 2024-03-29
Payer: COMMERCIAL

## 2024-04-02 DIAGNOSIS — I10 ESSENTIAL HYPERTENSION: ICD-10-CM

## 2024-04-02 RX ORDER — LISINOPRIL AND HYDROCHLOROTHIAZIDE 20; 25 MG/1; MG/1
1 TABLET ORAL DAILY
Qty: 90 TABLET | Refills: 3 | Status: SHIPPED | OUTPATIENT
Start: 2024-04-02 | End: 2025-04-02

## 2024-04-02 NOTE — TELEPHONE ENCOUNTER
Pt w. high deductible of 2k, being charged $500. Had a previous  coupon at a different pharmacy. Spoke w. pharmacist, prescription transferred to [facility] and cost is now $10 / month. Pt updated.

## 2024-04-11 RX ORDER — AMIODARONE HYDROCHLORIDE 200 MG/1
200 TABLET ORAL DAILY
Qty: 90 TABLET | Refills: 3 | OUTPATIENT
Start: 2024-04-11

## 2024-04-12 ENCOUNTER — PATIENT MESSAGE (OUTPATIENT)
Dept: ADMINISTRATIVE | Facility: HOSPITAL | Age: 59
End: 2024-04-12
Payer: COMMERCIAL

## 2024-04-20 ENCOUNTER — LAB VISIT (OUTPATIENT)
Dept: LAB | Facility: HOSPITAL | Age: 59
End: 2024-04-20
Attending: FAMILY MEDICINE
Payer: COMMERCIAL

## 2024-04-20 DIAGNOSIS — E11.65 TYPE 2 DIABETES MELLITUS WITH HYPERGLYCEMIA, WITHOUT LONG-TERM CURRENT USE OF INSULIN: ICD-10-CM

## 2024-04-20 DIAGNOSIS — E55.9 VITAMIN D DEFICIENCY: ICD-10-CM

## 2024-04-20 DIAGNOSIS — E78.5 DYSLIPIDEMIA: ICD-10-CM

## 2024-04-20 DIAGNOSIS — D50.9 IRON DEFICIENCY ANEMIA, UNSPECIFIED IRON DEFICIENCY ANEMIA TYPE: ICD-10-CM

## 2024-04-20 LAB
25(OH)D3+25(OH)D2 SERPL-MCNC: 41 NG/ML (ref 30–96)
ALBUMIN SERPL BCP-MCNC: 4.1 G/DL (ref 3.5–5.2)
ALP SERPL-CCNC: 79 U/L (ref 55–135)
ALT SERPL W/O P-5'-P-CCNC: 33 U/L (ref 10–44)
ANION GAP SERPL CALC-SCNC: 12 MMOL/L (ref 8–16)
AST SERPL-CCNC: 24 U/L (ref 10–40)
BASOPHILS # BLD AUTO: 0.06 K/UL (ref 0–0.2)
BASOPHILS NFR BLD: 1 % (ref 0–1.9)
BILIRUB SERPL-MCNC: 0.4 MG/DL (ref 0.1–1)
BUN SERPL-MCNC: 18 MG/DL (ref 8–23)
CALCIUM SERPL-MCNC: 9.5 MG/DL (ref 8.7–10.5)
CHLORIDE SERPL-SCNC: 99 MMOL/L (ref 95–110)
CHOLEST SERPL-MCNC: 138 MG/DL (ref 120–199)
CHOLEST/HDLC SERPL: 3.4 {RATIO} (ref 2–5)
CO2 SERPL-SCNC: 28 MMOL/L (ref 23–29)
CREAT SERPL-MCNC: 1.4 MG/DL (ref 0.5–1.4)
DIFFERENTIAL METHOD BLD: ABNORMAL
EOSINOPHIL # BLD AUTO: 0.3 K/UL (ref 0–0.5)
EOSINOPHIL NFR BLD: 5.1 % (ref 0–8)
ERYTHROCYTE [DISTWIDTH] IN BLOOD BY AUTOMATED COUNT: 14.4 % (ref 11.5–14.5)
EST. GFR  (NO RACE VARIABLE): 57.2 ML/MIN/1.73 M^2
ESTIMATED AVG GLUCOSE: 148 MG/DL (ref 68–131)
FERRITIN SERPL-MCNC: 74 NG/ML (ref 20–300)
GLUCOSE SERPL-MCNC: 142 MG/DL (ref 70–110)
HBA1C MFR BLD: 6.8 % (ref 4–5.6)
HCT VFR BLD AUTO: 39.6 % (ref 40–54)
HDLC SERPL-MCNC: 41 MG/DL (ref 40–75)
HDLC SERPL: 29.7 % (ref 20–50)
HGB BLD-MCNC: 12.7 G/DL (ref 14–18)
IMM GRANULOCYTES # BLD AUTO: 0.02 K/UL (ref 0–0.04)
IMM GRANULOCYTES NFR BLD AUTO: 0.3 % (ref 0–0.5)
IRON SERPL-MCNC: 91 UG/DL (ref 45–160)
LDLC SERPL CALC-MCNC: 67 MG/DL (ref 63–159)
LYMPHOCYTES # BLD AUTO: 1.6 K/UL (ref 1–4.8)
LYMPHOCYTES NFR BLD: 24.9 % (ref 18–48)
MCH RBC QN AUTO: 28.5 PG (ref 27–31)
MCHC RBC AUTO-ENTMCNC: 32.1 G/DL (ref 32–36)
MCV RBC AUTO: 89 FL (ref 82–98)
MONOCYTES # BLD AUTO: 0.4 K/UL (ref 0.3–1)
MONOCYTES NFR BLD: 6.5 % (ref 4–15)
NEUTROPHILS # BLD AUTO: 3.9 K/UL (ref 1.8–7.7)
NEUTROPHILS NFR BLD: 62.2 % (ref 38–73)
NONHDLC SERPL-MCNC: 97 MG/DL
NRBC BLD-RTO: 0 /100 WBC
PLATELET # BLD AUTO: 217 K/UL (ref 150–450)
PMV BLD AUTO: 10.7 FL (ref 9.2–12.9)
POTASSIUM SERPL-SCNC: 4.3 MMOL/L (ref 3.5–5.1)
PROT SERPL-MCNC: 7.3 G/DL (ref 6–8.4)
RBC # BLD AUTO: 4.45 M/UL (ref 4.6–6.2)
SATURATED IRON: 21 % (ref 20–50)
SODIUM SERPL-SCNC: 139 MMOL/L (ref 136–145)
TOTAL IRON BINDING CAPACITY: 441 UG/DL (ref 250–450)
TRANSFERRIN SERPL-MCNC: 298 MG/DL (ref 200–375)
TRIGL SERPL-MCNC: 150 MG/DL (ref 30–150)
WBC # BLD AUTO: 6.27 K/UL (ref 3.9–12.7)

## 2024-04-20 PROCEDURE — 85025 COMPLETE CBC W/AUTO DIFF WBC: CPT | Performed by: FAMILY MEDICINE

## 2024-04-20 PROCEDURE — 82728 ASSAY OF FERRITIN: CPT | Performed by: FAMILY MEDICINE

## 2024-04-20 PROCEDURE — 36415 COLL VENOUS BLD VENIPUNCTURE: CPT | Mod: PO | Performed by: FAMILY MEDICINE

## 2024-04-20 PROCEDURE — 82306 VITAMIN D 25 HYDROXY: CPT | Performed by: FAMILY MEDICINE

## 2024-04-20 PROCEDURE — 80053 COMPREHEN METABOLIC PANEL: CPT | Performed by: FAMILY MEDICINE

## 2024-04-20 PROCEDURE — 83540 ASSAY OF IRON: CPT | Performed by: FAMILY MEDICINE

## 2024-04-20 PROCEDURE — 80061 LIPID PANEL: CPT | Performed by: FAMILY MEDICINE

## 2024-04-20 PROCEDURE — 83036 HEMOGLOBIN GLYCOSYLATED A1C: CPT | Performed by: FAMILY MEDICINE

## 2024-04-24 ENCOUNTER — PATIENT MESSAGE (OUTPATIENT)
Dept: GASTROENTEROLOGY | Facility: CLINIC | Age: 59
End: 2024-04-24
Payer: COMMERCIAL

## 2024-04-24 ENCOUNTER — OFFICE VISIT (OUTPATIENT)
Dept: FAMILY MEDICINE | Facility: CLINIC | Age: 59
End: 2024-04-24
Payer: COMMERCIAL

## 2024-04-24 VITALS
HEIGHT: 74 IN | HEART RATE: 81 BPM | TEMPERATURE: 99 F | SYSTOLIC BLOOD PRESSURE: 155 MMHG | RESPIRATION RATE: 18 BRPM | DIASTOLIC BLOOD PRESSURE: 60 MMHG | OXYGEN SATURATION: 97 % | BODY MASS INDEX: 36.75 KG/M2 | WEIGHT: 286.38 LBS

## 2024-04-24 DIAGNOSIS — E11.40 PAINFUL DIABETIC NEUROPATHY: ICD-10-CM

## 2024-04-24 DIAGNOSIS — I48.19 PERSISTENT ATRIAL FIBRILLATION: ICD-10-CM

## 2024-04-24 DIAGNOSIS — G47.33 OSA ON CPAP: ICD-10-CM

## 2024-04-24 DIAGNOSIS — E66.01 SEVERE OBESITY (BMI 35.0-39.9) WITH COMORBIDITY: ICD-10-CM

## 2024-04-24 DIAGNOSIS — N18.31 CHRONIC KIDNEY DISEASE, STAGE 3A: ICD-10-CM

## 2024-04-24 DIAGNOSIS — Z23 NEED FOR PNEUMOCOCCAL VACCINATION: ICD-10-CM

## 2024-04-24 DIAGNOSIS — Z13.31 POSITIVE DEPRESSION SCREENING: ICD-10-CM

## 2024-04-24 DIAGNOSIS — Z01.00 DIABETIC EYE EXAM: ICD-10-CM

## 2024-04-24 DIAGNOSIS — E11.9 ENCOUNTER FOR DIABETIC FOOT EXAM: ICD-10-CM

## 2024-04-24 DIAGNOSIS — E11.59 HYPERTENSION ASSOCIATED WITH DIABETES: Primary | ICD-10-CM

## 2024-04-24 DIAGNOSIS — E78.5 DYSLIPIDEMIA: ICD-10-CM

## 2024-04-24 DIAGNOSIS — E11.9 DIABETIC EYE EXAM: ICD-10-CM

## 2024-04-24 DIAGNOSIS — I50.32 CHRONIC DIASTOLIC CONGESTIVE HEART FAILURE: ICD-10-CM

## 2024-04-24 DIAGNOSIS — Z86.010 HISTORY OF COLON POLYPS: ICD-10-CM

## 2024-04-24 DIAGNOSIS — I15.2 HYPERTENSION ASSOCIATED WITH DIABETES: Primary | ICD-10-CM

## 2024-04-24 DIAGNOSIS — E55.9 VITAMIN D DEFICIENCY: ICD-10-CM

## 2024-04-24 DIAGNOSIS — E11.65 TYPE 2 DIABETES MELLITUS WITH HYPERGLYCEMIA, WITHOUT LONG-TERM CURRENT USE OF INSULIN: ICD-10-CM

## 2024-04-24 DIAGNOSIS — D50.9 IRON DEFICIENCY ANEMIA, UNSPECIFIED IRON DEFICIENCY ANEMIA TYPE: ICD-10-CM

## 2024-04-24 DIAGNOSIS — Z98.890 H/O CARDIAC RADIOFREQUENCY ABLATION: ICD-10-CM

## 2024-04-24 DIAGNOSIS — K21.9 GASTROESOPHAGEAL REFLUX DISEASE WITHOUT ESOPHAGITIS: ICD-10-CM

## 2024-04-24 PROCEDURE — 90677 PCV20 VACCINE IM: CPT | Mod: PBBFAC,PO

## 2024-04-24 PROCEDURE — 99215 OFFICE O/P EST HI 40 MIN: CPT | Mod: PBBFAC,PO,25 | Performed by: FAMILY MEDICINE

## 2024-04-24 PROCEDURE — G2211 COMPLEX E/M VISIT ADD ON: HCPCS | Mod: S$PBB,,, | Performed by: FAMILY MEDICINE

## 2024-04-24 PROCEDURE — 99999 PR PBB SHADOW E&M-EST. PATIENT-LVL V: CPT | Mod: PBBFAC,,, | Performed by: FAMILY MEDICINE

## 2024-04-24 PROCEDURE — 99214 OFFICE O/P EST MOD 30 MIN: CPT | Mod: S$PBB,,, | Performed by: FAMILY MEDICINE

## 2024-04-24 PROCEDURE — 90471 IMMUNIZATION ADMIN: CPT | Mod: PBBFAC,PO

## 2024-04-24 RX ORDER — METOPROLOL SUCCINATE 50 MG/1
50 TABLET, EXTENDED RELEASE ORAL DAILY
Qty: 90 TABLET | Refills: 3 | Status: SHIPPED | OUTPATIENT
Start: 2024-04-24 | End: 2025-04-24

## 2024-04-24 RX ADMIN — PNEUMOCOCCAL 20-VALENT CONJUGATE VACCINE 0.5 ML
2.2; 2.2; 2.2; 2.2; 2.2; 2.2; 2.2; 2.2; 2.2; 2.2; 2.2; 2.2; 2.2; 2.2; 2.2; 2.2; 4.4; 2.2; 2.2; 2.2 INJECTION, SUSPENSION INTRAMUSCULAR at 04:04

## 2024-04-24 NOTE — PROGRESS NOTES
Subjective:       Patient ID: Alfred Cuello is a 59 y.o. male.    Chief Complaint: Follow-up (6mth f/u)    HPI  Review of Systems   Constitutional:  Negative for fatigue and unexpected weight change.   Respiratory:  Negative for chest tightness and shortness of breath.    Cardiovascular:  Negative for chest pain, palpitations and leg swelling.   Gastrointestinal:  Negative for abdominal pain.   Musculoskeletal:  Negative for arthralgias.   Neurological:  Negative for dizziness, syncope, light-headedness and headaches.       Patient Active Problem List   Diagnosis    Iron deficiency anemia    Hypertension associated with diabetes    Gastroesophageal reflux disease    H/O pyloric stenosis    Dyslipidemia    Chronic kidney disease, stage 3a    RAFAEL on CPAP    Painful diabetic neuropathy    Chronic pain syndrome    Diabetic polyneuropathy associated with type 2 diabetes mellitus    Research study patient    Type 2 diabetes mellitus with hyperglycemia, without long-term current use of insulin    Vitamin D deficiency    Severe obesity (BMI 35.0-39.9) with comorbidity    Persistent atrial fibrillation    Chronic diastolic congestive heart failure    H/O cardiac radiofrequency ablation     Patient is here for a chronic conditions follow up.    Reviewed labs 4/24  Wife has been sick and in hospital in [place] for 3 months. Colon issue. Lots of stress       GI Dr. Koo- colonoscopy 2018-14 polyps. On 3 year surveillance     Card JEAN-PAUL RIOS, s/p ablation, chronic diastolic heart failure on eliquis, plavix.  Did stress echo 9/2023The study is consistent with ischemia probably in the distribution of the RCA with significant symptoms not at an optimal level of stress.  Will make appt for check up        EP Dr. Lee s/p successful RF cath ablation 12/6/22     Endocrine Dr. Beach/Dr. Muniz- type 2 DM A1c 6.8. ckd stage 3. On ACE I, lipitor. Mild anemia. Urine ma nl  has advancing neuropathy pain uncontrolled  with both gabapentin 300mg 3 po qid and lyrica 50mg tid.  Did well with metanx but too expensive     Podiatry Dr. Glez diab polyneuropathy      Eye Dr. Castaneda cataract  Objective:      Physical Exam  Vitals and nursing note reviewed.   Constitutional:       Appearance: He is well-developed.   Cardiovascular:      Rate and Rhythm: Normal rate and regular rhythm.      Heart sounds: Normal heart sounds.   Pulmonary:      Effort: Pulmonary effort is normal.      Breath sounds: Normal breath sounds.   Skin:     General: Skin is warm and dry.   Neurological:      Mental Status: He is alert and oriented to person, place, and time.         Assessment:       1. Hypertension associated with diabetes    2. Dyslipidemia    3. Persistent atrial fibrillation    4. Iron deficiency anemia, unspecified iron deficiency anemia type    5. Type 2 diabetes mellitus with hyperglycemia, without long-term current use of insulin    6. Severe obesity (BMI 35.0-39.9) with comorbidity    7. Vitamin D deficiency    8. Gastroesophageal reflux disease without esophagitis    9. Diabetic eye exam    10. RAFAEL on CPAP    11. Painful diabetic neuropathy    12. Chronic diastolic congestive heart failure    13. H/O cardiac radiofrequency ablation    14. Chronic kidney disease, stage 3a    15. History of colon polyps    16. Need for pneumococcal vaccination    17. Encounter for diabetic foot exam    18. Positive depression screening        Plan:         1. Hypertension associated with diabetes  Uncontrolled. Add  - metoprolol succinate (TOPROL-XL) 50 MG 24 hr tablet; Take 1 tablet (50 mg total) by mouth once daily.  Dispense: 90 tablet; Refill: 3    2. Dyslipidemia  Controlled on current medications.  Continue current medications.    - Lipid Panel; Future    3. Persistent atrial fibrillation  S/p ablation. F/u card for check up    4. Iron deficiency anemia, unspecified iron deficiency anemia type  Screen and treat as indicated:    - CBC Auto  "Differential; Future  - Ferritin; Future  - Iron and TIBC; Future    5. Type 2 diabetes mellitus with hyperglycemia, without long-term current use of insulin  Controlled on current medications.  Continue current medications.    - Comprehensive Metabolic Panel; Future  - Hemoglobin A1C; Future    6. Severe obesity (BMI 35.0-39.9) with comorbidity  Counseled patient on his ideal body weight, health consequences of being obese and current recommendations including weekly exercise and a heart healthy diet.  Current BMI is:Estimated body mass index is 36.77 kg/m² as calculated from the following:    Height as of this encounter: 6' 2" (1.88 m).    Weight as of this encounter: 129.9 kg (286 lb 6 oz)..  Patient is aware that ideal BMI < 25 or Weight in (lb) to have BMI = 25: 194.3.      7. Vitamin D deficiency  Controlled on current medications.  Continue current medications.    - Vitamin D; Future    8. Gastroesophageal reflux disease without esophagitis  Controlled on current medications.  Continue current medications.      9. Diabetic eye exam  Appt tomorrow    10. RAFAEL on CPAP  Cont CPAP consistently    11. Painful diabetic neuropathy  Cont current mgmt    12. Chronic diastolic congestive heart failure  Cont current mgmt. compensated    13. H/O cardiac radiofrequency ablation  Cont card monitoring    14. Chronic kidney disease, stage 3a  Stable and chronic.  Will continue to monitor q3-6 months and control chronic conditions as optimally as possible to preserve function.      15. History of colon polyps  Refer for surveillance  - Case Request Endoscopy: COLONOSCOPY    16. Need for pneumococcal vaccination  Immunize today.  Counseled patient on risks, benefits and side effects.  Patient elected to proceed with vaccination.    - (VF) pneumococcocal 20 vaccine (PREVNAR 20) syringe (preferred for >/= 2 months)    17. Encounter for diabetic foot exam  refer  - Ambulatory referral/consult to Podiatry; Future    18. Positive " depression screening  Cont current mgmt        Time spent with patient: 20 minutes    Patient with be reevaluated in 6 months or sooner prn    Greater than 50% of this visit was spent counseling as described in above documentation:Yes  I have used clinical judgement based on duration and functional status to consider definite necessity for treatment. yes

## 2024-04-25 ENCOUNTER — OFFICE VISIT (OUTPATIENT)
Dept: OPTOMETRY | Facility: CLINIC | Age: 59
End: 2024-04-25
Payer: COMMERCIAL

## 2024-04-25 DIAGNOSIS — H25.13 NUCLEAR SCLEROSIS, BILATERAL: ICD-10-CM

## 2024-04-25 DIAGNOSIS — H52.7 REFRACTIVE ERROR: ICD-10-CM

## 2024-04-25 DIAGNOSIS — E11.9 DIABETES MELLITUS TYPE 2 WITHOUT RETINOPATHY: Primary | ICD-10-CM

## 2024-04-25 PROCEDURE — 92004 COMPRE OPH EXAM NEW PT 1/>: CPT | Mod: S$GLB,,, | Performed by: OPTOMETRIST

## 2024-04-25 PROCEDURE — 4010F ACE/ARB THERAPY RXD/TAKEN: CPT | Mod: CPTII,S$GLB,, | Performed by: OPTOMETRIST

## 2024-04-25 PROCEDURE — 3044F HG A1C LEVEL LT 7.0%: CPT | Mod: CPTII,S$GLB,, | Performed by: OPTOMETRIST

## 2024-04-25 PROCEDURE — 2023F DILAT RTA XM W/O RTNOPTHY: CPT | Mod: CPTII,S$GLB,, | Performed by: OPTOMETRIST

## 2024-04-25 PROCEDURE — 3066F NEPHROPATHY DOC TX: CPT | Mod: CPTII,S$GLB,, | Performed by: OPTOMETRIST

## 2024-04-25 PROCEDURE — 1160F RVW MEDS BY RX/DR IN RCRD: CPT | Mod: CPTII,S$GLB,, | Performed by: OPTOMETRIST

## 2024-04-25 PROCEDURE — 99999 PR PBB SHADOW E&M-EST. PATIENT-LVL III: CPT | Mod: PBBFAC,,, | Performed by: OPTOMETRIST

## 2024-04-25 PROCEDURE — 3061F NEG MICROALBUMINURIA REV: CPT | Mod: CPTII,S$GLB,, | Performed by: OPTOMETRIST

## 2024-04-25 PROCEDURE — 1159F MED LIST DOCD IN RCRD: CPT | Mod: CPTII,S$GLB,, | Performed by: OPTOMETRIST

## 2024-04-25 NOTE — PROGRESS NOTES
HPI     Diabetic Eye Exam     Additional comments: LDE: 02/08/2022           Comments    60 YO male presents today for an annual diabetic eye exam. Patient states   that he is doing well, notes no problems or complaints.   Wears OTC readers for distance and near, distance glasses are not as   strong as reading.     Allergy drops OU QAM    Hemoglobin A1C       Date                     Value               Ref Range             Status                04/20/2024               6.8 (H)             4.0 - 5.6 %           Final                  10/07/2023               6.3 (H)             4.0 - 5.6 %           Final                  04/22/2023               6.3 (H)             4.0 - 5.6 %           Final                      Last edited by Carl Yost, OD on 4/25/2024  4:13 PM.            Assessment /Plan     For exam results, see Encounter Report.    Diabetes mellitus type 2 without retinopathy    Nuclear sclerosis, bilateral    Refractive error      1. Diabetes mellitus type 2 without retinopathy  Discussed possible ocular affects of uncontrolled blood sugar with patient. Recommended continued strong blood sugar control and continued care with PCP. Monitor yearly.     2. Nuclear sclerosis, bilateral  Mild OU, not VS  Discussed possible ocular affects of cataracts. Acceptable BCVA OU. Discussed treatment options. Surgery not recommended at this time. Monitor yearly.     3. Refractive error  Declines refraction  Doing well with otc readers   Return for spec rx prn

## 2024-04-30 ENCOUNTER — TELEPHONE (OUTPATIENT)
Dept: GASTROENTEROLOGY | Facility: CLINIC | Age: 59
End: 2024-04-30
Payer: COMMERCIAL

## 2024-04-30 RX ORDER — AMIODARONE HYDROCHLORIDE 200 MG/1
200 TABLET ORAL DAILY
Qty: 90 TABLET | Refills: 3 | OUTPATIENT
Start: 2024-04-30

## 2024-05-06 ENCOUNTER — TELEPHONE (OUTPATIENT)
Dept: ELECTROPHYSIOLOGY | Facility: CLINIC | Age: 59
End: 2024-05-06
Payer: COMMERCIAL

## 2024-05-13 RX ORDER — EZETIMIBE 10 MG/1
10 TABLET ORAL DAILY
Qty: 90 TABLET | Refills: 3 | Status: SHIPPED | OUTPATIENT
Start: 2024-05-13 | End: 2025-05-13

## 2024-05-15 ENCOUNTER — PATIENT MESSAGE (OUTPATIENT)
Dept: ENDOCRINOLOGY | Facility: CLINIC | Age: 59
End: 2024-05-15
Payer: COMMERCIAL

## 2024-05-15 RX ORDER — AMIODARONE HYDROCHLORIDE 200 MG/1
200 TABLET ORAL DAILY
Qty: 90 TABLET | Refills: 3 | OUTPATIENT
Start: 2024-05-15

## 2024-05-16 DIAGNOSIS — E11.65 TYPE 2 DIABETES MELLITUS WITH HYPERGLYCEMIA, WITHOUT LONG-TERM CURRENT USE OF INSULIN: Primary | ICD-10-CM

## 2024-05-16 RX ORDER — GLIMEPIRIDE 4 MG/1
4 TABLET ORAL 2 TIMES DAILY
Qty: 180 TABLET | Refills: 1 | Status: SHIPPED | OUTPATIENT
Start: 2024-05-16

## 2024-05-20 DIAGNOSIS — K21.9 GASTROESOPHAGEAL REFLUX DISEASE WITHOUT ESOPHAGITIS: ICD-10-CM

## 2024-05-20 RX ORDER — OMEPRAZOLE 20 MG/1
20 CAPSULE, DELAYED RELEASE ORAL
Qty: 90 CAPSULE | Refills: 3 | Status: SHIPPED | OUTPATIENT
Start: 2024-05-20

## 2024-05-21 DIAGNOSIS — E11.42 DIABETIC POLYNEUROPATHY ASSOCIATED WITH TYPE 2 DIABETES MELLITUS: ICD-10-CM

## 2024-05-21 RX ORDER — GABAPENTIN 300 MG/1
1200 CAPSULE ORAL 3 TIMES DAILY
Qty: 1080 CAPSULE | Refills: 0 | Status: SHIPPED | OUTPATIENT
Start: 2024-05-21 | End: 2024-09-12

## 2024-07-02 ENCOUNTER — TELEPHONE (OUTPATIENT)
Dept: GASTROENTEROLOGY | Facility: CLINIC | Age: 59
End: 2024-07-02

## 2024-07-02 NOTE — TELEPHONE ENCOUNTER
----- Message from Steffi Bartholomew PA-C sent at 7/2/2024  8:38 AM CDT -----  Regarding: RE: clearance  Hello,     Mr. Simon is cleared to hold Eliquis for two days and should resume afterwards.     Regards,   Steffi Bartholomew PA-C  ----- Message -----  From: Jack Espana LPN  Sent: 7/1/2024  12:00 AM CDT  To: Steffi Bartholomew PA-C  Subject: clearance                                        Mr. Cuello has a colonoscopy scheduled with Dr. Koo on 8/2/24. Please send clearance to hold  Eliquis for 2 days?     If you have any questions or concerns, please don't hesitate to call.    Sincerely,  JORGE Santiago  [phone]  
DISPLAY PLAN FREE TEXT

## 2024-07-08 NOTE — PATIENT INSTRUCTIONS
Your Diabetes Foot Care Program    Every day you depend on your feet to keep you moving. But when you have diabetes, your feet need special care. Even a small foot problem can become very serious. So dont take your feet for granted. By working with your diabetes healthcare team, you can learn how to protect your feet and keep them healthy.  Evaluating your feet  An evaluation helps your healthcare provider check the condition of your feet. The evaluation includes a review of your diabetes history and overall health. It may also include a foot exam, X-rays, or other tests. These can help show problems beneath the skin that you cant see or feel.  Medical history  You will be asked about your overall health and any history of foot problems. Youll also discuss your diabetes history, such as whether your blood sugar level has changed over time. It also includes questions about sensations of pain, tingling, pins and needles, or numbness. Your healthcare provider will also want to know if you have high blood pressure and heart disease, or if you smoke. Be sure to mention any medicines (including over-the-counter), supplements, or herbal remedies you take.  Foot exam  A foot exam checks the condition of different parts of your foot. First, your skin and nails are examined for any signs of infection. Blood flow is checked by feeling for the pulses in each foot. You may also have tests to study the nerves in the foot. These include using a small filament (wire) to see how sensitive your feet are. In certain cases, you will be asked to walk a short distance to check for bone, joint, and muscle problems.  Diagnostic tests  If needed, your healthcare provider will suggest certain tests to learn more about your feet. These include:  Doppler tests to measure blood flow in the feet and lower leg.  X-rays, which can show bone or joint problems.  Other imaging tests, such as an MRI (magnetic resonance imaging), bone scan, and CT  (computed tomography) scan. These can help show bone infections.  Other tests, such as vascular tests, which study the blood flow in your feet and legs. You may also have nerve studies to learn how sensitive your feet are.  Creating a foot care program  Based on the evaluation, your healthcare provider will create a foot care program for you. Your program may be as simple as starting a daily self-care routine and changing the types of shoes your wear. It may also involve treating minor foot problems, such as a corn or blister. In some cases, surgery will be needed to treat an infection or mechanical problems, such as hammer toes.  Preventing problems  When you have diabetes, its easier to prevent problems than to treat them later on. So see your healthcare team for regular checkups and foot care. Your healthcare team can also help you learn more about caring for your feet at home. For example, you may be told to avoid walking barefoot. Or you may be told that special footwear is needed to protect your feet.  Have regular checkups  Foot problems can develop quickly. So be sure to follow your healthcare teams schedule for regular checkups. During office visits, take off your shoes and socks as soon as you get in the exam room. Ask your healthcare provider to examine your feet for problems. This will make it easier to find and treat small skin irritations before they get worse. Regular checkups can also help keep track of the blood flow and feeling in your feet. If you have neuropathy (lack of feeling in your feet), you will need to have checkups more often.  Learn about self-care  The more you know about diabetes and your feet, the easier it will be to prevent problems. Members of your healthcare team can teach you how to inspect your feet and teach you to look for warning signs. They can also give you other foot care tips. During office visits, be sure to ask any questions you have.  Date Last Reviewed: 7/1/2016  ©  9683-4931 [employer]. [address] [place] [address], [place] [address]. All rights reserved. This information is not intended as a substitute for professional medical care. Always follow your healthcare professional's instructions.

## 2024-07-11 ENCOUNTER — OFFICE VISIT (OUTPATIENT)
Dept: PODIATRY | Facility: CLINIC | Age: 59
End: 2024-07-11
Payer: COMMERCIAL

## 2024-07-11 VITALS — WEIGHT: 289.25 LBS | OXYGEN SATURATION: 98 % | BODY MASS INDEX: 37.12 KG/M2 | HEIGHT: 74 IN

## 2024-07-11 DIAGNOSIS — E11.40 TYPE 2 DIABETES MELLITUS WITH DIABETIC NEUROPATHY, UNSPECIFIED WHETHER LONG TERM INSULIN USE: ICD-10-CM

## 2024-07-11 DIAGNOSIS — E11.9 COMPREHENSIVE DIABETIC FOOT EXAMINATION, TYPE 2 DM, ENCOUNTER FOR: ICD-10-CM

## 2024-07-11 DIAGNOSIS — B35.3 TINEA PEDIS OF BOTH FEET: ICD-10-CM

## 2024-07-11 DIAGNOSIS — L97.509 TYPE 2 DIABETES MELLITUS WITH FOOT ULCER, UNSPECIFIED WHETHER LONG TERM INSULIN USE: ICD-10-CM

## 2024-07-11 DIAGNOSIS — E11.9 ENCOUNTER FOR DIABETIC FOOT EXAM: Primary | ICD-10-CM

## 2024-07-11 DIAGNOSIS — E11.621 TYPE 2 DIABETES MELLITUS WITH FOOT ULCER, UNSPECIFIED WHETHER LONG TERM INSULIN USE: ICD-10-CM

## 2024-07-11 PROCEDURE — 1160F RVW MEDS BY RX/DR IN RCRD: CPT | Mod: CPTII,S$GLB,, | Performed by: PODIATRIST

## 2024-07-11 PROCEDURE — 3066F NEPHROPATHY DOC TX: CPT | Mod: CPTII,S$GLB,, | Performed by: PODIATRIST

## 2024-07-11 PROCEDURE — 99999 PR PBB SHADOW E&M-EST. PATIENT-LVL V: CPT | Mod: PBBFAC,,, | Performed by: PODIATRIST

## 2024-07-11 PROCEDURE — 3008F BODY MASS INDEX DOCD: CPT | Mod: CPTII,S$GLB,, | Performed by: PODIATRIST

## 2024-07-11 PROCEDURE — 4010F ACE/ARB THERAPY RXD/TAKEN: CPT | Mod: CPTII,S$GLB,, | Performed by: PODIATRIST

## 2024-07-11 PROCEDURE — 1159F MED LIST DOCD IN RCRD: CPT | Mod: CPTII,S$GLB,, | Performed by: PODIATRIST

## 2024-07-11 PROCEDURE — 99214 OFFICE O/P EST MOD 30 MIN: CPT | Mod: S$GLB,,, | Performed by: PODIATRIST

## 2024-07-11 PROCEDURE — 3061F NEG MICROALBUMINURIA REV: CPT | Mod: CPTII,S$GLB,, | Performed by: PODIATRIST

## 2024-07-11 PROCEDURE — 3044F HG A1C LEVEL LT 7.0%: CPT | Mod: CPTII,S$GLB,, | Performed by: PODIATRIST

## 2024-07-11 RX ORDER — CLOTRIMAZOLE AND BETAMETHASONE DIPROPIONATE 10; .64 MG/G; MG/G
CREAM TOPICAL 2 TIMES DAILY
Qty: 45 G | Refills: 11 | Status: SHIPPED | OUTPATIENT
Start: 2024-07-11 | End: 2024-07-21

## 2024-07-11 NOTE — PROGRESS NOTES
"    [address] [facility] [address] [place] [address]  Phone: [phone]   [phone]    Patient's PCP:Nuria Man MD  Referring Provider: Dr. Nuria Man    Subjective:      Chief Complaint:: Diabetic Foot Exam, Diabetes, Diabetes Mellitus, and Tinea Pedis    HPI  Alfred Cuello is a 59 y.o. male who presents today for a diabetic foot exam.  Pt has seen Venus Muniz NP on 10/12/2023 who treats them for their diabetes.  Pt has been a diabetic for 10-15 years.  Taking metformin to treat diabetes.    Blood sugar: did not check  Hemoglobin A1C: 6.8      Additionally, patient presents with dry skin bilateral feet.       Vitals:    07/11/24 1502   SpO2: 98%   Weight: 131.2 kg (289 lb 3.9 oz)   Height: 6' 2" (1.88 m)   PainSc: 0-No pain      Shoe Size: 13    Past Surgical History:   Procedure Laterality Date    ABDOMINAL SURGERY      as a child    ABLATION OF ARRHYTHMOGENIC FOCUS FOR ATRIAL FIBRILLATION N/A 12/6/2022    Procedure: Ablation atrial fibrillation;  Surgeon: SKYLER Lee MD;  Location: [facility] EP LAB;  Service: Cardiology;  Laterality: N/A;  AF, NOEMI, PVI, RFA, Carto, Gen, WA, 3 Prep    COLONOSCOPY N/A 7/20/2018    Procedure: COLONOSCOPY;  Surgeon: Marko Koo MD;  Location: [facility];  Service: Endoscopy;  Laterality: N/A;    TREATMENT OF CARDIAC ARRHYTHMIA N/A 5/20/2022    Procedure: CARDIOVERSION;  Surgeon: SKYLER Lee MD;  Location: [facility] EP LAB;  Service: Cardiology;  Laterality: N/A;  afib, NOEMI, DCCV, anes, MB, 3 Prep    TREATMENT OF CARDIAC ARRHYTHMIA  12/6/2022    Procedure: Cardioversion or Defibrillation;  Surgeon: SKYLER Lee MD;  Location: [facility] EP LAB;  Service: Cardiology;;    TRIAL OF SPINAL CORD NERVE STIMULATOR N/A 6/17/2019    Procedure: Trial, Neurostimulator, LUMBAR SPINAL CORD STIMULATOR TRIAL;  Surgeon: Rahat Orantes MD;  Location: [facility] PAIN MGT;  Service: Pain Management;  Laterality: N/A;  PDN STUDY TRIAL, NEEDS CONSENT, NEVRO REP "     Past Medical History:   Diagnosis Date    Atrial fibrillation status post cardioversion     2 months ago    Diabetes mellitus, type 2     GERD (gastroesophageal reflux disease)     Hyperlipidemia     Hypertension     Neuropathy      Family History   Problem Relation Name Age of Onset    Diabetes Mother          has a pacemaker    Coronary artery disease Father          hx of cabg    Glaucoma Neg Hx          Social History:   Marital Status:   Alcohol History:  reports current alcohol use.  Tobacco History:  reports that he quit smoking about 13 years ago. His smoking use included cigarettes. He has never used smokeless tobacco.  Drug History:  reports no history of drug use.    Review of patient's allergies indicates:   Allergen Reactions    Oxycodone Itching and Nausea Only       Current Outpatient Medications   Medication Sig Dispense Refill    amiodarone (PACERONE) 200 MG Tab Take 1 tablet (200 mg total) by mouth once daily. 90 tablet 3    apixaban (ELIQUIS) 5 mg Tab Take 1 tablet (5 mg total) by mouth 2 (two) times daily. 60 tablet 11    atorvastatin (LIPITOR) 80 MG tablet Take 1 tablet (80 mg total) by mouth once daily. 90 tablet 3    clotrimazole-betamethasone 1-0.05% (LOTRISONE) cream Apply topically 2 (two) times daily. for 10 days 45 g 11    ezetimibe (ZETIA) 10 mg tablet Take 1 tablet (10 mg total) by mouth once daily. 90 tablet 3    ferrous sulfate 325 mg (65 mg iron) Tab tablet Take 325 mg by mouth daily with breakfast.      furosemide (LASIX) 20 MG tablet Take 1 tablet (20 mg total) by mouth 2 (two) times daily. 180 tablet 3    gabapentin (NEURONTIN) 300 MG capsule Take 4 capsules (1,200 mg total) by mouth 3 (three) times daily. 1080 capsule 0    glimepiride (AMARYL) 4 MG tablet Take 1 tablet (4 mg total) by mouth 2 (two) times a day. 180 tablet 1    KRILL OIL ORAL Take by mouth once daily.      lisinopriL-hydrochlorothiazide (PRINZIDE,ZESTORETIC) 20-25 mg Tab Take 1 tablet by mouth once  daily. 90 tablet 3    loratadine (CLARITIN) 10 mg tablet Take 10 mg by mouth once daily.      metFORMIN (GLUCOPHAGE-XR) 500 MG ER 24hr tablet Take 2 tablets (1,000 mg total) by mouth 2 (two) times daily with meals. 360 tablet 3    metoprolol succinate (TOPROL-XL) 50 MG 24 hr tablet Take 1 tablet (50 mg total) by mouth once daily. 90 tablet 3    multivitamin (THERAGRAN) per tablet Take 1 tablet by mouth once daily.      nitroGLYCERIN (NITROSTAT) 0.4 MG SL tablet Place 1 tablet (0.4 mg total) under the tongue every 5 (five) minutes as needed for Chest pain. 30 tablet 2    omeprazole (PRILOSEC) 20 MG capsule Take 1 capsule by mouth once daily 90 capsule 3    pioglitazone (ACTOS) 30 MG tablet Take 1 tablet (30 mg total) by mouth once daily. 90 tablet 4    potassium chloride SA (K-DUR,KLOR-CON) 20 MEQ tablet Take 1 tablet by mouth once daily 30 tablet 0    sars-cov-2, covid-19, (PFIZER COVID-19) 30 mcg/0.3 ml injection        No current facility-administered medications for this visit.       Review of Systems   Constitutional:  Negative for chills, fatigue, fever and unexpected weight change.   HENT:  Negative for hearing loss and trouble swallowing.    Eyes:  Negative for photophobia and visual disturbance.   Respiratory:  Negative for cough, shortness of breath and wheezing.    Cardiovascular:  Negative for chest pain, palpitations and leg swelling.   Gastrointestinal:  Negative for abdominal pain and nausea.   Genitourinary:  Negative for dysuria and frequency.   Musculoskeletal:  Negative for arthralgias, back pain, gait problem, joint swelling, myalgias and neck pain.   Skin:  Negative for rash and wound.   Neurological:  Negative for tremors, seizures, weakness, numbness and headaches.   Hematological:  Does not bruise/bleed easily.   Psychiatric/Behavioral:  Negative for hallucinations.          Objective:        Physical Exam:   Foot Exam  Physical Exam  Physical examination: General: Pt. is well-developed,  well-nourished, appears stated age, in no acute distress, alert and oriented x 3.      Dry scale with superficial flakes over an erythematous base bilateral feet in moccasin distribution without ulceration, drainage, pus, tracking, fluctuance, malodor, or cardinal signs infection.    Toenails 1st, 2nd, 3rd, 4th, 5th  bilateral are hypertrophic, dystrophic,  without periungual skin abnormality of each.      Vascular: Dorsalis pedis and posterior tibial pulses are 2/4  Bilaterally. Toes are warm to touch. Feet are warm proximally.    There is decreased digital hair. Skin is atrophic, slightly hyperpigmented, and mildly edematous. Capillary refill less than 5 seconds all toes/distal feet    Neurologic: [place]-El[place] 5.07 monofilament is decreased bilateral feet. Sharp/dull sensation absent Bilateral feet.    Vibratory sensation absent bilateral    Musculoskeletal: adequate joint range of motion without pain, limitation, nor crepitation Bilateral feet and ankle joints. Muscle strength is 5/5 in all groups bilaterally.    Lymphatics: no lymphangitic streaking bilaterally.    Dermatologic: Elongated, thickened, dystrophic, discolored nails x 10. Xerosis Bilaterally.      Imaging:            Assessment:       1. Encounter for diabetic foot exam    2. Comprehensive diabetic foot examination, type 2 DM, encounter for    3. Type 2 diabetes mellitus with foot ulcer, unspecified whether long term insulin use    4. Tinea pedis of both feet    5. Type 2 diabetes mellitus with diabetic neuropathy, unspecified whether long term insulin use      Plan:   Encounter for diabetic foot exam  -     Ambulatory referral/consult to Podiatry    Comprehensive diabetic foot examination, type 2 DM, encounter for  -      DIABETES FOOT EXAM    Type 2 diabetes mellitus with foot ulcer, unspecified whether long term insulin use    Tinea pedis of both feet  -     clotrimazole-betamethasone 1-0.05% (LOTRISONE) cream; Apply topically 2 (two) times  daily. for 10 days  Dispense: 45 g; Refill: 11    Type 2 diabetes mellitus with diabetic neuropathy, unspecified whether long term insulin use      No follow-ups on file.    Procedures          Counseling:     I provided patient education verbally regarding:   Patient diagnosis, treatment options, as well as alternatives, risks, and benefits.     This note was created using Dragon voice recognition software that occasionally misinterpreted phrases or words.         Fungal infection of skin explained. Treatment options including no treatment, topical medications, oral medications were discussed, as well as success rates and risks of recurrence. We agreed on topical medication       Athlete's foot counseling: Counseled patient that it is important to keep the feet dry. Use absorbent cotton socks and change them if they become sweaty. Or wear an open-toe shoe or sandal. Wash the feet at least once a day with soap and water. Apply the antifungal cream as prescribed. Recommend spray antiperspirant to the feet. Some antifungal creams are available without a prescription. It may take a week before the rash starts to improve. It can take about 3 to 4 weeks to completely clear. Continue the medicine until the rash is all gone. Use over-the-counter antifungal powders or sprays on your feet after exposure to high-risk environments, such as public showers, gyms, and locker rooms. This can help prevent future infections. Wearing appropriate shoes in these situations can help.   Lotrisone prescribed        Counseling/Education:  I provided patient education verbally regarding:   The aspects of diabetes and how it pertains to the feet. I explained the importance of proper diabetic foot care and how it is essential for the health of their feet.    I discussed the importance of knowing their Hemoglobin A1c and that the level needs to be as close to 6 as possible. I discussed the increase complications of high blood sugar including  stroke, blindness, heart attack, kidney failure and loss of limb secondary to neuropathy and PVD.     With neuropathy, beware of any breaks in the skin or redness. These areas are not recognized early due to the numbness.    I discussed Diabetes, lower back issues, metabolic disorders, systemic causes, chemotherapy, vitamin deficiency, heavy metal exposure, as some of the causes. I also explained that as much as 40% of the time we can not find a cause. I discussed different treatments available to control the symptoms but which may not cure the problem.       Counseled patient on the aspects of diabetes and how it pertains to the feet.  I explained the importance of proper diabetic foot care and how it is essential for the health of their feet.      Shoe inspection. Patient instructed on proper foot hygeine. We discussed wearing proper shoe gear, daily foot inspections, never walking without protective shoe gear, never putting sharp instruments to feet, routine podiatric nail visits every 2-3 months.          Additional patient instructions:     - Check feet daily for wounds and areas of irritation.     - Keep regularly scheduled appointments     - Apply moisturizing cream to feet and ankles daily but not between toes.     - Keep feet clean and dry, especially between toes.     - Elevate feet as much as possible throughout the day.     - Wear supportive/accommodative shoes at all times when ambulating.    - Notify clinic immediately of any new or worsening conditions.      Patient should call the office immediately if any signs of infection, such as fever, chills, sweats, increased redness or pain.    Patient was instructed to call the clinic or go to the emergency department if their symptoms do not improve, worsens, or if new symptoms develop.  Patient was advised that if any increased swelling, pain, or numbness arise to go immediately to the ED. Patient knows to call any time if an emergency arises. Shared  decision making occurred and patient verbalized understanding in agreement with this plan.

## 2024-07-26 ENCOUNTER — TELEPHONE (OUTPATIENT)
Dept: GASTROENTEROLOGY | Facility: CLINIC | Age: 59
End: 2024-07-26
Payer: COMMERCIAL

## 2024-07-26 NOTE — TELEPHONE ENCOUNTER
Returned call to patient to advise that he will need to reach out to endoscopy for his procedure time.

## 2024-07-26 NOTE — TELEPHONE ENCOUNTER
----- Message from Tiffanie Miller sent at 7/26/2024  9:14 AM CDT -----  Regarding: advise  Contact: pt  Type: Needs Medical Advice  Who Called:  patient  Symptoms (please be specific):  colonoscopy 8/2/24  How long has patient had these symptoms:    Pharmacy name and phone #:    Best Call Back Number: [phone]    Additional Information: seeking call time and instructions so he can make arrangements

## 2024-08-02 ENCOUNTER — ANESTHESIA EVENT (OUTPATIENT)
Dept: ENDOSCOPY | Facility: HOSPITAL | Age: 59
End: 2024-08-02
Payer: COMMERCIAL

## 2024-08-02 ENCOUNTER — HOSPITAL ENCOUNTER (OUTPATIENT)
Facility: HOSPITAL | Age: 59
Discharge: HOME | End: 2024-08-02
Attending: INTERNAL MEDICINE | Admitting: FAMILY MEDICINE
Payer: COMMERCIAL

## 2024-08-02 ENCOUNTER — ANESTHESIA (OUTPATIENT)
Dept: ENDOSCOPY | Facility: HOSPITAL | Age: 59
End: 2024-08-02
Payer: COMMERCIAL

## 2024-08-02 DIAGNOSIS — K57.90 DIVERTICULOSIS: ICD-10-CM

## 2024-08-02 DIAGNOSIS — K63.5 POLYP OF COLON, UNSPECIFIED PART OF COLON, UNSPECIFIED TYPE: Primary | ICD-10-CM

## 2024-08-02 DIAGNOSIS — Z86.010 HISTORY OF COLON POLYPS: ICD-10-CM

## 2024-08-02 DIAGNOSIS — K64.8 INTERNAL HEMORRHOIDS: ICD-10-CM

## 2024-08-02 PROCEDURE — 37000008 HC ANESTHESIA 1ST 15 MINUTES: Performed by: INTERNAL MEDICINE

## 2024-08-02 PROCEDURE — 27201012 HC FORCEPS, HOT/COLD, DISP: Performed by: INTERNAL MEDICINE

## 2024-08-02 PROCEDURE — 25000003 PHARM REV CODE 250: Performed by: NURSE ANESTHETIST, CERTIFIED REGISTERED

## 2024-08-02 PROCEDURE — 45380 COLONOSCOPY AND BIOPSY: CPT | Mod: 59,33,, | Performed by: INTERNAL MEDICINE

## 2024-08-02 PROCEDURE — 45385 COLONOSCOPY W/LESION REMOVAL: CPT | Mod: 33 | Performed by: INTERNAL MEDICINE

## 2024-08-02 PROCEDURE — 45380 COLONOSCOPY AND BIOPSY: CPT | Mod: 59,33 | Performed by: INTERNAL MEDICINE

## 2024-08-02 PROCEDURE — 25000003 PHARM REV CODE 250: Performed by: INTERNAL MEDICINE

## 2024-08-02 PROCEDURE — 27201089 HC SNARE, DISP (ANY): Performed by: INTERNAL MEDICINE

## 2024-08-02 PROCEDURE — 37000009 HC ANESTHESIA EA ADD 15 MINS: Performed by: INTERNAL MEDICINE

## 2024-08-02 PROCEDURE — 63600175 PHARM REV CODE 636 W HCPCS: Performed by: NURSE ANESTHETIST, CERTIFIED REGISTERED

## 2024-08-02 PROCEDURE — 45385 COLONOSCOPY W/LESION REMOVAL: CPT | Mod: 33,,, | Performed by: INTERNAL MEDICINE

## 2024-08-02 RX ORDER — LIDOCAINE HYDROCHLORIDE 20 MG/ML
INJECTION INTRAVENOUS
Status: DISCONTINUED | OUTPATIENT
Start: 2024-08-02 | End: 2024-08-02

## 2024-08-02 RX ORDER — PROPOFOL 10 MG/ML
VIAL (ML) INTRAVENOUS
Status: DISCONTINUED | OUTPATIENT
Start: 2024-08-02 | End: 2024-08-02

## 2024-08-02 RX ORDER — SODIUM CHLORIDE 9 MG/ML
INJECTION, SOLUTION INTRAVENOUS CONTINUOUS
Status: DISCONTINUED | OUTPATIENT
Start: 2024-08-02 | End: 2024-08-02 | Stop reason: HOSPADM

## 2024-08-02 RX ADMIN — PROPOFOL 40 MG: 10 INJECTION, EMULSION INTRAVENOUS at 11:08

## 2024-08-02 RX ADMIN — PROPOFOL 130 MG: 10 INJECTION, EMULSION INTRAVENOUS at 11:08

## 2024-08-02 RX ADMIN — SODIUM CHLORIDE: 9 INJECTION, SOLUTION INTRAVENOUS at 10:08

## 2024-08-02 RX ADMIN — LIDOCAINE HYDROCHLORIDE 50 MG: 20 INJECTION, SOLUTION INTRAVENOUS at 11:08

## 2024-08-02 RX ADMIN — PROPOFOL 40 MG: 10 INJECTION, EMULSION INTRAVENOUS at 12:08

## 2024-08-02 NOTE — ANESTHESIA PREPROCEDURE EVALUATION
08/02/2024  Alfred Cuello is a 59 y.o., male.      Pre-op Assessment    I have reviewed the Patient Summary Reports.     I have reviewed the Nursing Notes. I have reviewed the NPO Status.   I have reviewed the Medications.     Review of Systems  Anesthesia Hx:  No problems with previous Anesthesia                Cardiovascular:     Hypertension    Dysrhythmias atrial fibrillation  CHF    hyperlipidemia    EF - 65%      Congestive Heart Failure (CHF)                Hypertension     Atrial Fibrillation     Pulmonary:        Sleep Apnea     Obstructive Sleep Apnea (RAFAEL).           Renal/:  Chronic Renal Disease        Kidney Function/Disease             Hepatic/GI:     GERD      Gerd          Neurological:    Neuromuscular Disease,             Peripheral Neuropathy                        Neuromuscular Disease   Endocrine:  Diabetes, type 2    Diabetes                    Obesity / BMI > 30      Physical Exam  General: Well nourished    Airway:  Mallampati: II   Mouth Opening: Normal  TM Distance: Normal  Neck ROM: Normal ROM        Anesthesia Plan  Type of Anesthesia, risks & benefits discussed:    Anesthesia Type: Gen Natural Airway  Intra-op Monitoring Plan: Standard ASA Monitors  Induction:  IV  Informed Consent: Informed consent signed with the Patient and all parties understand the risks and agree with anesthesia plan.  All questions answered.   ASA Score: 3    Ready For Surgery From Anesthesia Perspective.     .

## 2024-08-02 NOTE — TRANSFER OF CARE
"Anesthesia Transfer of Care Note    Patient: Alfred Cuello    Procedure(s) Performed: Procedure(s) (LRB):  COLONOSCOPY (N/A)    Patient location: PACU    Anesthesia Type: general    Transport from OR: Transported from OR on room air with adequate spontaneous ventilation    Post pain: adequate analgesia    Post assessment: no apparent anesthetic complications and tolerated procedure well    Post vital signs: stable    Level of consciousness: sedated and responds to stimulation    Nausea/Vomiting: no nausea/vomiting    Complications: none    Transfer of care protocol was followed      Last vitals: Visit Vitals  BP (!) 143/68 (BP Location: Left arm, Patient Position: Lying)   Pulse 64   Temp 36.5 °C (97.7 °F) (Skin)   Resp 16   Ht 6' 2" (1.88 m)   Wt 122.5 kg (270 lb)   SpO2 96%   BMI 34.67 kg/m²     "

## 2024-08-02 NOTE — ANESTHESIA POSTPROCEDURE EVALUATION
Anesthesia Post Evaluation    Patient: Alfred Cuello    Procedure(s) Performed: Procedure(s) (LRB):  COLONOSCOPY (N/A)    Final Anesthesia Type: general      Patient location during evaluation: PACU  Patient participation: Yes- Able to Participate  Level of consciousness: awake and alert  Post-procedure vital signs: reviewed and stable  Pain management: adequate  Airway patency: patent    PONV status at discharge: No PONV  Anesthetic complications: no      Cardiovascular status: blood pressure returned to baseline  Respiratory status: unassisted  Hydration status: euvolemic  Follow-up not needed.              Vitals Value Taken Time   /60 08/02/24 1233   Temp 36.5 °C (97.7 °F) 08/02/24 1235   Pulse 58 08/02/24 1237   Resp 16 08/02/24 1235   SpO2 100 % 08/02/24 1237   Vitals shown include unfiled device data.      Event Time   Out of Recovery 12:50:20         Pain/Bean Score: Bean Score: 10 (8/2/2024 12:32 PM)

## 2024-08-05 VITALS
TEMPERATURE: 98 F | HEIGHT: 74 IN | HEART RATE: 56 BPM | WEIGHT: 270 LBS | DIASTOLIC BLOOD PRESSURE: 58 MMHG | RESPIRATION RATE: 16 BRPM | OXYGEN SATURATION: 98 % | SYSTOLIC BLOOD PRESSURE: 103 MMHG | BODY MASS INDEX: 34.65 KG/M2

## 2024-08-29 DIAGNOSIS — E78.5 DYSLIPIDEMIA: ICD-10-CM

## 2024-08-29 RX ORDER — ATORVASTATIN CALCIUM 80 MG/1
80 TABLET, FILM COATED ORAL DAILY
Qty: 90 TABLET | Refills: 3 | Status: SHIPPED | OUTPATIENT
Start: 2024-08-29 | End: 2025-08-29

## 2024-09-08 DIAGNOSIS — E11.42 DIABETIC POLYNEUROPATHY ASSOCIATED WITH TYPE 2 DIABETES MELLITUS: ICD-10-CM

## 2024-09-09 RX ORDER — GABAPENTIN 300 MG/1
1200 CAPSULE ORAL 3 TIMES DAILY
Qty: 1080 CAPSULE | Refills: 0 | Status: SHIPPED | OUTPATIENT
Start: 2024-09-09 | End: 2024-12-09

## 2024-10-15 ENCOUNTER — OFFICE VISIT (OUTPATIENT)
Dept: CARDIOLOGY | Facility: CLINIC | Age: 59
End: 2024-10-15
Payer: COMMERCIAL

## 2024-10-15 VITALS
BODY MASS INDEX: 36.81 KG/M2 | OXYGEN SATURATION: 98 % | HEIGHT: 74 IN | SYSTOLIC BLOOD PRESSURE: 136 MMHG | WEIGHT: 286.81 LBS | HEART RATE: 74 BPM | DIASTOLIC BLOOD PRESSURE: 70 MMHG

## 2024-10-15 DIAGNOSIS — I48.19 PERSISTENT ATRIAL FIBRILLATION: ICD-10-CM

## 2024-10-15 DIAGNOSIS — E11.65 TYPE 2 DIABETES MELLITUS WITH HYPERGLYCEMIA, WITHOUT LONG-TERM CURRENT USE OF INSULIN: ICD-10-CM

## 2024-10-15 DIAGNOSIS — Z98.890 H/O CARDIAC RADIOFREQUENCY ABLATION: ICD-10-CM

## 2024-10-15 DIAGNOSIS — E78.5 DYSLIPIDEMIA: ICD-10-CM

## 2024-10-15 DIAGNOSIS — F32.A DEPRESSION, UNSPECIFIED DEPRESSION TYPE: ICD-10-CM

## 2024-10-15 DIAGNOSIS — I50.32 CHRONIC DIASTOLIC CONGESTIVE HEART FAILURE: Primary | ICD-10-CM

## 2024-10-15 DIAGNOSIS — R00.2 PALPITATIONS: Primary | ICD-10-CM

## 2024-10-15 DIAGNOSIS — E66.01 SEVERE OBESITY (BMI 35.0-39.9) WITH COMORBIDITY: ICD-10-CM

## 2024-10-15 DIAGNOSIS — G47.33 OSA ON CPAP: ICD-10-CM

## 2024-10-15 DIAGNOSIS — E11.59 HYPERTENSION ASSOCIATED WITH DIABETES: ICD-10-CM

## 2024-10-15 DIAGNOSIS — I15.2 HYPERTENSION ASSOCIATED WITH DIABETES: ICD-10-CM

## 2024-10-15 DIAGNOSIS — N18.31 CHRONIC KIDNEY DISEASE, STAGE 3A: ICD-10-CM

## 2024-10-15 LAB
OHS QRS DURATION: 140 MS
OHS QTC CALCULATION: 488 MS

## 2024-10-15 PROCEDURE — 99214 OFFICE O/P EST MOD 30 MIN: CPT | Mod: 25,S$GLB,, | Performed by: PHYSICIAN ASSISTANT

## 2024-10-15 PROCEDURE — 3061F NEG MICROALBUMINURIA REV: CPT | Mod: CPTII,S$GLB,, | Performed by: PHYSICIAN ASSISTANT

## 2024-10-15 PROCEDURE — 3066F NEPHROPATHY DOC TX: CPT | Mod: CPTII,S$GLB,, | Performed by: PHYSICIAN ASSISTANT

## 2024-10-15 PROCEDURE — 3008F BODY MASS INDEX DOCD: CPT | Mod: CPTII,S$GLB,, | Performed by: PHYSICIAN ASSISTANT

## 2024-10-15 PROCEDURE — 3075F SYST BP GE 130 - 139MM HG: CPT | Mod: CPTII,S$GLB,, | Performed by: PHYSICIAN ASSISTANT

## 2024-10-15 PROCEDURE — 3044F HG A1C LEVEL LT 7.0%: CPT | Mod: CPTII,S$GLB,, | Performed by: PHYSICIAN ASSISTANT

## 2024-10-15 PROCEDURE — 99999 PR PBB SHADOW E&M-EST. PATIENT-LVL V: CPT | Mod: PBBFAC,,, | Performed by: PHYSICIAN ASSISTANT

## 2024-10-15 PROCEDURE — 93000 ELECTROCARDIOGRAM COMPLETE: CPT | Mod: S$GLB,,, | Performed by: INTERNAL MEDICINE

## 2024-10-15 PROCEDURE — 4010F ACE/ARB THERAPY RXD/TAKEN: CPT | Mod: CPTII,S$GLB,, | Performed by: PHYSICIAN ASSISTANT

## 2024-10-15 PROCEDURE — 1160F RVW MEDS BY RX/DR IN RCRD: CPT | Mod: CPTII,S$GLB,, | Performed by: PHYSICIAN ASSISTANT

## 2024-10-15 PROCEDURE — 1159F MED LIST DOCD IN RCRD: CPT | Mod: CPTII,S$GLB,, | Performed by: PHYSICIAN ASSISTANT

## 2024-10-15 PROCEDURE — 3078F DIAST BP <80 MM HG: CPT | Mod: CPTII,S$GLB,, | Performed by: PHYSICIAN ASSISTANT

## 2024-10-15 RX ORDER — SPIRONOLACTONE 25 MG/1
25 TABLET ORAL DAILY
Qty: 30 TABLET | Refills: 11 | Status: SHIPPED | OUTPATIENT
Start: 2024-10-15 | End: 2024-10-17

## 2024-10-15 NOTE — PROGRESS NOTES
General Cardiology Clinic Note  Reason for Visit: Follow up   Last Clinic Visit: 11/3/2023 with Dr. Stu Rangel     HPI:   Alfred Cuello is a 59 y.o. male who presents to follow up.     Problems:  Atrial fibrillation s/p PVI 12/6/2022  HFpEF  Hypertension  Dyslipidemia  DM2  RAFAEL on CPAP   Obesity     HPI  Patient presents for follow up. He has not been doing well. His wife passed away 30 days ago and he is very depressed. He doesn't sleep and cries all day. He goes to work and then sits  once he gets home. He doesn't have any familial support. He reports worsening pedal edema up to his knees. He cannot take diuretics at work, because he is not able to go to the bathroom frequently, so he only takes them 1-2 times a week. He denies chest pain, orthopnea, PND, syncope, near syncope. He does not exercise, and sits all day at home.    11/3/2023 (Dr. Rangel)  58M pm hx HFpEF, pAfib, HLD, HTN 2 month follow up for worsening ALAN on exertion. Seen in August and at that time started on lasix 20mg BID. Patient reports ALAN and leg swelling has improved, but still present up to his shins. Wears compression socks and these make it tolerable. Otherwise minimal cp, sob, alan, lightheadedness, palpitations, orthopnea. Reports he will get SOB if he does intense exercise, and the only time in the past 2 months was during his dobutamine exercise test. Exercised 5m30s and reached METS 9, but had to terminate study early due to cp which resided in 10 minutes. EKG without ischemic signs, but basal-mid inferior hypokinesis was present, so was started on plavix with discussion about interventional cardiology visit. Patient has been otherwise well     8/15/2023 HPI  Patient presents for follow up. He reports mild discomfort in chest with exertion. He has a hard time describing it, but states it's not normal. This is not new. He reports ALAN with climbing 4 flights of stairs. He admits to modifying his activity to avoid symptoms. Overall feels  "better since the ablation. He does reports a significant worsening of his pedal edema over the past 6 months and has been gaining weight. He denies orthopnea, PND, syncope, near syncope, sustained palpitations. He is using his CPAP. He does not exercise. BP at home is 130s/70s.    4/12/2022 HPI   Pt was incidentally found to be in Afib by PCP on 11/2020 and was referred to Cardiology. He was started on Eliquis. Later that month, he ended up admitted with acute diastolic CHF. He was scheduled for DCCV; however, NOEMI showed a TAWNY thrombus so cardioversion was aborted. It seems he was lost to follow up after this.     He presents today for follow up. He notes he "fatigues more easily" than he used to. Also reports a rare mild, left sided chest discomfort. Seems to be unrelated to physical activity. Lasts no more than a couple minutes. Has occurred ~ 6 times over the past year. He reports mild BYRNE which he has attributed to obesity and deconditioning. Occasional orthostatic symptoms. He has chronic pedal edema which is improved with compression stockings. Denies syncope, palpitations, TIA, bleeding issues. He does not do any aerobic exercise. He recently started dieting and has lost about 20 lbs. He is compliant with CPAP. BP at home 130s/70s.      ROS:      Review of Systems   Constitutional: Positive for malaise/fatigue. Negative for diaphoresis, weight gain and weight loss.   HENT:  Negative for nosebleeds.    Eyes:  Negative for vision loss in left eye, vision loss in right eye and visual disturbance.   Cardiovascular:  Positive for dyspnea on exertion and leg swelling. Negative for chest pain, claudication, irregular heartbeat, near-syncope, orthopnea, palpitations, paroxysmal nocturnal dyspnea and syncope.   Respiratory:  Negative for cough, shortness of breath, sleep disturbances due to breathing, snoring and wheezing.    Hematologic/Lymphatic: Negative for bleeding problem. Does not bruise/bleed easily.   Skin:  " Negative for poor wound healing and rash.   Musculoskeletal:  Negative for muscle cramps and myalgias.   Gastrointestinal:  Negative for bloating, abdominal pain, diarrhea, heartburn, melena, nausea and vomiting.   Genitourinary:  Negative for hematuria and nocturia.   Neurological:  Positive for dizziness. Negative for brief paralysis, headaches, light-headedness, numbness and weakness.   Psychiatric/Behavioral:  Positive for depression. The patient has insomnia.    Allergic/Immunologic: Negative for hives.       PMH:     Past Medical History:   Diagnosis Date    Atrial fibrillation status post cardioversion     2 months ago    Diabetes mellitus, type 2     GERD (gastroesophageal reflux disease)     Hyperlipidemia     Hypertension     Neuropathy     Sleep apnea, unspecified     cpap     Past Surgical History:   Procedure Laterality Date    ABDOMINAL SURGERY      as a child    ABLATION OF ARRHYTHMOGENIC FOCUS FOR ATRIAL FIBRILLATION N/A 12/6/2022    Procedure: Ablation atrial fibrillation;  Surgeon: SKYLER Lee MD;  Location: [facility] EP LAB;  Service: Cardiology;  Laterality: N/A;  AF, NOEMI, PVI, RFA, Carto, Gen, RI, 3 Prep    COLONOSCOPY N/A 7/20/2018    Procedure: COLONOSCOPY;  Surgeon: Marko Koo MD;  Location: [facility] ENDO;  Service: Endoscopy;  Laterality: N/A;    COLONOSCOPY N/A 8/2/2024    Procedure: COLONOSCOPY;  Surgeon: Marko Koo MD;  Location: [facility];  Service: Endoscopy;  Laterality: N/A;  one hour time slot    TREATMENT OF CARDIAC ARRHYTHMIA N/A 5/20/2022    Procedure: CARDIOVERSION;  Surgeon: SKYLER Lee MD;  Location: [facility] EP LAB;  Service: Cardiology;  Laterality: N/A;  afib, NOEMI, DCCV, anes, MB, 3 Prep    TREATMENT OF CARDIAC ARRHYTHMIA  12/6/2022    Procedure: Cardioversion or Defibrillation;  Surgeon: SKYLER Lee MD;  Location: [facility] EP LAB;  Service: Cardiology;;    TRIAL OF SPINAL CORD NERVE STIMULATOR N/A 6/17/2019    Procedure: Trial, Neurostimulator, LUMBAR  SPINAL CORD STIMULATOR TRIAL;  Surgeon: Rahat Orantes MD;  Location: [facility];  Service: Pain Management;  Laterality: N/A;  PDN STUDY TRIAL, NEEDS CONSENT, NEVRO REP     Allergies:     Review of patient's allergies indicates:   Allergen Reactions    Oxycodone Itching and Nausea Only     Medications:     Current Outpatient Medications on File Prior to Visit   Medication Sig Dispense Refill    amiodarone (PACERONE) 200 MG Tab Take 1 tablet (200 mg total) by mouth once daily. 90 tablet 3    apixaban (ELIQUIS) 5 mg Tab Take 1 tablet (5 mg total) by mouth 2 (two) times daily. 60 tablet 11    atorvastatin (LIPITOR) 80 MG tablet Take 1 tablet (80 mg total) by mouth once daily. 90 tablet 3    ezetimibe (ZETIA) 10 mg tablet Take 1 tablet (10 mg total) by mouth once daily. 90 tablet 3    ferrous sulfate 325 mg (65 mg iron) Tab tablet Take 325 mg by mouth daily with breakfast.      furosemide (LASIX) 20 MG tablet Take 1 tablet (20 mg total) by mouth 2 (two) times daily. 180 tablet 3    gabapentin (NEURONTIN) 300 MG capsule Take 4 capsules (1,200 mg total) by mouth 3 (three) times daily. 1080 capsule 0    glimepiride (AMARYL) 4 MG tablet Take 1 tablet (4 mg total) by mouth 2 (two) times a day. 180 tablet 1    KRILL OIL ORAL Take by mouth once daily.      lisinopriL-hydrochlorothiazide (PRINZIDE,ZESTORETIC) 20-25 mg Tab Take 1 tablet by mouth once daily. 90 tablet 3    loratadine (CLARITIN) 10 mg tablet Take 10 mg by mouth once daily.      metFORMIN (GLUCOPHAGE-XR) 500 MG ER 24hr tablet Take 2 tablets (1,000 mg total) by mouth 2 (two) times daily with meals. 360 tablet 3    metoprolol succinate (TOPROL-XL) 50 MG 24 hr tablet Take 1 tablet (50 mg total) by mouth once daily. 90 tablet 3    multivitamin (THERAGRAN) per tablet Take 1 tablet by mouth once daily.      nitroGLYCERIN (NITROSTAT) 0.4 MG SL tablet Place 1 tablet (0.4 mg total) under the tongue every 5 (five) minutes as needed for Chest pain. 30 tablet 2     "omeprazole (PRILOSEC) 20 MG capsule Take 1 capsule by mouth once daily 90 capsule 3    pioglitazone (ACTOS) 30 MG tablet Take 1 tablet (30 mg total) by mouth once daily. 90 tablet 4    potassium chloride SA (K-DUR,KLOR-CON) 20 MEQ tablet Take 1 tablet by mouth once daily 30 tablet 0    sars-cov-2, covid-19, (PFIZER COVID-19) 30 mcg/0.3 ml injection        No current facility-administered medications on file prior to visit.     Social History:     Social History     Tobacco Use    Smoking status: Former     Current packs/day: 0.00     Types: Cigarettes     Quit date: 3/20/2011     Years since quittin.5    Smokeless tobacco: Never    Tobacco comments:     smoked regularly for 35 years prior to this   Substance Use Topics    Alcohol use: Yes     Comment: occ     Family History:     Family History   Problem Relation Name Age of Onset    Cancer Mother      Diabetes Mother          has a pacemaker    Coronary artery disease Father          hx of cabg    Glaucoma Neg Hx       Physical Exam:   /70   Pulse 74   Ht 6' 2" (1.88 m)   Wt 130.1 kg (286 lb 13.1 oz)   SpO2 98%   BMI 36.83 kg/m²        Physical Exam  Vitals and nursing note reviewed.   Constitutional:       General: He is not in acute distress.     Appearance: Normal appearance. He is obese.   HENT:      Head: Normocephalic and atraumatic.      Nose: Nose normal.   Eyes:      General: No scleral icterus.     Extraocular Movements: Extraocular movements intact.      Conjunctiva/sclera: Conjunctivae normal.   Neck:      Thyroid: No thyromegaly.      Vascular: Hepatojugular reflux and JVD present. No carotid bruit.   Cardiovascular:      Rate and Rhythm: Normal rate. Rhythm irregularly irregular.      Pulses: Normal pulses.      Heart sounds: Normal heart sounds. No murmur heard.     No friction rub. No gallop.   Pulmonary:      Effort: Pulmonary effort is normal.      Breath sounds: Normal breath sounds. No wheezing, rhonchi or rales.   Chest:      " Chest wall: No tenderness.   Abdominal:      General: Bowel sounds are normal. There is no distension.      Palpations: Abdomen is soft.      Tenderness: There is no abdominal tenderness.   Musculoskeletal:      Cervical back: Neck supple.      Right lower le+ Pitting Edema (to knees bilaterally) present.      Left lower le+ Pitting Edema present.   Skin:     General: Skin is warm and dry.      Coloration: Skin is not pale.      Findings: No erythema or rash.      Nails: There is no clubbing.   Neurological:      Mental Status: He is alert and oriented to person, place, and time.   Psychiatric:         Mood and Affect: Mood is depressed. Affect is tearful.         Behavior: Behavior normal.          Labs:     Lab Results   Component Value Date     2024    K 4.3 2024    CL 99 2024    CO2 28 2024    BUN 18 2024    CREATININE 1.4 2024    ANIONGAP 12 2024     Lab Results   Component Value Date    HGBA1C 6.8 (H) 2024     Lab Results   Component Value Date    BNP 80 2023     (H) 2020    Lab Results   Component Value Date    WBC 6.27 2024    HGB 12.7 (L) 2024    HCT 39.6 (L) 2024     2024    GRAN 3.9 2024    GRAN 62.2 2024     Lab Results   Component Value Date    CHOL 138 2024    HDL 41 2024    LDLCALC 67.0 2024    TRIG 150 2024          Imaging:   Echocardiograms:   NOEMI 2022  The left ventricle is normal in size with normal systolic function. The estimated ejection fraction is 55%.  Normal right ventricular size with normal right ventricular systolic function.  Biatrial enlargement.  Mild tricuspid regurgitation.  Normal appearing left atrial appendage. No thrombus is present in the appendage. Abnormal appendage velocities.    NOEMI 2022  NOEMI to evaluate for LA/TAWNY thrombus prior to DCCV.  No thrombus is present in the left atrial appendage or left atrium (contrast  used). Abnormal appendage velocities 0.35m/s.  The left ventricle is mildly enlarged with mildly decreased systolic function.  The estimated ejection fraction is 45%.  Mild mitral regurgitation.  Normal right ventricular size with normal right ventricular systolic function.  Moderate tricuspid regurgitation.  Biatrial enlargement.  The sinuses of Valsalva is mildly dilated.    NOEMI 11/24/2020  With normal systolic function. The estimated ejection fraction is 55%.  Normal left ventricular diastolic function.  Normal right ventricular size with normal right ventricular systolic function.  Moderate left atrial enlargement.  Moderate right atrial enlargement.  Moderate mitral regurgitation.  Moderate to severe tricuspid regurgitation.    TTE 11/23/2020  The estimated PA systolic pressure is 29 mmHg.  There is left ventricular concentric remodeling.  The left ventricle is normal in size with normal systolic function. The estimated ejection fraction is 65%.  Normal left ventricular diastolic function.  Normal right ventricular size with normal right ventricular systolic function.  Mild left atrial enlargement.  Mild-to-moderate mitral regurgitation.  Mild tricuspid regurgitation.  Normal central venous pressure (3 mmHg).     Stress Tests:   Exercise stress echo 9/29/2023    Left Ventricle: The left ventricle is normal in size. Normal wall thickness. Normal wall motion. There is normal systolic function with a visually estimated ejection fraction of 60 - 65%. Ejection fraction by visual approximation is 63%. There is normal diastolic function.    Left Atrium: Left atrium is moderately dilated.    Right Ventricle: Normal right ventricular cavity size. Wall thickness is normal. Right ventricle wall motion  is normal. Systolic function is normal.    Right Atrium: Right atrium is mildly dilated.    Aortic Valve: The aortic valve is a trileaflet valve. There is aortic valve sclerosis. There is mild annular calcification  present.    Mitral Valve: There is mild mitral annular calcification present.    Pulmonary Artery: No pulmonary hypertension. The estimated pulmonary artery systolic pressure is 23 mmHg.    IVC/SVC: Normal venous pressure at 3 mmHg.    Stress Protocol: The patient exercised for 5 minutes 30 seconds on a high ramp protocol, corresponding to a functional capacity of 9 METS, achieving a peak heart rate of 120 bpm, which is 75 % of the age predicted maximum heart rate. Their exercise capacity was mildly impaired. The patient reported chest discomfort during the stress test. The test was stopped because the patient was falling off the treadmill. The patient couldn't keep up with it.    Baseline ECG: The Baseline ECG reveals sinus rhythm with RBBB. The axis is normal. The ST segments are normal.    Stress ECG: There are no ST segment deviation identified during the protocol. During stress, occasional PACs are noted. During stress, occasional PVCs are noted. There is normal blood pressure response with stress.    ECG Conclusion: The ECG portion of the study is negative for ischemia. Sensitivity is reduced due to failure to reach target heart rate.    Post-stress Echo: The left ventricle systolic function is hyperdynamic with an EF of 65 %. The post-stress echo showed wall motion abnormalities compared to baseline. Right ventricle systolic function is hyperdynamic.    Post-stress Impression: The study is consistent with ischemia probably in the distribution of the RCA with significant symptoms not at an optimal level of stress.    Caths:   None    EKG 10/15/2024: Atrial fibrillation, RBBB    Assessment:     1. Chronic diastolic congestive heart failure    2. Persistent atrial fibrillation    3. Hypertension associated with diabetes    4. Dyslipidemia    5. H/O cardiac radiofrequency ablation    6. Type 2 diabetes mellitus with hyperglycemia, without long-term current use of insulin    7. Severe obesity (BMI 35.0-39.9) with  comorbidity    8. RAFAEL on CPAP    9. Chronic kidney disease, stage 3a        Plan:     HFpEF  Appears volume overloaded on exam. Obtain TTE now that he's back in AF.   Recommend taking Lasix at least once a day if able. He finds the frequent urination interferes with his ability to do his job, so he won't take it before work. Recommend taking it as soon as he gets home in the evening and then twice a day on the weekends.   He has been unable to afford Jardiance when prescribed previously.   Start Spironolactone 25 mg daily. BMP and BNP in 1-2 weeks.   Discussed the importance of a low sodium diet    Persistent atrial fibrillation s/p PVI  Back in atrial fibrillation today. Rate is controlled on Metoprolol. He is no longer taking the Amiodarone. He has a history of a reduced LVEF in AF. Obtain TTE.   CHADSVASC 3. Continue Eliquis for CVA prophylaxis  Refer to EP     Hypertension  Mildly elevated.   Start Spironolactone, as above.    Continue Lisinopril-HCTZ 20-25 mg daily     Dyslipidemia  At goal  Continue Zetia 10 mg daily   Continue Lipitor 80 mg   Mediterranean diet     Type 2 DM  A1c at goal.     Stage 3 CKD  BMP in 2 weeks.     Obesity   Increase aerobic exercise with a goal of at least 30 minutes, 5 days a week.    RAFAEL on CPAP   Continue CPAP     Depression  He is significantly depressed, and unfortunately seems apathetic about his own health. Offered referral to psych, but he declines. He has an appt with his PCP soon. Encouraged him to discuss possible treatment options with his PCP.       Follow up in 3 months.     Signed:  Steffi Bartholomew PA-C  Cardiology   [phone] - General  10/15/2024 12:29 PM

## 2024-10-15 NOTE — PATIENT INSTRUCTIONS
Start Spironolactone 25 mg daily.     Try to take Lasix once a day if you can.     Continue to wear compression stockings every day.     Limit sodium to less than 2 grams daily (2,000 mg).

## 2024-10-17 RX ORDER — SPIRONOLACTONE 25 MG/1
25 TABLET ORAL DAILY
Qty: 90 TABLET | Refills: 3 | Status: SHIPPED | OUTPATIENT
Start: 2024-10-17 | End: 2025-10-12

## 2024-10-18 DIAGNOSIS — E11.65 TYPE 2 DIABETES MELLITUS WITH HYPERGLYCEMIA, WITHOUT LONG-TERM CURRENT USE OF INSULIN: ICD-10-CM

## 2024-10-19 ENCOUNTER — LAB VISIT (OUTPATIENT)
Dept: LAB | Facility: HOSPITAL | Age: 59
End: 2024-10-19
Attending: FAMILY MEDICINE
Payer: COMMERCIAL

## 2024-10-19 DIAGNOSIS — E11.65 TYPE 2 DIABETES MELLITUS WITH HYPERGLYCEMIA, WITHOUT LONG-TERM CURRENT USE OF INSULIN: ICD-10-CM

## 2024-10-19 DIAGNOSIS — E55.9 VITAMIN D DEFICIENCY: ICD-10-CM

## 2024-10-19 DIAGNOSIS — E78.5 DYSLIPIDEMIA: ICD-10-CM

## 2024-10-19 DIAGNOSIS — D50.9 IRON DEFICIENCY ANEMIA, UNSPECIFIED IRON DEFICIENCY ANEMIA TYPE: ICD-10-CM

## 2024-10-19 LAB
25(OH)D3+25(OH)D2 SERPL-MCNC: 49 NG/ML (ref 30–96)
ALBUMIN SERPL BCP-MCNC: 3.9 G/DL (ref 3.5–5.2)
ALP SERPL-CCNC: 80 U/L (ref 40–150)
ALT SERPL W/O P-5'-P-CCNC: 23 U/L (ref 10–44)
ANION GAP SERPL CALC-SCNC: 12 MMOL/L (ref 8–16)
AST SERPL-CCNC: 20 U/L (ref 10–40)
BASOPHILS # BLD AUTO: 0.05 K/UL (ref 0–0.2)
BASOPHILS NFR BLD: 0.7 % (ref 0–1.9)
BILIRUB SERPL-MCNC: 0.6 MG/DL (ref 0.1–1)
BUN SERPL-MCNC: 18 MG/DL (ref 8–23)
CALCIUM SERPL-MCNC: 9.5 MG/DL (ref 8.7–10.5)
CHLORIDE SERPL-SCNC: 103 MMOL/L (ref 95–110)
CHOLEST SERPL-MCNC: 120 MG/DL (ref 120–199)
CHOLEST/HDLC SERPL: 3.3 {RATIO} (ref 2–5)
CO2 SERPL-SCNC: 26 MMOL/L (ref 23–29)
CREAT SERPL-MCNC: 1.2 MG/DL (ref 0.5–1.4)
DIFFERENTIAL METHOD BLD: ABNORMAL
EOSINOPHIL # BLD AUTO: 0.3 K/UL (ref 0–0.5)
EOSINOPHIL NFR BLD: 4.9 % (ref 0–8)
ERYTHROCYTE [DISTWIDTH] IN BLOOD BY AUTOMATED COUNT: 13.9 % (ref 11.5–14.5)
EST. GFR  (NO RACE VARIABLE): >60 ML/MIN/1.73 M^2
ESTIMATED AVG GLUCOSE: 171 MG/DL (ref 68–131)
FERRITIN SERPL-MCNC: 35 NG/ML (ref 20–300)
GLUCOSE SERPL-MCNC: 165 MG/DL (ref 70–110)
HBA1C MFR BLD: 7.6 % (ref 4–5.6)
HCT VFR BLD AUTO: 37.6 % (ref 40–54)
HDLC SERPL-MCNC: 36 MG/DL (ref 40–75)
HDLC SERPL: 30 % (ref 20–50)
HGB BLD-MCNC: 12 G/DL (ref 14–18)
IMM GRANULOCYTES # BLD AUTO: 0.01 K/UL (ref 0–0.04)
IMM GRANULOCYTES NFR BLD AUTO: 0.1 % (ref 0–0.5)
IRON SERPL-MCNC: 56 UG/DL (ref 45–160)
LDLC SERPL CALC-MCNC: 57.4 MG/DL (ref 63–159)
LYMPHOCYTES # BLD AUTO: 1.5 K/UL (ref 1–4.8)
LYMPHOCYTES NFR BLD: 22.7 % (ref 18–48)
MCH RBC QN AUTO: 28.6 PG (ref 27–31)
MCHC RBC AUTO-ENTMCNC: 31.9 G/DL (ref 32–36)
MCV RBC AUTO: 90 FL (ref 82–98)
MONOCYTES # BLD AUTO: 0.4 K/UL (ref 0.3–1)
MONOCYTES NFR BLD: 6.3 % (ref 4–15)
NEUTROPHILS # BLD AUTO: 4.4 K/UL (ref 1.8–7.7)
NEUTROPHILS NFR BLD: 65.3 % (ref 38–73)
NONHDLC SERPL-MCNC: 84 MG/DL
NRBC BLD-RTO: 0 /100 WBC
PLATELET # BLD AUTO: 217 K/UL (ref 150–450)
PMV BLD AUTO: 11.5 FL (ref 9.2–12.9)
POTASSIUM SERPL-SCNC: 4 MMOL/L (ref 3.5–5.1)
PROT SERPL-MCNC: 7 G/DL (ref 6–8.4)
RBC # BLD AUTO: 4.2 M/UL (ref 4.6–6.2)
SATURATED IRON: 13 % (ref 20–50)
SODIUM SERPL-SCNC: 141 MMOL/L (ref 136–145)
TOTAL IRON BINDING CAPACITY: 445 UG/DL (ref 250–450)
TRANSFERRIN SERPL-MCNC: 301 MG/DL (ref 200–375)
TRIGL SERPL-MCNC: 133 MG/DL (ref 30–150)
WBC # BLD AUTO: 6.79 K/UL (ref 3.9–12.7)

## 2024-10-19 PROCEDURE — 82306 VITAMIN D 25 HYDROXY: CPT | Performed by: FAMILY MEDICINE

## 2024-10-19 PROCEDURE — 83036 HEMOGLOBIN GLYCOSYLATED A1C: CPT | Performed by: FAMILY MEDICINE

## 2024-10-19 PROCEDURE — 85025 COMPLETE CBC W/AUTO DIFF WBC: CPT | Performed by: FAMILY MEDICINE

## 2024-10-19 PROCEDURE — 82728 ASSAY OF FERRITIN: CPT | Performed by: FAMILY MEDICINE

## 2024-10-19 PROCEDURE — 83540 ASSAY OF IRON: CPT | Performed by: FAMILY MEDICINE

## 2024-10-19 PROCEDURE — 84466 ASSAY OF TRANSFERRIN: CPT | Performed by: FAMILY MEDICINE

## 2024-10-19 PROCEDURE — 36415 COLL VENOUS BLD VENIPUNCTURE: CPT | Mod: PO | Performed by: FAMILY MEDICINE

## 2024-10-19 PROCEDURE — 80053 COMPREHEN METABOLIC PANEL: CPT | Performed by: FAMILY MEDICINE

## 2024-10-19 PROCEDURE — 80061 LIPID PANEL: CPT | Performed by: FAMILY MEDICINE

## 2024-10-21 RX ORDER — METFORMIN HYDROCHLORIDE 500 MG/1
1000 TABLET, EXTENDED RELEASE ORAL 2 TIMES DAILY WITH MEALS
Qty: 360 TABLET | Refills: 3 | Status: SHIPPED | OUTPATIENT
Start: 2024-10-21 | End: 2025-10-21

## 2024-10-24 ENCOUNTER — OFFICE VISIT (OUTPATIENT)
Dept: FAMILY MEDICINE | Facility: CLINIC | Age: 59
End: 2024-10-24
Payer: COMMERCIAL

## 2024-10-24 VITALS
RESPIRATION RATE: 19 BRPM | SYSTOLIC BLOOD PRESSURE: 139 MMHG | BODY MASS INDEX: 36.22 KG/M2 | HEART RATE: 85 BPM | OXYGEN SATURATION: 96 % | HEIGHT: 74 IN | WEIGHT: 282.19 LBS | TEMPERATURE: 98 F | DIASTOLIC BLOOD PRESSURE: 82 MMHG

## 2024-10-24 DIAGNOSIS — G47.33 OSA ON CPAP: ICD-10-CM

## 2024-10-24 DIAGNOSIS — I48.0 PAROXYSMAL ATRIAL FIBRILLATION: Primary | ICD-10-CM

## 2024-10-24 DIAGNOSIS — F32.0 MAJOR DEPRESSIVE DISORDER, SINGLE EPISODE, MILD: ICD-10-CM

## 2024-10-24 DIAGNOSIS — I15.2 HYPERTENSION ASSOCIATED WITH DIABETES: Primary | ICD-10-CM

## 2024-10-24 DIAGNOSIS — D50.9 IRON DEFICIENCY ANEMIA, UNSPECIFIED IRON DEFICIENCY ANEMIA TYPE: ICD-10-CM

## 2024-10-24 DIAGNOSIS — E11.65 TYPE 2 DIABETES MELLITUS WITH HYPERGLYCEMIA, WITHOUT LONG-TERM CURRENT USE OF INSULIN: ICD-10-CM

## 2024-10-24 DIAGNOSIS — I48.19 PERSISTENT ATRIAL FIBRILLATION: ICD-10-CM

## 2024-10-24 DIAGNOSIS — K21.9 GASTROESOPHAGEAL REFLUX DISEASE WITHOUT ESOPHAGITIS: ICD-10-CM

## 2024-10-24 DIAGNOSIS — F43.21 GRIEF REACTION: ICD-10-CM

## 2024-10-24 DIAGNOSIS — E66.01 SEVERE OBESITY (BMI 35.0-39.9) WITH COMORBIDITY: ICD-10-CM

## 2024-10-24 DIAGNOSIS — E78.5 DYSLIPIDEMIA: ICD-10-CM

## 2024-10-24 DIAGNOSIS — E11.59 HYPERTENSION ASSOCIATED WITH DIABETES: Primary | ICD-10-CM

## 2024-10-24 DIAGNOSIS — E55.9 VITAMIN D DEFICIENCY: ICD-10-CM

## 2024-10-24 PROCEDURE — 3061F NEG MICROALBUMINURIA REV: CPT | Mod: CPTII,S$GLB,, | Performed by: FAMILY MEDICINE

## 2024-10-24 PROCEDURE — G2211 COMPLEX E/M VISIT ADD ON: HCPCS | Mod: S$GLB,,, | Performed by: FAMILY MEDICINE

## 2024-10-24 PROCEDURE — 3066F NEPHROPATHY DOC TX: CPT | Mod: CPTII,S$GLB,, | Performed by: FAMILY MEDICINE

## 2024-10-24 PROCEDURE — 3075F SYST BP GE 130 - 139MM HG: CPT | Mod: CPTII,S$GLB,, | Performed by: FAMILY MEDICINE

## 2024-10-24 PROCEDURE — 1160F RVW MEDS BY RX/DR IN RCRD: CPT | Mod: CPTII,S$GLB,, | Performed by: FAMILY MEDICINE

## 2024-10-24 PROCEDURE — 3079F DIAST BP 80-89 MM HG: CPT | Mod: CPTII,S$GLB,, | Performed by: FAMILY MEDICINE

## 2024-10-24 PROCEDURE — 3008F BODY MASS INDEX DOCD: CPT | Mod: CPTII,S$GLB,, | Performed by: FAMILY MEDICINE

## 2024-10-24 PROCEDURE — 3051F HG A1C>EQUAL 7.0%<8.0%: CPT | Mod: CPTII,S$GLB,, | Performed by: FAMILY MEDICINE

## 2024-10-24 PROCEDURE — 1159F MED LIST DOCD IN RCRD: CPT | Mod: CPTII,S$GLB,, | Performed by: FAMILY MEDICINE

## 2024-10-24 PROCEDURE — 99214 OFFICE O/P EST MOD 30 MIN: CPT | Mod: S$GLB,,, | Performed by: FAMILY MEDICINE

## 2024-10-24 PROCEDURE — 4010F ACE/ARB THERAPY RXD/TAKEN: CPT | Mod: CPTII,S$GLB,, | Performed by: FAMILY MEDICINE

## 2024-10-24 PROCEDURE — 99999 PR PBB SHADOW E&M-EST. PATIENT-LVL IV: CPT | Mod: PBBFAC,,, | Performed by: FAMILY MEDICINE

## 2024-10-24 RX ORDER — CLONAZEPAM 0.5 MG/1
0.5 TABLET ORAL 2 TIMES DAILY PRN
Qty: 60 TABLET | Refills: 0 | Status: SHIPPED | OUTPATIENT
Start: 2024-10-24 | End: 2025-10-24

## 2024-10-24 RX ORDER — ESCITALOPRAM OXALATE 10 MG/1
10 TABLET ORAL NIGHTLY
Qty: 90 TABLET | Refills: 1 | Status: SHIPPED | OUTPATIENT
Start: 2024-10-24 | End: 2025-10-24

## 2024-11-20 ENCOUNTER — OFFICE VISIT (OUTPATIENT)
Dept: FAMILY MEDICINE | Facility: CLINIC | Age: 59
End: 2024-11-20
Payer: COMMERCIAL

## 2024-11-20 DIAGNOSIS — F32.0 MAJOR DEPRESSIVE DISORDER, SINGLE EPISODE, MILD: ICD-10-CM

## 2024-11-20 DIAGNOSIS — F43.21 GRIEF REACTION: Primary | ICD-10-CM

## 2024-11-20 PROCEDURE — 1160F RVW MEDS BY RX/DR IN RCRD: CPT | Mod: CPTII,95,, | Performed by: FAMILY MEDICINE

## 2024-11-20 PROCEDURE — G2211 COMPLEX E/M VISIT ADD ON: HCPCS | Mod: 95,,, | Performed by: FAMILY MEDICINE

## 2024-11-20 PROCEDURE — 99213 OFFICE O/P EST LOW 20 MIN: CPT | Mod: 95,,, | Performed by: FAMILY MEDICINE

## 2024-11-20 PROCEDURE — 4010F ACE/ARB THERAPY RXD/TAKEN: CPT | Mod: CPTII,95,, | Performed by: FAMILY MEDICINE

## 2024-11-20 PROCEDURE — 3066F NEPHROPATHY DOC TX: CPT | Mod: CPTII,95,, | Performed by: FAMILY MEDICINE

## 2024-11-20 PROCEDURE — 1159F MED LIST DOCD IN RCRD: CPT | Mod: CPTII,95,, | Performed by: FAMILY MEDICINE

## 2024-11-20 PROCEDURE — 3051F HG A1C>EQUAL 7.0%<8.0%: CPT | Mod: CPTII,95,, | Performed by: FAMILY MEDICINE

## 2024-11-20 PROCEDURE — 3061F NEG MICROALBUMINURIA REV: CPT | Mod: CPTII,95,, | Performed by: FAMILY MEDICINE

## 2024-11-20 NOTE — PROGRESS NOTES
Subjective:       Patient ID: Alfred Cuello is a 59 y.o. male.    Chief Complaint: No chief complaint on file.    HPI  Review of Systems   Constitutional:  Negative for fatigue and unexpected weight change.   Respiratory:  Negative for chest tightness and shortness of breath.    Cardiovascular:  Negative for chest pain, palpitations and leg swelling.   Gastrointestinal:  Negative for abdominal pain.   Musculoskeletal:  Negative for arthralgias.   Neurological:  Negative for dizziness, syncope, light-headedness and headaches.   Psychiatric/Behavioral:  Negative for behavioral problems, confusion, dysphoric mood and sleep disturbance. The patient is nervous/anxious.        Patient Active Problem List   Diagnosis    Iron deficiency anemia    Hypertension associated with diabetes    Gastroesophageal reflux disease    H/O pyloric stenosis    Dyslipidemia    Chronic kidney disease, stage 3a    RAFAEL on CPAP    Painful diabetic neuropathy    Chronic pain syndrome    Diabetic polyneuropathy associated with type 2 diabetes mellitus    Research study patient    Type 2 diabetes mellitus with hyperglycemia, without long-term current use of insulin    Vitamin D deficiency    Severe obesity (BMI 35.0-39.9) with comorbidity    Persistent atrial fibrillation    Chronic diastolic congestive heart failure    H/O cardiac radiofrequency ablation    Major depressive disorder, single episode, mild     Patient is here for telemedicine visit  The patient location is: [place]  The chief complaint leading to consultation is: f/u mood  Visit type: Virtual visit with synchronous audio and video  Total time spent with patient: 10-20 min  Each patient to whom he or she provides medical services by telemedicine is:  (1) informed of the relationship between the physician and patient and the respective role of any other health care provider with respect to management of the patient; and (2) notified that he or she may decline to receive medical  services by telemedicine and may withdraw from such care at any time.    Notes: see below   AUDIO VISIT ONLY? No  Is doing better on lexapro. Sleeping better. Is taking klonopin 1/2 in am and 1 1/2 at night. Makes him sleepy but helps nerves.  Not feeling so overwhelmed by emotion.  No sig se. Is seeing  for counseling.      Relevant History:  Psych Wife passed away 9/2024.  Intense grief.  Not a lot of support at home. Boss has been supportive. Trouble sleeping , mainly interrupted sleep. Lexapro added. Given klonopin as needed. Referred for counseling. Has appt to establish with Dr. Perez 12/9/24. Has f/u with me 2/2025     GI Dr. Koo- colonoscopy 2024-10 polyps. On 3 year surveillance     Card JEAN-PAUL Bartholomew PAF, s/p ablation, chronic diastolic heart failure on eliquis.  Did stress echo 9/2023The study is consistent with ischemia probably in the distribution of the RCA with significant symptoms not at an optimal level of stress.     EP Dr. Lee s/p successful RF cath ablation 12/6/22  Spironolactone started for chronic diastolic HF 10/15/2024, started on lasix 20mg bid 8/2024     Endocrine Dr. Beach/Dr. Muniz- type 2 DM A1c 7.6 on glimepiride, metformin, actos. ckd stage 3. On ACE I, lipitor and zetia. Mild anemia. Urine ma nl  has advancing neuropathy pain uncontrolled with both gabapentin 300mg 3 po qid and lyrica 50mg tid.  Did well with metanx but too expensive     Podiatry Dr. Glez diab polyneuropathy      Eye Dr. Castaneda cataract     Pulm RAFAEL on cpap  Objective:      Physical Exam  Constitutional:       Appearance: Normal appearance.   Pulmonary:      Effort: Pulmonary effort is normal.   Neurological:      General: No focal deficit present.      Mental Status: He is alert.   Psychiatric:         Mood and Affect: Mood is not anxious or depressed. Affect is flat. Affect is not blunt or tearful.         Speech: Speech normal.         Behavior: Behavior normal.         Assessment:       1.  Grief reaction    2. Major depressive disorder, single episode, mild        Plan:         1. Grief reaction (Primary)  Improving. Cont counseling    2. Major depressive disorder, single episode, mild  Cont lexapro.  Wean down on klonopin to just as needed.  Stop am dose.  Then decrease by 1/2 tab every 3 days or so until night time dose stopped as well.  Then just use prn severe anxiety      Time spent with patient: 20 minutes    Patient with be reevaluated in  3 months  or sooner prn    Greater than 50% of this visit was spent counseling as described in above documentation:Yes

## 2024-11-26 ENCOUNTER — TELEPHONE (OUTPATIENT)
Dept: BEHAVIORAL HEALTH | Facility: CLINIC | Age: 59
End: 2024-11-26
Payer: COMMERCIAL

## 2024-11-29 ENCOUNTER — PATIENT MESSAGE (OUTPATIENT)
Dept: CARDIOLOGY | Facility: CLINIC | Age: 59
End: 2024-11-29
Payer: COMMERCIAL

## 2024-12-04 ENCOUNTER — PATIENT MESSAGE (OUTPATIENT)
Dept: ELECTROPHYSIOLOGY | Facility: CLINIC | Age: 59
End: 2024-12-04
Payer: COMMERCIAL

## 2024-12-06 ENCOUNTER — PATIENT MESSAGE (OUTPATIENT)
Dept: CARDIOLOGY | Facility: CLINIC | Age: 59
End: 2024-12-06
Payer: COMMERCIAL

## 2024-12-07 ENCOUNTER — LAB VISIT (OUTPATIENT)
Dept: LAB | Facility: HOSPITAL | Age: 59
End: 2024-12-07
Payer: COMMERCIAL

## 2024-12-07 DIAGNOSIS — E11.42 DIABETIC POLYNEUROPATHY ASSOCIATED WITH TYPE 2 DIABETES MELLITUS: ICD-10-CM

## 2024-12-07 DIAGNOSIS — I50.32 CHRONIC DIASTOLIC CONGESTIVE HEART FAILURE: ICD-10-CM

## 2024-12-07 LAB
ANION GAP SERPL CALC-SCNC: 10 MMOL/L (ref 8–16)
BNP SERPL-MCNC: 165 PG/ML (ref 0–99)
BUN SERPL-MCNC: 30 MG/DL (ref 8–23)
CALCIUM SERPL-MCNC: 9.5 MG/DL (ref 8.7–10.5)
CHLORIDE SERPL-SCNC: 102 MMOL/L (ref 95–110)
CO2 SERPL-SCNC: 27 MMOL/L (ref 23–29)
CREAT SERPL-MCNC: 1.4 MG/DL (ref 0.5–1.4)
EST. GFR  (NO RACE VARIABLE): 57.2 ML/MIN/1.73 M^2
GLUCOSE SERPL-MCNC: 236 MG/DL (ref 70–110)
POTASSIUM SERPL-SCNC: 4.2 MMOL/L (ref 3.5–5.1)
SODIUM SERPL-SCNC: 139 MMOL/L (ref 136–145)

## 2024-12-07 PROCEDURE — 36415 COLL VENOUS BLD VENIPUNCTURE: CPT | Mod: PO | Performed by: PHYSICIAN ASSISTANT

## 2024-12-07 PROCEDURE — 83880 ASSAY OF NATRIURETIC PEPTIDE: CPT | Performed by: PHYSICIAN ASSISTANT

## 2024-12-07 PROCEDURE — 80048 BASIC METABOLIC PNL TOTAL CA: CPT | Performed by: PHYSICIAN ASSISTANT

## 2024-12-09 RX ORDER — GABAPENTIN 300 MG/1
1200 CAPSULE ORAL 3 TIMES DAILY
Qty: 1080 CAPSULE | Refills: 0 | OUTPATIENT
Start: 2024-12-09 | End: 2025-03-09

## 2024-12-11 RX ORDER — FUROSEMIDE 20 MG/1
20 TABLET ORAL 2 TIMES DAILY
Qty: 180 TABLET | Refills: 3 | Status: SHIPPED | OUTPATIENT
Start: 2024-12-11 | End: 2025-12-11

## 2024-12-12 ENCOUNTER — PATIENT MESSAGE (OUTPATIENT)
Dept: CARDIOLOGY | Facility: CLINIC | Age: 59
End: 2024-12-12
Payer: COMMERCIAL

## 2025-01-15 ENCOUNTER — OFFICE VISIT (OUTPATIENT)
Dept: CARDIOLOGY | Facility: CLINIC | Age: 60
End: 2025-01-15
Payer: COMMERCIAL

## 2025-01-15 ENCOUNTER — HOSPITAL ENCOUNTER (OUTPATIENT)
Dept: CARDIOLOGY | Facility: HOSPITAL | Age: 60
Discharge: HOME | End: 2025-01-15
Attending: PHYSICIAN ASSISTANT
Payer: COMMERCIAL

## 2025-01-15 VITALS
DIASTOLIC BLOOD PRESSURE: 76 MMHG | WEIGHT: 280.19 LBS | HEART RATE: 74 BPM | SYSTOLIC BLOOD PRESSURE: 141 MMHG | BODY MASS INDEX: 35.96 KG/M2 | HEIGHT: 74 IN | OXYGEN SATURATION: 98 %

## 2025-01-15 VITALS
HEART RATE: 70 BPM | DIASTOLIC BLOOD PRESSURE: 82 MMHG | SYSTOLIC BLOOD PRESSURE: 139 MMHG | HEIGHT: 74 IN | WEIGHT: 282 LBS | BODY MASS INDEX: 36.19 KG/M2

## 2025-01-15 DIAGNOSIS — E78.5 DYSLIPIDEMIA: ICD-10-CM

## 2025-01-15 DIAGNOSIS — I15.2 HYPERTENSION ASSOCIATED WITH DIABETES: ICD-10-CM

## 2025-01-15 DIAGNOSIS — N18.31 CHRONIC KIDNEY DISEASE, STAGE 3A: ICD-10-CM

## 2025-01-15 DIAGNOSIS — I48.19 PERSISTENT ATRIAL FIBRILLATION: ICD-10-CM

## 2025-01-15 DIAGNOSIS — I50.32 CHRONIC DIASTOLIC CONGESTIVE HEART FAILURE: ICD-10-CM

## 2025-01-15 DIAGNOSIS — I50.32 CHRONIC DIASTOLIC CONGESTIVE HEART FAILURE: Primary | ICD-10-CM

## 2025-01-15 DIAGNOSIS — E11.59 HYPERTENSION ASSOCIATED WITH DIABETES: ICD-10-CM

## 2025-01-15 DIAGNOSIS — G47.33 OSA ON CPAP: ICD-10-CM

## 2025-01-15 DIAGNOSIS — E66.01 SEVERE OBESITY (BMI 35.0-39.9) WITH COMORBIDITY: ICD-10-CM

## 2025-01-15 DIAGNOSIS — E11.65 TYPE 2 DIABETES MELLITUS WITH HYPERGLYCEMIA, WITHOUT LONG-TERM CURRENT USE OF INSULIN: ICD-10-CM

## 2025-01-15 DIAGNOSIS — Z98.890 H/O CARDIAC RADIOFREQUENCY ABLATION: ICD-10-CM

## 2025-01-15 LAB
ASCENDING AORTA: 3.79 CM
AV AREA BY CONTINUOUS VTI: 2.6 CM2
AV INDEX (PROSTH): 0.66
AV LVOT MEAN GRADIENT: 3 MMHG
AV LVOT PEAK GRADIENT: 7 MMHG
AV MEAN GRADIENT: 8.4 MMHG
AV PEAK GRADIENT: 16 MMHG
AV VALVE AREA BY VELOCITY RATIO: 2.7 CM²
AV VALVE AREA: 2.7 CM2
AV VELOCITY RATIO: 0.65
BSA FOR ECHO PROCEDURE: 2.58 M2
CV ECHO LV RWT: 0.58 CM
DOP CALC AO PEAK VEL: 2 M/S
DOP CALC AO VTI: 42.4 CM
DOP CALC LVOT AREA: 4.2 CM2
DOP CALC LVOT DIAMETER: 2.3 CM
DOP CALC LVOT PEAK VEL: 1.3 M/S
DOP CALC LVOT STROKE VOLUME: 115.4 CM3
DOP CALCLVOT PEAK VEL VTI: 27.8 CM
E/E' RATIO: 12.8 M/S
ECHO EF ESTIMATED: 63 %
ECHO LV POSTERIOR WALL: 1.4 CM (ref 0.6–1.1)
FRACTIONAL SHORTENING: 35.4 % (ref 28–44)
INTERVENTRICULAR SEPTUM: 1 CM (ref 0.6–1.1)
IVC DIAMETER: 2.1 CM
IVRT: 45.67 MS
LA MAJOR: 6.25 CM
LA MINOR: 6.57 CM
LA WIDTH: 4.6 CM
LEFT ATRIUM SIZE: 4.32 CM
LEFT ATRIUM VOLUME INDEX MOD: 48.6 ML/M2
LEFT ATRIUM VOLUME INDEX: 43.1 ML/M2
LEFT ATRIUM VOLUME MOD: 121.97 ML
LEFT ATRIUM VOLUME: 108.21 CM3
LEFT INTERNAL DIMENSION IN SYSTOLE: 3.1 CM (ref 2.1–4)
LEFT VENTRICLE DIASTOLIC VOLUME INDEX: 42.56 ML/M2
LEFT VENTRICLE DIASTOLIC VOLUME: 106.82 ML
LEFT VENTRICLE MASS INDEX: 87.3 G/M2
LEFT VENTRICLE SYSTOLIC VOLUME INDEX: 15.6 ML/M2
LEFT VENTRICLE SYSTOLIC VOLUME: 39.09 ML
LEFT VENTRICULAR INTERNAL DIMENSION IN DIASTOLE: 4.8 CM (ref 3.5–6)
LEFT VENTRICULAR MASS: 219.1 G
LV LATERAL E/E' RATIO: 12.8 M/S
LV SEPTAL E/E' RATIO: 12.8 M/S
MV A" WAVE DURATION": 245.48 MS
MV PEAK E VEL: 1.28 M/S
OHS CV RV/LV RATIO: 0.85 CM
PISA TR MAX VEL: 2.42 M/S
PULM VEIN A" WAVE DURATION": 245.48 MS
PULM VEIN S/D RATIO: 0.37
PULMONIC VEIN PEAK A VELOCITY: 0.1 M/S
PV PEAK D VEL: 0.59 M/S
PV PEAK S VEL: 0.22 M/S
RA MAJOR: 5.36 CM
RA PRESSURE ESTIMATED: 8 MMHG
RA WIDTH: 4.33 CM
RIGHT ATRIAL AREA: 21.6 CM2
RIGHT VENTRICLE DIASTOLIC BASEL DIMENSION: 4.1 CM
RV TB RVSP: 10 MMHG
RV TISSUE DOPPLER FREE WALL SYSTOLIC VELOCITY 1 (APICAL 4 CHAMBER VIEW): 9.84 CM/S
SINUS: 3.52 CM
STJ: 3.05 CM
TDI LATERAL: 0.1 M/S
TDI SEPTAL: 0.1 M/S
TDI: 0.1 M/S
TR MAX PG: 23 MMHG
TRICUSPID ANNULAR PLANE SYSTOLIC EXCURSION: 2.54 CM
TV PEAK GRADIENT: 23 MMHG
TV REST PULMONARY ARTERY PRESSURE: 31 MMHG
Z-SCORE OF LEFT VENTRICULAR DIMENSION IN END DIASTOLE: -10.91
Z-SCORE OF LEFT VENTRICULAR DIMENSION IN END SYSTOLE: -7.95

## 2025-01-15 PROCEDURE — 93306 TTE W/DOPPLER COMPLETE: CPT | Mod: 26,,, | Performed by: INTERNAL MEDICINE

## 2025-01-15 PROCEDURE — 1159F MED LIST DOCD IN RCRD: CPT | Mod: CPTII,S$GLB,, | Performed by: PHYSICIAN ASSISTANT

## 2025-01-15 PROCEDURE — 99214 OFFICE O/P EST MOD 30 MIN: CPT | Mod: S$GLB,,, | Performed by: PHYSICIAN ASSISTANT

## 2025-01-15 PROCEDURE — 1160F RVW MEDS BY RX/DR IN RCRD: CPT | Mod: CPTII,S$GLB,, | Performed by: PHYSICIAN ASSISTANT

## 2025-01-15 PROCEDURE — 4010F ACE/ARB THERAPY RXD/TAKEN: CPT | Mod: CPTII,S$GLB,, | Performed by: PHYSICIAN ASSISTANT

## 2025-01-15 PROCEDURE — 3077F SYST BP >= 140 MM HG: CPT | Mod: CPTII,S$GLB,, | Performed by: PHYSICIAN ASSISTANT

## 2025-01-15 PROCEDURE — 3078F DIAST BP <80 MM HG: CPT | Mod: CPTII,S$GLB,, | Performed by: PHYSICIAN ASSISTANT

## 2025-01-15 PROCEDURE — 3008F BODY MASS INDEX DOCD: CPT | Mod: CPTII,S$GLB,, | Performed by: PHYSICIAN ASSISTANT

## 2025-01-15 PROCEDURE — 99999 PR PBB SHADOW E&M-EST. PATIENT-LVL V: CPT | Mod: PBBFAC,,, | Performed by: PHYSICIAN ASSISTANT

## 2025-01-15 PROCEDURE — 93306 TTE W/DOPPLER COMPLETE: CPT

## 2025-01-15 PROCEDURE — 3072F LOW RISK FOR RETINOPATHY: CPT | Mod: CPTII,S$GLB,, | Performed by: PHYSICIAN ASSISTANT

## 2025-01-15 RX ORDER — OLMESARTAN MEDOXOMIL AND HYDROCHLOROTHIAZIDE 40/25 40; 25 MG/1; MG/1
1 TABLET ORAL DAILY
Qty: 90 TABLET | Refills: 3 | Status: SHIPPED | OUTPATIENT
Start: 2025-01-15 | End: 2026-01-15

## 2025-01-15 NOTE — PROGRESS NOTES
General Cardiology Clinic Note  Reason for Visit: Follow up   Last Clinic Visit: 10/15/2024     HPI:   Alfred Cuello is a 59 y.o. male who presents to follow up.     Problems:  Atrial fibrillation s/p PVI 12/6/2022  HFpEF  Hypertension  Dyslipidemia  DM2  CKD Stage 3a  RAFAEL on CPAP   Obesity     Interval HPI  Patient presents for follow up. He was started on Spironolactone at last visit. He states the swelling has improved. His weight is down about 7 lbs on chart review. He still reports fatigue with ALAN. Feels like he is still in AF. He denies CP, orthopnea, PND, palpitations, syncope. He is still mostly taking Lasix just on the weekends.     10/15/2024 HPI  Patient presents for follow up. He has not been doing well. His wife passed away 30 days ago and he is very depressed. He doesn't sleep and cries all day. He goes to work and then sits  once he gets home. He doesn't have any familial support. He reports worsening pedal edema up to his knees. He cannot take diuretics at work, because he is not able to go to the bathroom frequently, so he only takes them 1-2 times a week. He denies chest pain, orthopnea, PND, syncope, near syncope. He does not exercise, and sits all day at home.    11/3/2023 (Dr. Rangel)  58M pm hx HFpEF, pAfib, HLD, HTN 2 month follow up for worsening ALAN on exertion. Seen in August and at that time started on lasix 20mg BID. Patient reports ALAN and leg swelling has improved, but still present up to his shins. Wears compression socks and these make it tolerable. Otherwise minimal cp, sob, alan, lightheadedness, palpitations, orthopnea. Reports he will get SOB if he does intense exercise, and the only time in the past 2 months was during his dobutamine exercise test. Exercised 5m30s and reached METS 9, but had to terminate study early due to cp which resided in 10 minutes. EKG without ischemic signs, but basal-mid inferior hypokinesis was present, so was started on plavix with discussion about  "interventional cardiology visit. Patient has been otherwise well     8/15/2023 HPI  Patient presents for follow up. He reports mild discomfort in chest with exertion. He has a hard time describing it, but states it's not normal. This is not new. He reports BYRNE with climbing 4 flights of stairs. He admits to modifying his activity to avoid symptoms. Overall feels better since the ablation. He does reports a significant worsening of his pedal edema over the past 6 months and has been gaining weight. He denies orthopnea, PND, syncope, near syncope, sustained palpitations. He is using his CPAP. He does not exercise. BP at home is 130s/70s.    4/12/2022 HPI   Pt was incidentally found to be in Afib by PCP on 11/2020 and was referred to Cardiology. He was started on Eliquis. Later that month, he ended up admitted with acute diastolic CHF. He was scheduled for DCCV; however, NOEMI showed a TAWNY thrombus so cardioversion was aborted. It seems he was lost to follow up after this.     He presents today for follow up. He notes he "fatigues more easily" than he used to. Also reports a rare mild, left sided chest discomfort. Seems to be unrelated to physical activity. Lasts no more than a couple minutes. Has occurred ~ 6 times over the past year. He reports mild BYRNE which he has attributed to obesity and deconditioning. Occasional orthostatic symptoms. He has chronic pedal edema which is improved with compression stockings. Denies syncope, palpitations, TIA, bleeding issues. He does not do any aerobic exercise. He recently started dieting and has lost about 20 lbs. He is compliant with CPAP. BP at home 130s/70s.      ROS:      Review of Systems   Constitutional: Positive for malaise/fatigue. Negative for diaphoresis, weight gain and weight loss.   HENT:  Negative for nosebleeds.    Eyes:  Negative for vision loss in left eye, vision loss in right eye and visual disturbance.   Cardiovascular:  Positive for dyspnea on exertion and " leg swelling. Negative for chest pain, claudication, irregular heartbeat, near-syncope, orthopnea, palpitations, paroxysmal nocturnal dyspnea and syncope.   Respiratory:  Negative for cough, shortness of breath, sleep disturbances due to breathing, snoring and wheezing.    Hematologic/Lymphatic: Negative for bleeding problem. Does not bruise/bleed easily.   Skin:  Negative for poor wound healing and rash.   Musculoskeletal:  Negative for muscle cramps and myalgias.   Gastrointestinal:  Negative for bloating, abdominal pain, diarrhea, heartburn, melena, nausea and vomiting.   Genitourinary:  Negative for hematuria and nocturia.   Neurological:  Positive for dizziness. Negative for brief paralysis, headaches, light-headedness, numbness and weakness.   Psychiatric/Behavioral:  Positive for depression. The patient has insomnia.    Allergic/Immunologic: Negative for hives.       PMH:     Past Medical History:   Diagnosis Date    Atrial fibrillation status post cardioversion     2 months ago    Diabetes mellitus, type 2     GERD (gastroesophageal reflux disease)     Hyperlipidemia     Hypertension     Neuropathy     Sleep apnea, unspecified     cpap     Past Surgical History:   Procedure Laterality Date    ABDOMINAL SURGERY      as a child    ABLATION OF ARRHYTHMOGENIC FOCUS FOR ATRIAL FIBRILLATION N/A 12/6/2022    Procedure: Ablation atrial fibrillation;  Surgeon: SKYLER Lee MD;  Location: [facility] EP LAB;  Service: Cardiology;  Laterality: N/A;  AF, NOEMI, PVI, RFA, Carto, Gen, GA, 3 Prep    COLONOSCOPY N/A 7/20/2018    Procedure: COLONOSCOPY;  Surgeon: Marko Koo MD;  Location: [facility];  Service: Endoscopy;  Laterality: N/A;    COLONOSCOPY N/A 8/2/2024    Procedure: COLONOSCOPY;  Surgeon: Marko Koo MD;  Location: [facility];  Service: Endoscopy;  Laterality: N/A;  one hour time slot    TREATMENT OF CARDIAC ARRHYTHMIA N/A 5/20/2022    Procedure: CARDIOVERSION;  Surgeon: SKYLER Lee MD;   Location: [facility] EP LAB;  Service: Cardiology;  Laterality: N/A;  afib, NOEMI, DCCV, anes, MB, 3 Prep    TREATMENT OF CARDIAC ARRHYTHMIA  12/6/2022    Procedure: Cardioversion or Defibrillation;  Surgeon: SKYLER Lee MD;  Location: [facility] EP LAB;  Service: Cardiology;;    TRIAL OF SPINAL CORD NERVE STIMULATOR N/A 6/17/2019    Procedure: Trial, Neurostimulator, LUMBAR SPINAL CORD STIMULATOR TRIAL;  Surgeon: Rahat Orantes MD;  Location: [facility] [place], operated by [facility];  Service: Pain Management;  Laterality: N/A;  PDN STUDY TRIAL, NEEDS CONSENT, NEVRO REP     Allergies:     Review of patient's allergies indicates:   Allergen Reactions    Oxycodone Itching and Nausea Only     Medications:     Current Outpatient Medications on File Prior to Visit   Medication Sig Dispense Refill    apixaban (ELIQUIS) 5 mg Tab Take 1 tablet (5 mg total) by mouth 2 (two) times daily. 60 tablet 11    atorvastatin (LIPITOR) 80 MG tablet Take 1 tablet (80 mg total) by mouth once daily. 90 tablet 3    clonazePAM (KLONOPIN) 0.5 MG tablet Take 1 tablet (0.5 mg total) by mouth 2 (two) times daily as needed for Anxiety (insomnia). 60 tablet 0    EScitalopram oxalate (LEXAPRO) 10 MG tablet Take 1 tablet (10 mg total) by mouth every evening. 90 tablet 1    ezetimibe (ZETIA) 10 mg tablet Take 1 tablet (10 mg total) by mouth once daily. 90 tablet 3    ferrous sulfate 325 mg (65 mg iron) Tab tablet Take 325 mg by mouth daily with breakfast.      furosemide (LASIX) 20 MG tablet Take 1 tablet (20 mg total) by mouth 2 (two) times daily. 180 tablet 3    gabapentin (NEURONTIN) 300 MG capsule Take 4 capsules (1,200 mg total) by mouth 3 (three) times daily. 1080 capsule 0    glimepiride (AMARYL) 4 MG tablet Take 1 tablet (4 mg total) by mouth 2 (two) times a day. 180 tablet 1    KRILL OIL ORAL Take by mouth once daily.      lisinopriL-hydrochlorothiazide (PRINZIDE,ZESTORETIC) 20-25 mg Tab Take 1 tablet by mouth once daily. 90 tablet 3    loratadine (CLARITIN) 10 mg  "tablet Take 10 mg by mouth once daily.      metFORMIN (GLUCOPHAGE-XR) 500 MG ER 24hr tablet Take 2 tablets (1,000 mg total) by mouth 2 (two) times daily with meals. 360 tablet 3    metoprolol succinate (TOPROL-XL) 50 MG 24 hr tablet Take 1 tablet (50 mg total) by mouth once daily. 90 tablet 3    multivitamin (THERAGRAN) per tablet Take 1 tablet by mouth once daily.      nitroGLYCERIN (NITROSTAT) 0.4 MG SL tablet Place 1 tablet (0.4 mg total) under the tongue every 5 (five) minutes as needed for Chest pain. 30 tablet 2    omeprazole (PRILOSEC) 20 MG capsule Take 1 capsule by mouth once daily 90 capsule 3    pioglitazone (ACTOS) 30 MG tablet Take 1 tablet (30 mg total) by mouth once daily. 90 tablet 4    sars-cov-2, covid-19, (PFIZER COVID-19) 30 mcg/0.3 ml injection       spironolactone (ALDACTONE) 25 MG tablet Take 1 tablet (25 mg total) by mouth once daily. 90 tablet 3     No current facility-administered medications on file prior to visit.     Social History:     Social History     Tobacco Use    Smoking status: Former     Current packs/day: 0.00     Types: Cigarettes     Quit date: 3/20/2011     Years since quittin.8    Smokeless tobacco: Never    Tobacco comments:     smoked regularly for 35 years prior to this   Substance Use Topics    Alcohol use: Yes     Comment: occ     Family History:     Family History   Problem Relation Name Age of Onset    Cancer Mother      Diabetes Mother          has a pacemaker    Coronary artery disease Father          hx of cabg    Glaucoma Neg Hx       Physical Exam:   BP (!) 141/76 (Patient Position: Sitting)   Pulse 74   Ht 6' 2" (1.88 m)   Wt 127.1 kg (280 lb 3.3 oz)   SpO2 98%   BMI 35.98 kg/m²        Physical Exam  Vitals and nursing note reviewed.   Constitutional:       General: He is not in acute distress.     Appearance: Normal appearance. He is obese.   HENT:      Head: Normocephalic and atraumatic.      Nose: Nose normal.   Eyes:      General: No scleral " icterus.     Extraocular Movements: Extraocular movements intact.      Conjunctiva/sclera: Conjunctivae normal.   Neck:      Thyroid: No thyromegaly.      Vascular: Hepatojugular reflux present. No carotid bruit or JVD.   Cardiovascular:      Rate and Rhythm: Normal rate. Rhythm irregular.      Pulses: Normal pulses.      Heart sounds: Normal heart sounds. No murmur heard.     No friction rub. No gallop.   Pulmonary:      Effort: Pulmonary effort is normal.      Breath sounds: Normal breath sounds. No wheezing, rhonchi or rales.   Chest:      Chest wall: No tenderness.   Abdominal:      General: Bowel sounds are normal. There is no distension.      Palpations: Abdomen is soft.      Tenderness: There is no abdominal tenderness.   Musculoskeletal:      Cervical back: Neck supple.      Right lower le+ Pitting Edema (to mid shins bilaterally) present.      Left lower le+ Pitting Edema present.      Comments: Wearing compression stockings   Skin:     General: Skin is warm and dry.      Coloration: Skin is not pale.      Findings: No erythema or rash.      Nails: There is no clubbing.   Neurological:      Mental Status: He is alert and oriented to person, place, and time.   Psychiatric:         Mood and Affect: Mood is depressed.         Behavior: Behavior normal.          Labs:     Lab Results   Component Value Date     2024    K 4.2 2024     2024    CO2 27 2024    BUN 30 (H) 2024    CREATININE 1.4 2024    ANIONGAP 10 2024     Lab Results   Component Value Date    HGBA1C 7.6 (H) 10/19/2024     Lab Results   Component Value Date     (H) 2024    BNP 80 2023     (H) 2020    Lab Results   Component Value Date    WBC 6.79 10/19/2024    HGB 12.0 (L) 10/19/2024    HCT 37.6 (L) 10/19/2024     10/19/2024    GRAN 4.4 10/19/2024    GRAN 65.3 10/19/2024     Lab Results   Component Value Date    CHOL 120 10/19/2024    HDL 36 (L)  10/19/2024    LDLCALC 57.4 (L) 10/19/2024    TRIG 133 10/19/2024          Imaging:   Echocardiograms:   TTE 1/15/2025    Left Ventricle: The left ventricle is normal in size. Ventricular mass is normal. Normal wall thickness. There is concentric remodeling. There is normal systolic function with a visually estimated ejection fraction of 60 - 65%.    Right Ventricle: Normal right ventricular cavity size. Wall thickness is normal. Systolic function is normal.    Left Atrium: Left atrium is moderately dilated.    Right Atrium: Right atrium is mildly dilated.    Aortic Valve: The aortic valve is a trileaflet valve. There is mild aortic valve sclerosis. Mildly calcified left, right and noncoronary cusps.    Mitral Valve: There is mild regurgitation with a centrally directed jet.    Tricuspid Valve: There is mild regurgitation with a centrally directed jet.    Pulmonary Artery: The estimated pulmonary artery systolic pressure is 31 mmHg.    IVC/SVC: Intermediate venous pressure at 8 mmHg.    NOEMI 12/6/2022  The left ventricle is normal in size with normal systolic function. The estimated ejection fraction is 55%.  Normal right ventricular size with normal right ventricular systolic function.  Biatrial enlargement.  Mild tricuspid regurgitation.  Normal appearing left atrial appendage. No thrombus is present in the appendage. Abnormal appendage velocities.    NOEMI 5/20/2022  NOEMI to evaluate for LA/TAWNY thrombus prior to DCCV.  No thrombus is present in the left atrial appendage or left atrium (contrast used). Abnormal appendage velocities 0.35m/s.  The left ventricle is mildly enlarged with mildly decreased systolic function.  The estimated ejection fraction is 45%.  Mild mitral regurgitation.  Normal right ventricular size with normal right ventricular systolic function.  Moderate tricuspid regurgitation.  Biatrial enlargement.  The sinuses of Valsalva is mildly dilated.    NOEMI 11/24/2020  With normal systolic function. The  estimated ejection fraction is 55%.  Normal left ventricular diastolic function.  Normal right ventricular size with normal right ventricular systolic function.  Moderate left atrial enlargement.  Moderate right atrial enlargement.  Moderate mitral regurgitation.  Moderate to severe tricuspid regurgitation.    TTE 11/23/2020  The estimated PA systolic pressure is 29 mmHg.  There is left ventricular concentric remodeling.  The left ventricle is normal in size with normal systolic function. The estimated ejection fraction is 65%.  Normal left ventricular diastolic function.  Normal right ventricular size with normal right ventricular systolic function.  Mild left atrial enlargement.  Mild-to-moderate mitral regurgitation.  Mild tricuspid regurgitation.  Normal central venous pressure (3 mmHg).     Stress Tests:   Exercise stress echo 9/29/2023    Left Ventricle: The left ventricle is normal in size. Normal wall thickness. Normal wall motion. There is normal systolic function with a visually estimated ejection fraction of 60 - 65%. Ejection fraction by visual approximation is 63%. There is normal diastolic function.    Left Atrium: Left atrium is moderately dilated.    Right Ventricle: Normal right ventricular cavity size. Wall thickness is normal. Right ventricle wall motion  is normal. Systolic function is normal.    Right Atrium: Right atrium is mildly dilated.    Aortic Valve: The aortic valve is a trileaflet valve. There is aortic valve sclerosis. There is mild annular calcification present.    Mitral Valve: There is mild mitral annular calcification present.    Pulmonary Artery: No pulmonary hypertension. The estimated pulmonary artery systolic pressure is 23 mmHg.    IVC/SVC: Normal venous pressure at 3 mmHg.    Stress Protocol: The patient exercised for 5 minutes 30 seconds on a high ramp protocol, corresponding to a functional capacity of 9 METS, achieving a peak heart rate of 120 bpm, which is 75 % of the age  predicted maximum heart rate. Their exercise capacity was mildly impaired. The patient reported chest discomfort during the stress test. The test was stopped because the patient was falling off the treadmill. The patient couldn't keep up with it.    Baseline ECG: The Baseline ECG reveals sinus rhythm with RBBB. The axis is normal. The ST segments are normal.    Stress ECG: There are no ST segment deviation identified during the protocol. During stress, occasional PACs are noted. During stress, occasional PVCs are noted. There is normal blood pressure response with stress.    ECG Conclusion: The ECG portion of the study is negative for ischemia. Sensitivity is reduced due to failure to reach target heart rate.    Post-stress Echo: The left ventricle systolic function is hyperdynamic with an EF of 65 %. The post-stress echo showed wall motion abnormalities compared to baseline. Right ventricle systolic function is hyperdynamic.    Post-stress Impression: The study is consistent with ischemia probably in the distribution of the RCA with significant symptoms not at an optimal level of stress.    Caths:   None    EKG 10/15/2024: Atrial fibrillation, RBBB    Assessment:     1. Chronic diastolic congestive heart failure    2. Persistent atrial fibrillation    3. H/O cardiac radiofrequency ablation    4. Hypertension associated with diabetes    5. Dyslipidemia    6. Type 2 diabetes mellitus with hyperglycemia, without long-term current use of insulin    7. Chronic kidney disease, stage 3a    8. Severe obesity (BMI 35.0-39.9) with comorbidity    9. RAFAEL on CPAP      Plan:     HFpEF  Volume status improving. TTE today with preserved EF. CVP 8 mmHg.   He is unable to take the Lasix daily because it interferes with his ability to do his job  He has been unable to afford Jardiance when prescribed previously.   Continue Spironolactone 25 mg daily  He needs better BP control. See plan below.     Persistent atrial fibrillation s/p  PVI  Symptomatic. Irregular rhythm on auscultation. Rate is controlled on Metoprolol.   CHADSVASC 3. Continue Eliquis for CVA prophylaxis  He has an upcoming appt with EP     Hypertension  Mildly elevated.   Change Lisinopril-HCTZ to Olmesartan-HCTZ 40-25 mg daily   Continue Spironolactone 25 mg daily   Continue Metoprolol succinate 50 mg daily     Dyslipidemia  At goal  Continue Zetia 10 mg daily   Continue Lipitor 80 mg   Mediterranean diet     Type 2 DM  Uncontrolled. A1c 7.6%    Stage 3 CKD  Renal function stable on recent labs. Baseline Cr 1.4    Obesity   Increase aerobic exercise with a goal of at least 30 minutes, 5 days a week.    RAFAEL on CPAP   Continue CPAP          Follow up in 4 months.     Signed:  Steffi Bartholomew PA-C  Cardiology   [phone] - General  1/15/2025 12:29 PM

## 2025-01-22 ENCOUNTER — PATIENT MESSAGE (OUTPATIENT)
Dept: CARDIOLOGY | Facility: CLINIC | Age: 60
End: 2025-01-22
Payer: COMMERCIAL

## 2025-01-30 NOTE — PROGRESS NOTES
Electrophysiology Clinic Note    Reason for follow-up patient visit: Ongoing evaluation and recommendations regarding persistent atrial fibrillation, s/p NOEMI/cardioversion on 5/20/2022 with return to persistent atrial fibrillation, s/p EP study with radiofrequency catheter ablation with pulmonary vein isolation (PVI) on 12/6/2022. Unfortunately, he has returned to symptomatic, persistent atrial fibrillation.     PRESENTING HISTORY:     History of Present Illness:  Mr. Alfred Cuello is a marley 59-year-old gentleman who returns to clinic today for ongoing evaluation and recommendations regarding persistent atrial fibrillation, s/p EP study with radiofrequency catheter ablation with pulmonary vein isolation (PVI) on 12/6/2022. He has a past medical history significant for persistent atrial fibrillation, s/p EP study with pulmonary vein isolation (PVI) on 12/6/2022, a history of HFpEF with prior LVEF decreased to 45% in the setting of AF - most recently improved to LVEF 60-65% following maintenance of sinus rhythm, hypertension, hyperlipidemia, type II diabetes mellitus, CKD stage III, GERD, RAFAEL maintained on CPAP therapy, and obesity. He underwent a prior NOEMI/cardioversion on 5/20/2022, and returned to persistent atrial fibrillation approximately one week after undergoing this procedure. He has since undergone an EP study with radiofrequency catheter ablation with pulmonary vein isolation (PVI) on 12/6/2022. Unfortunately, he has returned to symptomatic, persistent atrial fibrillation since October, 2024.     He is followed in general cardiology clinic with SANDRA Bartholomew and Dr. Shannon. At their prior encounters, they discussed his persistent atrial fibrillation. He was initially diagnosed with asymptomatic atrial fibrillation in November of 2020, when this was incidentally noted on an ECG. This may have been present for years prior, but this was the first ECG evidence. He was seen in cardiology with Dr. Shannon and  "was initiated on oral anticoagulation with apixaban 5mg po BID. He was subsequently admitted from 11/22-24/2020 in the setting of acute decompensation of heart failure with atrial fibrillation with RVR. He was planned to undergo a NOEMI with cardioversion; however, a NOEMI on 11/24/2020 revealed a left atrial appendage thrombus, and cardioversion was not performed. Unfortunately, he was then lost to follow up until I saw him again in clinic on 5/2/2022. At that encounter, we discussed undergoing a repeat NOEMI/cardioversion with initiation of amiodarone. He underwent NOEMI/cardioversion on 5/20/2022. He states that he was feeling well for the first week, but then could tell that he returned to atrial fibrillation. At our previous encounters, we discussed the concept of radiofrequency catheter ablation, and he underwent a successful EP study with radiofrequency catheter ablation with pulmonary vein isolation (PVI) on 12/6/2022. Since undergoing ablation, he reported feeling "much better", and had maintained sinus rhythm on all subsequent ECGs and ambulatory event monitoring for the past few years. His previously prescribed amiodarone 200mg po daily was discontinued with maintained sinus rhythm. He has remained on uninterrupted oral anticoagulation with apixaban with no adverse bleeding events reported. Unfortunately, he recently returned to symptomatic, persistent atrial fibrillation in October of 2024. During this time, he reports that his wife, for whom he was the primary caretaker, had significantly declining health and ultimately passed away. He continues to experience grief and has had unintentional weight gain. He reports recurrent symptoms of sustained palpitations, similar to the symptoms he was experiencing prior to his ablation. He reports worsened fatigue, mild exercise intolerance, and worsened shortness of breath while in atrial fibrillation. He has resumed smoking since his wife's passing.      In discussion " "with Mr. Cuello today, he tells me that he is feeling overall "like I am back in atrial fibrillation". He denies any episodes of dizziness, lightheadedness, syncope/presyncope, chest pain or chest discomfort, nausea or vomiting, orthopnea, or PND. He reports recurrent symptoms of sustained palpitations, worsened fatigue, mild exercise intolerance, and worsened shortness of breath while in atrial fibrillation. He can climb more than one flight of stairs prior to needing to take a break and maintains a high level of physical activity at his job. He was recently mowing his yard when he reported worsened fatigue and exercise intolerance, and had to take several breaks, which he feels is not his usual level of exercise capacity. He reports transient periods of dizziness with abrupt positional changes. 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 He wears compression stockings for comfort.     Review of Systems:  Review of Systems   Constitutional:  Positive for fatigue. Negative for activity change.   HENT:  Negative for nasal congestion, nosebleeds, postnasal drip, rhinorrhea, sinus pressure/congestion, sneezing and sore throat.    Respiratory:  Positive for shortness of breath. Negative for apnea, cough, chest tightness and wheezing.    Cardiovascular:  Positive for palpitations and leg swelling. Negative for chest pain and claudication.   Gastrointestinal:  Negative for abdominal distention, abdominal pain, blood in stool, change in bowel habit, constipation, diarrhea, nausea and vomiting.   Genitourinary:  Negative for dysuria and hematuria.   Musculoskeletal:  Positive for arthralgias. Negative for gait problem.   Neurological:  Positive for dizziness. Negative for seizures, syncope, weakness, light-headedness, headaches and coordination difficulties.        PAST HISTORY:     Past Medical History: "   Diagnosis Date    Atrial fibrillation status post cardioversion     2 months ago    Diabetes mellitus, type 2     GERD (gastroesophageal reflux disease)     Hyperlipidemia     Hypertension     Neuropathy     Sleep apnea, unspecified     cpap   - Persistent atrial fibrillation s/p PVI on 12/6/2022  - History of HFpEF with most recent LVEF 60-65%  - RAFAEL maintained on CPAP therapy  - Obesity     Past Surgical History:   Procedure Laterality Date    ABDOMINAL SURGERY      as a child    ABLATION OF ARRHYTHMOGENIC FOCUS FOR ATRIAL FIBRILLATION N/A 12/6/2022    Procedure: Ablation atrial fibrillation;  Surgeon: SKYLER Lee MD;  Location: [facility] EP LAB;  Service: Cardiology;  Laterality: N/A;  AF, ONEMI, PVI, RFA, Carto, Gen, IN, 3 Prep    COLONOSCOPY N/A 7/20/2018    Procedure: COLONOSCOPY;  Surgeon: Marko Koo MD;  Location: [facility] ENDO;  Service: Endoscopy;  Laterality: N/A;    COLONOSCOPY N/A 8/2/2024    Procedure: COLONOSCOPY;  Surgeon: Marko Koo MD;  Location: [facility] ENDO;  Service: Endoscopy;  Laterality: N/A;  one hour time slot    TREATMENT OF CARDIAC ARRHYTHMIA N/A 5/20/2022    Procedure: CARDIOVERSION;  Surgeon: SKYLER Lee MD;  Location: [facility] EP LAB;  Service: Cardiology;  Laterality: N/A;  afib, NOEMI, DCCV, anes, MB, 3 Prep    TREATMENT OF CARDIAC ARRHYTHMIA  12/6/2022    Procedure: Cardioversion or Defibrillation;  Surgeon: SKYLER Lee MD;  Location: [facility] EP LAB;  Service: Cardiology;;    TRIAL OF SPINAL CORD NERVE STIMULATOR N/A 6/17/2019    Procedure: Trial, Neurostimulator, LUMBAR SPINAL CORD STIMULATOR TRIAL;  Surgeon: Rahat Orantes MD;  Location: [facility] PAIN MGT;  Service: Pain Management;  Laterality: N/A;  PDN STUDY TRIAL, NEEDS CONSENT, NEVRO REP       Family History:  Family History   Problem Relation Name Age of Onset    Cancer Mother      Diabetes Mother          has a pacemaker    Coronary artery disease Father          hx of cabg    Glaucoma Neg Hx         Social  History:  He  reports that he quit smoking about 13 years ago. His smoking use included cigarettes. He has never used smokeless tobacco. He reports current alcohol use. He reports that he does not use drugs. He is .       MEDICATIONS & ALLERGIES:     Review of patient's allergies indicates:   Allergen Reactions    Oxycodone Itching and Nausea Only       Current Outpatient Medications on File Prior to Visit   Medication Sig Dispense Refill    apixaban (ELIQUIS) 5 mg Tab Take 1 tablet (5 mg total) by mouth 2 (two) times daily. 60 tablet 11    atorvastatin (LIPITOR) 80 MG tablet Take 1 tablet (80 mg total) by mouth once daily. 90 tablet 3    clonazePAM (KLONOPIN) 0.5 MG tablet Take 1 tablet (0.5 mg total) by mouth 2 (two) times daily as needed for Anxiety (insomnia). 60 tablet 0    EScitalopram oxalate (LEXAPRO) 10 MG tablet Take 1 tablet (10 mg total) by mouth every evening. 90 tablet 1    ezetimibe (ZETIA) 10 mg tablet Take 1 tablet (10 mg total) by mouth once daily. 90 tablet 3    ferrous sulfate 325 mg (65 mg iron) Tab tablet Take 325 mg by mouth daily with breakfast.      furosemide (LASIX) 20 MG tablet Take 1 tablet (20 mg total) by mouth 2 (two) times daily. 180 tablet 3    gabapentin (NEURONTIN) 300 MG capsule Take 4 capsules (1,200 mg total) by mouth 3 (three) times daily. 1080 capsule 0    glimepiride (AMARYL) 4 MG tablet Take 1 tablet (4 mg total) by mouth 2 (two) times a day. 180 tablet 1    KRILL OIL ORAL Take by mouth once daily.      lisinopriL-hydrochlorothiazide (PRINZIDE,ZESTORETIC) 20-25 mg Tab Take 1 tablet by mouth once daily. 90 tablet 3    loratadine (CLARITIN) 10 mg tablet Take 10 mg by mouth once daily.      metFORMIN (GLUCOPHAGE-XR) 500 MG ER 24hr tablet Take 2 tablets (1,000 mg total) by mouth 2 (two) times daily with meals. 360 tablet 3    metoprolol succinate (TOPROL-XL) 50 MG 24 hr tablet Take 1 tablet (50 mg total) by mouth once daily. 90 tablet 3    multivitamin (THERAGRAN) per  "tablet Take 1 tablet by mouth once daily.      nitroGLYCERIN (NITROSTAT) 0.4 MG SL tablet Place 1 tablet (0.4 mg total) under the tongue every 5 (five) minutes as needed for Chest pain. 30 tablet 2    olmesartan-hydrochlorothiazide (BENICAR HCT) 40-25 mg per tablet Take 1 tablet by mouth once daily. 90 tablet 3    omeprazole (PRILOSEC) 20 MG capsule Take 1 capsule by mouth once daily 90 capsule 3    pioglitazone (ACTOS) 30 MG tablet Take 1 tablet (30 mg total) by mouth once daily. 90 tablet 4    sars-cov-2, covid-19, (PFIZER COVID-19) 30 mcg/0.3 ml injection       spironolactone (ALDACTONE) 25 MG tablet Take 1 tablet (25 mg total) by mouth once daily. 90 tablet 3     No current facility-administered medications on file prior to visit.        OBJECTIVE:     Vital Signs:  /64 (Patient Position: Sitting)   Pulse 75   Ht 6' 2" (1.88 m)   Wt 130 kg (286 lb 9.6 oz)   BMI 36.80 kg/m²     Physical Exam:  Physical Exam  Constitutional:       General: He is not in acute distress.     Appearance: Normal appearance. He is obese. He is not ill-appearing or diaphoretic.      Comments: Well-appearing man in NAD.    HENT:      Head: Normocephalic and atraumatic.      Nose: Nose normal.      Mouth/Throat:      Mouth: Mucous membranes are moist.      Pharynx: Oropharynx is clear.   Eyes:      Pupils: Pupils are equal, round, and reactive to light.   Cardiovascular:      Rate and Rhythm: Normal rate. Rhythm irregular.      Pulses: Normal pulses.      Heart sounds: Normal heart sounds. No murmur heard.     No friction rub. No gallop.      Comments: Irregularly irregular rhythm on right radial artery palpation with atrial fibrillation with rate control on an in-office rhythm strip.   Pulmonary:      Effort: Pulmonary effort is normal. No respiratory distress.      Breath sounds: Normal breath sounds. No wheezing, rhonchi or rales.   Chest:      Chest wall: No tenderness.   Abdominal:      General: There is no distension.      " Palpations: Abdomen is soft.      Tenderness: There is no abdominal tenderness.   Musculoskeletal:         General: No swelling or tenderness.      Cervical back: Normal range of motion.      Right lower leg: Edema present.      Left lower leg: Edema present.      Comments: Trace bilateral pedal edema.    Skin:     General: Skin is warm and dry.      Findings: No erythema, lesion or rash.   Neurological:      General: No focal deficit present.      Mental Status: He is alert and oriented to person, place, and time. Mental status is at baseline.      Motor: No weakness.      Gait: Gait normal.   Psychiatric:         Mood and Affect: Mood normal.         Behavior: Behavior normal.        Laboratory Data:  Lab Results   Component Value Date    WBC 6.79 10/19/2024    HGB 12.0 (L) 10/19/2024    HCT 37.6 (L) 10/19/2024    MCV 90 10/19/2024     10/19/2024     Lab Results   Component Value Date     (H) 12/07/2024     12/07/2024    K 4.2 12/07/2024     12/07/2024    CO2 27 12/07/2024    BUN 30 (H) 12/07/2024    CREATININE 1.4 12/07/2024    CALCIUM 9.5 12/07/2024    MG 1.7 10/09/2021     Lab Results   Component Value Date    INR 1.1 11/26/2022    INR 1.1 05/14/2022       Pertinent Cardiac Data:  Personal interpretation of today's ECG: Atrial fibrillation with a ventricular response rate of 75 bpm, RBBB with  ms, QT/QTc 410/457 ms, nonspecific STT changes.     Resting 2D Transthoracic Echocardiogram - 11/23/2020:  The estimated PA systolic pressure is 29 mmHg.  There is left ventricular concentric remodeling.  The left ventricle is normal in size with normal systolic function. The estimated ejection fraction is 65%.  Normal left ventricular diastolic function.  Normal right ventricular size with normal right ventricular systolic function.  Mild left atrial enlargement.  Mild-to-moderate mitral regurgitation.  Mild tricuspid regurgitation.  Normal central venous pressure (3 mmHg).    NOEMI -  5/20/2022:  NOEMI to evaluate for LA/TAWNY thrombus prior to DCCV.  No thrombus is present in the left atrial appendage or left atrium (contrast used). Abnormal appendage velocities 0.35m/s.  The left ventricle is mildly enlarged with mildly decreased systolic function.  The estimated ejection fraction is 45%.  Mild mitral regurgitation.  Normal right ventricular size with normal right ventricular systolic function.  Moderate tricuspid regurgitation.  Biatrial enlargement.  The sinuses of Valsalva is mildly dilated.    NOEMI - 12/6/2022:  The left ventricle is normal in size with normal systolic function. The estimated ejection fraction is 55%.  Normal right ventricular size with normal right ventricular systolic function.  Biatrial enlargement.  Mild tricuspid regurgitation.  Normal appearing left atrial appendage. No thrombus is present in the appendage. Abnormal appendage velocities.    EPS with Radiofrequency Catheter PVI - 12/6/2022:    Successful radiofrequency pulmonary vein isolation (PVI) catheter ablation.    3D mapping performed with [place] 3.    Cardioversion prior to ablation.    Electrophysiology study with coronary sinus pacing.    Electrophysiology study with left ventricular pacing.    His bundle recording.    Intracardiac echo.    Pulmonary vein isolation.    Exercise Stress Echocardiogram - 9/29/2023:    Left Ventricle: The left ventricle is normal in size. Normal wall thickness. Normal wall motion. There is normal systolic function with a visually estimated ejection fraction of 60 - 65%. Ejection fraction by visual approximation is 63%. There is normal diastolic function.    Left Atrium: Left atrium is moderately dilated.    Right Ventricle: Normal right ventricular cavity size. Wall thickness is normal. Right ventricle wall motion  is normal. Systolic function is normal.    Right Atrium: Right atrium is mildly dilated.    Aortic Valve: The aortic valve is a trileaflet valve. There is aortic valve  sclerosis. There is mild annular calcification present.    Mitral Valve: There is mild mitral annular calcification present.    Pulmonary Artery: No pulmonary hypertension. The estimated pulmonary artery systolic pressure is 23 mmHg.    IVC/SVC: Normal venous pressure at 3 mmHg.    Stress Protocol: The patient exercised for 5 minutes 30 seconds on a high ramp protocol, corresponding to a functional capacity of 9 METS, achieving a peak heart rate of 120 bpm, which is 75 % of the age predicted maximum heart rate. Their exercise capacity was mildly impaired. The patient reported chest discomfort during the stress test. The test was stopped because the patient was falling off the treadmill. The patient couldn't keep up with it.    Baseline ECG: The Baseline ECG reveals sinus rhythm with RBBB. The axis is normal. The ST segments are normal.    Stress ECG: There are no ST segment deviation identified during the protocol. During stress, occasional PACs are noted. During stress, occasional PVCs are noted. There is normal blood pressure response with stress.    ECG Conclusion: The ECG portion of the study is negative for ischemia. Sensitivity is reduced due to failure to reach target heart rate.    Post-stress Echo: The left ventricle systolic function is hyperdynamic with an EF of 65 %. The post-stress echo showed wall motion abnormalities compared to baseline. Right ventricle systolic function is hyperdynamic.    Post-stress Impression: The study is consistent with ischemia probably in the distribution of the RCA with significant symptoms not at an optimal level of stress.    Resting 2D Transthoracic Echocardiogram - 1/15/2025:    Left Ventricle: The left ventricle is normal in size. Ventricular mass is normal. Normal wall thickness. There is concentric remodeling. There is normal systolic function with a visually estimated ejection fraction of 60 - 65%.    Right Ventricle: Normal right ventricular cavity size. Wall  thickness is normal. Systolic function is normal.    Left Atrium: Left atrium is moderately dilated.    Right Atrium: Right atrium is mildly dilated.    Aortic Valve: The aortic valve is a trileaflet valve. There is mild aortic valve sclerosis. Mildly calcified left, right and noncoronary cusps.    Mitral Valve: There is mild regurgitation with a centrally directed jet.    Tricuspid Valve: There is mild regurgitation with a centrally directed jet.    Pulmonary Artery: The estimated pulmonary artery systolic pressure is 31 mmHg.    IVC/SVC: Intermediate venous pressure at 8 mmHg.      ASSESSMENT & PLAN:   Mr. Alfred Cuello is a marley 59-year-old gentleman who returns to clinic today for ongoing evaluation and recommendations regarding persistent atrial fibrillation, s/p EP study with radiofrequency catheter ablation with pulmonary vein isolation (PVI) on 12/6/2022. He has a past medical history significant for persistent atrial fibrillation, s/p EP study with pulmonary vein isolation (PVI) on 12/6/2022, a history of HFpEF with prior LVEF decreased to 45% in the setting of AF - most recently improved to LVEF 60-65% following maintenance of sinus rhythm, hypertension, hyperlipidemia, type II diabetes mellitus, CKD stage III, GERD, RAFAEL maintained on CPAP therapy, and obesity. He underwent a prior NOEMI/cardioversion on 5/20/2022, and returned to persistent atrial fibrillation approximately one week after undergoing this procedure. He has since undergone an EP study with radiofrequency catheter ablation with pulmonary vein isolation (PVI) on 12/6/2022. Unfortunately, he has returned to symptomatic, persistent atrial fibrillation since October, 2024.     - We continue to discuss the pathophysiology of atrial fibrillation; specifically, we discussed persistent atrial fibrillation and the concept that the longer a patient remains in atrial fibrillation, the more challenging rhythm restoration and maintenance of sinus  rhythm may become. We discussed that atrial fibrillation has an increased risk of CVA.   - We reviewed his prior PVI procedure. He had several years of relief with no recurrent atrial fibrillation since undergoing successful ablation until he returned to symptomatic, persistent atrial fibrillation in October, 2024. While in recurrent atrial fibrillation, he reports symptoms of sustained palpitations, worsened fatigue, mild exercise intolerance, and worsened shortness of breath while in atrial fibrillation. We discussed the indication to undergo a repeat EP study with another radiofrequency catheter ablation with pulmonary vein isolation, with possible posterior wall isolation. This procedure was described in detail, in addition to review of potential risks, benefits, and alternative treatment options. Mr. Cuello voices understanding and is in agreement, and informed consent was obtained.   - He was previously maintained on amiodarone 200mg po daily for antiarrhythmic therapy, with cessation following his prior ablation. We may consider a brief resumption of this agent in the blanking period following his anticipated repeat ablation. He remains on metoprolol succinate 50mg po daily.   - We reviewed the results of his recent resting echocardiogram demonstrating preserved systolic function with LVEF 60-65%.  - His [id number] is 3 (CHF, HTN, T2DM, male gender, age less than 64), portending an annual adjusted risk of CVA of 3.2%. He remains on uninterrupted apixaban therapy with no bleeding events reported.   - Possible underlying drivers of atrial fibrillation were addressed at this appointment, including recommendations for weight loss - now a class I recommendation. Review of laboratory data revealed acceptable TSH at 0.956. He remains compliant with his CPAP machine for treatment of his underlying obstructive sleep apnea.      This patient will present to the EP laboratory to undergo a repeat EP study with a  redo-radiofrequency catheter ablation with pulmonary vein isolation and consideration for ablation of the posterior wall and potential non-PV triggers. We may consider a brief course of amiodarone antiarrhythmic therapy in the blanking period following his redo ablation. He will continue metoprolol succinate 50mg po daily and uninterrupted oral anticoagulation with apixaban. All questions and concerns were addressed at this encounter. Total time spent in this patient encounter: 46 minutes.     Signing Physician:       ALEIDA Lee MD  Electrophysiology Attending

## 2025-01-31 ENCOUNTER — HOSPITAL ENCOUNTER (OUTPATIENT)
Dept: CARDIOLOGY | Facility: CLINIC | Age: 60
Discharge: HOME | End: 2025-01-31
Payer: COMMERCIAL

## 2025-01-31 ENCOUNTER — OFFICE VISIT (OUTPATIENT)
Dept: ELECTROPHYSIOLOGY | Facility: CLINIC | Age: 60
End: 2025-01-31
Payer: COMMERCIAL

## 2025-01-31 VITALS
DIASTOLIC BLOOD PRESSURE: 64 MMHG | HEART RATE: 75 BPM | WEIGHT: 286.63 LBS | HEIGHT: 74 IN | BODY MASS INDEX: 36.78 KG/M2 | SYSTOLIC BLOOD PRESSURE: 123 MMHG

## 2025-01-31 DIAGNOSIS — I50.32 CHRONIC HEART FAILURE WITH PRESERVED EJECTION FRACTION: ICD-10-CM

## 2025-01-31 DIAGNOSIS — F43.21 GRIEF: ICD-10-CM

## 2025-01-31 DIAGNOSIS — I10 PRIMARY HYPERTENSION: ICD-10-CM

## 2025-01-31 DIAGNOSIS — I48.0 PAROXYSMAL ATRIAL FIBRILLATION: ICD-10-CM

## 2025-01-31 DIAGNOSIS — G89.4 CHRONIC PAIN SYNDROME: ICD-10-CM

## 2025-01-31 DIAGNOSIS — G47.33 OSA ON CPAP: ICD-10-CM

## 2025-01-31 DIAGNOSIS — Z98.890 H/O CARDIAC RADIOFREQUENCY ABLATION: ICD-10-CM

## 2025-01-31 DIAGNOSIS — I51.3 THROMBUS OF ATRIAL APPENDAGE: ICD-10-CM

## 2025-01-31 DIAGNOSIS — K21.9 GASTROESOPHAGEAL REFLUX DISEASE, UNSPECIFIED WHETHER ESOPHAGITIS PRESENT: ICD-10-CM

## 2025-01-31 DIAGNOSIS — I50.20 HFREF (HEART FAILURE WITH REDUCED EJECTION FRACTION): ICD-10-CM

## 2025-01-31 DIAGNOSIS — E11.42 DIABETIC POLYNEUROPATHY ASSOCIATED WITH TYPE 2 DIABETES MELLITUS: ICD-10-CM

## 2025-01-31 DIAGNOSIS — E11.59 HYPERTENSION ASSOCIATED WITH DIABETES: ICD-10-CM

## 2025-01-31 DIAGNOSIS — E66.812 CLASS 2 OBESITY WITH BODY MASS INDEX (BMI) OF 36.0 TO 36.9 IN ADULT, UNSPECIFIED OBESITY TYPE, UNSPECIFIED WHETHER SERIOUS COMORBIDITY PRESENT: ICD-10-CM

## 2025-01-31 DIAGNOSIS — N18.31 CHRONIC KIDNEY DISEASE, STAGE 3A: ICD-10-CM

## 2025-01-31 DIAGNOSIS — I15.2 HYPERTENSION ASSOCIATED WITH DIABETES: ICD-10-CM

## 2025-01-31 DIAGNOSIS — F32.0 MAJOR DEPRESSIVE DISORDER, SINGLE EPISODE, MILD: ICD-10-CM

## 2025-01-31 DIAGNOSIS — E11.42 TYPE 2 DIABETES MELLITUS WITH DIABETIC POLYNEUROPATHY, WITHOUT LONG-TERM CURRENT USE OF INSULIN: ICD-10-CM

## 2025-01-31 DIAGNOSIS — E78.2 MIXED HYPERLIPIDEMIA: ICD-10-CM

## 2025-01-31 DIAGNOSIS — D50.9 IRON DEFICIENCY ANEMIA, UNSPECIFIED IRON DEFICIENCY ANEMIA TYPE: ICD-10-CM

## 2025-01-31 DIAGNOSIS — I48.19 PERSISTENT ATRIAL FIBRILLATION: Primary | ICD-10-CM

## 2025-01-31 LAB
OHS QRS DURATION: 140 MS
OHS QTC CALCULATION: 457 MS

## 2025-01-31 PROCEDURE — 1159F MED LIST DOCD IN RCRD: CPT | Mod: CPTII,S$GLB,, | Performed by: STUDENT IN AN ORGANIZED HEALTH CARE EDUCATION/TRAINING PROGRAM

## 2025-01-31 PROCEDURE — 93005 ELECTROCARDIOGRAM TRACING: CPT | Mod: S$GLB,,, | Performed by: STUDENT IN AN ORGANIZED HEALTH CARE EDUCATION/TRAINING PROGRAM

## 2025-01-31 PROCEDURE — 4010F ACE/ARB THERAPY RXD/TAKEN: CPT | Mod: CPTII,S$GLB,, | Performed by: STUDENT IN AN ORGANIZED HEALTH CARE EDUCATION/TRAINING PROGRAM

## 2025-01-31 PROCEDURE — 3078F DIAST BP <80 MM HG: CPT | Mod: CPTII,S$GLB,, | Performed by: STUDENT IN AN ORGANIZED HEALTH CARE EDUCATION/TRAINING PROGRAM

## 2025-01-31 PROCEDURE — 99999 PR PBB SHADOW E&M-EST. PATIENT-LVL IV: CPT | Mod: PBBFAC,,, | Performed by: STUDENT IN AN ORGANIZED HEALTH CARE EDUCATION/TRAINING PROGRAM

## 2025-01-31 PROCEDURE — 99215 OFFICE O/P EST HI 40 MIN: CPT | Mod: S$GLB,,, | Performed by: STUDENT IN AN ORGANIZED HEALTH CARE EDUCATION/TRAINING PROGRAM

## 2025-01-31 PROCEDURE — 3072F LOW RISK FOR RETINOPATHY: CPT | Mod: CPTII,S$GLB,, | Performed by: STUDENT IN AN ORGANIZED HEALTH CARE EDUCATION/TRAINING PROGRAM

## 2025-01-31 PROCEDURE — 3008F BODY MASS INDEX DOCD: CPT | Mod: CPTII,S$GLB,, | Performed by: STUDENT IN AN ORGANIZED HEALTH CARE EDUCATION/TRAINING PROGRAM

## 2025-01-31 PROCEDURE — 3074F SYST BP LT 130 MM HG: CPT | Mod: CPTII,S$GLB,, | Performed by: STUDENT IN AN ORGANIZED HEALTH CARE EDUCATION/TRAINING PROGRAM

## 2025-01-31 PROCEDURE — 93010 ELECTROCARDIOGRAM REPORT: CPT | Mod: S$GLB,,, | Performed by: INTERNAL MEDICINE

## 2025-01-31 NOTE — Clinical Note
Catalina Weiss,   I'm not sure if I clicked the button to send to you, but this is the note for Rob Cuello's redo PVI.   Thank you!

## 2025-02-03 ENCOUNTER — TELEPHONE (OUTPATIENT)
Dept: ELECTROPHYSIOLOGY | Facility: CLINIC | Age: 60
End: 2025-02-03
Payer: COMMERCIAL

## 2025-02-03 NOTE — TELEPHONE ENCOUNTER
Spoke with patient and scheduled procedure: redo ablation scheduled on 3/17/25 with an arrival time of 5:15AM at [facility].   Labs to be done at: [facility] lab on 2/22/25; non-fasting.   Confirmed that patient is not on GLP-1 agonist  Advised to hold the following meds:Eliquis, Glimepiride, Metformin, and Actos on day of procedure. Last dose should be taken on 3/16/25.  Instructed pt that he will need to be NPO after midnight on 3/16/25.  Confirmed pt has a caregiver available to bring him home post procedure.   Confirmed that patient does not have any implanted device that has remote or monitor that could cause electrical interference  Instructions will be sent via poral, as requested   Pt verbalized understanding and appreciated the call.

## 2025-02-05 ENCOUNTER — PATIENT MESSAGE (OUTPATIENT)
Dept: ELECTROPHYSIOLOGY | Facility: CLINIC | Age: 60
End: 2025-02-05
Payer: COMMERCIAL

## 2025-02-05 DIAGNOSIS — I48.19 PERSISTENT ATRIAL FIBRILLATION: Primary | ICD-10-CM

## 2025-02-15 PROBLEM — F43.21 GRIEF: Status: ACTIVE | Noted: 2025-02-15

## 2025-02-22 ENCOUNTER — LAB VISIT (OUTPATIENT)
Dept: LAB | Facility: HOSPITAL | Age: 60
End: 2025-02-22
Attending: FAMILY MEDICINE
Payer: COMMERCIAL

## 2025-02-22 DIAGNOSIS — E78.5 DYSLIPIDEMIA: ICD-10-CM

## 2025-02-22 DIAGNOSIS — E11.65 TYPE 2 DIABETES MELLITUS WITH HYPERGLYCEMIA, WITHOUT LONG-TERM CURRENT USE OF INSULIN: ICD-10-CM

## 2025-02-22 DIAGNOSIS — I48.19 PERSISTENT ATRIAL FIBRILLATION: ICD-10-CM

## 2025-02-22 LAB
ALBUMIN SERPL BCP-MCNC: 4 G/DL (ref 3.5–5.2)
ALP SERPL-CCNC: 80 U/L (ref 40–150)
ALT SERPL W/O P-5'-P-CCNC: 21 U/L (ref 10–44)
ANION GAP SERPL CALC-SCNC: 10 MMOL/L (ref 8–16)
AST SERPL-CCNC: 44 U/L (ref 10–40)
BASOPHILS # BLD AUTO: 0.07 K/UL (ref 0–0.2)
BASOPHILS NFR BLD: 0.9 % (ref 0–1.9)
BILIRUB SERPL-MCNC: 0.3 MG/DL (ref 0.1–1)
BUN SERPL-MCNC: 30 MG/DL (ref 8–23)
CALCIUM SERPL-MCNC: 9.6 MG/DL (ref 8.7–10.5)
CHLORIDE SERPL-SCNC: 102 MMOL/L (ref 95–110)
CHOLEST SERPL-MCNC: 162 MG/DL (ref 120–199)
CHOLEST/HDLC SERPL: 4.1 {RATIO} (ref 2–5)
CO2 SERPL-SCNC: 26 MMOL/L (ref 23–29)
CREAT SERPL-MCNC: 1.2 MG/DL (ref 0.5–1.4)
DIFFERENTIAL METHOD BLD: ABNORMAL
EOSINOPHIL # BLD AUTO: 0.4 K/UL (ref 0–0.5)
EOSINOPHIL NFR BLD: 5.6 % (ref 0–8)
ERYTHROCYTE [DISTWIDTH] IN BLOOD BY AUTOMATED COUNT: 14.6 % (ref 11.5–14.5)
EST. GFR  (NO RACE VARIABLE): >60 ML/MIN/1.73 M^2
ESTIMATED AVG GLUCOSE: 192 MG/DL (ref 68–131)
GLUCOSE SERPL-MCNC: 322 MG/DL (ref 70–110)
HBA1C MFR BLD: 8.3 % (ref 4–5.6)
HCT VFR BLD AUTO: 36.1 % (ref 40–54)
HDLC SERPL-MCNC: 40 MG/DL (ref 40–75)
HDLC SERPL: 24.7 % (ref 20–50)
HGB BLD-MCNC: 11.5 G/DL (ref 14–18)
IMM GRANULOCYTES # BLD AUTO: 0.02 K/UL (ref 0–0.04)
IMM GRANULOCYTES NFR BLD AUTO: 0.3 % (ref 0–0.5)
LDLC SERPL CALC-MCNC: 82 MG/DL (ref 63–159)
LYMPHOCYTES # BLD AUTO: 1.6 K/UL (ref 1–4.8)
LYMPHOCYTES NFR BLD: 21.9 % (ref 18–48)
MCH RBC QN AUTO: 28.8 PG (ref 27–31)
MCHC RBC AUTO-ENTMCNC: 31.9 G/DL (ref 32–36)
MCV RBC AUTO: 91 FL (ref 82–98)
MONOCYTES # BLD AUTO: 0.5 K/UL (ref 0.3–1)
MONOCYTES NFR BLD: 6.8 % (ref 4–15)
NEUTROPHILS # BLD AUTO: 4.8 K/UL (ref 1.8–7.7)
NEUTROPHILS NFR BLD: 64.5 % (ref 38–73)
NONHDLC SERPL-MCNC: 122 MG/DL
NRBC BLD-RTO: 0 /100 WBC
PLATELET # BLD AUTO: 209 K/UL (ref 150–450)
PMV BLD AUTO: 11.4 FL (ref 9.2–12.9)
POTASSIUM SERPL-SCNC: 4 MMOL/L (ref 3.5–5.1)
PROT SERPL-MCNC: 7.1 G/DL (ref 6–8.4)
RBC # BLD AUTO: 3.99 M/UL (ref 4.6–6.2)
SODIUM SERPL-SCNC: 138 MMOL/L (ref 136–145)
TRIGL SERPL-MCNC: 200 MG/DL (ref 30–150)
WBC # BLD AUTO: 7.49 K/UL (ref 3.9–12.7)

## 2025-02-22 PROCEDURE — 85610 PROTHROMBIN TIME: CPT | Performed by: STUDENT IN AN ORGANIZED HEALTH CARE EDUCATION/TRAINING PROGRAM

## 2025-02-22 PROCEDURE — 83036 HEMOGLOBIN GLYCOSYLATED A1C: CPT | Performed by: FAMILY MEDICINE

## 2025-02-22 PROCEDURE — 80053 COMPREHEN METABOLIC PANEL: CPT | Performed by: FAMILY MEDICINE

## 2025-02-22 PROCEDURE — 80061 LIPID PANEL: CPT | Performed by: FAMILY MEDICINE

## 2025-02-22 PROCEDURE — 85025 COMPLETE CBC W/AUTO DIFF WBC: CPT | Performed by: FAMILY MEDICINE

## 2025-02-22 PROCEDURE — 36415 COLL VENOUS BLD VENIPUNCTURE: CPT | Mod: PO | Performed by: FAMILY MEDICINE

## 2025-02-22 PROCEDURE — 85730 THROMBOPLASTIN TIME PARTIAL: CPT | Performed by: STUDENT IN AN ORGANIZED HEALTH CARE EDUCATION/TRAINING PROGRAM

## 2025-02-24 ENCOUNTER — RESULTS FOLLOW-UP (OUTPATIENT)
Dept: FAMILY MEDICINE | Facility: CLINIC | Age: 60
End: 2025-02-24

## 2025-02-24 LAB
APTT PPP: 26.1 SEC (ref 21–32)
INR PPP: 1 (ref 0.8–1.2)
PROTHROMBIN TIME: 11.1 SEC (ref 9–12.5)

## 2025-02-26 ENCOUNTER — PATIENT OUTREACH (OUTPATIENT)
Dept: ADMINISTRATIVE | Facility: HOSPITAL | Age: 60
End: 2025-02-26
Payer: COMMERCIAL

## 2025-02-26 NOTE — PROGRESS NOTES
Care Coordination Encounter Details:    Spoke to patient and portal message sent as well. Patient very pleasant.     [employer] Portal Status:         [x]  Reviewed MyChart Portal Status offered / enrolled if applicable        Additional Notes:     MyChart Outcomes: Pt is enrolled & active          Updates Requested / Reviewed:        Updated Care Coordination Note, Care Everywhere, , Care Team Updated, and Immunizations Reconciliation Completed or Queried: [place]         Health Maintenance Screening(s) Due:      Health Maintenance Topics Overdue:      VB Score: 1          Influenza Vaccine  RSV Vaccine                  Health Maintenance Topic(s) Outreach Outcomes & Actions Taken:    Eye Exam - Outreach Outcomes & Actions Taken  : Eye Camera Scheduled or Optometry/Ophthalmology Referral Placed/Appt Scheduled    Medication Adherence / Statins - Outreach Outcomes & Actions Taken  :          Chronic Disease Management:     Diabetes Measures        Lab Results   Component Value Date    HGBA1C 8.3 (H) 02/22/2025           [x]  Reviewed chart for active Diabetes diagnosis     []  Scheduled necessary follow up appointments if needed             Hypertension Measures        BP Readings from Last 1 Encounters:   01/31/25 123/64           [x]  Reviewed chart for active Hypertension diagnosis     []  Reviewed & documented Home BP Cuff     []  Documented a Remote BP if needed & applicable     []  Scheduled necessary follow up appointments with Primary Care if needed             Provider Team Continuity:     Last PCP Visit Date: 11/20/2024          [x]  Reviewed Primary Care Provider Visits, Annual Wellness Visit, and Future          Appointments to ensure appointments have been scheduled and/or           completed            Social Determinants of Health          [x]  Reviewed, completed, and/or updated the following sections:                  Food Insecurity, Transportation Needs, Financial Resource Strain,                  Tobacco Use        Additional Notes:  Recent review of SDOH and Tobacco Screening           Care Management, Digital Medicine, and/or Education Referrals    OPCM Risk Score: 27.7         Next Steps - Referral Actions: Digital Medicine Outcomes and Actions Taken: Pt Declined or Not Eligible        Additional Notes:  No CHW or Diabetes Education referral placed

## 2025-02-27 ENCOUNTER — OFFICE VISIT (OUTPATIENT)
Dept: FAMILY MEDICINE | Facility: CLINIC | Age: 60
End: 2025-02-27
Payer: COMMERCIAL

## 2025-02-27 VITALS
TEMPERATURE: 99 F | HEIGHT: 74 IN | DIASTOLIC BLOOD PRESSURE: 80 MMHG | SYSTOLIC BLOOD PRESSURE: 130 MMHG | BODY MASS INDEX: 35.88 KG/M2 | HEART RATE: 67 BPM | OXYGEN SATURATION: 97 % | WEIGHT: 279.56 LBS

## 2025-02-27 DIAGNOSIS — E11.59 HYPERTENSION ASSOCIATED WITH DIABETES: ICD-10-CM

## 2025-02-27 DIAGNOSIS — D50.9 IRON DEFICIENCY ANEMIA, UNSPECIFIED IRON DEFICIENCY ANEMIA TYPE: ICD-10-CM

## 2025-02-27 DIAGNOSIS — E11.69 HYPERLIPIDEMIA ASSOCIATED WITH TYPE 2 DIABETES MELLITUS: ICD-10-CM

## 2025-02-27 DIAGNOSIS — E11.40 PAINFUL DIABETIC NEUROPATHY: ICD-10-CM

## 2025-02-27 DIAGNOSIS — E78.5 HYPERLIPIDEMIA ASSOCIATED WITH TYPE 2 DIABETES MELLITUS: ICD-10-CM

## 2025-02-27 DIAGNOSIS — Z23 FLU VACCINE NEED: ICD-10-CM

## 2025-02-27 DIAGNOSIS — E55.9 VITAMIN D DEFICIENCY: ICD-10-CM

## 2025-02-27 DIAGNOSIS — G47.33 OSA ON CPAP: ICD-10-CM

## 2025-02-27 DIAGNOSIS — E66.01 SEVERE OBESITY (BMI 35.0-39.9) WITH COMORBIDITY: ICD-10-CM

## 2025-02-27 DIAGNOSIS — I48.19 PERSISTENT ATRIAL FIBRILLATION: ICD-10-CM

## 2025-02-27 DIAGNOSIS — I15.2 HYPERTENSION ASSOCIATED WITH DIABETES: ICD-10-CM

## 2025-02-27 DIAGNOSIS — F43.21 GRIEF REACTION: ICD-10-CM

## 2025-02-27 DIAGNOSIS — E11.65 TYPE 2 DIABETES MELLITUS WITH HYPERGLYCEMIA, WITHOUT LONG-TERM CURRENT USE OF INSULIN: Primary | ICD-10-CM

## 2025-02-27 DIAGNOSIS — K21.9 GASTROESOPHAGEAL REFLUX DISEASE WITHOUT ESOPHAGITIS: ICD-10-CM

## 2025-02-27 PROCEDURE — 99214 OFFICE O/P EST MOD 30 MIN: CPT | Mod: S$GLB,,, | Performed by: FAMILY MEDICINE

## 2025-02-27 PROCEDURE — G2211 COMPLEX E/M VISIT ADD ON: HCPCS | Mod: S$GLB,,, | Performed by: FAMILY MEDICINE

## 2025-02-27 PROCEDURE — 3008F BODY MASS INDEX DOCD: CPT | Mod: CPTII,S$GLB,, | Performed by: FAMILY MEDICINE

## 2025-02-27 PROCEDURE — 3079F DIAST BP 80-89 MM HG: CPT | Mod: CPTII,S$GLB,, | Performed by: FAMILY MEDICINE

## 2025-02-27 PROCEDURE — 1160F RVW MEDS BY RX/DR IN RCRD: CPT | Mod: CPTII,S$GLB,, | Performed by: FAMILY MEDICINE

## 2025-02-27 PROCEDURE — 99999 PR PBB SHADOW E&M-EST. PATIENT-LVL V: CPT | Mod: PBBFAC,,, | Performed by: FAMILY MEDICINE

## 2025-02-27 PROCEDURE — 3052F HG A1C>EQUAL 8.0%<EQUAL 9.0%: CPT | Mod: CPTII,S$GLB,, | Performed by: FAMILY MEDICINE

## 2025-02-27 PROCEDURE — 3072F LOW RISK FOR RETINOPATHY: CPT | Mod: CPTII,S$GLB,, | Performed by: FAMILY MEDICINE

## 2025-02-27 PROCEDURE — 4010F ACE/ARB THERAPY RXD/TAKEN: CPT | Mod: CPTII,S$GLB,, | Performed by: FAMILY MEDICINE

## 2025-02-27 PROCEDURE — 3075F SYST BP GE 130 - 139MM HG: CPT | Mod: CPTII,S$GLB,, | Performed by: FAMILY MEDICINE

## 2025-02-27 PROCEDURE — 1159F MED LIST DOCD IN RCRD: CPT | Mod: CPTII,S$GLB,, | Performed by: FAMILY MEDICINE

## 2025-02-27 RX ORDER — TIRZEPATIDE 2.5 MG/.5ML
2.5 INJECTION, SOLUTION SUBCUTANEOUS
Qty: 2 ML | Refills: 5 | Status: SHIPPED | OUTPATIENT
Start: 2025-02-27

## 2025-02-27 NOTE — PROGRESS NOTES
Subjective:       Patient ID: Alfred Cuello is a 59 y.o. male.    Chief Complaint: Follow-up    Problem List[1]  Patient is here for a chronic conditions follow up.    Reviewed labs 2/2025  History of Present Illness      On ace I or ARB?   On gabapentin?   AST elevation, , A1c 8.3       Review of Systems   Constitutional:  Negative for fatigue and unexpected weight change.   Respiratory:  Negative for chest tightness and shortness of breath.    Cardiovascular:  Negative for chest pain, palpitations and leg swelling.   Gastrointestinal:  Negative for abdominal pain.   Musculoskeletal:  Negative for arthralgias.   Neurological:  Negative for dizziness, syncope, light-headedness and headaches.      Relevant History:  Psych Wife passed away 9/2024.  Intense grief.  Not a lot of support at home. Boss has been supportive. Trouble sleeping , mainly interrupted sleep. Lexapro added. Given klonopin as needed. Referred for counseling.     GI Dr. Koo- colonoscopy 2024-10 polyps. On 3 year surveillance     Card JEAN-PAUL Bartholomew PAF, s/p ablation, chronic diastolic heart failure on eliquis.  Did stress echo 9/2023The study is consistent with ischemia probably in the distribution of the RCA with significant symptoms not at an optimal level of stress.     EP Dr. Lee s/p RF cath ablation 12/6/22  Spironolactone started for chronic diastolic HF 10/15/2024, started on lasix 20mg bid 8/2024  Has persistent A fib after ablation 2022 has ablation planned 3/17/25     Endocrine Dr. Beach/Dr. Muniz- type 2 DM A1c 8.3 on glimepiride, metformin, actos. ckd stage 3. On ARB, lipitor and zetia. Mild anemia. Urine ma nl  has advancing neuropathy pain uncontrolled with trial both gabapentin 300mg 3 po qid nor lyrica 50mg tid.  Did well with metanx but too expensive     Podiatry Dr. Glez diab polyneuropathy      Eye Dr. Castaneda cataract     Pulm RAFAEL on cpap  Objective:      Physical Exam  Vitals and nursing note reviewed.    Constitutional:       Appearance: He is well-developed.   Cardiovascular:      Rate and Rhythm: Normal rate and regular rhythm.      Heart sounds: Normal heart sounds.   Pulmonary:      Effort: Pulmonary effort is normal.      Breath sounds: Normal breath sounds.   Skin:     General: Skin is warm and dry.   Neurological:      Mental Status: He is alert and oriented to person, place, and time.         Assessment:       ICD-10-CM ICD-9-CM    1. Type 2 diabetes mellitus with hyperglycemia, without long-term current use of insulin  E11.65 250.00 CBC Auto Differential     790.29 Comprehensive Metabolic Panel      Hemoglobin A1C      Microalbumin/Creatinine Ratio, Urine      2. Flu vaccine need  Z23 V04.81       3. Hypertension associated with diabetes  E11.59 250.80     I15.2 401.9       4. Grief reaction  F43.21 309.0       5. Severe obesity (BMI 35.0-39.9) with comorbidity  E66.01 278.01       6. Persistent atrial fibrillation  I48.19 427.31       7. RAFAEL on CPAP  G47.33 327.23       8. Iron deficiency anemia, unspecified iron deficiency anemia type  D50.9 280.9       9. Vitamin D deficiency  E55.9 268.9       10. Gastroesophageal reflux disease without esophagitis  K21.9 530.81       11. Painful diabetic neuropathy  E11.40 250.60      357.2       12. Hyperlipidemia associated with type 2 diabetes mellitus  E11.69 250.80 Lipid Panel    E78.5 272.4          Plan:   1. Type 2 diabetes mellitus with hyperglycemia, without long-term current use of insulin (Primary)  Uncontrolled. D/c amaryl and start mounjaro 2.5 mg weekly sq.  Cont metformin and actos and monitor  - CBC Auto Differential; Future  - Comprehensive Metabolic Panel; Future  - Hemoglobin A1C; Future  - Microalbumin/Creatinine Ratio, Urine; Future    2. Flu vaccine need  declined    3. Hypertension associated with diabetes  Controlled on current medications.  Continue current medications.      4. Grief reaction  Cont counseling if needed    5. Severe obesity  "(BMI 35.0-39.9) with comorbidity  Counseled patient on his ideal body weight, health consequences of being obese and current recommendations including weekly exercise and a heart healthy diet.  Current BMI is:Estimated body mass index is 35.89 kg/m² as calculated from the following:    Height as of this encounter: 6' 2" (1.88 m).    Weight as of this encounter: 126.8 kg (279 lb 8.7 oz)..  Patient is aware that ideal BMI < 25 or Weight in (lb) to have BMI = 25: 194.3.      6. Persistent atrial fibrillation  Ablation planned. Cont card monitoring and care    7. RAFAEL on CPAP  Cont cpap use consistently    8. Iron deficiency anemia, unspecified iron deficiency anemia type  Screen and treat as indicated:      9. Vitamin D deficiency  Screen and treat as indicated:      10. Gastroesophageal reflux disease without esophagitis  Controlled on current medications.  Continue current medications.  Prilosec 20mg daily    11. Painful diabetic neuropathy  Cont current mgmt and gabapentin    12. Hyperlipidemia associated with type 2 diabetes mellitus  Controlled on current medications.  Continue current medications.  Lipitor 80mg.   - Lipid Panel; Future    Assessment & Plan            Time spent with patient: 20 minutes  Patient with be reevaluated in 4 months or sooner prn  Greater than 50% of this visit was spent counseling as described in above documentation:Yes  This note was generated with the assistance of ambient listening technology. Verbal consent was obtained by the patient and accompanying visitor(s) for the recording of patient appointment to facilitate this note. I attest to having reviewed and edited the generated note for accuracy, though some syntax or spelling errors may persist. Please contact the author of this note for any clarification.            [1]   Patient Active Problem List  Diagnosis    Iron deficiency anemia    Hypertension associated with diabetes    Gastroesophageal reflux disease    H/O pyloric " stenosis    Dyslipidemia    Chronic kidney disease, stage 3a    RAFAEL on CPAP    Painful diabetic neuropathy    Chronic pain syndrome    Diabetic polyneuropathy associated with type 2 diabetes mellitus    Research study patient    Type 2 diabetes mellitus with hyperglycemia, without long-term current use of insulin    Vitamin D deficiency    Severe obesity (BMI 35.0-39.9) with comorbidity    Persistent atrial fibrillation    Chronic heart failure with preserved ejection fraction    H/O cardiac radiofrequency ablation    Major depressive disorder, single episode, mild    Grief

## 2025-03-02 DIAGNOSIS — E11.65 TYPE 2 DIABETES MELLITUS WITH HYPERGLYCEMIA, WITHOUT LONG-TERM CURRENT USE OF INSULIN: Primary | ICD-10-CM

## 2025-03-03 RX ORDER — PIOGLITAZONEHYDROCHLORIDE 30 MG/1
30 TABLET ORAL DAILY
Qty: 90 TABLET | Refills: 4 | OUTPATIENT
Start: 2025-03-03

## 2025-03-13 ENCOUNTER — TELEPHONE (OUTPATIENT)
Dept: ELECTROPHYSIOLOGY | Facility: CLINIC | Age: 60
End: 2025-03-13
Payer: COMMERCIAL

## 2025-03-13 NOTE — TELEPHONE ENCOUNTER
Spoke to patient.    CONFIRMED procedure arrival time of 5:15AM on 3/17/25 for redo ablation.     Reiterated instructions including:  -Directions to check in desk  -NPO after midnight night prior to procedure; informed pt that he may drink water until 3AM on day of procedure.   -High importance of HOLDING Eliquis, Glimepiride, Metformin and Actos on the day of his procedure. Last doses should be taken on 3/16/2025.  -Pre-procedure LABS reviewed; no significant alerts noted.  -Confirmed absence of implanted device/stimulator reviewed. Confirmed with pt that he does not have any implanted devices. He was part of a clinical trial in the past for a spinal stimulator but did NOT have a stimulator implanted.   -Confirmed no fever, cough, or shortness of breath in the past 30 days  -Confirmed no redness, rash, irritation, or yeast infection to groin area.   -Pt will be accompanied by his brother.   Informed pt that he will NOT be permitted to drive home post procedure. Pt confirmed that his brother will drive him home.   Pre-procedure instructions sent to pt today via email as requested.   Patient verbalized understanding of above and appreciated the call.

## 2025-03-14 ENCOUNTER — TELEPHONE (OUTPATIENT)
Dept: ELECTROPHYSIOLOGY | Facility: CLINIC | Age: 60
End: 2025-03-14
Payer: COMMERCIAL

## 2025-03-17 ENCOUNTER — ANESTHESIA EVENT (OUTPATIENT)
Dept: MEDSURG UNIT | Facility: HOSPITAL | Age: 60
End: 2025-03-17
Payer: COMMERCIAL

## 2025-03-17 ENCOUNTER — HOSPITAL ENCOUNTER (OUTPATIENT)
Dept: CARDIOLOGY | Facility: HOSPITAL | Age: 60
Discharge: HOME | End: 2025-03-17
Attending: STUDENT IN AN ORGANIZED HEALTH CARE EDUCATION/TRAINING PROGRAM | Admitting: STUDENT IN AN ORGANIZED HEALTH CARE EDUCATION/TRAINING PROGRAM
Payer: COMMERCIAL

## 2025-03-17 ENCOUNTER — ANESTHESIA (OUTPATIENT)
Dept: MEDSURG UNIT | Facility: HOSPITAL | Age: 60
End: 2025-03-17
Payer: COMMERCIAL

## 2025-03-17 ENCOUNTER — HOSPITAL ENCOUNTER (OUTPATIENT)
Facility: HOSPITAL | Age: 60
Discharge: HOME | End: 2025-03-17
Attending: STUDENT IN AN ORGANIZED HEALTH CARE EDUCATION/TRAINING PROGRAM | Admitting: STUDENT IN AN ORGANIZED HEALTH CARE EDUCATION/TRAINING PROGRAM
Payer: COMMERCIAL

## 2025-03-17 VITALS
BODY MASS INDEX: 35.04 KG/M2 | DIASTOLIC BLOOD PRESSURE: 63 MMHG | SYSTOLIC BLOOD PRESSURE: 112 MMHG | WEIGHT: 273 LBS | HEIGHT: 74 IN

## 2025-03-17 VITALS
BODY MASS INDEX: 35.04 KG/M2 | HEART RATE: 79 BPM | HEIGHT: 74 IN | OXYGEN SATURATION: 97 % | SYSTOLIC BLOOD PRESSURE: 140 MMHG | TEMPERATURE: 97 F | WEIGHT: 273 LBS | DIASTOLIC BLOOD PRESSURE: 64 MMHG | RESPIRATION RATE: 16 BRPM

## 2025-03-17 DIAGNOSIS — E11.65 TYPE 2 DIABETES MELLITUS WITH HYPERGLYCEMIA, WITHOUT LONG-TERM CURRENT USE OF INSULIN: Primary | ICD-10-CM

## 2025-03-17 DIAGNOSIS — I49.9 ARRHYTHMIA: ICD-10-CM

## 2025-03-17 DIAGNOSIS — I48.19 PERSISTENT ATRIAL FIBRILLATION: ICD-10-CM

## 2025-03-17 DIAGNOSIS — I48.91 ATRIAL FIBRILLATION: ICD-10-CM

## 2025-03-17 LAB
OHS QRS DURATION: 126 MS
OHS QTC CALCULATION: 404 MS
POC ACTIVATED CLOTTING TIME K: 141 SEC (ref 74–137)
POCT GLUCOSE: 189 MG/DL (ref 70–110)
SAMPLE: ABNORMAL

## 2025-03-17 PROCEDURE — D9220A PRA ANESTHESIA: Mod: ANES,,, | Performed by: ANESTHESIOLOGY

## 2025-03-17 PROCEDURE — 99499 UNLISTED E&M SERVICE: CPT | Mod: ,,, | Performed by: STUDENT IN AN ORGANIZED HEALTH CARE EDUCATION/TRAINING PROGRAM

## 2025-03-17 PROCEDURE — 94761 N-INVAS EAR/PLS OXIMETRY MLT: CPT

## 2025-03-17 PROCEDURE — 25000003 PHARM REV CODE 250: Performed by: NURSE ANESTHETIST, CERTIFIED REGISTERED

## 2025-03-17 PROCEDURE — 76937 US GUIDE VASCULAR ACCESS: CPT | Mod: 26,,, | Performed by: ANESTHESIOLOGY

## 2025-03-17 PROCEDURE — D9220A PRA ANESTHESIA: Mod: CRNA,,, | Performed by: NURSE ANESTHETIST, CERTIFIED REGISTERED

## 2025-03-17 PROCEDURE — 25000003 PHARM REV CODE 250: Performed by: STUDENT IN AN ORGANIZED HEALTH CARE EDUCATION/TRAINING PROGRAM

## 2025-03-17 PROCEDURE — 36620 INSERTION CATHETER ARTERY: CPT | Mod: 59,,, | Performed by: ANESTHESIOLOGY

## 2025-03-17 PROCEDURE — 93312 ECHO TRANSESOPHAGEAL: CPT | Mod: 26,,, | Performed by: INTERNAL MEDICINE

## 2025-03-17 PROCEDURE — 93325 DOPPLER ECHO COLOR FLOW MAPG: CPT | Mod: 26,,, | Performed by: INTERNAL MEDICINE

## 2025-03-17 PROCEDURE — 93320 DOPPLER ECHO COMPLETE: CPT | Mod: 26,,, | Performed by: INTERNAL MEDICINE

## 2025-03-17 PROCEDURE — 37000009 HC ANESTHESIA EA ADD 15 MINS: Performed by: STUDENT IN AN ORGANIZED HEALTH CARE EDUCATION/TRAINING PROGRAM

## 2025-03-17 PROCEDURE — 27000221 HC OXYGEN, UP TO 24 HOURS

## 2025-03-17 PROCEDURE — 63600175 PHARM REV CODE 636 W HCPCS: Performed by: NURSE ANESTHETIST, CERTIFIED REGISTERED

## 2025-03-17 PROCEDURE — 93320 DOPPLER ECHO COMPLETE: CPT

## 2025-03-17 PROCEDURE — 63600175 PHARM REV CODE 636 W HCPCS: Performed by: STUDENT IN AN ORGANIZED HEALTH CARE EDUCATION/TRAINING PROGRAM

## 2025-03-17 PROCEDURE — 93005 ELECTROCARDIOGRAM TRACING: CPT

## 2025-03-17 PROCEDURE — 93010 ELECTROCARDIOGRAM REPORT: CPT | Mod: ,,, | Performed by: INTERNAL MEDICINE

## 2025-03-17 PROCEDURE — 37000008 HC ANESTHESIA 1ST 15 MINUTES: Performed by: STUDENT IN AN ORGANIZED HEALTH CARE EDUCATION/TRAINING PROGRAM

## 2025-03-17 PROCEDURE — 27201037 HC PRESSURE MONITORING SET UP

## 2025-03-17 RX ORDER — FENTANYL CITRATE 50 UG/ML
INJECTION, SOLUTION INTRAMUSCULAR; INTRAVENOUS
Status: DISCONTINUED | OUTPATIENT
Start: 2025-03-17 | End: 2025-03-17

## 2025-03-17 RX ORDER — HALOPERIDOL LACTATE 5 MG/ML
0.5 INJECTION, SOLUTION INTRAMUSCULAR EVERY 10 MIN PRN
Status: DISCONTINUED | OUTPATIENT
Start: 2025-03-17 | End: 2025-03-17 | Stop reason: HOSPADM

## 2025-03-17 RX ORDER — SUCCINYLCHOLINE CHLORIDE 20 MG/ML
INJECTION INTRAMUSCULAR; INTRAVENOUS
Status: DISCONTINUED | OUTPATIENT
Start: 2025-03-17 | End: 2025-03-17

## 2025-03-17 RX ORDER — HYDROMORPHONE HYDROCHLORIDE 1 MG/ML
0.2 INJECTION, SOLUTION INTRAMUSCULAR; INTRAVENOUS; SUBCUTANEOUS EVERY 5 MIN PRN
Status: DISCONTINUED | OUTPATIENT
Start: 2025-03-17 | End: 2025-03-17 | Stop reason: HOSPADM

## 2025-03-17 RX ORDER — PANTOPRAZOLE SODIUM 40 MG/1
40 TABLET, DELAYED RELEASE ORAL DAILY
Qty: 30 TABLET | Refills: 0 | Status: SHIPPED | OUTPATIENT
Start: 2025-03-17 | End: 2025-03-17 | Stop reason: HOSPADM

## 2025-03-17 RX ORDER — ONDANSETRON HYDROCHLORIDE 2 MG/ML
INJECTION, SOLUTION INTRAVENOUS
Status: DISCONTINUED | OUTPATIENT
Start: 2025-03-17 | End: 2025-03-17

## 2025-03-17 RX ORDER — HEPARIN SOD,PORCINE/0.9 % NACL 1000/500ML
INTRAVENOUS SOLUTION INTRAVENOUS
Status: DISCONTINUED | OUTPATIENT
Start: 2025-03-17 | End: 2025-03-17 | Stop reason: HOSPADM

## 2025-03-17 RX ORDER — MIDAZOLAM HYDROCHLORIDE 1 MG/ML
INJECTION INTRAMUSCULAR; INTRAVENOUS
Status: DISCONTINUED | OUTPATIENT
Start: 2025-03-17 | End: 2025-03-17

## 2025-03-17 RX ORDER — FENTANYL CITRATE 50 UG/ML
25 INJECTION, SOLUTION INTRAMUSCULAR; INTRAVENOUS EVERY 5 MIN PRN
Status: DISCONTINUED | OUTPATIENT
Start: 2025-03-17 | End: 2025-03-17 | Stop reason: HOSPADM

## 2025-03-17 RX ORDER — HEPARIN SODIUM 1000 [USP'U]/ML
INJECTION, SOLUTION INTRAVENOUS; SUBCUTANEOUS
Status: DISCONTINUED | OUTPATIENT
Start: 2025-03-17 | End: 2025-03-17

## 2025-03-17 RX ORDER — ACETAMINOPHEN 325 MG/1
650 TABLET ORAL EVERY 4 HOURS PRN
OUTPATIENT
Start: 2025-03-17

## 2025-03-17 RX ORDER — GLUCAGON 1 MG
1 KIT INJECTION
Status: DISCONTINUED | OUTPATIENT
Start: 2025-03-17 | End: 2025-03-17 | Stop reason: HOSPADM

## 2025-03-17 RX ORDER — ONDANSETRON HYDROCHLORIDE 2 MG/ML
4 INJECTION, SOLUTION INTRAVENOUS DAILY PRN
Status: DISCONTINUED | OUTPATIENT
Start: 2025-03-17 | End: 2025-03-17 | Stop reason: HOSPADM

## 2025-03-17 RX ORDER — PROPOFOL 10 MG/ML
VIAL (ML) INTRAVENOUS
Status: DISCONTINUED | OUTPATIENT
Start: 2025-03-17 | End: 2025-03-17

## 2025-03-17 RX ORDER — DEXAMETHASONE SODIUM PHOSPHATE 4 MG/ML
INJECTION, SOLUTION INTRA-ARTICULAR; INTRALESIONAL; INTRAMUSCULAR; INTRAVENOUS; SOFT TISSUE
Status: DISCONTINUED | OUTPATIENT
Start: 2025-03-17 | End: 2025-03-17

## 2025-03-17 RX ORDER — LIDOCAINE HYDROCHLORIDE 20 MG/ML
INJECTION INTRAVENOUS
Status: DISCONTINUED | OUTPATIENT
Start: 2025-03-17 | End: 2025-03-17

## 2025-03-17 RX ORDER — OXYCODONE HYDROCHLORIDE 5 MG/1
5 TABLET ORAL
Status: DISCONTINUED | OUTPATIENT
Start: 2025-03-17 | End: 2025-03-17 | Stop reason: HOSPADM

## 2025-03-17 RX ORDER — ROCURONIUM BROMIDE 10 MG/ML
INJECTION, SOLUTION INTRAVENOUS
Status: DISCONTINUED | OUTPATIENT
Start: 2025-03-17 | End: 2025-03-17

## 2025-03-17 RX ADMIN — PROPOFOL 50 MG: 10 INJECTION, EMULSION INTRAVENOUS at 07:03

## 2025-03-17 RX ADMIN — PROPOFOL 150 MG: 10 INJECTION, EMULSION INTRAVENOUS at 07:03

## 2025-03-17 RX ADMIN — SUGAMMADEX 200 MG: 100 INJECTION, SOLUTION INTRAVENOUS at 08:03

## 2025-03-17 RX ADMIN — ROCURONIUM BROMIDE 10 MG: 10 INJECTION INTRAVENOUS at 07:03

## 2025-03-17 RX ADMIN — ROCURONIUM BROMIDE 20 MG: 10 INJECTION INTRAVENOUS at 07:03

## 2025-03-17 RX ADMIN — MIDAZOLAM HYDROCHLORIDE 2 MG: 2 INJECTION, SOLUTION INTRAMUSCULAR; INTRAVENOUS at 07:03

## 2025-03-17 RX ADMIN — DEXAMETHASONE SODIUM PHOSPHATE 4 MG: 4 INJECTION, SOLUTION INTRAMUSCULAR; INTRAVENOUS at 07:03

## 2025-03-17 RX ADMIN — ONDANSETRON 4 MG: 2 INJECTION INTRAMUSCULAR; INTRAVENOUS at 08:03

## 2025-03-17 RX ADMIN — GLYCOPYRROLATE 0.1 MG: 0.2 INJECTION, SOLUTION INTRAMUSCULAR; INTRAVENOUS at 07:03

## 2025-03-17 RX ADMIN — SODIUM CHLORIDE: 9 INJECTION, SOLUTION INTRAVENOUS at 07:03

## 2025-03-17 RX ADMIN — FENTANYL CITRATE 100 MCG: 50 INJECTION, SOLUTION INTRAMUSCULAR; INTRAVENOUS at 07:03

## 2025-03-17 RX ADMIN — LIDOCAINE HYDROCHLORIDE 75 MG: 20 INJECTION INTRAVENOUS at 07:03

## 2025-03-17 RX ADMIN — SUCCINYLCHOLINE CHLORIDE 180 MG: 20 INJECTION, SOLUTION INTRAMUSCULAR; INTRAVENOUS at 07:03

## 2025-03-17 NOTE — DISCHARGE SUMMARY
[facility]  Cardiology  Discharge Summary      Patient Name: Alfred Cuello  MRN: [id number]  Admission Date: 3/17/2025  Hospital Length of Stay: 0 days  Discharge Date and Time:  03/17/2025   Attending Physician: SKYLER Lee MD    Discharging Provider: Isadora Abraham DNP  Primary Care Physician: Nuria Man MD    HPI:   Alfred Cuello is a 60 y.o. male with PMH of atrial fibrillation, s/p EP study with pulmonary vein isolation (PVI) on 12/6/2022, a history of HFpEF with prior LVEF decreased to 45% in the setting of AF - most recently improved to LVEF 60-65% following maintenance of sinus rhythm, hypertension, hyperlipidemia, type II diabetes mellitus, CKD stage III, GERD, RAFAEL maintained on CPAP therapy, and obesity    History obtained through patient report and clinic notes:   LOV Dr Hiltbold 1/31/25:  Mr. Alfred Cuello is a marley 59-year-old gentleman who returns to clinic today for ongoing evaluation and recommendations regarding persistent atrial fibrillation. He underwent a prior NOEMI/cardioversion on 5/20/2022, and returned to persistent atrial fibrillation approximately one week after undergoing this procedure. He has since undergone an EP study with radiofrequency catheter ablation with pulmonary vein isolation (PVI) on 12/6/2022. Unfortunately, he has returned to symptomatic, persistent atrial fibrillation since October, 2024.      He is followed in general cardiology clinic with SANDRA Bartholomew and Dr. Shannon. He was initially diagnosed with asymptomatic atrial fibrillation in November of 2020, when this was incidentally noted on an ECG. This may have been present for years prior, but this was the first ECG evidence. He was seen in cardiology with Dr. Shannon and was initiated on oral anticoagulation with apixaban 5mg po BID. He was subsequently admitted from 11/22-24/2020 in the setting of acute decompensation of heart failure with atrial fibrillation with RVR. He was planned  "to undergo a NOEMI with cardioversion; however, a NOEMI on 11/24/2020 revealed a left atrial appendage thrombus, and cardioversion was not performed. Unfortunately, he was then lost to follow up until I saw him again in clinic on 5/2/2022. At that encounter, we discussed undergoing a repeat NOEMI/cardioversion with initiation of amiodarone. He underwent NOEMI/cardioversion on 5/20/2022. He states that he was feeling well for the first week, but then could tell that he returned to atrial fibrillation. At our previous encounters, we discussed the concept of radiofrequency catheter ablation, and he underwent a successful EP study with radiofrequency catheter ablation with pulmonary vein isolation (PVI) on 12/6/2022. Since undergoing ablation, he reported feeling "much better", and had maintained sinus rhythm on all subsequent ECGs and ambulatory event monitoring for the past few years. His previously prescribed amiodarone 200mg po daily was discontinued with maintained sinus rhythm. He has remained on uninterrupted oral anticoagulation with apixaban with no adverse bleeding events reported. Unfortunately, he recently returned to symptomatic, persistent atrial fibrillation in October of 2024. During this time, he reports that his wife, for whom he was the primary caretaker, had significantly declining health and ultimately passed away. He continues to experience grief and has had unintentional weight gain. He reports recurrent symptoms of sustained palpitations, similar to the symptoms he was experiencing prior to his ablation. He reports worsened fatigue, mild exercise intolerance, and worsened shortness of breath while in atrial fibrillation. He has resumed smoking since his wife's passing.   He is feeling overall "like I am back in atrial fibrillation". He reports recurrent symptoms of sustained palpitations, worsened fatigue, mild exercise intolerance, and worsened shortness of breath while in atrial fibrillation. He was " recently mowing his yard when he reported worsened fatigue and exercise intolerance, and had to take several breaks, which he feels is not his usual level of exercise capacity. He reports transient periods of dizziness with abrupt positional changes. 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    This patient will present to the EP laboratory to undergo a repeat EP study with a redo-radiofrequency catheter ablation with pulmonary vein isolation and consideration for ablation of the posterior wall and potential non-PV triggers. We may consider a brief course of amiodarone antiarrhythmic therapy in the blanking period following his redo ablation. He will continue metoprolol succinate 50mg po daily and uninterrupted oral anticoagulation with apixaban.      echo 1/15/2025:    Left Ventricle: The left ventricle is normal in size. Ventricular mass is normal. Normal wall thickness. There is concentric remodeling. There is normal systolic function with a visually estimated ejection fraction of 60 - 65%.    Alfred Cuello presents today to [facility] for scheduled RFA redo PVI with Dr. Lee.       Hospital Course:  Patient presented for RF-PVI (see procedure note for details), upon NOEMI small TAWNY thrombus noted and procedure aborted. Admitted to PACU, for monitoring.  He was monitored on telemetry, no acute arrhythmias.   Patient ambulating, tolerating PO intake, and voiding with no issues. Denies any chest pain or SOB. Discharge plans discussed with Dr. Lee. Patient to continue all home medications including Eliquis for CVA prophylaxis. Will plan for 1 month further AC, and repeat procedure at that time.  Dr. Lee's nurse will call to reschedule PVI ablation. He was assessed at bedside prior to discharge. He reported feeling well and denied chest discomfort, shortness of breath, palpitations,  lightheadedness, or any other acute symptoms. Bilateral groin and armpit/chest razor burn sites with redness have significantly improved. Discussed keeping areas clean and dry, can use cool compress/ice to areas if continued irritation. Further f/u with pcp if signs of infections noted.    Discharge plans/instructions discussed with patient and family who verbalized understanding and agreement of plans of care. No further questions or concerns voiced at this time. He was deemed in stable condition for discharge to home.      Final Active Diagnoses:    Diagnosis Date Noted POA    PRINCIPAL PROBLEM:  Persistent atrial fibrillation [I48.19] 11/16/2020 Yes      Problems Resolved During this Admission:     No new Assessment & Plan notes have been filed under this hospital service since the last note was generated.  Service: Cardiology      Discharged Condition: stable    Disposition: Home or Self Care    Follow Up: Nurse will call to schedule procedure.    Patient Instructions:      No driving until:   Order Comments: 48 hours as you have received anesthesia     Other restrictions (specify):   Order Comments: Follow up:  · Dr. Tiwari's nurse will call to reschedule PVI in about 1 month    Medications:  · Continue all home medications including eliquis tonight; Make sure to not miss any doses until reschedule procedure  - if you already picked up your protonix medication, DO not start. Will start after reschedule ablation.    Activity Instructions:  · Do not drive or operate any dangerous machinery for 24 hours, but optimally 48-72 hours since you were given general anesthesia.     Go to the Emergency Department if you develop:   · Redness, warmth, or drainage/pus at the IV sites, or sites shaved for procedure  · Chills or fever greater than 101.0 F   · Acute Weakness or numbness   · Visual, gait or speech disturbances   · New chest pain, palpitations, shortness of breath, fainting     Notify your health care provider  if you experience any of the following:  increased confusion or weakness     Notify your health care provider if you experience any of the following:  persistent dizziness, light-headedness, or visual disturbances     Notify your health care provider if you experience any of the following:  worsening rash     Notify your health care provider if you experience any of the following:  severe persistent headache     Notify your health care provider if you experience any of the following:  difficulty breathing or increased cough     Notify your health care provider if you experience any of the following:  redness, tenderness, or signs of infection (pain, swelling, redness, odor or green/yellow discharge around incision site)     Notify your health care provider if you experience any of the following:  severe uncontrolled pain     Notify your health care provider if you experience any of the following:  persistent nausea and vomiting or diarrhea     Notify your health care provider if you experience any of the following:  temperature >100.4     Activity as tolerated     Medications:  Reconciled Home Medications:      Medication List        CONTINUE taking these medications      atorvastatin 80 MG tablet  Commonly known as: LIPITOR  Take 1 tablet (80 mg total) by mouth once daily.     ELIQUIS 5 mg Tab  Generic drug: apixaban  Take 1 tablet (5 mg total) by mouth 2 (two) times daily.     ezetimibe 10 mg tablet  Commonly known as: ZETIA  Take 1 tablet (10 mg total) by mouth once daily.     ferrous sulfate 325 mg (65 mg iron) Tab tablet  Commonly known as: FEOSOL  Take 325 mg by mouth daily with breakfast.     furosemide 20 MG tablet  Commonly known as: LASIX  Take 1 tablet (20 mg total) by mouth 2 (two) times daily.     gabapentin 300 MG capsule  Commonly known as: NEURONTIN  Take 4 capsules (1,200 mg total) by mouth 3 (three) times daily.     KRILL OIL ORAL  Take by mouth once daily.     lisinopriL-hydrochlorothiazide 20-25  mg Tab  Commonly known as: PRINZIDE,ZESTORETIC  Take 1 tablet by mouth once daily.     loratadine 10 mg tablet  Commonly known as: CLARITIN  Take 10 mg by mouth once daily.     metFORMIN 500 MG ER 24hr tablet  Commonly known as: GLUCOPHAGE-XR  Take 2 tablets (1,000 mg total) by mouth 2 (two) times daily with meals.     metoprolol succinate 50 MG 24 hr tablet  Commonly known as: TOPROL-XL  Take 1 tablet (50 mg total) by mouth once daily.     multivitamin per tablet  Commonly known as: THERAGRAN  Take 1 tablet by mouth once daily.     nitroGLYCERIN 0.4 MG SL tablet  Commonly known as: NITROSTAT  Place 1 tablet (0.4 mg total) under the tongue every 5 (five) minutes as needed for Chest pain.     olmesartan-hydrochlorothiazide 40-25 mg per tablet  Commonly known as: BENICAR HCT  Take 1 tablet by mouth once daily.     omeprazole 20 MG capsule  Commonly known as: PRILOSEC  Take 1 capsule by mouth once daily     pioglitazone 30 MG tablet  Commonly known as: ACTOS  Take 1 tablet (30 mg total) by mouth once daily.     sars-cov-2 (covid-19) 30 mcg/0.3 ml injection  Commonly known as: Pfizer COVID-19     spironolactone 25 MG tablet  Commonly known as: ALDACTONE  Take 1 tablet (25 mg total) by mouth once daily.            ASK your doctor about these medications      clonazePAM 0.5 MG tablet  Commonly known as: KlonoPIN  Take 1 tablet (0.5 mg total) by mouth 2 (two) times daily as needed for Anxiety (insomnia).     MOUNJARO 2.5 mg/0.5 mL Pndanilo  Generic drug: tirzepatide  Inject 2.5 mg into the skin every 7 days.              Plan:  -Resume Eliquis tonight, make sure to not skip any doses prior to reschedule procedure  -Resume all other home medications  -Discussed keeping razor burn areas clean and dry, can use cool compress/ice or OTC scentless and alcohol free aquaphor lotion to areas if continued irritation. Further f/u with pcp if signs of infections noted.  -Dr. Lee's nurse will call to reschedule PVI in 1 month  -f/u in  clinic 3 months post reschedule procedure    Time spent on the discharge of patient: 20 minutes    Isadora Abraham DNP  Cardiology  [facility]    Attending: Kennedi Lee MD

## 2025-03-17 NOTE — Clinical Note
Patient arrived to the lab with significant razor burn to his bilateral groins and to chest area from clipping.

## 2025-03-17 NOTE — ANESTHESIA PROCEDURE NOTES
Arterial    Diagnosis: Atrial fibrillation    Patient location during procedure: done in OR  Timeout: 3/17/2025 7:30 AM  Procedure end time: 3/17/2025 7:32 AM    Staffing  Authorizing Provider: Siva Patten MD  Performing Provider: Siva Patten MD    Staffing  Performed by: Siva Patten MD  Authorized by: Siva Patten MD    Anesthesiologist was present at the time of the procedure.    Preanesthetic Checklist  Completed: patient identified, IV checked, site marked, risks and benefits discussed, surgical consent, monitors and equipment checked, pre-op evaluation, timeout performed and anesthesia consent givenArterial  Skin Prep: chlorhexidine gluconate  Local Infiltration: none  Orientation: right  Location: radial    Catheter Size: 20 G  Catheter placement by Ultrasound guidance. Heme positive aspiration all ports.   Vessel Caliber: small, medium, patent, compressibility normal  Vascular Doppler:  not done  Needle advanced into vessel with real time Ultrasound guidance.  Guidewire confirmed in vessel.  Sterile sheath used.  Image recorded and saved.Insertion Attempts: 1  Assessment  Dressing: secured with tape and tegaderm  Patient: Tolerated well

## 2025-03-17 NOTE — PLAN OF CARE
Received report from STANLEY Shell. Patient s/p NOEMI, AAOx3. VSS, no c/o pain or discomfort at this time, resp even and unlabored. Post procedure protocol reviewed with patient and patient's family. Understanding verbalized. Family members at bedside. Nurse call bell within reach. Will continue to monitor per post procedure protocol.

## 2025-03-17 NOTE — HPI
Alfred Cuello is a 60 y.o. male with PMH of atrial fibrillation, s/p EP study with pulmonary vein isolation (PVI) on 12/6/2022, a history of HFpEF with prior LVEF decreased to 45% in the setting of AF - most recently improved to LVEF 60-65% following maintenance of sinus rhythm, hypertension, hyperlipidemia, type II diabetes mellitus, CKD stage III, GERD, RAFAEL maintained on CPAP therapy, and obesity    History obtained through patient report and clinic notes:   LOV Dr Hiltbold 1/31/25:  Mr. Alfred Cuello is a marley 59-year-old gentleman who returns to clinic today for ongoing evaluation and recommendations regarding persistent atrial fibrillation. He underwent a prior NOEMI/cardioversion on 5/20/2022, and returned to persistent atrial fibrillation approximately one week after undergoing this procedure. He has since undergone an EP study with radiofrequency catheter ablation with pulmonary vein isolation (PVI) on 12/6/2022. Unfortunately, he has returned to symptomatic, persistent atrial fibrillation since October, 2024.      He is followed in general cardiology clinic with SANDRA Bartholomew and Dr. Shannon. He was initially diagnosed with asymptomatic atrial fibrillation in November of 2020, when this was incidentally noted on an ECG. This may have been present for years prior, but this was the first ECG evidence. He was seen in cardiology with Dr. Shannon and was initiated on oral anticoagulation with apixaban 5mg po BID. He was subsequently admitted from 11/22-24/2020 in the setting of acute decompensation of heart failure with atrial fibrillation with RVR. He was planned to undergo a NOEMI with cardioversion; however, a NOEMI on 11/24/2020 revealed a left atrial appendage thrombus, and cardioversion was not performed. Unfortunately, he was then lost to follow up until I saw him again in clinic on 5/2/2022. At that encounter, we discussed undergoing a repeat NOEMI/cardioversion with initiation of amiodarone. He underwent  "NOEMI/cardioversion on 5/20/2022. He states that he was feeling well for the first week, but then could tell that he returned to atrial fibrillation. At our previous encounters, we discussed the concept of radiofrequency catheter ablation, and he underwent a successful EP study with radiofrequency catheter ablation with pulmonary vein isolation (PVI) on 12/6/2022. Since undergoing ablation, he reported feeling "much better", and had maintained sinus rhythm on all subsequent ECGs and ambulatory event monitoring for the past few years. His previously prescribed amiodarone 200mg po daily was discontinued with maintained sinus rhythm. He has remained on uninterrupted oral anticoagulation with apixaban with no adverse bleeding events reported. Unfortunately, he recently returned to symptomatic, persistent atrial fibrillation in October of 2024. During this time, he reports that his wife, for whom he was the primary caretaker, had significantly declining health and ultimately passed away. He continues to experience grief and has had unintentional weight gain. He reports recurrent symptoms of sustained palpitations, similar to the symptoms he was experiencing prior to his ablation. He reports worsened fatigue, mild exercise intolerance, and worsened shortness of breath while in atrial fibrillation. He has resumed smoking since his wife's passing.   He is feeling overall "like I am back in atrial fibrillation". He reports recurrent symptoms of sustained palpitations, worsened fatigue, mild exercise intolerance, and worsened shortness of breath while in atrial fibrillation. He was recently mowing his yard when he reported worsened fatigue and exercise intolerance, and had to take several breaks, which he feels is not his usual level of exercise capacity. He reports transient periods of dizziness with abrupt positional changes. 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    This patient will present to the EP laboratory to undergo a repeat EP study with a redo-radiofrequency catheter ablation with pulmonary vein isolation and consideration for ablation of the posterior wall and potential non-PV triggers. We may consider a brief course of amiodarone antiarrhythmic therapy in the blanking period following his redo ablation. He will continue metoprolol succinate 50mg po daily and uninterrupted oral anticoagulation with apixaban.      echo 1/15/2025:    Left Ventricle: The left ventricle is normal in size. Ventricular mass is normal. Normal wall thickness. There is concentric remodeling. There is normal systolic function with a visually estimated ejection fraction of 60 - 65%.    Alfred Cuello presents today to [facility] for scheduled RFA redo PVI with Dr. Lee. *** denies any chest pain, palpitations, SOB, BYRNE, dizziness, light headedness, weakness, syncope, or near syncopal episodes. *** denies any bleeding, infections, fevers, rashes, or surgeries in the past 30 days. He is currently taking Eliquis 5mg BID with last dose last night, 3/16/25 pm. He also takes glimepiride, metformin and actos with all last doses yesterday 3/16/25.  Labs from 2/22/25 reviewed with no significant alerts.    ECG today shows AF with slow ventricular response and RBBB at 58 bpm  ms QT/Qtc 412/404 ms.        Plan:  -RFA PVI  -NOEMI prior  -Anesthesia for sedation    Prior to procedure, Dr. Lee at bedside speaking with patient and family. Extensive discussion with patient regarding the nature of RFA PVI including transseptal puncture. We discussed risks and benefits at length. Our discussion included, but was not limited to the risk of death, infection, bleeding, stroke, MI, cardiac perforation, embolism, cardiac tamponade, vascular injury, AE fistula, injury to phrenic nerve, pulmonary vein stenosis and other organic injury including  the possibility for need for surgery or pacemaker implantation. Patient verbalized understanding. All questions answered, no further questions or concerns, patient would like to proceed. Consents signed.    Attending: Kennedi Lee MD

## 2025-03-17 NOTE — ANESTHESIA PROCEDURE NOTES
Intubation    Date/Time: 3/17/2025 7:29 AM    Performed by: Ashok Alvarenga CRNA  Authorized by: Siva Patten MD    Intubation:     Induction:  Intravenous    Intubated:  Postinduction    Mask Ventilation:  Not attempted    Attempts:  1    Attempted By:  CRNA    Method of Intubation:  Video laryngoscopy    Blade:  Farrra 4    Laryngeal View Grade: Grade I - full view of cords      Difficult Airway Encountered?: No      Complications:  None    Airway Device:  Oral endotracheal tube    Airway Device Size:  8.0    Style/Cuff Inflation:  Cuffed (inflated to minimal occlusive pressure)    Inflation Amount (mL):  6    Tube secured:  23    Secured at:  The lips    Placement Verified By:  Capnometry    Complicating Factors:  Obesity, short neck and beard    Findings Post-Intubation:  BS equal bilateral and atraumatic/condition of teeth unchanged

## 2025-03-17 NOTE — H&P
[facility] - Short Stay Cardiac Unit  Cardiology  History and Physical     Patient Name: Alfred Cuello  MRN: [id number]  Admission Date: 3/17/2025  Code Status: Prior   Attending Provider: SKYLER Lee MD   Primary Care Physician: Nuria Man MD  Principal Problem:Persistent atrial fibrillation    Subjective:     Chief Complaint:  Persistent atrial fibrillation     HPI:  Alfred Cuello is a 60 y.o. male with PMH of atrial fibrillation, s/p EP study with pulmonary vein isolation (PVI) on 12/6/2022, a history of HFpEF with prior LVEF decreased to 45% in the setting of AF - most recently improved to LVEF 60-65% following maintenance of sinus rhythm, hypertension, hyperlipidemia, type II diabetes mellitus, CKD stage III, GERD, RAFAEL maintained on CPAP therapy, and obesity    History obtained through patient report and clinic notes:   LOV Dr Hiltbold 1/31/25:  Mr. Alfred Cuello is a marley 59-year-old gentleman who returns to clinic today for ongoing evaluation and recommendations regarding persistent atrial fibrillation. He underwent a prior NOEMI/cardioversion on 5/20/2022, and returned to persistent atrial fibrillation approximately one week after undergoing this procedure. He has since undergone an EP study with radiofrequency catheter ablation with pulmonary vein isolation (PVI) on 12/6/2022. Unfortunately, he has returned to symptomatic, persistent atrial fibrillation since October, 2024.      He is followed in general cardiology clinic with SANDRA Bartholomew and Dr. Shannon. He was initially diagnosed with asymptomatic atrial fibrillation in November of 2020, when this was incidentally noted on an ECG. This may have been present for years prior, but this was the first ECG evidence. He was seen in cardiology with Dr. Shannon and was initiated on oral anticoagulation with apixaban 5mg po BID. He was subsequently admitted from 11/22-24/2020 in the setting of acute decompensation of heart failure with atrial  "fibrillation with RVR. He was planned to undergo a NOEMI with cardioversion; however, a NOEMI on 11/24/2020 revealed a left atrial appendage thrombus, and cardioversion was not performed. Unfortunately, he was then lost to follow up until I saw him again in clinic on 5/2/2022. At that encounter, we discussed undergoing a repeat NOEMI/cardioversion with initiation of amiodarone. He underwent NOEMI/cardioversion on 5/20/2022. He states that he was feeling well for the first week, but then could tell that he returned to atrial fibrillation. At our previous encounters, we discussed the concept of radiofrequency catheter ablation, and he underwent a successful EP study with radiofrequency catheter ablation with pulmonary vein isolation (PVI) on 12/6/2022. Since undergoing ablation, he reported feeling "much better", and had maintained sinus rhythm on all subsequent ECGs and ambulatory event monitoring for the past few years. His previously prescribed amiodarone 200mg po daily was discontinued with maintained sinus rhythm. He has remained on uninterrupted oral anticoagulation with apixaban with no adverse bleeding events reported. Unfortunately, he recently returned to symptomatic, persistent atrial fibrillation in October of 2024. During this time, he reports that his wife, for whom he was the primary caretaker, had significantly declining health and ultimately passed away. He continues to experience grief and has had unintentional weight gain. He reports recurrent symptoms of sustained palpitations, similar to the symptoms he was experiencing prior to his ablation. He reports worsened fatigue, mild exercise intolerance, and worsened shortness of breath while in atrial fibrillation. He has resumed smoking since his wife's passing.   He is feeling overall "like I am back in atrial fibrillation". He reports recurrent symptoms of sustained palpitations, worsened fatigue, mild exercise intolerance, and worsened shortness of breath " while in atrial fibrillation. He was recently mowing his yard when he reported worsened fatigue and exercise intolerance, and had to take several breaks, which he feels is not his usual level of exercise capacity. He reports transient periods of dizziness with abrupt positional changes. 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    This patient will present to the EP laboratory to undergo a repeat EP study with a redo-radiofrequency catheter ablation with pulmonary vein isolation and consideration for ablation of the posterior wall and potential non-PV triggers. We may consider a brief course of amiodarone antiarrhythmic therapy in the blanking period following his redo ablation. He will continue metoprolol succinate 50mg po daily and uninterrupted oral anticoagulation with apixaban.      echo 1/15/2025:    Left Ventricle: The left ventricle is normal in size. Ventricular mass is normal. Normal wall thickness. There is concentric remodeling. There is normal systolic function with a visually estimated ejection fraction of 60 - 65%.    Alfred Cuello presents today to [facility] for scheduled RFA redo PVI with Dr. Lee. He denies any chest pain, palpitations, SOB, light headedness, weakness, syncope, or near syncopal episodes. Mild dizziness with positional changes. Has had BYRNE and fatigue since being back in AF. He denies any bleeding, infections, fevers, rashes, or surgeries in the past 30 days. He is currently taking Eliquis 5mg BID with last dose last night, 3/16/25 pm. He also takes glimepiride, metformin and actos with all last doses yesterday 3/16/25. He also just recently started mounjaro, last dose last Richard 3/9/25.  Labs from 2/22/25 reviewed with no significant alerts.    ECG today shows AF with slow ventricular response and RBBB at 58 bpm  ms QT/Qtc 412/404 ms.      Past Medical  History:   Diagnosis Date    Atrial fibrillation status post cardioversion     2 months ago    Diabetes mellitus, type 2     GERD (gastroesophageal reflux disease)     Hyperlipidemia     Hypertension     Neuropathy     Sleep apnea, unspecified     cpap       Past Surgical History:   Procedure Laterality Date    ABDOMINAL SURGERY      as a child    ABLATION OF ARRHYTHMOGENIC FOCUS FOR ATRIAL FIBRILLATION N/A 12/6/2022    Procedure: Ablation atrial fibrillation;  Surgeon: SKYLER Lee MD;  Location: [facility] EP LAB;  Service: Cardiology;  Laterality: N/A;  AF, NOEMI, PVI, RFA, Carto, Gen, MT, 3 Prep    COLONOSCOPY N/A 7/20/2018    Procedure: COLONOSCOPY;  Surgeon: Marko Koo MD;  Location: [facility] ENDO;  Service: Endoscopy;  Laterality: N/A;    COLONOSCOPY N/A 8/2/2024    Procedure: COLONOSCOPY;  Surgeon: Marko Koo MD;  Location: [facility] ENDO;  Service: Endoscopy;  Laterality: N/A;  one hour time slot    TREATMENT OF CARDIAC ARRHYTHMIA N/A 5/20/2022    Procedure: CARDIOVERSION;  Surgeon: SKYLER Lee MD;  Location: [facility] EP LAB;  Service: Cardiology;  Laterality: N/A;  afib, NOEMI, DCCV, anes, MB, 3 Prep    TREATMENT OF CARDIAC ARRHYTHMIA  12/6/2022    Procedure: Cardioversion or Defibrillation;  Surgeon: SKYLER Lee MD;  Location: [facility] EP LAB;  Service: Cardiology;;    TRIAL OF SPINAL CORD NERVE STIMULATOR N/A 6/17/2019    Procedure: Trial, Neurostimulator, LUMBAR SPINAL CORD STIMULATOR TRIAL;  Surgeon: Rahat Orantes MD;  Location: [facility] [place] PAIN MGT;  Service: Pain Management;  Laterality: N/A;  PDN STUDY TRIAL, NEEDS CONSENT, NEVRO REP       Review of patient's allergies indicates:   Allergen Reactions    Oxycodone Itching and Nausea Only       No current facility-administered medications on file prior to encounter.     Current Outpatient Medications on File Prior to Encounter   Medication Sig    apixaban (ELIQUIS) 5 mg Tab Take 1 tablet (5 mg total) by mouth 2 (two) times daily.    atorvastatin  (LIPITOR) 80 MG tablet Take 1 tablet (80 mg total) by mouth once daily.    ezetimibe (ZETIA) 10 mg tablet Take 1 tablet (10 mg total) by mouth once daily.    ferrous sulfate 325 mg (65 mg iron) Tab tablet Take 325 mg by mouth daily with breakfast.    furosemide (LASIX) 20 MG tablet Take 1 tablet (20 mg total) by mouth 2 (two) times daily.    gabapentin (NEURONTIN) 300 MG capsule Take 4 capsules (1,200 mg total) by mouth 3 (three) times daily.    KRILL OIL ORAL Take by mouth once daily.    lisinopriL-hydrochlorothiazide (PRINZIDE,ZESTORETIC) 20-25 mg Tab Take 1 tablet by mouth once daily.    loratadine (CLARITIN) 10 mg tablet Take 10 mg by mouth once daily.    metFORMIN (GLUCOPHAGE-XR) 500 MG ER 24hr tablet Take 2 tablets (1,000 mg total) by mouth 2 (two) times daily with meals.    metoprolol succinate (TOPROL-XL) 50 MG 24 hr tablet Take 1 tablet (50 mg total) by mouth once daily.    multivitamin (THERAGRAN) per tablet Take 1 tablet by mouth once daily.    olmesartan-hydrochlorothiazide (BENICAR HCT) 40-25 mg per tablet Take 1 tablet by mouth once daily.    omeprazole (PRILOSEC) 20 MG capsule Take 1 capsule by mouth once daily    pioglitazone (ACTOS) 30 MG tablet Take 1 tablet (30 mg total) by mouth once daily.    spironolactone (ALDACTONE) 25 MG tablet Take 1 tablet (25 mg total) by mouth once daily.    clonazePAM (KLONOPIN) 0.5 MG tablet Take 1 tablet (0.5 mg total) by mouth 2 (two) times daily as needed for Anxiety (insomnia).    nitroGLYCERIN (NITROSTAT) 0.4 MG SL tablet Place 1 tablet (0.4 mg total) under the tongue every 5 (five) minutes as needed for Chest pain.    sars-cov-2, covid-19, (PFIZER COVID-19) 30 mcg/0.3 ml injection      Family History       Problem Relation (Age of Onset)    Cancer Mother    Coronary artery disease Father    Diabetes Mother          Tobacco Use    Smoking status: Some Days     Current packs/day: 0.00     Types: Cigarettes     Last attempt to quit: 3/20/2011     Years since  quittin.0    Smokeless tobacco: Never    Tobacco comments:     smoked regularly for 35 years prior to this   Substance and Sexual Activity    Alcohol use: Yes     Comment: occ    Drug use: No    Sexual activity: Not Currently     Partners: Female     Review of Systems   Constitutional: Positive for malaise/fatigue. Negative for chills, diaphoresis and fever.   HENT: Negative.     Eyes:  Negative for pain and visual disturbance.   Cardiovascular:  Positive for dyspnea on exertion. Negative for chest pain, irregular heartbeat, leg swelling, near-syncope, orthopnea, palpitations and syncope.   Respiratory:  Negative for cough, hemoptysis, shortness of breath and wheezing.    Endocrine: Negative.    Hematologic/Lymphatic: Negative.    Skin: Negative.  Negative for rash.   Musculoskeletal:  Negative for falls, joint swelling and myalgias.   Gastrointestinal: Negative.  Negative for hematemesis, melena, nausea and vomiting.   Genitourinary: Negative.  Negative for hematuria.   Neurological:  Negative for dizziness, focal weakness, headaches and light-headedness.   Psychiatric/Behavioral:  Negative for altered mental status.      Objective:     Vital Signs (Most Recent):  Temp: 98.6 °F (37 °C) (25 0559)  Pulse: 67 (25 0559)  Resp: 18 (25 0559)  BP: 112/63 (25 0605)  SpO2: 97 % (25 0559) Vital Signs (24h Range):  Temp:  [98.6 °F (37 °C)] 98.6 °F (37 °C)  Pulse:  [67] 67  Resp:  [18] 18  SpO2:  [97 %] 97 %  BP: (112-116)/(63-68) 112/63     Weight: 123.8 kg (273 lb)  Body mass index is 35.05 kg/m².    SpO2: 97 %       Physical Exam  Vitals reviewed.   Constitutional:       General: He is not in acute distress.     Appearance: He is not ill-appearing.   HENT:      Head: Atraumatic.      Nose: No congestion.   Eyes:      Extraocular Movements: Extraocular movements intact.   Cardiovascular:      Rate and Rhythm: Normal rate. Rhythm irregular.      Pulses: Normal pulses.      Comments:  Bilateral radial +2 and bilateral pedal +2 pulses  Pulmonary:      Effort: Pulmonary effort is normal. No respiratory distress.   Abdominal:      General: Abdomen is flat.      Palpations: Abdomen is soft.   Musculoskeletal:         General: Normal range of motion.      Cervical back: Normal range of motion.      Right lower leg: No edema.      Left lower leg: No edema.   Skin:     General: Skin is warm and dry.      Findings: No rash.      Comments: Erythema/razor burn after electric shaving bilateral groin areas.   Neurological:      General: No focal deficit present.      Mental Status: He is alert and oriented to person, place, and time. Mental status is at baseline.   Psychiatric:         Mood and Affect: Mood normal.         Behavior: Behavior normal. Behavior is cooperative.        Assessment and Plan:     Plan:  -RFA PVI  -NOEMI prior  -Anesthesia for sedation    Prior to procedure, Dr. Lee at bedside speaking with patient and family. Extensive discussion with patient regarding the nature of RFA PVI including transseptal puncture. We discussed risks and benefits at length. Our discussion included, but was not limited to the risk of death, infection, bleeding, stroke, MI, cardiac perforation, embolism, cardiac tamponade, vascular injury, AE fistula, injury to phrenic nerve, pulmonary vein stenosis and other organic injury including the possibility for need for surgery or pacemaker implantation. Patient verbalized understanding. All questions answered, no further questions or concerns, patient would like to proceed. Consents signed.    Isadora Abraham DNP  Cardiology   [facility] - Short Stay Cardiac Unit    Attending: Kennedi Lee MD

## 2025-03-17 NOTE — PLAN OF CARE
Pt arrived to unit accompanied by his brother.  Pre op orders and assessment initiated.  Pt remains npo.  Call bell within reach.

## 2025-03-17 NOTE — ANESTHESIA POSTPROCEDURE EVALUATION
Anesthesia Post Evaluation    Patient: Alfred Robledo Pool    Procedure(s) Performed: Procedure(s) (LRB):  Ablation atrial fibrillation (N/A)  Transesophageal echo (NOEMI) intra-procedure log documentation (N/A)    Final Anesthesia Type: general      Patient location during evaluation: PACU  Patient participation: Yes- Able to Participate  Level of consciousness: awake and alert  Post-procedure vital signs: reviewed and stable  Pain management: adequate  Airway patency: patent    PONV status: None or treated.  Anesthetic complications: no      Cardiovascular status: hemodynamically stable  Respiratory status: spontaneous ventilation  Hydration status: euvolemic  Follow-up not needed.          Vitals Value Taken Time   /64 03/17/25 10:30   Temp 36.1 °C (97 °F) 03/17/25 10:30   Pulse 79 03/17/25 10:30   Resp 16 03/17/25 10:30   SpO2 97 % 03/17/25 10:30         No case tracking events are documented in the log.      Pain/Bean Score: Bean Score: 10 (3/17/2025 10:15 AM)

## 2025-03-17 NOTE — NURSING TRANSFER
Nursing Transfer Note      3/17/2025   10:20 AM    Reason patient is being transferred: Recovery criteria met    PACU nurse giving handoff: STANLEY Shell    Nurse receiving handoff: STANLEY Wagoner    Transfer to [facility] 7B    Transfer via stretcher    Transported by STANLEY Shell    Transfer Vital Signs @ 1015  Temperature: 97.9  Blood Pressure: 139/65  Heart Rate: 69  O2 Sat: 94% on RA  Respirations:15    Medicines/Equipment sent with patient: None    Any special needs or follow-up needed: Discharge after provider sees patient    Patient belongings transferred with patient: No    Chart send with patient: Yes    Notified: Brother    Patient reassessed prior to transfer at: 3/17/25 @ 1015

## 2025-03-17 NOTE — HOSPITAL COURSE
Patient presented for RF-PVI (see procedure note for details), upon NOEMI small TAWNY thrombus noted and procedure aborted. Admitted to PACU, for monitoring.  He was monitored on telemetry, no acute arrhythmias.   Patient ambulating, tolerating PO intake, and voiding with no issues. Denies any chest pain or SOB. Discharge plans discussed with Dr. Lee. Patient to continue all home medications including Eliquis for CVA prophylaxis. Will plan for 1 month further AC, and repeat procedure at that time.  Dr. Lee's nurse will call to reschedule PVI ablation. He was assessed at bedside prior to discharge. He reported feeling well and denied chest discomfort, shortness of breath, palpitations, lightheadedness, or any other acute symptoms. Discharge plans/instructions discussed with patient and family who verbalized understanding and agreement of plans of care. No further questions or concerns voiced at this time. He was deemed in stable condition for discharge to home.        Plan:  -Resume Eliquis tonight, make sure to not skip any doses prior to reschedule procedure  -Resume all other home medications  -Dr. Lee's nurse will call to reschedule PVI  -f/u in clinic 3 months post reschedule procedure      Attending: Kennedi Lee MD

## 2025-03-17 NOTE — DISCHARGE INSTRUCTIONS
Ablation Discharge Instructions and Care of Your Groin      Follow up:  Dr. Tiwari's nurse will call to reschedule PVI in about 1 month    Medications:  Continue all home medications including eliquis tonight; Make sure to not miss any doses until reschedule procedure    Activity Instructions:  Do not drive or operate any dangerous machinery for 24 hours, but optimally 48-72 hours since you were given general anesthesia.     Go to the Emergency Department if you develop:   Change in color or temperature of the leg  Redness, warmth, or drainage/pus at the groin sites  Chills or fever greater than 101.0 F   Severe bleeding or swelling at the groin sites  Acute Weakness or numbness   Visual, gait or speech disturbances   New chest pain, palpitations, shortness of breath, fainting

## 2025-03-17 NOTE — ANESTHESIA PREPROCEDURE EVALUATION
03/17/2025  Alfred Cuello is a 60 y.o., male.      Pre-op Assessment    I have reviewed the Patient Summary Reports.          Review of Systems  Anesthesia Hx:  No problems with previous Anesthesia                Social:  Non-Smoker       Hematology/Oncology:  Hematology Normal   Oncology Normal                                   EENT/Dental:  EENT/Dental Normal           Cardiovascular:     Hypertension    Dysrhythmias atrial fibrillation                                     Pulmonary:        Sleep Apnea                Renal/:  Chronic Renal Disease, CKD                Hepatic/GI:     GERD                Musculoskeletal:  Musculoskeletal Normal                Neurological:  Neurology Normal                                      Endocrine:  Diabetes, type 2           Dermatological:  Skin Normal    Psych:  Psychiatric Normal                Physical Exam  General: Alert and Oriented    Airway:  Mallampati: II / II  Mouth Opening: Normal  TM Distance: Normal  Tongue: Normal  Neck ROM: Normal ROM    Dental:  Intact    Chest/Lungs:  Clear to auscultation, Normal Respiratory Rate    Heart:  Rate: Normal  Rhythm: Regular Rhythm  Sounds: Normal    Anesthesia Plan  Type of Anesthesia, risks & benefits discussed:    Anesthesia Type: Gen ETT  Intra-op Monitoring Plan: Standard ASA Monitors and Art Line  Post Op Pain Control Plan: multimodal analgesia  Airway Plan: Direct  Informed Consent: Informed consent signed with the Patient and all parties understand the risks and agree with anesthesia plan.  All questions answered.   ASA Score: 3    Ready For Surgery From Anesthesia Perspective.   .
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
## 2025-03-17 NOTE — TRANSFER OF CARE
"Anesthesia Transfer of Care Note    Patient: Alfred Cuello    Procedure(s) Performed: Procedure(s) (LRB):  Ablation atrial fibrillation (N/A)  Transesophageal echo (NOEMI) intra-procedure log documentation (N/A)    Patient location: PACU    Anesthesia Type: general    Transport from OR: Transported from OR on 6-10 L/min O2 by face mask with adequate spontaneous ventilation    Post pain: adequate analgesia    Post assessment: no apparent anesthetic complications and tolerated procedure well    Post vital signs: stable    Level of consciousness: lethargic, responds to stimulation and awake    Nausea/Vomiting: no nausea/vomiting    Complications: none    Transfer of care protocol was followed    Last vitals: Visit Vitals  BP (!) 143/80 (BP Location: Left arm, Patient Position: Lying)   Pulse 70   Temp 36.7 °C (98.1 °F) (Skin)   Resp (!) 9   Ht 6' 2" (1.88 m)   Wt 123.8 kg (273 lb)   SpO2 100%   BMI 35.05 kg/m²     "

## 2025-03-17 NOTE — CONSULTS
[facility]  NOEMI Consult      Alfred Cuello  YOB: 1965  Medical Record Number:  [id number]  Attending Physician:  SKYLER Lee MD   Date of Admission: 3/17/2025       Hospital Day:  0  Current Principal Problem:  Persistent atrial fibrillation      History     Cc: atrial fib    HPI  60 y.o. male with PMH of atrial fibrillation, s/p EP study with pulmonary vein isolation (PVI) on 12/6/2022, a history of HFpEF with prior LVEF decreased to 45% in the setting of AF - most recently improved to LVEF 60-65% following maintenance of sinus rhythm, hypertension, hyperlipidemia, type II diabetes mellitus, CKD stage III, GERD, RAFAEL maintained on CPAP therapy, and obesity.     Today, he presents for repeat RFA/PVI. Patient remains in atrial fibrillation. Stable at this time.    Blood thinner: eliquis 5mg bid     Dysphagia or odynophagia:  no  Liver Disease, esophageal disease, or known varices:  no  Upper GI Bleeding: no  Prior neck surgery or radiation:  no  History of anesthetic difficulties:  no  Family history of anesthetic difficulties:  no  Last oral intake: last pm   Able to move neck in all directions: yes  Platelet count: 209  INR: 1  Last EF: TTE normal 9/2023        Medications - Outpatient  Prior to Admission medications    Medication Sig Start Date End Date Taking? Authorizing Provider   apixaban (ELIQUIS) 5 mg Tab Take 1 tablet (5 mg total) by mouth 2 (two) times daily. 3/19/24  Yes Stu Rangel MD   atorvastatin (LIPITOR) 80 MG tablet Take 1 tablet (80 mg total) by mouth once daily. 8/29/24 8/29/25 Yes Nuria Man MD   ezetimibe (ZETIA) 10 mg tablet Take 1 tablet (10 mg total) by mouth once daily. 5/13/24 5/13/25 Yes Stu Rangel MD   ferrous sulfate 325 mg (65 mg iron) Tab tablet Take 325 mg by mouth daily with breakfast.   Yes Provider, Historical   furosemide (LASIX) 20 MG tablet Take 1 tablet (20 mg total) by mouth 2 (two) times daily. 12/11/24  12/11/25 Yes Stu Rangel MD   gabapentin (NEURONTIN) 300 MG capsule Take 4 capsules (1,200 mg total) by mouth 3 (three) times daily. 9/9/24 3/17/25 Yes Venus Muniz NP   KRILL OIL ORAL Take by mouth once daily.   Yes Provider, Historical   lisinopriL-hydrochlorothiazide (PRINZIDE,ZESTORETIC) 20-25 mg Tab Take 1 tablet by mouth once daily. 4/2/24 4/2/25 Yes Stu Rangel MD   loratadine (CLARITIN) 10 mg tablet Take 10 mg by mouth once daily.   Yes Provider, Historical   metFORMIN (GLUCOPHAGE-XR) 500 MG ER 24hr tablet Take 2 tablets (1,000 mg total) by mouth 2 (two) times daily with meals. 10/21/24 10/21/25 Yes Venus Muniz NP   metoprolol succinate (TOPROL-XL) 50 MG 24 hr tablet Take 1 tablet (50 mg total) by mouth once daily. 4/24/24 4/24/25 Yes Nuria Man MD   multivitamin (THERAGRAN) per tablet Take 1 tablet by mouth once daily.   Yes Provider, Historical   olmesartan-hydrochlorothiazide (BENICAR HCT) 40-25 mg per tablet Take 1 tablet by mouth once daily. 1/15/25 1/15/26 Yes Steffi Bartholomew PA-C   omeprazole (PRILOSEC) 20 MG capsule Take 1 capsule by mouth once daily 5/20/24  Yes Anatoly Stout PA-C   pioglitazone (ACTOS) 30 MG tablet Take 1 tablet (30 mg total) by mouth once daily. 1/22/24  Yes Venus Muniz NP   spironolactone (ALDACTONE) 25 MG tablet Take 1 tablet (25 mg total) by mouth once daily. 10/17/24 10/12/25 Yes Steffi Bartholomew PA-C   clonazePAM (KLONOPIN) 0.5 MG tablet Take 1 tablet (0.5 mg total) by mouth 2 (two) times daily as needed for Anxiety (insomnia). 10/24/24 10/24/25  Nuria Man MD   nitroGLYCERIN (NITROSTAT) 0.4 MG SL tablet Place 1 tablet (0.4 mg total) under the tongue every 5 (five) minutes as needed for Chest pain. 9/29/23   Gillies, Connor M,    sars-cov-2, covid-19, (PFIZER COVID-19) 30 mcg/0.3 ml injection  11/21/21   Provider, Skye   tirzepatide (MOUNJARO) 2.5 mg/0.5 mL PnIj Inject 2.5 mg into the skin every 7 days.  Patient  not taking: Reported on 3/14/2025 2/27/25   Nuria Man MD         Medications - Current  Scheduled Meds:  Continuous Infusions:  PRN Meds:.      Allergies  Review of patient's allergies indicates:   Allergen Reactions    Oxycodone Itching and Nausea Only         Past Medical History  Past Medical History:   Diagnosis Date    Atrial fibrillation status post cardioversion     2 months ago    Diabetes mellitus, type 2     GERD (gastroesophageal reflux disease)     Hyperlipidemia     Hypertension     Neuropathy     Sleep apnea, unspecified     cpap         Past Surgical History  Past Surgical History:   Procedure Laterality Date    ABDOMINAL SURGERY      as a child    ABLATION OF ARRHYTHMOGENIC FOCUS FOR ATRIAL FIBRILLATION N/A 12/6/2022    Procedure: Ablation atrial fibrillation;  Surgeon: SKYLER Lee MD;  Location: [facility] EP LAB;  Service: Cardiology;  Laterality: N/A;  AF, NOEMI, PVI, RFA, Carto, Gen, ND, 3 Prep    COLONOSCOPY N/A 7/20/2018    Procedure: COLONOSCOPY;  Surgeon: Marko Koo MD;  Location: [facility] ENDO;  Service: Endoscopy;  Laterality: N/A;    COLONOSCOPY N/A 8/2/2024    Procedure: COLONOSCOPY;  Surgeon: Marko Koo MD;  Location: [facility] ENDO;  Service: Endoscopy;  Laterality: N/A;  one hour time slot    TREATMENT OF CARDIAC ARRHYTHMIA N/A 5/20/2022    Procedure: CARDIOVERSION;  Surgeon: SKYLER Lee MD;  Location: [facility] EP LAB;  Service: Cardiology;  Laterality: N/A;  afib, NOEMI, DCCV, anes, MB, 3 Prep    TREATMENT OF CARDIAC ARRHYTHMIA  12/6/2022    Procedure: Cardioversion or Defibrillation;  Surgeon: SKYLER Lee MD;  Location: [facility] EP LAB;  Service: Cardiology;;    TRIAL OF SPINAL CORD NERVE STIMULATOR N/A 6/17/2019    Procedure: Trial, Neurostimulator, LUMBAR SPINAL CORD STIMULATOR TRIAL;  Surgeon: Rahat Orantes MD;  Location: [facility] PAIN MGT;  Service: Pain Management;  Laterality: N/A;  PDN STUDY TRIAL, NEEDS CONSENT, NEVRO REP         Social History  Social  History     Socioeconomic History    Marital status:    Tobacco Use    Smoking status: Some Days     Current packs/day: 0.00     Types: Cigarettes     Last attempt to quit: 3/20/2011     Years since quittin.0    Smokeless tobacco: Never    Tobacco comments:     smoked regularly for 35 years prior to this   Substance and Sexual Activity    Alcohol use: Yes     Comment: occ    Drug use: No    Sexual activity: Not Currently     Partners: Female     Social Drivers of Health     Financial Resource Strain: Low Risk  (2025)    Overall Financial Resource Strain (CARDIA)     Difficulty of Paying Living Expenses: Not hard at all   Food Insecurity: No Food Insecurity (2025)    Hunger Vital Sign     Worried About Running Out of Food in the Last Year: Never true     Ran Out of Food in the Last Year: Never true   Transportation Needs: No Transportation Needs (2025)    PRAPARE - Transportation     Lack of Transportation (Medical): No     Lack of Transportation (Non-Medical): No   Physical Activity: Inactive (2025)    Exercise Vital Sign     Days of Exercise per Week: 0 days     Minutes of Exercise per Session: 0 min   Stress: Stress Concern Present (2025)    [employer] [place] [employer] - Occupational Stress Questionnaire     Feeling of Stress : To some extent   Housing Stability: Low Risk  (2025)    Housing Stability Vital Sign     Unable to Pay for Housing in the Last Year: No     Number of Times Moved in the Last Year: 0     Homeless in the Last Year: No         ROS  10 point ROS performed and negative except as stated in HPI     Physical Examination         Vital Signs  Vitals  Temp: 98.6 °F (37 °C)  Temp Source: Temporal  Pulse: 67  Resp: 18  SpO2: 97 %  BP: 112/63  BP Location: Left arm  BP Method: Automatic  Patient Position: Lying          24 Hour VS Range    Temp:  [98.6 °F (37 °C)]   Pulse:  [67]   Resp:  [18]   BP: (112-116)/(63-68)   SpO2:  [97 %]   No intake or  "output data in the 24 hours ending 03/17/25 0642      Physical Exam:   Constitutional: no acute distress  HEENT: NCAT, EOMI, no scleral icterus  Cardiovascular: regular rate and irregular rhythm  Pulmonary: Normal respiratory effort   Abdomen: nontender, non-distended   Neuro: alert and oriented, no focal deficits  Extremities: warm, no edema   MSK: no deformities  Integument: intact, no rashes         Data       No results for input(s): "WBC", "HGB", "HCT", "PLT" in the last 168 hours.     No results for input(s): "PROTIME", "INR" in the last 168 hours.     No results for input(s): "NA", "K", "CL", "CO2", "BUN", "CREATININE", "ANIONGAP", "CALCIUM" in the last 168 hours.     No results for input(s): "PROT", "ALBUMIN", "BILITOT", "ALKPHOS", "AST", "ALT" in the last 168 hours.     No results for input(s): "TROPONINI" in the last 168 hours.     BNP (pg/mL)   Date Value   12/07/2024 165 (H)   09/02/2023 80   11/22/2020 265 (H)       No results for input(s): "LABBLOO" in the last 168 hours.         Assessment & Plan     Atrial fibrillation/flutter  -No absolute contraindications of esophageal stricture, tumor, perforation, laceration,or diverticulum and/or active GI bleed  -The risks, benefits & alternatives of the procedure were explained to the patient.   -The risks of transesophageal echo include but are not limited to:  Dental trauma, esophageal trauma/perforation, bleeding, laryngospasm/brochospasm, aspiration, sore throat/hoarseness, & dislodgement of the endotracheal tube/nasogastric tube (where applicable).    -The risks of moderate sedation include hypotension, respiratory depression, arrhythmias, bronchospasm, & death.    -Informed consent was obtained. The patient is agreeable to proceed with the procedure and all questions and concerns addressed    Sean Rangel MD  [facility]   Cardiovascular Disease Fellow    "

## 2025-03-18 RX ORDER — PIOGLITAZONEHYDROCHLORIDE 30 MG/1
30 TABLET ORAL DAILY
Qty: 90 TABLET | Refills: 4 | OUTPATIENT
Start: 2025-03-18

## 2025-03-19 ENCOUNTER — PATIENT MESSAGE (OUTPATIENT)
Dept: ELECTROPHYSIOLOGY | Facility: CLINIC | Age: 60
End: 2025-03-19
Payer: COMMERCIAL

## 2025-03-19 LAB
ASCENDING AORTA: 3.7 CM
BSA FOR ECHO PROCEDURE: 2.54 M2
SINUS: 3.7 CM
STJ: 3.2 CM
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
## 2025-03-24 DIAGNOSIS — I50.20 HFREF (HEART FAILURE WITH REDUCED EJECTION FRACTION): ICD-10-CM

## 2025-03-24 DIAGNOSIS — Z98.890 H/O CARDIAC RADIOFREQUENCY ABLATION: ICD-10-CM

## 2025-03-24 DIAGNOSIS — I50.32 CHRONIC HEART FAILURE WITH PRESERVED EJECTION FRACTION: ICD-10-CM

## 2025-03-24 DIAGNOSIS — E11.65 TYPE 2 DIABETES MELLITUS WITH HYPERGLYCEMIA, WITHOUT LONG-TERM CURRENT USE OF INSULIN: Primary | ICD-10-CM

## 2025-03-24 DIAGNOSIS — I48.19 PERSISTENT ATRIAL FIBRILLATION: Primary | ICD-10-CM

## 2025-03-24 NOTE — TELEPHONE ENCOUNTER
No care due was identified.  [facility] Embedded Care Due Messages. Reference number: [id number].   3/24/2025 5:51:55 PM CDT

## 2025-03-26 RX ORDER — PIOGLITAZONEHYDROCHLORIDE 30 MG/1
30 TABLET ORAL DAILY
Qty: 90 TABLET | Refills: 4 | OUTPATIENT
Start: 2025-03-26

## 2025-03-28 RX ORDER — TIRZEPATIDE 2.5 MG/.5ML
2.5 INJECTION, SOLUTION SUBCUTANEOUS
Qty: 2 ML | Refills: 5 | Status: SHIPPED | OUTPATIENT
Start: 2025-03-28

## 2025-04-07 ENCOUNTER — TELEPHONE (OUTPATIENT)
Dept: ELECTROPHYSIOLOGY | Facility: CLINIC | Age: 60
End: 2025-04-07
Payer: COMMERCIAL

## 2025-04-07 NOTE — TELEPHONE ENCOUNTER
Called pt to inform of procedure date. No answer, left voicemail informing pt that his procedure has been scheduled on 4/21/25. Please call [phone] to discuss details.

## 2025-04-08 ENCOUNTER — TELEPHONE (OUTPATIENT)
Dept: ELECTROPHYSIOLOGY | Facility: CLINIC | Age: 60
End: 2025-04-08
Payer: COMMERCIAL
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
## 2025-04-22 ENCOUNTER — PATIENT OUTREACH (OUTPATIENT)
Dept: ADMINISTRATIVE | Facility: CLINIC | Age: 60
End: 2025-04-22
Payer: COMMERCIAL

## 2025-04-22 NOTE — PROGRESS NOTES
C3 nurse spoke with Alfred Cuello for a TCC post hospital discharge follow up call. The patient does not have a scheduled HOSFU appointment with Nuria Man MD within 5-7 days post hospital discharge date 4/17/25. C3 nurse was unable to schedule HOSFU appointment in [facility].    Message sent to PCP staff requesting they contact patient and schedule follow up appointment.

## 2025-04-22 NOTE — TELEPHONE ENCOUNTER
Pt stated taking:  Pantoprazole daily  cephALEXin (KEFLEX) 500 MG 3 times daily  oxyCODONE-acetaminophen (PERCOCET) 5-325 mg 1 tablet by mouth every 4 (four) hours as needed (pain 4-6) for up to 5 days  aspirin 325 mg Cap by mouth 2 (two) times a day  nystatin (MYCOSTATIN) 100,000 powder PRN

## 2025-04-23 NOTE — TELEPHONE ENCOUNTER
Called and spoke to patient regarding hospital follow up. TCC scheduled for tomorrow 4/24/25 with Dr. Magda Sanches

## 2025-04-24 ENCOUNTER — OFFICE VISIT (OUTPATIENT)
Dept: FAMILY MEDICINE | Facility: CLINIC | Age: 60
End: 2025-04-24
Payer: COMMERCIAL

## 2025-04-24 VITALS
OXYGEN SATURATION: 98 % | DIASTOLIC BLOOD PRESSURE: 70 MMHG | HEIGHT: 74 IN | WEIGHT: 272.94 LBS | BODY MASS INDEX: 35.03 KG/M2 | HEART RATE: 67 BPM | SYSTOLIC BLOOD PRESSURE: 120 MMHG

## 2025-04-24 DIAGNOSIS — E78.5 HYPERLIPIDEMIA ASSOCIATED WITH TYPE 2 DIABETES MELLITUS: ICD-10-CM

## 2025-04-24 DIAGNOSIS — S82.891A CLOSED FRACTURE OF RIGHT ANKLE, INITIAL ENCOUNTER: ICD-10-CM

## 2025-04-24 DIAGNOSIS — L03.115 CELLULITIS OF RIGHT LOWER EXTREMITY: ICD-10-CM

## 2025-04-24 DIAGNOSIS — Z92.89 HISTORY OF RECENT HOSPITALIZATION: Primary | ICD-10-CM

## 2025-04-24 DIAGNOSIS — E11.69 HYPERLIPIDEMIA ASSOCIATED WITH TYPE 2 DIABETES MELLITUS: ICD-10-CM

## 2025-04-24 DIAGNOSIS — E11.59 HYPERTENSION ASSOCIATED WITH DIABETES: ICD-10-CM

## 2025-04-24 DIAGNOSIS — E66.01 SEVERE OBESITY (BMI 35.0-39.9) WITH COMORBIDITY: ICD-10-CM

## 2025-04-24 DIAGNOSIS — I15.2 HYPERTENSION ASSOCIATED WITH DIABETES: ICD-10-CM

## 2025-04-24 DIAGNOSIS — F17.218 CIGARETTE NICOTINE DEPENDENCE WITH OTHER NICOTINE-INDUCED DISORDER: ICD-10-CM

## 2025-04-24 DIAGNOSIS — E11.65 TYPE 2 DIABETES MELLITUS WITH HYPERGLYCEMIA, WITHOUT LONG-TERM CURRENT USE OF INSULIN: ICD-10-CM

## 2025-04-24 PROCEDURE — 1160F RVW MEDS BY RX/DR IN RCRD: CPT | Mod: CPTII,S$GLB,, | Performed by: STUDENT IN AN ORGANIZED HEALTH CARE EDUCATION/TRAINING PROGRAM

## 2025-04-24 PROCEDURE — 99999 PR PBB SHADOW E&M-EST. PATIENT-LVL IV: CPT | Mod: PBBFAC,,, | Performed by: STUDENT IN AN ORGANIZED HEALTH CARE EDUCATION/TRAINING PROGRAM

## 2025-04-24 PROCEDURE — 3078F DIAST BP <80 MM HG: CPT | Mod: CPTII,S$GLB,, | Performed by: STUDENT IN AN ORGANIZED HEALTH CARE EDUCATION/TRAINING PROGRAM

## 2025-04-24 PROCEDURE — 1159F MED LIST DOCD IN RCRD: CPT | Mod: CPTII,S$GLB,, | Performed by: STUDENT IN AN ORGANIZED HEALTH CARE EDUCATION/TRAINING PROGRAM

## 2025-04-24 PROCEDURE — 3052F HG A1C>EQUAL 8.0%<EQUAL 9.0%: CPT | Mod: CPTII,S$GLB,, | Performed by: STUDENT IN AN ORGANIZED HEALTH CARE EDUCATION/TRAINING PROGRAM

## 2025-04-24 PROCEDURE — 3008F BODY MASS INDEX DOCD: CPT | Mod: CPTII,S$GLB,, | Performed by: STUDENT IN AN ORGANIZED HEALTH CARE EDUCATION/TRAINING PROGRAM

## 2025-04-24 PROCEDURE — 99214 OFFICE O/P EST MOD 30 MIN: CPT | Mod: S$GLB,,, | Performed by: STUDENT IN AN ORGANIZED HEALTH CARE EDUCATION/TRAINING PROGRAM

## 2025-04-24 PROCEDURE — 4010F ACE/ARB THERAPY RXD/TAKEN: CPT | Mod: CPTII,S$GLB,, | Performed by: STUDENT IN AN ORGANIZED HEALTH CARE EDUCATION/TRAINING PROGRAM

## 2025-04-24 PROCEDURE — 3072F LOW RISK FOR RETINOPATHY: CPT | Mod: CPTII,S$GLB,, | Performed by: STUDENT IN AN ORGANIZED HEALTH CARE EDUCATION/TRAINING PROGRAM

## 2025-04-24 PROCEDURE — 3074F SYST BP LT 130 MM HG: CPT | Mod: CPTII,S$GLB,, | Performed by: STUDENT IN AN ORGANIZED HEALTH CARE EDUCATION/TRAINING PROGRAM
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
## 2025-04-29 NOTE — TELEPHONE ENCOUNTER
Called and spoke with patient's wife Fabby to schedule NOEMI/ DCCV.   Agreed on Friday May 20, 2022 so he can give work 2 weeks notice.   Labs prior on the 14th, EKG post op on the 27th.   He is covid vaccinated, uses the portal for instructions.   Reviewed meds to hold   Actos, Glimiperide, Metformin the morning of the procedure.   He no longer takes Ozempic weekly. It is very important that he takes his Eliquis twice a day including the days prior and the morning of the procedure.  His procedure may be cancelled if He has missed doses prior to arrival. He has no metal in his body.  Wife Fabby will convey message and check the portal for instructions.        last done 3/19/done dop/done

## 2025-05-08 DIAGNOSIS — E11.65 TYPE 2 DIABETES MELLITUS WITH HYPERGLYCEMIA, WITHOUT LONG-TERM CURRENT USE OF INSULIN: Primary | ICD-10-CM

## 2025-05-08 RX ORDER — PIOGLITAZONE 30 MG/1
30 TABLET ORAL DAILY
Qty: 90 TABLET | Refills: 4 | OUTPATIENT
Start: 2025-05-08

## 2025-05-21 RX ORDER — PIOGLITAZONE 30 MG/1
30 TABLET ORAL DAILY
Qty: 90 TABLET | Refills: 4 | OUTPATIENT
Start: 2025-05-21

## 2025-05-22 ENCOUNTER — PATIENT MESSAGE (OUTPATIENT)
Dept: ELECTROPHYSIOLOGY | Facility: CLINIC | Age: 60
End: 2025-05-22
Payer: COMMERCIAL

## 2025-05-24 ENCOUNTER — LAB VISIT (OUTPATIENT)
Dept: LAB | Facility: HOSPITAL | Age: 60
End: 2025-05-24
Payer: COMMERCIAL

## 2025-05-24 DIAGNOSIS — E11.69 HYPERLIPIDEMIA ASSOCIATED WITH TYPE 2 DIABETES MELLITUS: ICD-10-CM

## 2025-05-24 DIAGNOSIS — I48.19 PERSISTENT ATRIAL FIBRILLATION: ICD-10-CM

## 2025-05-24 DIAGNOSIS — E11.65 TYPE 2 DIABETES MELLITUS WITH HYPERGLYCEMIA, WITHOUT LONG-TERM CURRENT USE OF INSULIN: ICD-10-CM

## 2025-05-24 DIAGNOSIS — Z98.890 H/O CARDIAC RADIOFREQUENCY ABLATION: ICD-10-CM

## 2025-05-24 DIAGNOSIS — E78.5 HYPERLIPIDEMIA ASSOCIATED WITH TYPE 2 DIABETES MELLITUS: ICD-10-CM

## 2025-05-24 DIAGNOSIS — I50.32 CHRONIC HEART FAILURE WITH PRESERVED EJECTION FRACTION: ICD-10-CM

## 2025-05-24 DIAGNOSIS — I50.20 HFREF (HEART FAILURE WITH REDUCED EJECTION FRACTION): ICD-10-CM

## 2025-05-24 LAB
ABSOLUTE EOSINOPHIL (OHS): 0.27 K/UL
ABSOLUTE MONOCYTE (OHS): 0.47 K/UL (ref 0.3–1)
ABSOLUTE NEUTROPHIL COUNT (OHS): 4.89 K/UL (ref 1.8–7.7)
ALBUMIN SERPL BCP-MCNC: 4 G/DL (ref 3.5–5.2)
ALBUMIN/CREAT UR: 16.3 UG/MG
ALP SERPL-CCNC: 94 UNIT/L (ref 40–150)
ALT SERPL W/O P-5'-P-CCNC: 20 UNIT/L (ref 10–44)
ANION GAP (OHS): 12 MMOL/L (ref 8–16)
APTT PPP: 25.9 SECONDS (ref 21–32)
AST SERPL-CCNC: 19 UNIT/L (ref 11–45)
BASOPHILS # BLD AUTO: 0.08 K/UL
BASOPHILS NFR BLD AUTO: 1 %
BILIRUB SERPL-MCNC: 0.5 MG/DL (ref 0.1–1)
BUN SERPL-MCNC: 31 MG/DL (ref 8–23)
CALCIUM SERPL-MCNC: 9.7 MG/DL (ref 8.7–10.5)
CHLORIDE SERPL-SCNC: 100 MMOL/L (ref 95–110)
CHOLEST SERPL-MCNC: 138 MG/DL (ref 120–199)
CHOLEST/HDLC SERPL: 3.9 {RATIO} (ref 2–5)
CO2 SERPL-SCNC: 27 MMOL/L (ref 23–29)
CREAT SERPL-MCNC: 1.3 MG/DL (ref 0.5–1.4)
CREAT UR-MCNC: 264 MG/DL (ref 23–375)
EAG (OHS): 157 MG/DL (ref 68–131)
ERYTHROCYTE [DISTWIDTH] IN BLOOD BY AUTOMATED COUNT: 14.4 % (ref 11.5–14.5)
GFR SERPLBLD CREATININE-BSD FMLA CKD-EPI: >60 ML/MIN/1.73/M2
GLUCOSE SERPL-MCNC: 152 MG/DL (ref 70–110)
HBA1C MFR BLD: 7.1 % (ref 4–5.6)
HCT VFR BLD AUTO: 40.3 % (ref 40–54)
HDLC SERPL-MCNC: 35 MG/DL (ref 40–75)
HDLC SERPL: 25.4 % (ref 20–50)
HGB BLD-MCNC: 12.5 GM/DL (ref 14–18)
IMM GRANULOCYTES # BLD AUTO: 0.01 K/UL (ref 0–0.04)
IMM GRANULOCYTES NFR BLD AUTO: 0.1 % (ref 0–0.5)
INR PPP: 1 (ref 0.8–1.2)
LDLC SERPL CALC-MCNC: 59.2 MG/DL (ref 63–159)
LYMPHOCYTES # BLD AUTO: 1.93 K/UL (ref 1–4.8)
MCH RBC QN AUTO: 27.7 PG (ref 27–31)
MCHC RBC AUTO-ENTMCNC: 31 G/DL (ref 32–36)
MCV RBC AUTO: 89 FL (ref 82–98)
MICROALBUMIN UR-MCNC: 43 UG/ML (ref ?–5000)
NONHDLC SERPL-MCNC: 103 MG/DL
NUCLEATED RBC (/100WBC) (OHS): 0 /100 WBC
PLATELET # BLD AUTO: 277 K/UL (ref 150–450)
PMV BLD AUTO: 11.2 FL (ref 9.2–12.9)
POTASSIUM SERPL-SCNC: 4.1 MMOL/L (ref 3.5–5.1)
PROT SERPL-MCNC: 7.4 GM/DL (ref 6–8.4)
PROTHROMBIN TIME: 11.1 SECONDS (ref 9–12.5)
RBC # BLD AUTO: 4.52 M/UL (ref 4.6–6.2)
RELATIVE EOSINOPHIL (OHS): 3.5 %
RELATIVE LYMPHOCYTE (OHS): 25.2 % (ref 18–48)
RELATIVE MONOCYTE (OHS): 6.1 % (ref 4–15)
RELATIVE NEUTROPHIL (OHS): 64.1 % (ref 38–73)
SODIUM SERPL-SCNC: 139 MMOL/L (ref 136–145)
TRIGL SERPL-MCNC: 219 MG/DL (ref 30–150)
WBC # BLD AUTO: 7.65 K/UL (ref 3.9–12.7)

## 2025-05-24 PROCEDURE — 80053 COMPREHEN METABOLIC PANEL: CPT

## 2025-05-24 PROCEDURE — 85610 PROTHROMBIN TIME: CPT

## 2025-05-24 PROCEDURE — 36415 COLL VENOUS BLD VENIPUNCTURE: CPT | Mod: PO

## 2025-05-24 PROCEDURE — 85025 COMPLETE CBC W/AUTO DIFF WBC: CPT

## 2025-05-24 PROCEDURE — 83036 HEMOGLOBIN GLYCOSYLATED A1C: CPT

## 2025-05-24 PROCEDURE — 82570 ASSAY OF URINE CREATININE: CPT

## 2025-05-24 PROCEDURE — 85730 THROMBOPLASTIN TIME PARTIAL: CPT

## 2025-05-24 PROCEDURE — 80061 LIPID PANEL: CPT

## 2025-05-26 ENCOUNTER — RESULTS FOLLOW-UP (OUTPATIENT)
Dept: FAMILY MEDICINE | Facility: CLINIC | Age: 60
End: 2025-05-26

## 2025-05-27 ENCOUNTER — PATIENT OUTREACH (OUTPATIENT)
Dept: ADMINISTRATIVE | Facility: HOSPITAL | Age: 60
End: 2025-05-27
Payer: COMMERCIAL

## 2025-05-27 ENCOUNTER — OFFICE VISIT (OUTPATIENT)
Dept: FAMILY MEDICINE | Facility: CLINIC | Age: 60
End: 2025-05-27
Payer: COMMERCIAL

## 2025-05-27 VITALS
RESPIRATION RATE: 18 BRPM | BODY MASS INDEX: 33.16 KG/M2 | HEART RATE: 78 BPM | DIASTOLIC BLOOD PRESSURE: 62 MMHG | OXYGEN SATURATION: 97 % | WEIGHT: 258.38 LBS | TEMPERATURE: 99 F | SYSTOLIC BLOOD PRESSURE: 120 MMHG | HEIGHT: 74 IN

## 2025-05-27 DIAGNOSIS — E11.59 HYPERTENSION ASSOCIATED WITH DIABETES: ICD-10-CM

## 2025-05-27 DIAGNOSIS — D50.9 IRON DEFICIENCY ANEMIA, UNSPECIFIED IRON DEFICIENCY ANEMIA TYPE: ICD-10-CM

## 2025-05-27 DIAGNOSIS — N18.31 CHRONIC KIDNEY DISEASE, STAGE 3A: ICD-10-CM

## 2025-05-27 DIAGNOSIS — S82.891A CLOSED FRACTURE OF RIGHT ANKLE, INITIAL ENCOUNTER: ICD-10-CM

## 2025-05-27 DIAGNOSIS — E66.01 SEVERE OBESITY (BMI 35.0-39.9) WITH COMORBIDITY: ICD-10-CM

## 2025-05-27 DIAGNOSIS — E11.69 HYPERLIPIDEMIA ASSOCIATED WITH TYPE 2 DIABETES MELLITUS: ICD-10-CM

## 2025-05-27 DIAGNOSIS — I48.19 PERSISTENT ATRIAL FIBRILLATION: ICD-10-CM

## 2025-05-27 DIAGNOSIS — E78.5 HYPERLIPIDEMIA ASSOCIATED WITH TYPE 2 DIABETES MELLITUS: ICD-10-CM

## 2025-05-27 DIAGNOSIS — I15.2 HYPERTENSION ASSOCIATED WITH DIABETES: ICD-10-CM

## 2025-05-27 DIAGNOSIS — Z92.89 HISTORY OF RECENT HOSPITALIZATION: ICD-10-CM

## 2025-05-27 DIAGNOSIS — E11.65 TYPE 2 DIABETES MELLITUS WITH HYPERGLYCEMIA, WITHOUT LONG-TERM CURRENT USE OF INSULIN: Primary | ICD-10-CM

## 2025-05-27 PROCEDURE — 1160F RVW MEDS BY RX/DR IN RCRD: CPT | Mod: CPTII,S$GLB,, | Performed by: FAMILY MEDICINE

## 2025-05-27 PROCEDURE — 3051F HG A1C>EQUAL 7.0%<8.0%: CPT | Mod: CPTII,S$GLB,, | Performed by: FAMILY MEDICINE

## 2025-05-27 PROCEDURE — 4010F ACE/ARB THERAPY RXD/TAKEN: CPT | Mod: CPTII,S$GLB,, | Performed by: FAMILY MEDICINE

## 2025-05-27 PROCEDURE — 99214 OFFICE O/P EST MOD 30 MIN: CPT | Mod: S$GLB,,, | Performed by: FAMILY MEDICINE

## 2025-05-27 PROCEDURE — 3008F BODY MASS INDEX DOCD: CPT | Mod: CPTII,S$GLB,, | Performed by: FAMILY MEDICINE

## 2025-05-27 PROCEDURE — 3061F NEG MICROALBUMINURIA REV: CPT | Mod: CPTII,S$GLB,, | Performed by: FAMILY MEDICINE

## 2025-05-27 PROCEDURE — 3072F LOW RISK FOR RETINOPATHY: CPT | Mod: CPTII,S$GLB,, | Performed by: FAMILY MEDICINE

## 2025-05-27 PROCEDURE — 3066F NEPHROPATHY DOC TX: CPT | Mod: CPTII,S$GLB,, | Performed by: FAMILY MEDICINE

## 2025-05-27 PROCEDURE — G2211 COMPLEX E/M VISIT ADD ON: HCPCS | Mod: S$GLB,,, | Performed by: FAMILY MEDICINE

## 2025-05-27 PROCEDURE — 3074F SYST BP LT 130 MM HG: CPT | Mod: CPTII,S$GLB,, | Performed by: FAMILY MEDICINE

## 2025-05-27 PROCEDURE — 1159F MED LIST DOCD IN RCRD: CPT | Mod: CPTII,S$GLB,, | Performed by: FAMILY MEDICINE

## 2025-05-27 PROCEDURE — 3078F DIAST BP <80 MM HG: CPT | Mod: CPTII,S$GLB,, | Performed by: FAMILY MEDICINE

## 2025-05-27 PROCEDURE — 99999 PR PBB SHADOW E&M-EST. PATIENT-LVL III: CPT | Mod: PBBFAC,,, | Performed by: FAMILY MEDICINE

## 2025-05-27 RX ORDER — TIRZEPATIDE 5 MG/.5ML
5 INJECTION, SOLUTION SUBCUTANEOUS
Qty: 2 ML | Refills: 5 | Status: SHIPPED | OUTPATIENT
Start: 2025-05-27

## 2025-05-27 RX ORDER — METOPROLOL SUCCINATE 50 MG/1
50 TABLET, EXTENDED RELEASE ORAL DAILY
Qty: 90 TABLET | Refills: 3 | Status: SHIPPED | OUTPATIENT
Start: 2025-05-27 | End: 2026-05-27
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
## 2025-05-27 NOTE — PROGRESS NOTES
Population Health Chart Review & Patient Outreach Details    Patient outreach vis portal as patient with clinic visit scheduled at 3 pm today.  Updates Requested / Reviewed:      Updated Care Coordination Note, Care Everywhere, , and Immunizations Reconciliation Completed or Queried: [facility] Topics Overdue:      [facility] Score: 0     Patient is not due for any topics at this time.    RSV Vaccine                  Health Maintenance Topic(s) Outreach Outcomes & Actions Taken:    Eye Exam - Outreach Outcomes & Actions Taken  : already scheduled 06/27/2025    Diabetic Foot Exam - Outreach Outcomes & Actions Taken  : due 07/11/2025 Note added to Primary Care Visit  note in appointment desk of  due.

## 2025-05-29 DIAGNOSIS — Z98.890 S/P ORIF (OPEN REDUCTION INTERNAL FIXATION) FRACTURE: ICD-10-CM

## 2025-05-29 DIAGNOSIS — S82.891D: Primary | ICD-10-CM

## 2025-05-29 DIAGNOSIS — Z87.81 S/P ORIF (OPEN REDUCTION INTERNAL FIXATION) FRACTURE: ICD-10-CM

## 2025-06-03 ENCOUNTER — CLINICAL SUPPORT (OUTPATIENT)
Dept: REHABILITATION | Facility: HOSPITAL | Age: 60
End: 2025-06-03
Payer: COMMERCIAL

## 2025-06-03 DIAGNOSIS — M25.671 IMPAIRED RANGE OF MOTION OF RIGHT ANKLE: Primary | ICD-10-CM

## 2025-06-03 DIAGNOSIS — Z74.09 IMPAIRED FUNCTIONAL MOBILITY AND ACTIVITY TOLERANCE: ICD-10-CM

## 2025-06-03 DIAGNOSIS — M62.81 MUSCLE WEAKNESS OF LOWER EXTREMITY: ICD-10-CM

## 2025-06-03 PROCEDURE — 97161 PT EVAL LOW COMPLEX 20 MIN: CPT | Mod: PN

## 2025-06-03 PROCEDURE — 97112 NEUROMUSCULAR REEDUCATION: CPT | Mod: PN

## 2025-06-05 ENCOUNTER — CLINICAL SUPPORT (OUTPATIENT)
Dept: REHABILITATION | Facility: HOSPITAL | Age: 60
End: 2025-06-05
Payer: COMMERCIAL

## 2025-06-05 DIAGNOSIS — M62.81 MUSCLE WEAKNESS OF LOWER EXTREMITY: ICD-10-CM

## 2025-06-05 DIAGNOSIS — Z74.09 IMPAIRED FUNCTIONAL MOBILITY AND ACTIVITY TOLERANCE: ICD-10-CM

## 2025-06-05 DIAGNOSIS — M25.671 IMPAIRED RANGE OF MOTION OF RIGHT ANKLE: Primary | ICD-10-CM

## 2025-06-05 PROCEDURE — 97112 NEUROMUSCULAR REEDUCATION: CPT | Mod: PN

## 2025-06-05 PROCEDURE — 97110 THERAPEUTIC EXERCISES: CPT | Mod: PN

## 2025-06-05 PROCEDURE — 97530 THERAPEUTIC ACTIVITIES: CPT | Mod: PN

## 2025-06-08 DIAGNOSIS — E11.42 DIABETIC POLYNEUROPATHY ASSOCIATED WITH TYPE 2 DIABETES MELLITUS: ICD-10-CM

## 2025-06-08 DIAGNOSIS — K21.9 GASTROESOPHAGEAL REFLUX DISEASE WITHOUT ESOPHAGITIS: ICD-10-CM

## 2025-06-09 RX ORDER — OMEPRAZOLE 20 MG/1
20 CAPSULE, DELAYED RELEASE ORAL DAILY
Qty: 90 CAPSULE | Refills: 3 | Status: SHIPPED | OUTPATIENT
Start: 2025-06-09

## 2025-06-09 RX ORDER — GABAPENTIN 300 MG/1
1200 CAPSULE ORAL 3 TIMES DAILY
Qty: 1080 CAPSULE | Refills: 0 | OUTPATIENT
Start: 2025-06-09 | End: 2025-09-07

## 2025-06-10 ENCOUNTER — CLINICAL SUPPORT (OUTPATIENT)
Dept: REHABILITATION | Facility: HOSPITAL | Age: 60
End: 2025-06-10
Payer: COMMERCIAL

## 2025-06-10 ENCOUNTER — TELEPHONE (OUTPATIENT)
Dept: CARDIOLOGY | Facility: CLINIC | Age: 60
End: 2025-06-10
Payer: COMMERCIAL

## 2025-06-10 DIAGNOSIS — M25.671 IMPAIRED RANGE OF MOTION OF RIGHT ANKLE: Primary | ICD-10-CM

## 2025-06-10 DIAGNOSIS — M62.81 MUSCLE WEAKNESS OF LOWER EXTREMITY: ICD-10-CM

## 2025-06-10 DIAGNOSIS — Z74.09 IMPAIRED FUNCTIONAL MOBILITY AND ACTIVITY TOLERANCE: ICD-10-CM

## 2025-06-10 PROCEDURE — 97112 NEUROMUSCULAR REEDUCATION: CPT | Mod: PN

## 2025-06-10 PROCEDURE — 97110 THERAPEUTIC EXERCISES: CPT | Mod: PN

## 2025-06-10 PROCEDURE — 97530 THERAPEUTIC ACTIVITIES: CPT | Mod: PN

## 2025-06-10 NOTE — PROGRESS NOTES
Outpatient Rehab    Physical Therapy Visit    Patient Name: Alfred Cuello  MRN: [id number]  YOB: 1965  Encounter Date: 6/10/2025    Therapy Diagnosis:   Encounter Diagnoses   Name Primary?    Impaired range of motion of right ankle Yes    Muscle weakness of lower extremity     Impaired functional mobility and activity tolerance      Physician: Rosetta Woodward PA    Physician Orders: Eval and Treat  Medical Diagnosis: Closed fracture dislocation of ankle joint, right, with routine healing, subsequent encounter  S/P ORIF (open reduction internal fixation) fracture  Surgical Diagnosis: ORIF RIght ankle   Surgical Date: Not applicable for this Episode    Visit # / Visits Authorized:  2 / 15  Insurance Authorization Period: 6/2/2025 to 12/31/2025  Date of Evaluation: 6/3/2025  Plan of Care Certification: 6/3/2025 to 8/30/2025      PT/PTA:     Number of PTA visits since last PT visit:   Time In:     Time Out:    Total Time (in minutes):     Total Billable Time (in minutes):      FOTO:  Intake Score:  %  Survey Score 2:  %  Survey Score 3:  %    Precautions:       Subjective   Walking better.         Objective            Treatment:  Therapeutic Exercise  TE 1: Nu Step  Balance/Neuromuscular Re-Education  NMR 1: Seated Heel slides x 20  NMR 2: Wobble board all directions x 20 each  NMR 3: Seated heel raises with 20# KB  NMR 4: Seated Calf stretch with strap 3 x 30 sec  NMR 5: Supine Hip ABD BTB and ADD Ball x 30 each  NMR 6: Bridges x 20  NMR 7: SLR x 30  Therapeutic Activity  TA 1: Step ups Forward x 20  TA 2: Stand Heel raises x 20  TA 3: Sled Push and pull 3 x 10 yards    Time Entry(in minutes):  Neuromuscular Re-Education Time Entry: 28  Therapeutic Activity Time Entry: 15  Therapeutic Exercise Time Entry: 10    Assessment & Plan   Assessment: Walking better, strength improving, pain controlled.  Patient is able to return to work as he is either just standing or walking not a heavy  PDL  Evaluation/Treatment Tolerance: Patient tolerated treatment well    The patient will continue to benefit from skilled outpatient physical therapy in order to address the deficits listed in the problem list on the initial evaluation, provide patient and family education, and maximize the patients level of independence in the home and community environments.     The patient's spiritual, cultural, and educational needs were considered, and the patient is agreeable to the plan of care and goals.           Plan: COntinue with ROM Strength and Gait    Goals:   Active       Long Term        Patient will return to normal ADL, recreational, and work related activities with less pain and limitation.  (Progressing)       Start:  06/03/25    Expected End:  08/30/25            Patient to achieve full ankle AROM with minimal pain  (Progressing)       Start:  06/03/25    Expected End:  08/30/25            Pain Rating at Best: 1/10 to improve Quality of Life.   (Progressing)       Start:  06/03/25    Expected End:  08/30/25               Short term        Recent signs and systems trend is improving in order to progress towards LTG's.  (Progressing)       Start:  06/03/25    Expected End:  08/30/25            Patient will be independent with HEP in order to further progress and return to maximal function.  (Progressing)       Start:  06/03/25    Expected End:  08/30/25            Pain rating at Worst: 4/10 in order to progress towards increased independence with activity.  (Progressing)       Start:  06/03/25    Expected End:  08/30/25                Fred Wynn, PT

## 2025-06-11 ENCOUNTER — OFFICE VISIT (OUTPATIENT)
Dept: CARDIOLOGY | Facility: CLINIC | Age: 60
End: 2025-06-11
Payer: COMMERCIAL

## 2025-06-11 VITALS
OXYGEN SATURATION: 98 % | BODY MASS INDEX: 34.55 KG/M2 | HEART RATE: 69 BPM | DIASTOLIC BLOOD PRESSURE: 82 MMHG | SYSTOLIC BLOOD PRESSURE: 156 MMHG | HEIGHT: 74 IN | WEIGHT: 269.19 LBS

## 2025-06-11 DIAGNOSIS — E11.65 TYPE 2 DIABETES MELLITUS WITH HYPERGLYCEMIA, WITHOUT LONG-TERM CURRENT USE OF INSULIN: ICD-10-CM

## 2025-06-11 DIAGNOSIS — N18.31 CHRONIC KIDNEY DISEASE, STAGE 3A: ICD-10-CM

## 2025-06-11 DIAGNOSIS — I48.19 PERSISTENT ATRIAL FIBRILLATION: ICD-10-CM

## 2025-06-11 DIAGNOSIS — E78.5 DYSLIPIDEMIA: ICD-10-CM

## 2025-06-11 DIAGNOSIS — E66.01 SEVERE OBESITY (BMI 35.0-39.9) WITH COMORBIDITY: ICD-10-CM

## 2025-06-11 DIAGNOSIS — I15.2 HYPERTENSION ASSOCIATED WITH DIABETES: ICD-10-CM

## 2025-06-11 DIAGNOSIS — I50.32 CHRONIC HEART FAILURE WITH PRESERVED EJECTION FRACTION: Primary | ICD-10-CM

## 2025-06-11 DIAGNOSIS — E11.59 HYPERTENSION ASSOCIATED WITH DIABETES: ICD-10-CM

## 2025-06-11 DIAGNOSIS — Z98.890 H/O CARDIAC RADIOFREQUENCY ABLATION: ICD-10-CM

## 2025-06-11 DIAGNOSIS — G47.33 OSA ON CPAP: ICD-10-CM

## 2025-06-11 PROCEDURE — 3051F HG A1C>EQUAL 7.0%<8.0%: CPT | Mod: CPTII,S$GLB,, | Performed by: PHYSICIAN ASSISTANT

## 2025-06-11 PROCEDURE — 3066F NEPHROPATHY DOC TX: CPT | Mod: CPTII,S$GLB,, | Performed by: PHYSICIAN ASSISTANT

## 2025-06-11 PROCEDURE — 3061F NEG MICROALBUMINURIA REV: CPT | Mod: CPTII,S$GLB,, | Performed by: PHYSICIAN ASSISTANT

## 2025-06-11 PROCEDURE — 1160F RVW MEDS BY RX/DR IN RCRD: CPT | Mod: CPTII,S$GLB,, | Performed by: PHYSICIAN ASSISTANT

## 2025-06-11 PROCEDURE — 3072F LOW RISK FOR RETINOPATHY: CPT | Mod: CPTII,S$GLB,, | Performed by: PHYSICIAN ASSISTANT

## 2025-06-11 PROCEDURE — 3008F BODY MASS INDEX DOCD: CPT | Mod: CPTII,S$GLB,, | Performed by: PHYSICIAN ASSISTANT

## 2025-06-11 PROCEDURE — 1159F MED LIST DOCD IN RCRD: CPT | Mod: CPTII,S$GLB,, | Performed by: PHYSICIAN ASSISTANT

## 2025-06-11 PROCEDURE — 3079F DIAST BP 80-89 MM HG: CPT | Mod: CPTII,S$GLB,, | Performed by: PHYSICIAN ASSISTANT

## 2025-06-11 PROCEDURE — 99999 PR PBB SHADOW E&M-EST. PATIENT-LVL V: CPT | Mod: PBBFAC,,, | Performed by: PHYSICIAN ASSISTANT

## 2025-06-11 PROCEDURE — 3077F SYST BP >= 140 MM HG: CPT | Mod: CPTII,S$GLB,, | Performed by: PHYSICIAN ASSISTANT

## 2025-06-11 PROCEDURE — 99214 OFFICE O/P EST MOD 30 MIN: CPT | Mod: S$GLB,,, | Performed by: PHYSICIAN ASSISTANT

## 2025-06-11 PROCEDURE — 4010F ACE/ARB THERAPY RXD/TAKEN: CPT | Mod: CPTII,S$GLB,, | Performed by: PHYSICIAN ASSISTANT

## 2025-06-11 RX ORDER — OLMESARTAN MEDOXOMIL AND HYDROCHLOROTHIAZIDE 40/25 40; 25 MG/1; MG/1
1 TABLET ORAL DAILY
Qty: 90 TABLET | Refills: 3 | Status: SHIPPED | OUTPATIENT
Start: 2025-06-11 | End: 2026-06-11
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
## 2025-06-12 ENCOUNTER — CLINICAL SUPPORT (OUTPATIENT)
Dept: REHABILITATION | Facility: HOSPITAL | Age: 60
End: 2025-06-12
Payer: COMMERCIAL

## 2025-06-12 DIAGNOSIS — M25.671 IMPAIRED RANGE OF MOTION OF RIGHT ANKLE: Primary | ICD-10-CM

## 2025-06-12 DIAGNOSIS — Z74.09 IMPAIRED FUNCTIONAL MOBILITY AND ACTIVITY TOLERANCE: ICD-10-CM

## 2025-06-12 DIAGNOSIS — M62.81 MUSCLE WEAKNESS OF LOWER EXTREMITY: ICD-10-CM

## 2025-06-12 PROCEDURE — 97110 THERAPEUTIC EXERCISES: CPT | Mod: PN

## 2025-06-12 PROCEDURE — 97530 THERAPEUTIC ACTIVITIES: CPT | Mod: PN

## 2025-06-12 PROCEDURE — 97112 NEUROMUSCULAR REEDUCATION: CPT | Mod: PN

## 2025-06-12 NOTE — PROGRESS NOTES
Outpatient Rehab    Physical Therapy Visit    Patient Name: Alfred Cuello  MRN: [id number]  YOB: 1965  Encounter Date: 6/12/2025    Therapy Diagnosis:   Encounter Diagnoses   Name Primary?    Impaired range of motion of right ankle Yes    Muscle weakness of lower extremity     Impaired functional mobility and activity tolerance      Physician: Rosetta Woodward PA    Physician Orders: Eval and Treat  Medical Diagnosis: Closed fracture dislocation of ankle joint, right, with routine healing, subsequent encounter  S/P ORIF (open reduction internal fixation) fracture  Surgical Diagnosis: ORIF RIght ankle   Surgical Date: Not applicable for this Episode    Visit # / Visits Authorized:  3 / 15  Insurance Authorization Period: 6/2/2025 to 12/31/2025  Date of Evaluation: 6/3/2025  Plan of Care Certification: 6/3/2025 to 8/30/2025      PT/PTA:     Number of PTA visits since last PT visit:   Time In: 1400   Time Out: 1453  Total Time (in minutes): 53   Total Billable Time (in minutes): 53    FOTO:  Intake Score:  %  Survey Score 2:  %  Survey Score 3:  %    Precautions:       Subjective   Back was a little sore after pushing sled.         Objective            Treatment:  Therapeutic Exercise  TE 1: Nu Step x 10 min  Balance/Neuromuscular Re-Education  NMR 2: Wobble board all directions x 20 each  NMR 3: Seated heel raises with 20# KB  NMR 4: GS Slant Board :  DF on step  NMR 5: Supine Hip ABD BTB and ADD Ball x 30 each  NMR 6: Bridges x 20  NMR 7: SLR x 30  Therapeutic Activity  TA 1: Step ups Forward x 20  TA 2: Stand Heel raises x 20  TA 3: Leg Press 40# x 20    Time Entry(in minutes):  Neuromuscular Re-Education Time Entry: 28  Therapeutic Activity Time Entry: 15  Therapeutic Exercise Time Entry: 10    Assessment & Plan   Assessment: Continued improvement in his motion and strength, is ok to return to work from this standpoint       The patient will continue to benefit from skilled outpatient physical  therapy in order to address the deficits listed in the problem list on the initial evaluation, provide patient and family education, and maximize the patients level of independence in the home and community environments.     The patient's spiritual, cultural, and educational needs were considered, and the patient is agreeable to the plan of care and goals.           Plan: COntinue with ROM Strength and Gait    Goals:   Active       Long Term        Patient will return to normal ADL, recreational, and work related activities with less pain and limitation.  (Progressing)       Start:  06/03/25    Expected End:  08/30/25            Patient to achieve full ankle AROM with minimal pain  (Progressing)       Start:  06/03/25    Expected End:  08/30/25            Pain Rating at Best: 1/10 to improve Quality of Life.   (Progressing)       Start:  06/03/25    Expected End:  08/30/25               Short term        Recent signs and systems trend is improving in order to progress towards LTG's.  (Progressing)       Start:  06/03/25    Expected End:  08/30/25            Patient will be independent with HEP in order to further progress and return to maximal function.  (Progressing)       Start:  06/03/25    Expected End:  08/30/25            Pain rating at Worst: 4/10 in order to progress towards increased independence with activity.  (Progressing)       Start:  06/03/25    Expected End:  08/30/25                Fred Wynn, PT

## 2025-06-21 DIAGNOSIS — E11.42 DIABETIC POLYNEUROPATHY ASSOCIATED WITH TYPE 2 DIABETES MELLITUS: ICD-10-CM

## 2025-06-23 ENCOUNTER — PATIENT MESSAGE (OUTPATIENT)
Dept: FAMILY MEDICINE | Facility: CLINIC | Age: 60
End: 2025-06-23
Payer: COMMERCIAL

## 2025-06-23 ENCOUNTER — PATIENT MESSAGE (OUTPATIENT)
Dept: ENDOCRINOLOGY | Facility: CLINIC | Age: 60
End: 2025-06-23
Payer: COMMERCIAL

## 2025-06-23 DIAGNOSIS — E11.42 DIABETIC POLYNEUROPATHY ASSOCIATED WITH TYPE 2 DIABETES MELLITUS: ICD-10-CM

## 2025-06-23 RX ORDER — GABAPENTIN 300 MG/1
1200 CAPSULE ORAL 3 TIMES DAILY
Qty: 360 CAPSULE | Refills: 2 | Status: SHIPPED | OUTPATIENT
Start: 2025-06-23 | End: 2025-06-24 | Stop reason: SDUPTHER

## 2025-06-24 ENCOUNTER — TELEPHONE (OUTPATIENT)
Dept: ENDOCRINOLOGY | Facility: CLINIC | Age: 60
End: 2025-06-24
Payer: COMMERCIAL

## 2025-06-24 DIAGNOSIS — E11.65 TYPE 2 DIABETES MELLITUS WITH HYPERGLYCEMIA, WITHOUT LONG-TERM CURRENT USE OF INSULIN: ICD-10-CM

## 2025-06-24 RX ORDER — GABAPENTIN 300 MG/1
900 CAPSULE ORAL 4 TIMES DAILY
Qty: 1080 CAPSULE | Refills: 1 | Status: SHIPPED | OUTPATIENT
Start: 2025-06-24

## 2025-06-24 RX ORDER — GLIMEPIRIDE 4 MG/1
4 TABLET ORAL 2 TIMES DAILY
Qty: 180 TABLET | Refills: 1 | OUTPATIENT
Start: 2025-06-24

## 2025-06-24 NOTE — TELEPHONE ENCOUNTER
I spoke with pt via phone I advised him that message was sent to Dr. Man fro review. Will await response.

## 2025-06-27 ENCOUNTER — OFFICE VISIT (OUTPATIENT)
Dept: OPTOMETRY | Facility: CLINIC | Age: 60
End: 2025-06-27
Payer: COMMERCIAL

## 2025-06-27 DIAGNOSIS — H25.13 NUCLEAR SCLEROSIS, BILATERAL: ICD-10-CM

## 2025-06-27 DIAGNOSIS — H52.7 REFRACTIVE ERROR: ICD-10-CM

## 2025-06-27 DIAGNOSIS — H25.041 POSTERIOR SUBCAPSULAR POLAR SENILE CATARACT, RIGHT: ICD-10-CM

## 2025-06-27 DIAGNOSIS — E11.9 DIABETES MELLITUS TYPE 2 WITHOUT RETINOPATHY: Primary | ICD-10-CM

## 2025-06-27 PROCEDURE — 99999 PR PBB SHADOW E&M-EST. PATIENT-LVL III: CPT | Mod: PBBFAC,,, | Performed by: OPTOMETRIST

## 2025-06-27 NOTE — PROGRESS NOTES
HPI    Pt states no changes in vision noticed  Wears OTC  readers, two different mathew for driving/reading   Allergy eyes    (+)allergy gtts PRN  (-)headaches  (+)floaters       Hemoglobin A1C       Date                     Value               Ref Range             Status                05/24/2025               7.1 (H)             4.0 - 5.6 %           Final          02/22/2025               8.3 (H)             4.0 - 5.6 %           Final                 10/19/2024               7.6 (H)             4.0 - 5.6 %           Final                  ----------    Last edited by Carl Yost, OD on 6/27/2025  3:45 PM.            Assessment /Plan     For exam results, see Encounter Report.    Diabetes mellitus type 2 without retinopathy    Nuclear sclerosis, bilateral    Posterior subcapsular polar senile cataract, right    Refractive error      1. Diabetes mellitus type 2 without retinopathy (Primary)  Discussed possible ocular affects of uncontrolled blood sugar with patient.   Recommended continued strong blood sugar control and continued care with PCP.   Monitor yearly.     2. Nuclear sclerosis, bilateral  3. Posterior subcapsular polar senile cataract, right  Mild NS OU, PSC OD -- pt overall asymptomatic  Discussed possible ocular affects of cataracts. Acceptable BCVA OU.   Discussed treatment options. Pt not interested in surgery at this time.   Monitor yearly.     4. Refractive error  Declines refraction, happy with otc readers

## 2025-06-30 ENCOUNTER — TELEPHONE (OUTPATIENT)
Dept: ELECTROPHYSIOLOGY | Facility: CLINIC | Age: 60
End: 2025-06-30
Payer: COMMERCIAL

## 2025-06-30 DIAGNOSIS — I51.3 THROMBUS OF ATRIAL APPENDAGE: ICD-10-CM

## 2025-06-30 DIAGNOSIS — I48.19 PERSISTENT ATRIAL FIBRILLATION: Primary | ICD-10-CM

## 2025-06-30 DIAGNOSIS — Z98.890 H/O CARDIAC RADIOFREQUENCY ABLATION: ICD-10-CM

## 2025-06-30 NOTE — TELEPHONE ENCOUNTER
Spoke with patient and scheduled procedure: ablation on 8/28/25 with an arrival time of 8AM at [facility] short stay.   Reviewed procedure with patient.  Labs to be done at: [facility] on 8/23/25; non-fasting.   Confirmed that patient is on GLP-1 agonist. Patient confirmed that he takes Mounjaro weekly on Sundays. Instructed to take last dose on 8/17/25.  Advised to hold the following meds:Eliquis and Metformin day of procedure, last doses should be taken on 8/27/25.  Patient confirmed that he takes Eliquis 5mg BID.   Confirmed that patient does not have any implanted device that has remote or monitor that could cause electrical interference  Instructions will be sent via portal, as requested  Patient verbalized understanding.

## 2025-07-08 RX ORDER — EZETIMIBE 10 MG/1
10 TABLET ORAL DAILY
Qty: 90 TABLET | Refills: 3 | Status: SHIPPED | OUTPATIENT
Start: 2025-07-08 | End: 2026-07-08

## 2025-07-17 ENCOUNTER — PATIENT MESSAGE (OUTPATIENT)
Dept: ELECTROPHYSIOLOGY | Facility: CLINIC | Age: 60
End: 2025-07-17
Payer: COMMERCIAL

## 2025-08-17 ENCOUNTER — PATIENT MESSAGE (OUTPATIENT)
Dept: FAMILY MEDICINE | Facility: CLINIC | Age: 60
End: 2025-08-17
Payer: COMMERCIAL

## 2025-08-23 ENCOUNTER — LAB VISIT (OUTPATIENT)
Dept: LAB | Facility: HOSPITAL | Age: 60
End: 2025-08-23
Attending: STUDENT IN AN ORGANIZED HEALTH CARE EDUCATION/TRAINING PROGRAM
Payer: COMMERCIAL

## 2025-08-23 DIAGNOSIS — I51.3 THROMBUS OF ATRIAL APPENDAGE: ICD-10-CM

## 2025-08-23 DIAGNOSIS — I48.19 PERSISTENT ATRIAL FIBRILLATION: ICD-10-CM

## 2025-08-23 DIAGNOSIS — Z98.890 H/O CARDIAC RADIOFREQUENCY ABLATION: ICD-10-CM

## 2025-08-23 LAB
ANION GAP (SMH): 12 MMOL/L (ref 8–16)
APTT PPP: 26.7 SECONDS (ref 21–32)
BUN SERPL-MCNC: 25 MG/DL (ref 8–23)
CALCIUM SERPL-MCNC: 9.6 MG/DL (ref 8.7–10.5)
CHLORIDE SERPL-SCNC: 101 MMOL/L (ref 95–110)
CO2 SERPL-SCNC: 25 MMOL/L (ref 23–29)
CREAT SERPL-MCNC: 1.3 MG/DL (ref 0.5–1.4)
ERYTHROCYTE [DISTWIDTH] IN BLOOD BY AUTOMATED COUNT: 14.7 % (ref 11.5–14.5)
GFR SERPLBLD CREATININE-BSD FMLA CKD-EPI: >60 ML/MIN/1.73/M2
GLUCOSE SERPL-MCNC: 255 MG/DL (ref 70–110)
HCT VFR BLD AUTO: 36.6 % (ref 40–54)
HGB BLD-MCNC: 11.8 GM/DL (ref 14–18)
INR PPP: 1 (ref 0.8–1.2)
MCH RBC QN AUTO: 27.4 PG (ref 27–31)
MCHC RBC AUTO-ENTMCNC: 32.2 G/DL (ref 32–36)
MCV RBC AUTO: 85 FL (ref 82–98)
PLATELET # BLD AUTO: 232 K/UL (ref 150–450)
PMV BLD AUTO: 10.3 FL (ref 9.2–12.9)
POTASSIUM SERPL-SCNC: 4.4 MMOL/L (ref 3.5–5.1)
PROTHROMBIN TIME: 11.4 SECONDS (ref 9–12.5)
RBC # BLD AUTO: 4.3 M/UL (ref 4.6–6.2)
SODIUM SERPL-SCNC: 138 MMOL/L (ref 136–145)
WBC # BLD AUTO: 7.05 K/UL (ref 3.9–12.7)

## 2025-08-23 PROCEDURE — 85027 COMPLETE CBC AUTOMATED: CPT

## 2025-08-23 PROCEDURE — 36415 COLL VENOUS BLD VENIPUNCTURE: CPT

## 2025-08-23 PROCEDURE — 85730 THROMBOPLASTIN TIME PARTIAL: CPT

## 2025-08-23 PROCEDURE — 85610 PROTHROMBIN TIME: CPT

## 2025-08-23 PROCEDURE — 80048 BASIC METABOLIC PNL TOTAL CA: CPT

## 2025-08-26 ENCOUNTER — TELEPHONE (OUTPATIENT)
Dept: ELECTROPHYSIOLOGY | Facility: CLINIC | Age: 60
End: 2025-08-26
Payer: COMMERCIAL

## 2025-08-28 ENCOUNTER — HOSPITAL ENCOUNTER (OUTPATIENT)
Facility: HOSPITAL | Age: 60
Discharge: HOME | End: 2025-08-28
Attending: STUDENT IN AN ORGANIZED HEALTH CARE EDUCATION/TRAINING PROGRAM | Admitting: STUDENT IN AN ORGANIZED HEALTH CARE EDUCATION/TRAINING PROGRAM
Payer: COMMERCIAL

## 2025-08-28 ENCOUNTER — ANESTHESIA EVENT (OUTPATIENT)
Dept: MEDSURG UNIT | Facility: HOSPITAL | Age: 60
End: 2025-08-28
Payer: COMMERCIAL

## 2025-08-28 ENCOUNTER — ANTI-COAG VISIT (OUTPATIENT)
Dept: CARDIOLOGY | Facility: CLINIC | Age: 60
End: 2025-08-28
Payer: COMMERCIAL

## 2025-08-28 ENCOUNTER — ANESTHESIA (OUTPATIENT)
Dept: MEDSURG UNIT | Facility: HOSPITAL | Age: 60
End: 2025-08-28
Payer: COMMERCIAL

## 2025-08-28 VITALS
HEIGHT: 74 IN | HEART RATE: 79 BPM | WEIGHT: 255 LBS | BODY MASS INDEX: 32.73 KG/M2 | OXYGEN SATURATION: 98 % | SYSTOLIC BLOOD PRESSURE: 116 MMHG | TEMPERATURE: 98 F | RESPIRATION RATE: 18 BRPM | DIASTOLIC BLOOD PRESSURE: 58 MMHG

## 2025-08-28 DIAGNOSIS — I48.91 ATRIAL FIBRILLATION: ICD-10-CM

## 2025-08-28 DIAGNOSIS — I48.19 PERSISTENT ATRIAL FIBRILLATION: ICD-10-CM

## 2025-08-28 DIAGNOSIS — I51.3 THROMBUS OF ATRIAL APPENDAGE: ICD-10-CM

## 2025-08-28 DIAGNOSIS — I51.3 LA THROMBUS: ICD-10-CM

## 2025-08-28 DIAGNOSIS — Z98.890 H/O CARDIAC RADIOFREQUENCY ABLATION: ICD-10-CM

## 2025-08-28 DIAGNOSIS — I49.9 ARRHYTHMIA: ICD-10-CM

## 2025-08-28 DIAGNOSIS — Z79.01 LONG TERM (CURRENT) USE OF ANTICOAGULANTS: Primary | ICD-10-CM

## 2025-08-28 DIAGNOSIS — E11.42 DIABETIC POLYNEUROPATHY ASSOCIATED WITH TYPE 2 DIABETES MELLITUS: Primary | ICD-10-CM

## 2025-08-28 LAB
AORTIC SIZE INDEX (SOV): 1.6 CM/M2
AORTIC SIZE INDEX: 1.2 CM/M2
ASCENDING AORTA: 3 CM
BSA FOR ECHO PROCEDURE: 2.46 M2
Lab: 1.6 CM/M
Lab: 2 CM/M
OHS CV CPX PATIENT HEIGHT IN: 74
OHS QRS DURATION: 138 MS
OHS QTC CALCULATION: 420 MS
POCT GLUCOSE: 158 MG/DL (ref 70–110)
POCT GLUCOSE: 169 MG/DL (ref 70–110)
SINUS: 3.8 CM
STJ: 3.4 CM

## 2025-08-28 PROCEDURE — 93010 ELECTROCARDIOGRAM REPORT: CPT | Mod: ,,, | Performed by: INTERNAL MEDICINE

## 2025-08-28 PROCEDURE — 82962 GLUCOSE BLOOD TEST: CPT | Performed by: INTERNAL MEDICINE

## 2025-08-28 PROCEDURE — 37000008 HC ANESTHESIA 1ST 15 MINUTES: Performed by: INTERNAL MEDICINE

## 2025-08-28 PROCEDURE — 25500020 PHARM REV CODE 255

## 2025-08-28 PROCEDURE — 37000009 HC ANESTHESIA EA ADD 15 MINS: Performed by: INTERNAL MEDICINE

## 2025-08-28 PROCEDURE — 25000003 PHARM REV CODE 250: Performed by: NURSE ANESTHETIST, CERTIFIED REGISTERED

## 2025-08-28 PROCEDURE — 27201423 OPTIME MED/SURG SUP & DEVICES STERILE SUPPLY: Performed by: INTERNAL MEDICINE

## 2025-08-28 PROCEDURE — 63600175 PHARM REV CODE 636 W HCPCS: Performed by: STUDENT IN AN ORGANIZED HEALTH CARE EDUCATION/TRAINING PROGRAM

## 2025-08-28 PROCEDURE — 82962 GLUCOSE BLOOD TEST: CPT | Performed by: STUDENT IN AN ORGANIZED HEALTH CARE EDUCATION/TRAINING PROGRAM

## 2025-08-28 PROCEDURE — 63600175 PHARM REV CODE 636 W HCPCS: Performed by: NURSE ANESTHETIST, CERTIFIED REGISTERED

## 2025-08-28 PROCEDURE — 93005 ELECTROCARDIOGRAM TRACING: CPT

## 2025-08-28 PROCEDURE — 93656 COMPRE EP EVAL ABLTJ ATR FIB: CPT | Mod: 74 | Performed by: INTERNAL MEDICINE

## 2025-08-28 PROCEDURE — 99499 UNLISTED E&M SERVICE: CPT | Mod: ,,, | Performed by: INTERNAL MEDICINE

## 2025-08-28 PROCEDURE — 25000003 PHARM REV CODE 250: Performed by: STUDENT IN AN ORGANIZED HEALTH CARE EDUCATION/TRAINING PROGRAM

## 2025-08-28 PROCEDURE — 36620 INSERTION CATHETER ARTERY: CPT | Mod: XU,,, | Performed by: ANESTHESIOLOGY

## 2025-08-28 RX ORDER — MIDAZOLAM HYDROCHLORIDE 1 MG/ML
INJECTION INTRAMUSCULAR; INTRAVENOUS
Status: DISCONTINUED | OUTPATIENT
Start: 2025-08-28 | End: 2025-08-28

## 2025-08-28 RX ORDER — PROCHLORPERAZINE EDISYLATE 5 MG/ML
5 INJECTION INTRAMUSCULAR; INTRAVENOUS EVERY 30 MIN PRN
Status: DISCONTINUED | OUTPATIENT
Start: 2025-08-28 | End: 2025-08-28 | Stop reason: HOSPADM

## 2025-08-28 RX ORDER — ONDANSETRON HYDROCHLORIDE 2 MG/ML
INJECTION, SOLUTION INTRAVENOUS
Status: DISCONTINUED | OUTPATIENT
Start: 2025-08-28 | End: 2025-08-28

## 2025-08-28 RX ORDER — LIDOCAINE HYDROCHLORIDE 20 MG/ML
INJECTION INTRAVENOUS
Status: DISCONTINUED | OUTPATIENT
Start: 2025-08-28 | End: 2025-08-28

## 2025-08-28 RX ORDER — SUCCINYLCHOLINE CHLORIDE 20 MG/ML
INJECTION INTRAMUSCULAR; INTRAVENOUS
Status: DISCONTINUED | OUTPATIENT
Start: 2025-08-28 | End: 2025-08-28

## 2025-08-28 RX ORDER — HYDROMORPHONE HYDROCHLORIDE 1 MG/ML
0.2 INJECTION, SOLUTION INTRAMUSCULAR; INTRAVENOUS; SUBCUTANEOUS EVERY 5 MIN PRN
Status: DISCONTINUED | OUTPATIENT
Start: 2025-08-28 | End: 2025-08-28 | Stop reason: HOSPADM

## 2025-08-28 RX ORDER — PHENYLEPHRINE HYDROCHLORIDE 10 MG/ML
INJECTION INTRAVENOUS
Status: DISCONTINUED | OUTPATIENT
Start: 2025-08-28 | End: 2025-08-28

## 2025-08-28 RX ORDER — FENTANYL CITRATE 50 UG/ML
INJECTION, SOLUTION INTRAMUSCULAR; INTRAVENOUS
Status: DISCONTINUED | OUTPATIENT
Start: 2025-08-28 | End: 2025-08-28

## 2025-08-28 RX ORDER — PROPOFOL 10 MG/ML
VIAL (ML) INTRAVENOUS
Status: DISCONTINUED | OUTPATIENT
Start: 2025-08-28 | End: 2025-08-28

## 2025-08-28 RX ORDER — ACETAMINOPHEN 325 MG/1
650 TABLET ORAL EVERY 4 HOURS PRN
Status: DISCONTINUED | OUTPATIENT
Start: 2025-08-28 | End: 2025-08-28 | Stop reason: HOSPADM

## 2025-08-28 RX ORDER — DEXAMETHASONE SODIUM PHOSPHATE 4 MG/ML
INJECTION, SOLUTION INTRA-ARTICULAR; INTRALESIONAL; INTRAMUSCULAR; INTRAVENOUS; SOFT TISSUE
Status: DISCONTINUED | OUTPATIENT
Start: 2025-08-28 | End: 2025-08-28

## 2025-08-28 RX ORDER — WARFARIN SODIUM 5 MG/1
5 TABLET ORAL DAILY
Qty: 30 TABLET | Refills: 5 | Status: SHIPPED | OUTPATIENT
Start: 2025-08-28 | End: 2026-02-24

## 2025-08-28 RX ORDER — DIPHENHYDRAMINE HYDROCHLORIDE 50 MG/ML
25 INJECTION, SOLUTION INTRAMUSCULAR; INTRAVENOUS EVERY 6 HOURS PRN
Status: DISCONTINUED | OUTPATIENT
Start: 2025-08-28 | End: 2025-08-28 | Stop reason: HOSPADM

## 2025-08-28 RX ORDER — ONDANSETRON HYDROCHLORIDE 2 MG/ML
4 INJECTION, SOLUTION INTRAVENOUS ONCE AS NEEDED
Status: DISCONTINUED | OUTPATIENT
Start: 2025-08-28 | End: 2025-08-28 | Stop reason: HOSPADM

## 2025-08-28 RX ORDER — HEPARIN SOD,PORCINE/0.9 % NACL 1000/500ML
INTRAVENOUS SOLUTION INTRAVENOUS
Status: DISCONTINUED | OUTPATIENT
Start: 2025-08-28 | End: 2025-08-28 | Stop reason: HOSPADM

## 2025-08-28 RX ORDER — FENTANYL CITRATE 50 UG/ML
25 INJECTION, SOLUTION INTRAMUSCULAR; INTRAVENOUS EVERY 5 MIN PRN
Status: DISCONTINUED | OUTPATIENT
Start: 2025-08-28 | End: 2025-08-28 | Stop reason: HOSPADM

## 2025-08-28 RX ADMIN — PROPOFOL 50 MG: 10 INJECTION, EMULSION INTRAVENOUS at 01:08

## 2025-08-28 RX ADMIN — SODIUM CHLORIDE, SODIUM GLUCONATE, SODIUM ACETATE, POTASSIUM CHLORIDE, MAGNESIUM CHLORIDE, SODIUM PHOSPHATE, DIBASIC, AND POTASSIUM PHOSPHATE: .53; .5; .37; .037; .03; .012; .00082 INJECTION, SOLUTION INTRAVENOUS at 12:08

## 2025-08-28 RX ADMIN — ONDANSETRON 4 MG: 2 INJECTION INTRAMUSCULAR; INTRAVENOUS at 01:08

## 2025-08-28 RX ADMIN — PROPOFOL 150 MG: 10 INJECTION, EMULSION INTRAVENOUS at 12:08

## 2025-08-28 RX ADMIN — SUCCINYLCHOLINE CHLORIDE 160 MG: 20 INJECTION, SOLUTION INTRAMUSCULAR; INTRAVENOUS; PARENTERAL at 12:08

## 2025-08-28 RX ADMIN — FENTANYL CITRATE 50 MCG: 0.05 INJECTION, SOLUTION INTRAMUSCULAR; INTRAVENOUS at 12:08

## 2025-08-28 RX ADMIN — PHENYLEPHRINE HYDROCHLORIDE 100 MCG: 10 INJECTION INTRAVENOUS at 12:08

## 2025-08-28 RX ADMIN — MIDAZOLAM HYDROCHLORIDE 2 MG: 2 INJECTION, SOLUTION INTRAMUSCULAR; INTRAVENOUS at 12:08

## 2025-08-28 RX ADMIN — SODIUM CHLORIDE: 9 INJECTION, SOLUTION INTRAVENOUS at 12:08

## 2025-08-28 RX ADMIN — LIDOCAINE HYDROCHLORIDE 50 MG: 20 INJECTION INTRAVENOUS at 12:08

## 2025-08-28 RX ADMIN — DEXAMETHASONE SODIUM PHOSPHATE 4 MG: 4 INJECTION, SOLUTION INTRAMUSCULAR; INTRAVENOUS at 12:08

## 2025-08-28 RX ADMIN — PHENYLEPHRINE HYDROCHLORIDE 200 MCG: 10 INJECTION INTRAVENOUS at 01:08

## 2025-08-28 RX ADMIN — HUMAN ALBUMIN MICROSPHERES AND PERFLUTREN 0.66 MG: 10; .22 INJECTION, SOLUTION INTRAVENOUS at 01:08

## 2025-08-28 RX ADMIN — PHENYLEPHRINE HYDROCHLORIDE 0.25 MCG/KG/MIN: 10 INJECTION INTRAVENOUS at 01:08

## 2025-08-29 ENCOUNTER — TELEPHONE (OUTPATIENT)
Dept: ELECTROPHYSIOLOGY | Facility: CLINIC | Age: 60
End: 2025-08-29
Payer: COMMERCIAL

## 2025-08-29 ENCOUNTER — PATIENT MESSAGE (OUTPATIENT)
Dept: CARDIOLOGY | Facility: CLINIC | Age: 60
End: 2025-08-29
Payer: COMMERCIAL

## 2025-08-29 LAB
POC ACTIVATED CLOTTING TIME K: 135 SEC (ref 74–137)
SAMPLE: NORMAL

## 2025-09-02 ENCOUNTER — TELEPHONE (OUTPATIENT)
Dept: ELECTROPHYSIOLOGY | Facility: CLINIC | Age: 60
End: 2025-09-02
Payer: COMMERCIAL

## 2025-09-03 ENCOUNTER — ANTI-COAG VISIT (OUTPATIENT)
Dept: CARDIOLOGY | Facility: CLINIC | Age: 60
End: 2025-09-03
Payer: COMMERCIAL

## 2025-09-03 DIAGNOSIS — Z79.01 LONG TERM (CURRENT) USE OF ANTICOAGULANTS: Primary | ICD-10-CM

## 2025-09-03 PROCEDURE — 93793 ANTICOAG MGMT PT WARFARIN: CPT | Mod: S$GLB,,,

## (undated) DEVICE — SHEATH CARTO VIZIGO MED 22MM

## (undated) DEVICE — SET SMARTABLATE IRR TUBE

## (undated) DEVICE — COVER PRB TRNSDUC 7.6X183CM

## (undated) DEVICE — SHEATH HEMOSTASIS 8.5FR

## (undated) DEVICE — PAD DEFIB CADENCE ADULT R2

## (undated) DEVICE — PACK EP DRAPE OMC

## (undated) DEVICE — INTRO FAST-CATH SL1 8.5FR 63CM

## (undated) DEVICE — CATH THERMOCOOL SMTCH SF D F

## (undated) DEVICE — COVER DRAPE ACUSON STERILE

## (undated) DEVICE — INTRODUCER HEMOSTASIS 7.5F

## (undated) DEVICE — PRESSURE LINE

## (undated) DEVICE — CATH DCAPLR STEER LG 6FR 2-5-2

## (undated) DEVICE — CATH PENTARY F 2-6-2MM 115CM

## (undated) DEVICE — PATCH CARTO REFERENCE

## (undated) DEVICE — NDL NRG TRNSPTAL 71CM

## (undated) DEVICE — STOPCOCK 3-WAY

## (undated) DEVICE — ELECTRODE REM PLYHSV RETURN 9

## (undated) DEVICE — KIT PROBE COVER WITH GEL

## (undated) DEVICE — SHEATH INTRODUCER 9FR 11CM

## (undated) DEVICE — LINE PRESSURE MONITORING 96IN

## (undated) DEVICE — BASIN SPLASH SHLD W/PAD BLUE

## (undated) DEVICE — SET HBE EXT CARESITE FILTER

## (undated) DEVICE — R CATH ACUSON ACUNAV 8FR